# Patient Record
Sex: FEMALE | Race: WHITE | NOT HISPANIC OR LATINO | Employment: OTHER | URBAN - METROPOLITAN AREA
[De-identification: names, ages, dates, MRNs, and addresses within clinical notes are randomized per-mention and may not be internally consistent; named-entity substitution may affect disease eponyms.]

---

## 2017-02-08 ENCOUNTER — ALLSCRIPTS OFFICE VISIT (OUTPATIENT)
Dept: OTHER | Facility: OTHER | Age: 57
End: 2017-02-08

## 2017-02-08 ENCOUNTER — TRANSCRIBE ORDERS (OUTPATIENT)
Dept: ADMINISTRATIVE | Facility: HOSPITAL | Age: 57
End: 2017-02-08

## 2017-02-08 DIAGNOSIS — E34.8 OTHER SPECIFIED ENDOCRINE DISORDERS: Primary | ICD-10-CM

## 2017-02-08 DIAGNOSIS — E34.8 OTHER SPECIFIED ENDOCRINE DISORDERS: ICD-10-CM

## 2017-02-08 DIAGNOSIS — I10 ESSENTIAL (PRIMARY) HYPERTENSION: ICD-10-CM

## 2017-02-22 ENCOUNTER — GENERIC CONVERSION - ENCOUNTER (OUTPATIENT)
Dept: OTHER | Facility: OTHER | Age: 57
End: 2017-02-22

## 2017-02-23 ENCOUNTER — TRANSCRIBE ORDERS (OUTPATIENT)
Dept: LAB | Facility: CLINIC | Age: 57
End: 2017-02-23

## 2017-02-23 ENCOUNTER — APPOINTMENT (OUTPATIENT)
Dept: LAB | Facility: CLINIC | Age: 57
End: 2017-02-23
Payer: COMMERCIAL

## 2017-02-23 DIAGNOSIS — I10 ESSENTIAL (PRIMARY) HYPERTENSION: ICD-10-CM

## 2017-02-23 LAB
BUN SERPL-MCNC: 11 MG/DL (ref 5–25)
CREAT SERPL-MCNC: 0.78 MG/DL (ref 0.6–1.3)
GFR SERPL CREATININE-BSD FRML MDRD: >60 ML/MIN/1.73SQ M

## 2017-02-23 PROCEDURE — 36415 COLL VENOUS BLD VENIPUNCTURE: CPT

## 2017-02-23 PROCEDURE — 82565 ASSAY OF CREATININE: CPT

## 2017-02-23 PROCEDURE — 84520 ASSAY OF UREA NITROGEN: CPT

## 2017-05-09 ENCOUNTER — TRANSCRIBE ORDERS (OUTPATIENT)
Dept: ADMINISTRATIVE | Facility: HOSPITAL | Age: 57
End: 2017-05-09

## 2017-05-10 DIAGNOSIS — E34.8 OTHER SPECIFIED ENDOCRINE DISORDERS: ICD-10-CM

## 2017-05-12 ENCOUNTER — HOSPITAL ENCOUNTER (OUTPATIENT)
Dept: MRI IMAGING | Facility: HOSPITAL | Age: 57
Discharge: HOME | End: 2017-05-12
Attending: PSYCHIATRY & NEUROLOGY
Payer: COMMERCIAL

## 2017-05-12 DIAGNOSIS — E34.8 OTHER SPECIFIED ENDOCRINE DISORDERS: ICD-10-CM

## 2017-05-12 PROCEDURE — 70551 MRI BRAIN STEM W/O DYE: CPT

## 2017-05-15 ENCOUNTER — GENERIC CONVERSION - ENCOUNTER (OUTPATIENT)
Dept: OTHER | Facility: OTHER | Age: 57
End: 2017-05-15

## 2017-06-21 ENCOUNTER — HOSPITAL ENCOUNTER (EMERGENCY)
Facility: HOSPITAL | Age: 57
Discharge: HOME | End: 2017-06-21
Attending: EMERGENCY MEDICINE | Admitting: EMERGENCY MEDICINE
Payer: COMMERCIAL

## 2017-06-21 ENCOUNTER — APPOINTMENT (EMERGENCY)
Dept: CT IMAGING | Facility: HOSPITAL | Age: 57
End: 2017-06-21
Payer: COMMERCIAL

## 2017-06-21 VITALS
OXYGEN SATURATION: 99 % | HEART RATE: 93 BPM | RESPIRATION RATE: 18 BRPM | SYSTOLIC BLOOD PRESSURE: 186 MMHG | DIASTOLIC BLOOD PRESSURE: 91 MMHG | TEMPERATURE: 99.1 F | WEIGHT: 190 LBS

## 2017-06-21 DIAGNOSIS — N39.0 URINARY TRACT INFECTION: Primary | ICD-10-CM

## 2017-06-21 LAB
BACTERIA UR QL AUTO: ABNORMAL /HPF
BILIRUB UR QL STRIP: NEGATIVE
CLARITY UR: CLEAR
COLOR UR: YELLOW
GLUCOSE UR STRIP-MCNC: NEGATIVE MG/DL
HGB UR QL STRIP.AUTO: ABNORMAL
HOLD SPECIMEN: NORMAL
HOLD SPECIMEN: YES
HOLD SPECIMEN: YES
KETONES UR STRIP-MCNC: NEGATIVE MG/DL
LEUKOCYTE ESTERASE UR QL STRIP: ABNORMAL
NITRITE UR QL STRIP: NEGATIVE
NON-SQ EPI CELLS URNS QL MICRO: ABNORMAL /HPF
PH UR STRIP.AUTO: 7 [PH] (ref 4.5–8)
PROT UR STRIP-MCNC: NEGATIVE MG/DL
RBC #/AREA URNS AUTO: ABNORMAL /HPF
SP GR UR STRIP.AUTO: 1.01 (ref 1–1.03)
UROBILINOGEN UR QL STRIP.AUTO: 0.2 E.U./DL
WBC #/AREA URNS AUTO: ABNORMAL /HPF

## 2017-06-21 PROCEDURE — 81001 URINALYSIS AUTO W/SCOPE: CPT | Performed by: EMERGENCY MEDICINE

## 2017-06-21 PROCEDURE — 87086 URINE CULTURE/COLONY COUNT: CPT | Performed by: EMERGENCY MEDICINE

## 2017-06-21 PROCEDURE — 36415 COLL VENOUS BLD VENIPUNCTURE: CPT

## 2017-06-21 PROCEDURE — 99284 EMERGENCY DEPT VISIT MOD MDM: CPT

## 2017-06-21 PROCEDURE — 74176 CT ABD & PELVIS W/O CONTRAST: CPT

## 2017-06-21 RX ORDER — ACETAMINOPHEN 325 MG/1
975 TABLET ORAL ONCE
Status: COMPLETED | OUTPATIENT
Start: 2017-06-21 | End: 2017-06-21

## 2017-06-21 RX ORDER — LOSARTAN POTASSIUM 100 MG/1
100 TABLET ORAL DAILY
COMMUNITY
End: 2017-12-18

## 2017-06-21 RX ORDER — SULFAMETHOXAZOLE AND TRIMETHOPRIM 800; 160 MG/1; MG/1
1 TABLET ORAL 2 TIMES DAILY
Qty: 14 TABLET | Refills: 0 | Status: SHIPPED | OUTPATIENT
Start: 2017-06-21 | End: 2017-06-28

## 2017-06-21 RX ORDER — AMLODIPINE BESYLATE AND ATORVASTATIN CALCIUM 10; 10 MG/1; MG/1
1 TABLET, FILM COATED ORAL DAILY
COMMUNITY
End: 2017-12-18

## 2017-06-21 RX ORDER — SULFAMETHOXAZOLE AND TRIMETHOPRIM 800; 160 MG/1; MG/1
1 TABLET ORAL ONCE
Status: COMPLETED | OUTPATIENT
Start: 2017-06-21 | End: 2017-06-21

## 2017-06-21 RX ORDER — FUROSEMIDE 40 MG/1
40 TABLET ORAL DAILY PRN
COMMUNITY

## 2017-06-21 RX ORDER — PHENAZOPYRIDINE HYDROCHLORIDE 100 MG/1
200 TABLET, FILM COATED ORAL ONCE
Status: COMPLETED | OUTPATIENT
Start: 2017-06-21 | End: 2017-06-21

## 2017-06-21 RX ORDER — MONTELUKAST SODIUM 10 MG/1
10 TABLET ORAL
COMMUNITY
End: 2020-01-09 | Stop reason: SDUPTHER

## 2017-06-21 RX ORDER — PHENAZOPYRIDINE HYDROCHLORIDE 200 MG/1
200 TABLET, FILM COATED ORAL 3 TIMES DAILY
Qty: 9 TABLET | Refills: 0 | Status: SHIPPED | OUTPATIENT
Start: 2017-06-21 | End: 2017-06-24

## 2017-06-21 RX ORDER — PRAVASTATIN SODIUM 10 MG
10 TABLET ORAL DAILY
COMMUNITY
End: 2017-12-18

## 2017-06-21 RX ADMIN — SULFAMETHOXAZOLE AND TRIMETHOPRIM 1 TABLET: 800; 160 TABLET ORAL at 22:29

## 2017-06-21 RX ADMIN — ACETAMINOPHEN 975 MG: 325 TABLET, FILM COATED ORAL at 21:54

## 2017-06-21 RX ADMIN — PHENAZOPYRIDINE HYDROCHLORIDE 200 MG: 100 TABLET ORAL at 22:30

## 2017-06-23 LAB — BACTERIA UR CULT: NORMAL

## 2017-11-05 ENCOUNTER — APPOINTMENT (EMERGENCY)
Dept: RADIOLOGY | Facility: HOSPITAL | Age: 57
End: 2017-11-05
Payer: COMMERCIAL

## 2017-11-05 ENCOUNTER — APPOINTMENT (EMERGENCY)
Dept: ULTRASOUND IMAGING | Facility: HOSPITAL | Age: 57
End: 2017-11-05
Payer: COMMERCIAL

## 2017-11-05 ENCOUNTER — HOSPITAL ENCOUNTER (EMERGENCY)
Facility: HOSPITAL | Age: 57
Discharge: HOME | End: 2017-11-05
Attending: EMERGENCY MEDICINE
Payer: COMMERCIAL

## 2017-11-05 VITALS
WEIGHT: 197.97 LBS | SYSTOLIC BLOOD PRESSURE: 168 MMHG | OXYGEN SATURATION: 97 % | RESPIRATION RATE: 16 BRPM | HEIGHT: 63 IN | HEART RATE: 81 BPM | DIASTOLIC BLOOD PRESSURE: 84 MMHG | TEMPERATURE: 98.1 F | BODY MASS INDEX: 35.08 KG/M2

## 2017-11-05 DIAGNOSIS — S83.92XA LEFT KNEE SPRAIN: Primary | ICD-10-CM

## 2017-11-05 PROCEDURE — 73564 X-RAY EXAM KNEE 4 OR MORE: CPT

## 2017-11-05 PROCEDURE — 99284 EMERGENCY DEPT VISIT MOD MDM: CPT

## 2017-11-05 PROCEDURE — 93971 EXTREMITY STUDY: CPT

## 2017-11-05 RX ORDER — ACETAMINOPHEN 325 MG/1
650 TABLET ORAL ONCE
Status: COMPLETED | OUTPATIENT
Start: 2017-11-05 | End: 2017-11-05

## 2017-11-05 RX ADMIN — ACETAMINOPHEN 650 MG: 325 TABLET ORAL at 21:50

## 2017-11-06 NOTE — DISCHARGE INSTRUCTIONS
Knee Sprain   WHAT YOU NEED TO KNOW:   A knee sprain occurs when one or more ligaments in your knee are suddenly stretched or torn  Ligaments are tissues that hold bones together  Ligaments support the knee and keep the joint and bones in the correct position  DISCHARGE INSTRUCTIONS:   Return to the emergency department if:   · Any part of your leg feels cold, numb, or looks pale     Contact your healthcare provider if:   · You have new or increased swelling, bruising, or pain in your knee  · Your symptoms do not improve within 6 weeks, even with treatment  · You have questions or concerns about your condition or care  Medicines:   · NSAIDs , such as ibuprofen, help decrease swelling, pain, and fever  This medicine is available with or without a doctor's order  NSAIDs can cause stomach bleeding or kidney problems in certain people  If you take blood thinner medicine, always ask your healthcare provider if NSAIDs are safe for you  Always read the medicine label and follow directions  · Acetaminophen  decreases pain and fever  It is available without a doctor's order  Ask how much to take and how often to take it  Follow directions  Read the labels of all other medicines you are using to see if they also contain acetaminophen, or ask your doctor or pharmacist  Acetaminophen can cause liver damage if not taken correctly  Do not use more than 4 grams (4,000 milligrams) total of acetaminophen in one day  · Prescription pain medicine  may be given  Ask how to take this medicine safely  · Take your medicine as directed  Contact your healthcare provider if you think your medicine is not helping or if you have side effects  Tell him or her if you are allergic to any medicine  Keep a list of the medicines, vitamins, and herbs you take  Include the amounts, and when and why you take them  Bring the list or the pill bottles to follow-up visits   Carry your medicine list with you in case of an emergency  Self-care:   · Rest  your knee and do not exercise  You may be told to keep weight off your knee  This means that you should not walk on your injured leg  Rest helps decrease swelling and allows the injury to heal  You can do gentle range of motion (ROM) exercises as directed  This will prevent stiffness  · Apply ice  on your knee for 15 to 20 minutes every hour or as directed  Use an ice pack, or put crushed ice in a plastic bag  Cover it with a towel  Ice helps prevent tissue damage and decreases swelling and pain  · Apply compression to your knee as directed  You may need to wear an elastic bandage  This helps keep your injured knee from moving too much while it heals  You can loosen or tighten the elastic bandage to make it comfortable  It should be tight enough for you to feel support  It should not be so tight that it causes your toes to feel numb or tingly  If you are wearing an elastic bandage, take it off and rewrap it once a day  · Elevate your knee  above the level of your heart as often as you can  This will help decrease swelling and pain  Prop your leg on pillows or blankets to keep it elevated comfortably  Do not put pillows directly behind your knee  · Use support devices as directed:  Support devices such as a splint or brace may be needed  These devices limit movement and protect your joint while it heals  You may be given crutches to use until you can stand on your injured leg without pain  Use devices as directed  Physical therapy:  A physical therapist teaches you exercises to help improve movement and strength, and to decrease pain  Prevent another knee sprain:  Exercise your legs to keep your muscles strong  Strong leg muscles help protect your knee and prevent strain  The following may also prevent a knee sprain:  · Slowly start your exercise or training program   Slowly increase the time, distance, and intensity of your exercise   Sudden increases in training may cause you to injure your knee again  · Wear protective braces and equipment as directed  Braces may prevent your knee from moving the wrong way and causing another sprain  Protective equipment may support your bones and ligaments to prevent injury  · Warm up and stretch before exercise  Warm up by walking or using an exercise bike before starting your regular exercise  Do gentle stretches after warming up  This helps to loosen your muscles and decrease stress on your knee  Cool down and stretch after you exercise  · Wear shoes that fit correctly and support your feet  Replace your running or exercise shoes before the padding or shock absorption is worn out  Ask your healthcare provider which exercise shoes are best for you  Ask if you should wear special shoe inserts  Shoe inserts can help support your heels and arches or keep your foot lined up correctly in your shoes  Exercise on flat surfaces  Follow up with your healthcare provider as directed:  Write down your questions so you remember to ask them during your visits  © 2017 [address] [facility] Information is for End User's use only and may not be sold, redistributed or otherwise used for commercial purposes  All illustrations and images included in CareNotes® are the copyrighted property of [employer] , [employer]  or Robby Titus  The above information is an  only  It is not intended as medical advice for individual conditions or treatments  Talk to your doctor, nurse or pharmacist before following any medical regimen to see if it is safe and effective for you

## 2017-11-06 NOTE — ED PROVIDER NOTES
History  Chief Complaint   Patient presents with    Knee Pain     Pt  states tripped over tree roots and now has pain in left knee, denies hitting head or LOC  History provided by:  Patient  Knee Pain   Location:  Knee  Time since incident:  1 day  Injury: yes    Mechanism of injury: fall    Fall:     Fall occurred:  Tripped    Impact surface:  [place]    Point of impact:  Knees    Entrapped after fall: no    Knee location:  L knee  Pain details:     Quality:  Dull    Radiates to:  Does not radiate    Severity:  Moderate    Onset quality:  Gradual    Duration:  1 day    Timing:  Constant    Progression:  Unchanged  Chronicity:  New  Foreign body present:  No foreign bodies  Prior injury to area:  No  Relieved by:  None tried  Exacerbated by: walking  Ineffective treatments:  None tried  Associated symptoms: no back pain, no decreased ROM, no fatigue, no fever, no muscle weakness, no neck pain, no numbness, no stiffness, no swelling and no tingling        Prior to Admission Medications   Prescriptions Last Dose Informant Patient Reported? Taking? amLODIPine-atorvastatin (CADUET) 10-10 MG per tablet   Yes Yes   Sig: Take 1 tablet by mouth daily   furosemide (LASIX) 40 mg tablet   Yes Yes   Sig: Take 40 mg by mouth 2 (two) times a day   losartan (COZAAR) 100 MG tablet   Yes Yes   Sig: Take 100 mg by mouth daily   metoprolol tartrate (LOPRESSOR) 25 mg tablet   Yes Yes   Sig: Take 25 mg by mouth 2 (two) times a day   montelukast (SINGULAIR) 10 mg tablet   Yes Yes   Sig: Take 10 mg by mouth daily at bedtime   pravastatin (PRAVACHOL) 10 mg tablet   Yes Yes   Sig: Take 10 mg by mouth daily      Facility-Administered Medications: None       Past Medical History:   Diagnosis Date    Bladder prolapse, female, acquired     Epilepsy ([facility] [address] )     Hypertension     Kidney stone        Past Surgical History:   Procedure Laterality Date    HYSTERECTOMY         History reviewed  No pertinent family history    I have reviewed and agree with the history as documented  Social History   Substance Use Topics    Smoking status: Never Smoker    Smokeless tobacco: Never Used    Alcohol use No        Review of Systems   Constitutional: Negative for activity change, chills, fatigue and fever  Eyes: Negative for photophobia and visual disturbance  Respiratory: Negative for chest tightness, shortness of breath and wheezing  Cardiovascular: Negative for chest pain and palpitations  Gastrointestinal: Negative for abdominal pain, constipation, diarrhea, nausea and vomiting  Genitourinary: Negative for decreased urine volume, difficulty urinating, dysuria, genital sores and hematuria  Musculoskeletal: Positive for arthralgias and joint swelling  Negative for back pain, myalgias, neck pain, neck stiffness and stiffness  Skin: Negative for rash and wound  Neurological: Negative for dizziness, syncope, weakness, light-headedness, numbness and headaches  Hematological: Negative for adenopathy  All other systems reviewed and are negative  Physical Exam  ED Triage Vitals [11/05/17 2141]   Temperature Pulse Respirations Blood Pressure SpO2   98 1 °F (36 7 °C) 81 16 (!) 216/103 97 %      Temp Source Heart Rate Source Patient Position - Orthostatic VS BP Location FiO2 (%)   Oral Monitor Lying Right arm --      Pain Score       8           Orthostatic Vital Signs  Vitals:    11/05/17 2141 11/05/17 2330   BP: (!) 216/103 168/84   Pulse: 81    Patient Position - Orthostatic VS: Lying        Physical Exam   Constitutional: She is oriented to person, place, and time  She appears well-developed and well-nourished  No distress  HENT:   Head: Normocephalic and atraumatic  Right Ear: External ear normal    Left Ear: External ear normal    Nose: Nose normal    Mouth/Throat: Oropharynx is clear and moist    Eyes: Conjunctivae and EOM are normal    Neck: Normal range of motion  Neck supple     Cardiovascular: Normal rate, regular rhythm, normal heart sounds and intact distal pulses  Exam reveals no gallop and no friction rub  No murmur heard  Pulmonary/Chest: Effort normal and breath sounds normal  No respiratory distress  She has no wheezes  Abdominal: Soft  She exhibits no distension  There is no tenderness  There is no guarding  Musculoskeletal: Normal range of motion  She exhibits tenderness  She exhibits no edema or deformity  Tenderness to posterior aspect of left knee  Left knee swelling noted  No pitting edema, no erythema, no warmth   Neurological: She is alert and oriented to person, place, and time  No sensory deficit  She exhibits normal muscle tone  Coordination normal    Skin: Skin is warm and dry  Capillary refill takes less than 2 seconds  She is not diaphoretic  Nursing note and vitals reviewed  ED Medications  Medications   acetaminophen (TYLENOL) tablet 650 mg (650 mg Oral Given 11/5/17 2150)       Diagnostic Studies  Results Reviewed     None                 XR knee 4+ views LEFT    (Results Pending)   VAS lower limb venous duplex study, unilateral/limited    (Results Pending)              Procedures  Orthopedic Injury  Date/Time: 11/5/2017 11:34 PM  Performed by: Seema Orellana by: Angélica Perez   Consent: Verbal consent obtained    Risks and benefits: risks, benefits and alternatives were discussed  Injury location: knee  Location details: left knee  Injury type: soft tissue  Pre-procedure neurovascular assessment: neurovascularly intact  Pre-procedure distal perfusion: normal  Pre-procedure neurological function: normal  Pre-procedure range of motion: normal    Anesthesia:  Local anesthesia used: no  Immobilization: ace wrap  Post-procedure neurovascular assessment: post-procedure neurovascularly intact  Post-procedure distal perfusion: normal  Post-procedure neurological function: normal  Post-procedure range of motion: normal  Patient tolerance: Patient tolerated the procedure well with no immediate complications             Phone Contacts  ED Phone Contact    ED Course  ED Course                                MDM  Number of Diagnoses or Management Options  Left knee sprain:   Diagnosis management comments: Differential diagnosis to include not limited to:  Knee sprain, knee fracture, DVT, ligamentous injury    Patient is a 53-year-old female with past medical history of DVT presenting to the emergency department for evaluation of left knee pain  Patient states that yesterday when she was getting up her car she tripped over a tree root and landed on her left knee  She states she was able to get up and ambulate throughout the night  She tried ice which helped her swelling  She states that today when she was walking around she has no history of increased pain in her left knee and weakness  She states that she felt like her kneecap giving out all day  She has not tried any medications to help with pain  She states that many years ago she did have a DVT in her left leg after minor knee injury  She states she is very worried about that right now as her knee is very swollen  On exam patient does have painful full range of motion of her left knee  Sensation fully intact distally  2+ pulses distally  Her knee is 2 cm more swollen than the right knee  With posterior tenderness  No acute fracture seen on x-ray  Plan to get ultrasound rule out DVT, as she had 1 after similar injury in the past   If normal, will discharge her after Ace wrap and have her follow up with orthopedics  Will offer crutches         Amount and/or Complexity of Data Reviewed  Tests in the radiology section of CPT®: ordered and reviewed  Discuss the patient with other providers: yes    Risk of Complications, Morbidity, and/or Mortality  Presenting problems: low  Diagnostic procedures: moderate  Management options: low    Patient Progress  Patient progress: improved    CritCare Time    Disposition  Final diagnoses: Left knee sprain     Time reflects when diagnosis was documented in both MDM as applicable and the Disposition within this note     Time User Action Codes Description Comment    11/5/2017 11:33 PM Spencer Necessary Add Emmy Morales  [id number]] Left knee sprain       ED Disposition     ED Disposition Condition Comment    Discharge  [address] discharge to home/self care  Condition at discharge: Stable        Follow-up Information     Follow up With Specialties Details Why Contact Info Additional Information    [facility] [place]  Schedule an appointment as soon as possible for a visit in 2 weeks If symptoms worsen or persist [address] [facility] [address] Emergency Department Emergency Medicine  If symptoms worsen [address]  AN ED, [address], [place], [place], [address]        Patient's Medications   Discharge Prescriptions    No medications on file     No discharge procedures on file      ED Provider  Electronically Signed by           Natividad Vasquez PA-C  11/05/17 2187

## 2017-11-08 ENCOUNTER — HOSPITAL ENCOUNTER (EMERGENCY)
Facility: HOSPITAL | Age: 57
Discharge: HOME | End: 2017-11-08
Admitting: EMERGENCY MEDICINE
Payer: COMMERCIAL

## 2017-11-08 ENCOUNTER — APPOINTMENT (EMERGENCY)
Dept: CT IMAGING | Facility: HOSPITAL | Age: 57
End: 2017-11-08
Payer: COMMERCIAL

## 2017-11-08 VITALS
BODY MASS INDEX: 34.61 KG/M2 | HEIGHT: 63 IN | OXYGEN SATURATION: 97 % | WEIGHT: 195.33 LBS | HEART RATE: 72 BPM | SYSTOLIC BLOOD PRESSURE: 146 MMHG | DIASTOLIC BLOOD PRESSURE: 68 MMHG | RESPIRATION RATE: 18 BRPM | TEMPERATURE: 99 F

## 2017-11-08 DIAGNOSIS — M25.569 KNEE PAIN, ACUTE: Primary | ICD-10-CM

## 2017-11-08 PROCEDURE — 73700 CT LOWER EXTREMITY W/O DYE: CPT

## 2017-11-08 PROCEDURE — 99284 EMERGENCY DEPT VISIT MOD MDM: CPT

## 2017-11-08 RX ORDER — OXYCODONE HYDROCHLORIDE AND ACETAMINOPHEN 5; 325 MG/1; MG/1
1 TABLET ORAL EVERY 6 HOURS PRN
Qty: 8 TABLET | Refills: 0 | Status: SHIPPED | OUTPATIENT
Start: 2017-11-08 | End: 2017-11-08

## 2017-11-08 RX ORDER — OXYCODONE HYDROCHLORIDE AND ACETAMINOPHEN 5; 325 MG/1; MG/1
1 TABLET ORAL ONCE
Status: DISCONTINUED | OUTPATIENT
Start: 2017-11-08 | End: 2017-11-08 | Stop reason: HOSPADM

## 2017-11-08 RX ORDER — NAPROXEN 250 MG/1
500 TABLET ORAL ONCE
Status: COMPLETED | OUTPATIENT
Start: 2017-11-08 | End: 2017-11-08

## 2017-11-08 RX ORDER — TRAMADOL HYDROCHLORIDE 50 MG/1
50 TABLET ORAL EVERY 6 HOURS PRN
Qty: 6 TABLET | Refills: 0 | Status: SHIPPED | OUTPATIENT
Start: 2017-11-08 | End: 2017-11-14

## 2017-11-08 RX ORDER — NAPROXEN 500 MG/1
500 TABLET ORAL 2 TIMES DAILY WITH MEALS
Qty: 14 TABLET | Refills: 0 | Status: SHIPPED | OUTPATIENT
Start: 2017-11-08 | End: 2020-06-30

## 2017-11-08 RX ADMIN — NAPROXEN 500 MG: 250 TABLET ORAL at 13:18

## 2017-11-08 NOTE — DISCHARGE INSTRUCTIONS

## 2017-11-08 NOTE — ED PROVIDER NOTES
History  Chief Complaint   Patient presents with   Ann-Marie Humphries Fall     pt fell the other day, was here saturday for same, Xray &US done and were both negative; seen @ortho today &was told she can have MRI on wednesday but does not want to wait       History provided by:  Patient  Fall   Mechanism of injury: fall    Injury location: left knee  Incident location:  Outdoors  Time since incident:  3 days  Fall:     Fall occurred:  Tripped (uneven sidewalk)    Impact surface:  [place]    Entrapped after fall: no    Suspicion of alcohol use: no    Suspicion of drug use: no    Prior to arrival data:     Loss of consciousness: no      Amnesic to event: no        Prior to Admission Medications   Prescriptions Last Dose Informant Patient Reported? Taking? amLODIPine-atorvastatin (CADUET) 10-10 MG per tablet   Yes No   Sig: Take 1 tablet by mouth daily   furosemide (LASIX) 40 mg tablet   Yes No   Sig: Take 40 mg by mouth 2 (two) times a day   losartan (COZAAR) 100 MG tablet   Yes No   Sig: Take 100 mg by mouth daily   metoprolol tartrate (LOPRESSOR) 25 mg tablet   Yes No   Sig: Take 25 mg by mouth 2 (two) times a day   montelukast (SINGULAIR) 10 mg tablet   Yes No   Sig: Take 10 mg by mouth daily at bedtime   pravastatin (PRAVACHOL) 10 mg tablet   Yes No   Sig: Take 10 mg by mouth daily      Facility-Administered Medications: None       Past Medical History:   Diagnosis Date    Bladder prolapse, female, acquired     Epilepsy ([facility] [address] )     Hypertension     Kidney stone        Past Surgical History:   Procedure Laterality Date    HYSTERECTOMY         History reviewed  No pertinent family history  I have reviewed and agree with the history as documented  Social History   Substance Use Topics    Smoking status: Never Smoker    Smokeless tobacco: Never Used    Alcohol use No        Review of Systems   Constitutional: Positive for activity change  Musculoskeletal: Positive for arthralgias and gait problem     Skin: Negative for color change, pallor, rash and wound  Psychiatric/Behavioral: Negative for confusion  All other systems reviewed and are negative  Physical Exam  ED Triage Vitals [11/08/17 1214]   Temperature Pulse Respirations Blood Pressure SpO2   99 °F (37 2 °C) 70 18 143/67 96 %      Temp Source Heart Rate Source Patient Position - Orthostatic VS BP Location FiO2 (%)   Oral Monitor Lying Right arm --      Pain Score       8           Orthostatic Vital Signs  Vitals:    11/08/17 1214 11/08/17 1430   BP: 143/67 146/68   Pulse: 70 72   Patient Position - Orthostatic VS: Lying Sitting       Physical Exam   Constitutional: She is oriented to person, place, and time  She appears well-developed and well-nourished  No distress  Musculoskeletal: She exhibits no edema  Examination of the left knee  There is mild swelling upon inspection  The legs are aligned  There is tenderness palpated over the posterior and lateral joint line  There is tenderness with flexion beyond 90° also in this area  Medial lateral collateral ligaments are intact  There is negative Lachman's sign  There is no crepitance upon palpation of the patella  There is good range of motion of the hip and ankle in all planes of motion  There is palpable dorsalis pedis and posterior tibial pulses that are +2 and symmetrical  There is sensation and motor function to the deep and superficial peroneal, sural, posterior tibial nerve distributions  Neurological: She is alert and oriented to person, place, and time  Skin: Skin is warm  Capillary refill takes less than 2 seconds  No rash noted  She is not diaphoretic  No erythema  No pallor  Psychiatric: She has a normal mood and affect  Her behavior is normal  Judgment and thought content normal    Nursing note and vitals reviewed        ED Medications  Medications   oxyCODONE-acetaminophen (PERCOCET) 5-325 mg per tablet 1 tablet (1 tablet Oral Not Given 11/8/17 1320)   naproxen (NAPROSYN) tablet 500 mg (500 mg Oral Given 11/8/17 1318)       Diagnostic Studies  Results Reviewed     None                 CT knee left wo contrast   ED Interpretation by Abhishek Quintana PA-C (11/08 1412)   No fracture      Final Result by Courtney Li MD (11/08 1402)   No fracture  Workstation performed: [id number]                    Procedures  Procedures       Phone Contacts  ED Phone Contact    ED Course  ED Course as of Nov 08 1621 Wed Nov 08, 2017   1418  aware reviewed  No prescriptions                                MDM  Number of Diagnoses or Management Options  Knee pain, acute: new and requires workup     Amount and/or Complexity of Data Reviewed  Tests in the radiology section of CPT®: ordered and reviewed  Tests in the medicine section of CPT®: ordered and reviewed  Independent visualization of images, tracings, or specimens: (X-ray left knee reviewed 11/4/17)    Risk of Complications, Morbidity, and/or Mortality  Presenting problems: low  Diagnostic procedures: moderate  Management options: moderate  General comments: Patient presents emergency room for repeat evaluation of her left knee  She had a plants in twist injury to her left knee, 3 days ago  She was seen and evaluated in the emergency room an x-ray of her knee was obtained which revealed some degenerative joint disease but no fracture  She also had an ultrasound of her calf because of pain was posterior and the ruling out a DVT  She attempted to go to the orthopedic office today  They told her they have no available appointments, so they sent her to the emergency room for evaluation  She presents because she is demanding an MRI  She was evaluated and the x-rays were reviewed from 3 days ago which were normal  I ordered a CT scan in the emergency room demonstrates no fracture  She was placed in a knee immobilizer and given a cane for ambulation    I did write her a prescription for tramadol as well as anti-inflammatories medications, Naprosyn, to take until her follow-up appointment with Orthopedics next week  Patient Progress  Patient progress: stable    CritCare Time    Disposition  Final diagnoses:   Knee pain, acute - Acute left knee sprain     Time reflects when diagnosis was documented in both MDM as applicable and the Disposition within this note     Time User Action Codes Description Comment    11/8/2017  2:24 PM Evershagufta Gleasoner Add [M25 569] Knee pain, acute     11/8/2017  2:24 PM Everlean Rafter Modify [M25 569] Knee pain, acute left    11/8/2017  2:25 PM Everlean Rafter Modify [M25 569] Knee pain, acute Acute left knee sprain      ED Disposition     ED Disposition Condition Comment    Discharge  Michelle Olivo Robbymanojlong discharge to home/self care      Condition at discharge: Good        Follow-up Information     Follow up With Specialties Details Shyam Blackman MD Family Medicine   [address] [place]      Veronique Recinos, DO Orthopedic Surgery  as scheduled next week [address] [place] [phone]          Discharge Medication List as of 11/8/2017  2:18 PM      START taking these medications    Details   naproxen (NAPROSYN) 500 mg tablet Take 1 tablet by mouth 2 (two) times a day with meals, Starting Wed 11/8/2017, Print      oxyCODONE-acetaminophen (PERCOCET) 5-325 mg per tablet Take 1 tablet by mouth every 6 (six) hours as needed for moderate pain for up to 8 days  aware reviewed Max Daily Amount: 4 tablets, Starting Wed 11/8/2017, Until u 11/16/2017, Print         CONTINUE these medications which have NOT CHANGED    Details   amLODIPine-atorvastatin (CADUET) 10-10 MG per tablet Take 1 tablet by mouth daily, Historical Med      furosemide (LASIX) 40 mg tablet Take 40 mg by mouth 2 (two) times a day, Historical Med      losartan (COZAAR) 100 MG tablet Take 100 mg by mouth daily, Historical Med      metoprolol tartrate (LOPRESSOR) 25 mg tablet Take 25 mg by mouth 2 (two) times a day, Historical Med      montelukast (SINGULAIR) 10 mg tablet Take 10 mg by mouth daily at bedtime, Historical Med      pravastatin (PRAVACHOL) 10 mg tablet Take 10 mg by mouth daily, Historical Med           No discharge procedures on file      ED Provider  Electronically Signed by           Otoniel Thompson PA-C  11/08/17 6336

## 2017-11-08 NOTE — ED NOTES
Pt stable, no distress noted, pt amb from ER with cane without difficulty     Kassie Paredes, RN  11/08/17 2657

## 2017-11-08 NOTE — ED NOTES
Pt expressed that she is unhappy with care, pt stating that she went to ortho office to schedule and appt and can't be seen until Wednesday and that is unacceptable, pt states she has hx of stroke and when she is in pain her BP increases and that is not acceptable, pt insists there is something wrong with her knee, pt wants MRI done in ER, explained to patient that an MRI will not be completed in ER, she will have to see Ortho and they will determine the need for an MRI, pt unhappy with explanation, adv MD will be in to see patient shortly and determine further course of treatment, patient mother heard calling nurse expletive name upon leaving the room       Alex Rodriguez RN  11/08/17 7790

## 2017-11-20 ENCOUNTER — ALLSCRIPTS OFFICE VISIT (OUTPATIENT)
Dept: OTHER | Facility: OTHER | Age: 57
End: 2017-11-20

## 2017-11-20 DIAGNOSIS — S83.412A SPRAIN OF MEDIAL COLLATERAL LIGAMENT OF LEFT KNEE: ICD-10-CM

## 2017-11-21 NOTE — PROGRESS NOTES
Assessment    1  Sprain of medial collateral ligament of left knee, initial encounter (844 1) ([id number])    Plan  Sprain of medial collateral ligament of left knee, initial encounter    · *1 - [facility]  * evaluate and treat 2 times a week for 6-8weeks  Patient may also have meniscus tear not getting MRI at this time  Status: Active Requested for: 20Nov2017  Care Summary provided  : Yes    Discussion/Summary    Left knee injury suspect back medial collateral ligaments brain possible medial meniscus tearI offered cortisone injection patient declinedWe will send her to physical therapy T ROM brace applied in the office today  Follow-up in 4 weeks but she is going away I did talk to her about signs and symptoms that may allow her to progress with range of motion and unlocking the brace at 4 weeks if she is not having pain with valgus stress  She will come back to see us in 4-6 weeks at which time we will evaluate if we can get rid of the brace  If she is still having pain we will look at an MRI for the medial meniscus  History of Present Illness  HPI: Patient comes in today with regards to her left knee  She fell over stump by her house and basically twisted her knee  She fell onto her knee as well  She reports the pain ranges from 4 out 10-8 out 10  Ice brace and pain killers have helped  Tylenol has helped as well  Pain is posterior medially  Patient reports past medical history of high blood pressure stroke heart disorder seizures  She also reports seizures after having steroid injections  She also reports depression stomach problems  She also reports a goiter and arthritis  Review of Systems   Constitutional: No fever, no chills, feels well, no tiredness, no recent weight gain or loss  Eyes: No complaints of eyesight problems, no red eyes  ENT: no loss of hearing, no nosebleeds, no sore throat    Cardiovascular: No complaints of chest pain, no palpitations, no leg claudication or lower extremity edema  Respiratory: no compliants of shortness of breath, no wheezing, no cough  Gastrointestinal: no complaints of abdominal pain, no constipation, no nausea or diarrhea, no vomiting, no bloody stools  Genitourinary: no complaints of dysuria, no incontinence  Musculoskeletal: as noted in HPI  Integumentary: no complaints of skin rash or lesion, no itching or dry skin, no skin wounds  Neurological: no complaints of headache, no confusion, no numbness or tingling, no dizziness  Endocrine: No complaints of muscle weakness, no feelings of weakness, no frequent urination, no excessive thirst   Psychiatric: No suicidal thoughts, no anxiety, no feelings of depression  Active Problems  1  Esophageal reflux (530 81) (K21 9)   2  Hypertension (401 9) (I10)   3  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)   4  Seizures (780 39) (R56 9)   5  Tension headache (307 81) (G44 209)    Past Medical History   · History of Depression (311) (F32 9)   · History of Epilepsy (345 90)   · History of Head Injury (959 01)   · History of diverticulitis of colon (V12 79) (Z87 19)   · History of headache (V13 89) (G83 607)   · History of hyperlipidemia (V12 29) (Z86 39)   · History of myocardial infarction (412) (I25 2)   · History of renal calculi (V13 01) (Z87 442)   · History of transient cerebral ischemia (V12 54) (Z86 73)   · History of Irregular heart beat (427 9) (I49 9)   · History of Nephrolithiasis (V13 01)   · History of Nephrolithiasis (592 0) (N20 0)   · History of Post traumatic stress disorder (309 81) (F43 10)    The active problems and past medical history were reviewed and updated today  Surgical History   · History of Hysterectomy   · History of Salpingo-oophorectomy Bilateral    The surgical history was reviewed and updated today         Family History  Mother    · Family history of Hypertension (V17 49)  Father    · Family history of diabetes mellitus (V18 0) (Z83 3)   · Family history of hypertension (V17 49) (Z82 49)   · Family history of Hypertension (V17 49)  Maternal Grandmother    · Family history of hypertension (V17 49) (Z82 49)   · Family history of Hypertension (V17 49)    The family history was reviewed and updated today  Social History     · Alcohol use   · Being A Social Drinker   · Caffeine use (V49 89) (F15 90)   · Never A Smoker   · Never smoker   · Never Used Drugs   · Single  The social history was reviewed and updated today  Current Meds   1  AmLODIPine Besylate 10 MG Oral Tablet; Take 1 tablet daily; Therapy: (Recorded:15Oct2013) to Recorded   2   MG TABS; take one tablet po q8h prn with food; Therapy: (Recorded:12Sep2014) to Recorded   3  Losartan Potassium 100 MG Oral Tablet; TAKE 1 TABLET DAILY; Therapy: (Recorded:12Sep2014) to Recorded   4  Omeprazole 40 MG Oral Capsule Delayed Release; TAKE 1 CAPSULE DAILY; Therapy: (Recorded:12Sep2014) to Recorded   5  Pravachol 40 MG Oral Tablet; TAKE 1 TABLET DAILY AS DIRECTED; Therapy: (Recorded:12Sep2014) to Recorded   6  Singulair 10 MG Oral Tablet; TAKE 1 TABLET DAILY; Therapy: (Recorded:15Oct2013) to Recorded   7  Torsemide 10 MG Oral Tablet; Take 1 tablet daily; Therapy: [id number] to Recorded    The medication list was reviewed and updated today  Allergies  1  Dilantin CAPS   2  Lidocaine HCl SOLN   3  Novocain SOLN  4  IVP Dye   5  IV Dye    Vitals  Signs     Heart Rate: 91  Systolic: 841  Diastolic: 85  Height: 5 ft 4 in  Weight: 196 lb 7 04 oz  BMI Calculated: 33 72  BSA Calculated: 1 95    Physical Exam  No audible wheeze no shortness of breath no appreciable erythema  Examination of patient's left knee there is mild laxity with valgus stress compared to the other extremity  There is pinpoint tenderness over the medial joint line especially with the medial collateral ligament is  Bounce test is negative but Deb's test does cause pain    Constitutional - General appearance: Normal   Musculoskeletal - Digits and nails: Normal -- Inspection/palpation of joints, bones, and muscles: Normal -- Muscle strength/tone: Normal   Cardiovascular - Pulses: Normal   Skin - Skin and subcutaneous tissue: Normal   Neurologic - Reflexes: Normal -- Sensation: Normal   Psychiatric - Orientation to person, place, and time: Normal -- Mood and affect: Normal   Eyes  Conjunctiva and lids: Normal        Results/Data  I personally reviewed the films/images/results in the office today  My interpretation follows  X-ray Review No osseous deformity  Future Appointments    Date/Time Provider Specialty Site   02/08/2018 10:00 AM JESSICA Butterfield   Neurology [facility] 21 12/18/2017 03:40 PM Heath Rogers DO Orthopedic Surgery [facility]  [address]       Signatures   Electronically signed by : Tea Garner DO; Nov 20 2017  2:51PM EST                       (Author)

## 2017-12-18 ENCOUNTER — GENERIC CONVERSION - ENCOUNTER (OUTPATIENT)
Dept: OTHER | Facility: OTHER | Age: 57
End: 2017-12-18

## 2017-12-18 ENCOUNTER — APPOINTMENT (EMERGENCY)
Dept: ULTRASOUND IMAGING | Facility: HOSPITAL | Age: 57
End: 2017-12-18
Payer: COMMERCIAL

## 2017-12-18 ENCOUNTER — HOSPITAL ENCOUNTER (EMERGENCY)
Facility: HOSPITAL | Age: 57
Discharge: HOME | End: 2017-12-18
Attending: EMERGENCY MEDICINE | Admitting: EMERGENCY MEDICINE
Payer: COMMERCIAL

## 2017-12-18 VITALS
RESPIRATION RATE: 18 BRPM | TEMPERATURE: 98.6 F | BODY MASS INDEX: 34.05 KG/M2 | OXYGEN SATURATION: 96 % | HEART RATE: 60 BPM | SYSTOLIC BLOOD PRESSURE: 170 MMHG | DIASTOLIC BLOOD PRESSURE: 78 MMHG | WEIGHT: 192.24 LBS

## 2017-12-18 DIAGNOSIS — M79.89 LEG SWELLING: Primary | ICD-10-CM

## 2017-12-18 LAB
APTT PPP: 32 SECONDS (ref 23–35)
BASOPHILS # BLD AUTO: 0.03 THOUSANDS/ΜL (ref 0–0.1)
BASOPHILS NFR BLD AUTO: 0 % (ref 0–1)
EOSINOPHIL # BLD AUTO: 0.17 THOUSAND/ΜL (ref 0–0.61)
EOSINOPHIL NFR BLD AUTO: 2 % (ref 0–6)
ERYTHROCYTE [DISTWIDTH] IN BLOOD BY AUTOMATED COUNT: 13 % (ref 11.6–15.1)
HCT VFR BLD AUTO: 39.8 % (ref 34.8–46.1)
HGB BLD-MCNC: 13.6 G/DL (ref 11.5–15.4)
INR PPP: 0.86 (ref 0.86–1.16)
LYMPHOCYTES # BLD AUTO: 1.81 THOUSANDS/ΜL (ref 0.6–4.47)
LYMPHOCYTES NFR BLD AUTO: 22 % (ref 14–44)
MCH RBC QN AUTO: 30.6 PG (ref 26.8–34.3)
MCHC RBC AUTO-ENTMCNC: 34.2 G/DL (ref 31.4–37.4)
MCV RBC AUTO: 89 FL (ref 82–98)
MONOCYTES # BLD AUTO: 0.67 THOUSAND/ΜL (ref 0.17–1.22)
MONOCYTES NFR BLD AUTO: 8 % (ref 4–12)
NEUTROPHILS # BLD AUTO: 5.66 THOUSANDS/ΜL (ref 1.85–7.62)
NEUTS SEG NFR BLD AUTO: 68 % (ref 43–75)
PLATELET # BLD AUTO: 270 THOUSANDS/UL (ref 149–390)
PMV BLD AUTO: 10.9 FL (ref 8.9–12.7)
PROTHROMBIN TIME: 12 SECONDS (ref 12.1–14.4)
RBC # BLD AUTO: 4.45 MILLION/UL (ref 3.81–5.12)
WBC # BLD AUTO: 8.34 THOUSAND/UL (ref 4.31–10.16)

## 2017-12-18 PROCEDURE — 85610 PROTHROMBIN TIME: CPT | Performed by: EMERGENCY MEDICINE

## 2017-12-18 PROCEDURE — 85730 THROMBOPLASTIN TIME PARTIAL: CPT | Performed by: EMERGENCY MEDICINE

## 2017-12-18 PROCEDURE — 99284 EMERGENCY DEPT VISIT MOD MDM: CPT

## 2017-12-18 PROCEDURE — 36415 COLL VENOUS BLD VENIPUNCTURE: CPT | Performed by: EMERGENCY MEDICINE

## 2017-12-18 PROCEDURE — 93971 EXTREMITY STUDY: CPT

## 2017-12-18 PROCEDURE — 85025 COMPLETE CBC W/AUTO DIFF WBC: CPT | Performed by: EMERGENCY MEDICINE

## 2017-12-18 RX ORDER — AMLODIPINE BESYLATE 10 MG/1
1 TABLET ORAL DAILY
COMMUNITY

## 2017-12-18 RX ORDER — TRAMADOL HYDROCHLORIDE 50 MG/1
50 TABLET ORAL EVERY 6 HOURS PRN
Qty: 30 TABLET | Refills: 0 | Status: SHIPPED | OUTPATIENT
Start: 2017-12-18 | End: 2017-12-28

## 2017-12-18 RX ORDER — TRAMADOL HYDROCHLORIDE 50 MG/1
50 TABLET ORAL EVERY 6 HOURS PRN
COMMUNITY
Start: 2017-12-01 | End: 2017-12-18

## 2017-12-18 RX ORDER — LOSARTAN POTASSIUM 100 MG/1
100 TABLET ORAL DAILY
COMMUNITY

## 2017-12-18 RX ORDER — ATORVASTATIN CALCIUM 40 MG/1
40 TABLET, FILM COATED ORAL DAILY
COMMUNITY

## 2017-12-18 NOTE — ED PROVIDER NOTES
History  Chief Complaint   Patient presents with    Leg Swelling     tore left mcl after a fall  was at ortho today  possible blood clot edema to left leg  pt has been having swelling and pain  61-year-old female sent down from Orthopedics office to rule out DVT  Patient has a a sprain needed she is being followed for by Orthopedics was seen in our office today was concerned for having a DVT consistent she has continued to have a lot of swelling and pain since initial injury  History provided by:  Patient   used: No    Leg Pain   Location:  Leg  Time since incident:  3 weeks  Injury: yes    Mechanism of injury: fall    Fall:     Fall occurred:  Tripped and walking    Impact surface:  Dirt    Point of impact:  Unable to specify    Entrapped after fall: no    Leg location:  L lower leg  Pain details:     Quality:  Cramping and pressure    Radiates to:  Does not radiate    Severity:  Severe    Onset quality:  Gradual    Duration:  3 weeks    Timing:  Constant    Progression:  Worsening  Chronicity:  New  Dislocation: no    Foreign body present:  No foreign bodies  Tetanus status:  Up to date  Prior injury to area:  No  Ineffective treatments:  None tried  Associated symptoms: decreased ROM and swelling    Associated symptoms: no back pain, no fatigue and no fever    Swelling:     Location:  Leg    Onset quality:  Gradual    Duration:  3 weeks    Timing:  Constant    Progression:  Worsening    Chronicity:  New  Risk factors: no concern for non-accidental trauma and no recent illness        Prior to Admission Medications   Prescriptions Last Dose Informant Patient Reported? Taking?    amLODIPine (NORVASC) 10 mg tablet   Yes Yes   Sig: Take 1 tablet by mouth daily   atorvastatin (LIPITOR) 40 mg tablet   Yes Yes   Sig: Take 40 mg by mouth daily   furosemide (LASIX) 40 mg tablet   Yes Yes   Sig: Take 40 mg by mouth daily     losartan (COZAAR) 100 MG tablet   Yes Yes   Sig: Take 100 mg by mouth daily   metoprolol tartrate (LOPRESSOR) 25 mg tablet   Yes Yes   Sig: Take 25 mg by mouth 2 (two) times a day   montelukast (SINGULAIR) 10 mg tablet   Yes Yes   Sig: Take 10 mg by mouth daily at bedtime   naproxen (NAPROSYN) 500 mg tablet   No Yes   Sig: Take 1 tablet by mouth 2 (two) times a day with meals   traMADol (ULTRAM) 50 mg tablet   Yes Yes   Sig: Take 50 mg by mouth every 6 (six) hours as needed      Facility-Administered Medications: None       Past Medical History:   Diagnosis Date    Bladder prolapse, female, acquired     Epilepsy ([facility] [address] )     Hypertension     Kidney stone     Tear of MCL (medial collateral ligament) of knee        Past Surgical History:   Procedure Laterality Date    HYSTERECTOMY         History reviewed  No pertinent family history  I have reviewed and agree with the history as documented  Social History   Substance Use Topics    Smoking status: Never Smoker    Smokeless tobacco: Never Used    Alcohol use No        Review of Systems   Constitutional: Negative for fatigue and fever  HENT: Negative for congestion and ear pain  Eyes: Negative for discharge and redness  Respiratory: Negative for apnea, cough, shortness of breath and wheezing  Cardiovascular: Negative for chest pain  Gastrointestinal: Negative for abdominal pain and diarrhea  Endocrine: Negative for cold intolerance and polydipsia  Genitourinary: Negative for difficulty urinating and hematuria  Musculoskeletal: Negative for arthralgias and back pain  Skin: Negative for color change and rash  Allergic/Immunologic: Negative for environmental allergies and immunocompromised state  Neurological: Negative for numbness and headaches  Hematological: Negative for adenopathy  Does not bruise/bleed easily  Psychiatric/Behavioral: Negative for agitation and behavioral problems         Physical Exam  ED Triage Vitals   Temperature Pulse Respirations Blood Pressure SpO2   12/18/17 1857 12/18/17 1821 12/18/17 1821 12/18/17 1821 12/18/17 1821   98 6 °F (37 °C) 82 18 (!) 210/102 99 %      Temp Source Heart Rate Source Patient Position - Orthostatic VS BP Location FiO2 (%)   12/18/17 1821 12/18/17 1821 12/18/17 1821 12/18/17 1821 --   Oral Monitor Lying Right arm       Pain Score       12/18/17 1821       5           Orthostatic Vital Signs  Vitals:    12/18/17 1821 12/18/17 1858 12/18/17 1900 12/18/17 2116   BP: (!) 210/102 (!) 197/93 (!) 197/93 170/78   Pulse: 82  74 60   Patient Position - Orthostatic VS: Lying Lying  Lying       Physical Exam   Constitutional: She is oriented to person, place, and time  Vital signs are normal  She appears well-developed and well-nourished  Non-toxic appearance  HENT:   Head: Normocephalic and atraumatic  Right Ear: Tympanic membrane and external ear normal    Left Ear: Tympanic membrane and external ear normal    Nose: Nose normal  No rhinorrhea, sinus tenderness or nasal deformity  Mouth/Throat: Uvula is midline and oropharynx is clear and moist  Normal dentition  Eyes: Conjunctivae, EOM and lids are normal  Pupils are equal, round, and reactive to light  Right eye exhibits no discharge  Left eye exhibits no discharge  Neck: Trachea normal and normal range of motion  Neck supple  No JVD present  Carotid bruit is not present  Cardiovascular: Normal rate, regular rhythm, intact distal pulses and normal pulses  No extrasystoles are present  PMI is not displaced  Pulmonary/Chest: Effort normal and breath sounds normal  No accessory muscle usage  No respiratory distress  She has no wheezes  She has no rhonchi  She has no rales  Abdominal: Soft  Normal appearance and bowel sounds are normal  She exhibits no mass  There is no tenderness  There is no rigidity, no rebound and no guarding  Musculoskeletal:        Right shoulder: She exhibits normal range of motion, no bony tenderness, no swelling and no deformity          Cervical back: Normal  She exhibits normal range of motion, no tenderness, no bony tenderness and no deformity  Left lower leg: She exhibits tenderness and swelling (Mild)  Lymphadenopathy:     She has no cervical adenopathy  She has no axillary adenopathy  Neurological: She is alert and oriented to person, place, and time  She has normal strength and normal reflexes  No cranial nerve deficit or sensory deficit  GCS eye subscore is 4  GCS verbal subscore is 5  GCS motor subscore is 6  Skin: Skin is warm and dry  No rash noted  Psychiatric: She has a normal mood and affect  Her speech is normal and behavior is normal    Nursing note and vitals reviewed  ED Medications  Medications - No data to display    Diagnostic Studies  Results Reviewed     Procedure Component Value Units Date/Time    Protime-INR [[id number]]  (Abnormal) Collected:  12/18/17 1855    Lab Status:  Final result Specimen:  Blood from Arm, Left Updated:  12/18/17 1926     Protime 12 0 (L) seconds      INR 0 86    APTT [[id number]]  (Normal) Collected:  12/18/17 1855    Lab Status:  Final result Specimen:  Blood from Arm, Left Updated:  12/18/17 1926     PTT 32 seconds     Narrative:          Therapeutic Heparin Range = 60-90 seconds    CBC and differential [[id number]]  (Normal) Collected:  12/18/17 1855    Lab Status:  Final result Specimen:  Blood from Arm, Left Updated:  12/18/17 1926     WBC 8 34 Thousand/uL      RBC 4 45 Million/uL      Hemoglobin 13 6 g/dL      Hematocrit 39 8 %      MCV 89 fL      MCH 30 6 pg      MCHC 34 2 g/dL      RDW 13 0 %      MPV 10 9 fL      Platelets 720 Thousands/uL      Neutrophils Relative 68 %      Lymphocytes Relative 22 %      Monocytes Relative 8 %      Eosinophils Relative 2 %      Basophils Relative 0 %      Neutrophils Absolute 5 66 Thousands/µL      Lymphocytes Absolute 1 81 Thousands/µL      Monocytes Absolute 0 67 Thousand/µL      Eosinophils Absolute 0 17 Thousand/µL      Basophils Absolute 0 03 Thousands/µL VAS lower limb venous duplex study, unilateral/limited   Final Result by Bam Tran MD (12/18 2237)                 Procedures  Procedures       Phone Contacts  ED Phone Contact    ED Course  ED Course                                MDM  Number of Diagnoses or Management Options  Leg swelling: new and requires workup     Amount and/or Complexity of Data Reviewed  Clinical lab tests: ordered and reviewed  Tests in the radiology section of CPT®: ordered and reviewed  Tests in the medicine section of CPT®: ordered and reviewed    Risk of Complications, Morbidity, and/or Mortality  General comments: Discussed results with patient she will have to follow up with Orthopedics will temporarily write for some tramadol which she specifically requested because she states the only pain medication she can take  Patient Progress  Patient progress: stable    CritCare Time    Disposition  Final diagnoses:   Leg swelling     Time reflects when diagnosis was documented in both MDM as applicable and the Disposition within this note     Time User Action Codes Description Comment    12/18/2017  8:40 PM Gwyn Gallagheres Add [M79 89] Leg swelling       ED Disposition     ED Disposition Condition Comment    Discharge  [address] discharge to home/self care      Condition at discharge: Good        Follow-up Information     Follow up With Specialties Details Why Contact Info    Rolando López MD [facility] Medicine Schedule an appointment as soon as possible for a visit  [address],  Orthopedic Surgery Schedule an appointment as soon as possible for a visit  [address] [place] [address]  [phone]          Discharge Medication List as of 12/18/2017  8:41 PM      CONTINUE these medications which have CHANGED    Details   traMADol (ULTRAM) 50 mg tablet Take 1 tablet by mouth every 6 (six) hours as needed for severe pain for up to 10 days, Starting Mon 12/18/2017, Until Thu 12/28/2017, Print         CONTINUE these medications which have NOT CHANGED    Details   amLODIPine (NORVASC) 10 mg tablet Take 1 tablet by mouth daily, Historical Med      atorvastatin (LIPITOR) 40 mg tablet Take 40 mg by mouth daily, Historical Med      furosemide (LASIX) 40 mg tablet Take 40 mg by mouth daily  , Historical Med      losartan (COZAAR) 100 MG tablet Take 100 mg by mouth daily, Historical Med      metoprolol tartrate (LOPRESSOR) 25 mg tablet Take 25 mg by mouth 2 (two) times a day, Historical Med      montelukast (SINGULAIR) 10 mg tablet Take 10 mg by mouth daily at bedtime, Historical Med      naproxen (NAPROSYN) 500 mg tablet Take 1 tablet by mouth 2 (two) times a day with meals, Starting Wed 11/8/2017, Print           No discharge procedures on file      ED Provider  Electronically Signed by           Kristal Dockery, DO  12/19/17 0030

## 2017-12-19 NOTE — DISCHARGE INSTRUCTIONS
Leg Pain   WHAT YOU NEED TO KNOW:   Leg pain may be caused by a variety of health conditions  Your tests did not show any broken bones or blood clots  DISCHARGE INSTRUCTIONS:   Return to the emergency department if:   · You have a fever  · Your leg starts to swell  · Your leg pain gets worse  · You have numbness or tingling in your leg or toes  · You cannot put any weight on or move your leg  Contact your healthcare provider if:   · Your pain does not decrease, even after treatment  · You have questions or concerns about your condition or care  Medicines:   · NSAIDs , such as ibuprofen, help decrease swelling, pain, and fever  This medicine is available with or without a doctor's order  NSAIDs can cause stomach bleeding or kidney problems in certain people  If you take blood thinner medicine, always ask your healthcare provider if NSAIDs are safe for you  Always read the medicine label and follow directions  · Take your medicine as directed  Contact your healthcare provider if you think your medicine is not helping or if you have side effects  Tell him of her if you are allergic to any medicine  Keep a list of the medicines, vitamins, and herbs you take  Include the amounts, and when and why you take them  Bring the list or the pill bottles to follow-up visits  Carry your medicine list with you in case of an emergency  Follow up with your healthcare provider as directed: You may need more tests to find the cause of your leg pain  You may need to see an orthopedic specialist or a physical therapist  Write down your questions so you remember to ask them during your visits  Manage your leg pain:   · Rest  your injured leg so that it can heal  You may need an immobilizer, brace, or splint to limit the movement of your leg  You may need to avoid putting any weight on your leg for at least 48 hours  Return to normal activities as directed      · Ice  the injury for 20 minutes every 4 hours for up to 24 hours, or as directed  Use an ice pack, or put crushed ice in a plastic bag  Cover it with a towel to protect your skin  Ice helps prevent tissue damage and decreases swelling and pain  · Elevate  your injured leg above the level of your heart as often as you can  This will help decrease swelling and pain  If possible, prop your leg on pillows or blankets to keep the area elevated comfortably  · Use assistive devices as directed  You may need to use a cane or crutches  Assistive devices help decrease pain and pressure on your leg when you walk  Ask your healthcare provider for more information about assistive devices and how to use them correctly  · Maintain a healthy weight  Extra body weight can cause pressure and pain in your hip, knee, and ankle joints  Ask your healthcare provider how much you should weigh  Ask him to help you create a weight loss plan if you are overweight  © 2017 [address] [employer] Information is for End User's use only and may not be sold, redistributed or otherwise used for commercial purposes  All illustrations and images included in CareNotes® are the copyrighted property of [employer]  or [employer]  The above information is an  only  It is not intended as medical advice for individual conditions or treatments  Talk to your doctor, nurse or pharmacist before following any medical regimen to see if it is safe and effective for you

## 2018-01-02 ENCOUNTER — APPOINTMENT (OUTPATIENT)
Dept: PHYSICAL THERAPY | Facility: REHABILITATION | Age: 58
End: 2018-01-02
Payer: COMMERCIAL

## 2018-01-02 ENCOUNTER — GENERIC CONVERSION - ENCOUNTER (OUTPATIENT)
Dept: OBGYN CLINIC | Facility: CLINIC | Age: 58
End: 2018-01-02

## 2018-01-02 DIAGNOSIS — S83.412A SPRAIN OF MEDIAL COLLATERAL LIGAMENT OF LEFT KNEE: ICD-10-CM

## 2018-01-02 PROCEDURE — G8979 MOBILITY GOAL STATUS: HCPCS

## 2018-01-02 PROCEDURE — 97161 PT EVAL LOW COMPLEX 20 MIN: CPT

## 2018-01-02 PROCEDURE — G8978 MOBILITY CURRENT STATUS: HCPCS

## 2018-01-04 ENCOUNTER — OFFICE VISIT (OUTPATIENT)
Dept: PHYSICAL THERAPY | Facility: REHABILITATION | Age: 58
End: 2018-01-04
Payer: COMMERCIAL

## 2018-01-04 ENCOUNTER — APPOINTMENT (OUTPATIENT)
Dept: PHYSICAL THERAPY | Facility: REHABILITATION | Age: 58
End: 2018-01-04
Payer: COMMERCIAL

## 2018-01-04 PROCEDURE — 97140 MANUAL THERAPY 1/> REGIONS: CPT

## 2018-01-04 PROCEDURE — 97110 THERAPEUTIC EXERCISES: CPT

## 2018-01-09 ENCOUNTER — APPOINTMENT (OUTPATIENT)
Dept: PHYSICAL THERAPY | Facility: REHABILITATION | Age: 58
End: 2018-01-09
Payer: COMMERCIAL

## 2018-01-09 PROCEDURE — 97110 THERAPEUTIC EXERCISES: CPT

## 2018-01-09 PROCEDURE — 97140 MANUAL THERAPY 1/> REGIONS: CPT

## 2018-01-10 NOTE — RESULT NOTES
Verified Results  * MRI BRAIN WO CONTRAST [id number] 07:16PM Lucia Ask Order Number: [id number]   Performing Comments: follow up pineal gland mass   - Patient Instructions: To schedule this appointment, please contact Central Scheduling at [phone]  Test Name Result Flag Reference   MRI BRAIN WO CONTRAST (Report)     MRI BRAIN WITHOUT CONTRAST     INDICATION: Follow-up pineal cyst  History of headache  History of epilepsy  COMPARISON:  3/19/2015 is the most recent of 3 previous MRI exams  TECHNIQUE: Sagittal T1, axial T2, axial FLAIR, axial T1, axial [place] and axial diffusion imaging  Coronal T2 and coronal FLAIR  IMAGE QUALITY: Diagnostic  FINDINGS:     BRAIN PARENCHYMA: Stable subcentimeter pineal cyst  Scattered white matter hyperintensities on T2 and FLAIR imaging which is unchanged  Stable brainstem and cerebellum  Normal corpus callosum and hypothalamus  Symmetric hippocampal formations with regards to size and signal      VENTRICLES: The ventricles are normal in size and contour  SELLA AND PITUITARY GLAND: Normal      ORBITS: Normal      PARANASAL SINUSES: Normal      VASCULATURE: Evaluation of the major intracranial vasculature demonstrates appropriate flow voids  CALVARIUM AND SKULL BASE: Normal      EXTRACRANIAL SOFT TISSUES: Normal        IMPRESSION:     Nonspecific scattered white matter change within the cerebral hemispheres is grossly unchanged from the prior examination  This may represent precocious chronic microangiopathic disease  No acute ischemia, mass or hemorrhage       Stable pineal cyst        Workstation performed: [id number]     Signed by:   Arielle Lee DO   5/15/17

## 2018-01-11 ENCOUNTER — APPOINTMENT (OUTPATIENT)
Dept: PHYSICAL THERAPY | Facility: REHABILITATION | Age: 58
End: 2018-01-11
Payer: COMMERCIAL

## 2018-01-13 VITALS
HEIGHT: 64 IN | HEART RATE: 91 BPM | DIASTOLIC BLOOD PRESSURE: 85 MMHG | BODY MASS INDEX: 33.54 KG/M2 | SYSTOLIC BLOOD PRESSURE: 159 MMHG | WEIGHT: 196.44 LBS

## 2018-01-15 VITALS
SYSTOLIC BLOOD PRESSURE: 128 MMHG | BODY MASS INDEX: 33.54 KG/M2 | HEIGHT: 64 IN | OXYGEN SATURATION: 99 % | HEART RATE: 70 BPM | DIASTOLIC BLOOD PRESSURE: 84 MMHG | WEIGHT: 196.44 LBS

## 2018-01-16 ENCOUNTER — APPOINTMENT (OUTPATIENT)
Dept: PHYSICAL THERAPY | Facility: REHABILITATION | Age: 58
End: 2018-01-16
Payer: COMMERCIAL

## 2018-01-16 PROCEDURE — 97110 THERAPEUTIC EXERCISES: CPT

## 2018-01-16 PROCEDURE — 97530 THERAPEUTIC ACTIVITIES: CPT

## 2018-01-18 ENCOUNTER — TRANSCRIBE ORDERS (OUTPATIENT)
Dept: ADMINISTRATIVE | Facility: HOSPITAL | Age: 58
End: 2018-01-18

## 2018-01-18 ENCOUNTER — ALLSCRIPTS OFFICE VISIT (OUTPATIENT)
Dept: OTHER | Facility: OTHER | Age: 58
End: 2018-01-18

## 2018-01-18 DIAGNOSIS — S06.0X1A CONCUSSION WITH LOSS OF CONSCIOUSNESS OF 30 MINUTES OR LESS, INITIAL ENCOUNTER: Primary | ICD-10-CM
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
## 2018-01-23 ENCOUNTER — APPOINTMENT (OUTPATIENT)
Dept: PHYSICAL THERAPY | Facility: REHABILITATION | Age: 58
End: 2018-01-23
Payer: COMMERCIAL

## 2018-01-23 PROCEDURE — 97110 THERAPEUTIC EXERCISES: CPT

## 2018-01-23 PROCEDURE — 97140 MANUAL THERAPY 1/> REGIONS: CPT

## 2018-01-23 PROCEDURE — G8978 MOBILITY CURRENT STATUS: HCPCS

## 2018-01-23 PROCEDURE — 97530 THERAPEUTIC ACTIVITIES: CPT

## 2018-01-23 PROCEDURE — G8979 MOBILITY GOAL STATUS: HCPCS

## 2018-01-24 ENCOUNTER — OFFICE VISIT (OUTPATIENT)
Dept: OBGYN CLINIC | Facility: CLINIC | Age: 58
End: 2018-01-24
Payer: COMMERCIAL

## 2018-01-24 VITALS
SYSTOLIC BLOOD PRESSURE: 179 MMHG | DIASTOLIC BLOOD PRESSURE: 88 MMHG | BODY MASS INDEX: 33.54 KG/M2 | HEART RATE: 93 BPM | HEIGHT: 64 IN | WEIGHT: 196.44 LBS

## 2018-01-24 VITALS
DIASTOLIC BLOOD PRESSURE: 103 MMHG | BODY MASS INDEX: 34.02 KG/M2 | HEART RATE: 101 BPM | HEIGHT: 63 IN | WEIGHT: 192 LBS | SYSTOLIC BLOOD PRESSURE: 172 MMHG

## 2018-01-24 DIAGNOSIS — Z12.11 SCREENING FOR COLON CANCER: ICD-10-CM

## 2018-01-24 DIAGNOSIS — S83.412A GRADE 1 INJURY OF MEDIAL COLLATERAL LIGAMENT OF LEFT KNEE: ICD-10-CM

## 2018-01-24 DIAGNOSIS — M25.562 ACUTE PAIN OF LEFT KNEE: Primary | ICD-10-CM

## 2018-01-24 PROCEDURE — 99212 OFFICE O/P EST SF 10 MIN: CPT | Performed by: ORTHOPAEDIC SURGERY

## 2018-01-24 RX ORDER — TRAMADOL HYDROCHLORIDE 50 MG/1
50 TABLET ORAL EVERY 6 HOURS PRN
COMMUNITY
End: 2020-01-09 | Stop reason: SINTOL

## 2018-01-24 NOTE — PROGRESS NOTES
Assessment:       1  Acute pain of left knee    2  Grade 1 injury of medial collateral ligament of left knee          Plan:        Grade 1 MCL sprain  with valgus stress very little laxity compared to the opposite side  Recommend continued therapy and weaning the brace as tolerated when pain improves            Subjective:     Patient ID: Fuad Rodgers is a 56 y o  female  Chief Complaint:    HPI  Knee Pain  Patient has left knee MCL sprain now greater than 4 weeks out still having pain with valgus stress  Patient reports some aggravation with therapy but otherwise no other       Social History     Occupational History    Not on file  Social History Main Topics    Smoking status: Never Smoker    Smokeless tobacco: Never Used    Alcohol use No    Drug use: No    Sexual activity: Not on file      Review of Systems   Constitutional: Negative  HENT: Negative for sneezing and sore throat  Eyes: Negative for visual disturbance  Respiratory: Negative for shortness of breath and wheezing  Cardiovascular: Negative for palpitations  Gastrointestinal: Negative for vomiting  Genitourinary: Negative for frequency  Skin: Negative  Neurological: Negative  Psychiatric/Behavioral: Negative  All other systems reviewed and are negative  Objective:     Right Knee Exam   Right knee exam is normal     Range of Motion   The patient has normal right knee ROM  Muscle Strength     The patient has normal right knee strength  Tests   Deb:  Medial - negative Lateral - negative  Drawer:       Anterior - negative      Varus: negative  Valgus: negative  Patellar Apprehension: negative    Other   Erythema: absent  Scars: absent  Sensation: normal  Pulse: present  Swelling: none  Other tests: no effusion present      Left Knee Exam   Left knee exam is normal     Range of Motion   The patient has normal left knee ROM      Muscle Strength     The patient has normal left knee strength  Tests   Deb:  Medial - negative Lateral - negative  Drawer:       Anterior - negative       Varus: negative  Valgus: negative  Patellar Apprehension: negative    Other   Erythema: absent  Scars: absent  Sensation: normal  Pulse: present  Swelling: none  Effusion: no effusion present        Physical Exam   Musculoskeletal:        Right knee: She exhibits no effusion  Left knee: She exhibits no effusion       pain with valgus stress no effusion no ecchymosis minimal tenderness on the medial joint line

## 2018-01-25 ENCOUNTER — APPOINTMENT (OUTPATIENT)
Dept: PHYSICAL THERAPY | Facility: REHABILITATION | Age: 58
End: 2018-01-25
Payer: COMMERCIAL

## 2018-01-30 ENCOUNTER — OFFICE VISIT (OUTPATIENT)
Dept: PHYSICAL THERAPY | Facility: REHABILITATION | Age: 58
End: 2018-01-30
Payer: COMMERCIAL

## 2018-01-30 DIAGNOSIS — S83.412D SPRAIN OF MEDIAL COLLATERAL LIGAMENT OF LEFT KNEE, SUBSEQUENT ENCOUNTER: Primary | ICD-10-CM

## 2018-01-30 PROCEDURE — 97110 THERAPEUTIC EXERCISES: CPT | Performed by: PHYSICAL THERAPIST

## 2018-01-30 PROCEDURE — 97530 THERAPEUTIC ACTIVITIES: CPT | Performed by: PHYSICAL THERAPIST

## 2018-01-30 NOTE — PROGRESS NOTES
Daily Note     Today's date: 2018  Patient name: Gera Sheriff  : 1960  MRN: [id number]  Referring provider: Milka Polanco DO  Dx:   Encounter Diagnosis   Name Primary?  Sprain of medial collateral ligament of left knee, subsequent encounter Yes                  Subjective: Pt reports with her immobilization brace locked at 30* flexion and brace around ankle, "they told me you had it adjusted all wrong, but this is hurting me now "  Pt reports physician performed MCL test which "sent me through the roof "       Objective: See treatment diary below  Precautions: Epileptic, HTN, LBP poor tolerance to SL    Daily Treatment Diary     Manual              Passive flexion nv                                                                    Exercise Diary              SLR flx hep 3x8            Supine Clams 3x8 OTB INC NV            Bridges 3x8            LAQ             TG NP            STS 3x8                                      HS stretch seated 3x30"            Calf stretch 3x30"            Quad stretch nv                                                                                                                                                               Modalities                                                             Assessment: Tolerated treatment well  Patient demonstrated fatigue post treatment, exhibited good technique with therapeutic exercises and would benefit from continued PT  Pt progressed through exercises this session with minimal complaints or need to rest  Pt noted having fatigue but no pain end of session  Adjusted pt's knee brace to be locked in extension vs 30* flexion      Plan: Continue per plan of care

## 2018-02-01 ENCOUNTER — APPOINTMENT (OUTPATIENT)
Dept: PHYSICAL THERAPY | Facility: REHABILITATION | Age: 58
End: 2018-02-01
Payer: COMMERCIAL

## 2018-02-01 ENCOUNTER — HOSPITAL ENCOUNTER (OUTPATIENT)
Dept: NEUROLOGY | Facility: AMBULATORY SURGERY CENTER | Age: 58
Discharge: HOME | End: 2018-02-01
Payer: COMMERCIAL

## 2018-02-01 DIAGNOSIS — S06.0X1A CONCUSSION WITH LOSS OF CONSCIOUSNESS OF 30 MINUTES OR LESS, INITIAL ENCOUNTER: ICD-10-CM

## 2018-02-01 PROCEDURE — 95819 EEG AWAKE AND ASLEEP: CPT | Performed by: PSYCHIATRY & NEUROLOGY

## 2018-02-01 PROCEDURE — 95819 EEG AWAKE AND ASLEEP: CPT

## 2018-02-08 ENCOUNTER — OFFICE VISIT (OUTPATIENT)
Dept: PHYSICAL THERAPY | Facility: REHABILITATION | Age: 58
End: 2018-02-08
Payer: COMMERCIAL

## 2018-02-08 ENCOUNTER — OFFICE VISIT (OUTPATIENT)
Dept: NEUROLOGY | Facility: CLINIC | Age: 58
End: 2018-02-08
Payer: COMMERCIAL

## 2018-02-08 VITALS
HEART RATE: 70 BPM | WEIGHT: 196.4 LBS | SYSTOLIC BLOOD PRESSURE: 119 MMHG | BODY MASS INDEX: 34.79 KG/M2 | DIASTOLIC BLOOD PRESSURE: 73 MMHG | RESPIRATION RATE: 16 BRPM

## 2018-02-08 DIAGNOSIS — S83.412D SPRAIN OF MEDIAL COLLATERAL LIGAMENT OF LEFT KNEE, SUBSEQUENT ENCOUNTER: Primary | ICD-10-CM

## 2018-02-08 DIAGNOSIS — R56.9 SEIZURES (HCC): ICD-10-CM

## 2018-02-08 DIAGNOSIS — S06.0X1D CONCUSSION WITH LOSS OF CONSCIOUSNESS OF 30 MINUTES OR LESS, SUBSEQUENT ENCOUNTER: Primary | ICD-10-CM

## 2018-02-08 PROBLEM — S06.0X1A CONCUSSION WTH LOSS OF CONSCIOUSNESS OF 30 MINUTES OR LESS: Status: ACTIVE | Noted: 2018-01-18

## 2018-02-08 PROCEDURE — 99213 OFFICE O/P EST LOW 20 MIN: CPT | Performed by: PSYCHIATRY & NEUROLOGY

## 2018-02-08 PROCEDURE — 97110 THERAPEUTIC EXERCISES: CPT | Performed by: PHYSICAL THERAPIST

## 2018-02-08 PROCEDURE — 97530 THERAPEUTIC ACTIVITIES: CPT | Performed by: PHYSICAL THERAPIST

## 2018-02-08 RX ORDER — AZITHROMYCIN 250 MG/1
TABLET, FILM COATED ORAL
Refills: 0 | COMMUNITY
Start: 2017-11-24 | End: 2020-04-01 | Stop reason: ALTCHOICE

## 2018-02-08 RX ORDER — MONTELUKAST SODIUM 10 MG/1
1 TABLET ORAL DAILY
COMMUNITY

## 2018-02-08 NOTE — PROGRESS NOTES
Patient ID: Yuvonne Babinski is a 56 y o  female  Assessment/Plan:         Problem List Items Addressed This Visit        Nervous and Auditory    Concussion wth loss of consciousness of 30 minutes or less - Primary       Other    Seizures ([facility] [address] )            Discussion Summary:    Patient with a 20 year history of seizures but no seizures between 2012 and an episode that occurred in November 2017 where after she hit her head on cement, could not remember what transpired for about 30 minutes thereafter  Patient first assumed this was because she had a seizure, but the memory loss could simply have been due to a concussion  After her last visit, she had an EEG which was normal  This makes less likely that her seizure disorder has reactivated, and given that she has history of being sensitive to medication side effects, I thought it best NOT to put her on any seizure medication since we do not have any definitive evidence that her seizures recurred  Patient need not come for follow-up but she knows to call me if she experiences any future episodes of loss of consciousness or loss of awareness  Subjective:    HPI    1 month follow-up for a 56year old female with remote history of seizures for 20 years from 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  Previous routine EEGs were all normal  Dr Jaycob Talamantes formerly followed the patient and when he first saw her in January 2013, he tried her on the Vimpat but she developed nausea and vomiting, so she stopped taking it and has not been on seizure medication since then  She remained seizure free for 5 years, but on 11/5/17 she tripped and fell over a tree stump and she hit her head on concrete and she does not recall what happened for the next 30 minutes   Patient assumed she had had a seizure because she felt headachy when she woke up, but it is also possible that she developed headache from a concussion  I ordered an EEG when I saw her on 1/18/18 to check for any new abnormalities and if she did I would consider placing her on a seizure medication  Note that she has a history of kidney stones so should not be given Topamax or Zonegran  Last seen 1/18/18  INTERVAL HISTORY:     EEG on 2/1/18  Impression: This routine EEG recorded during wakefulness is normal      Patient reports that she has not had any seizures since her last visit and she has not lost consciousness  She is currently not taking any seizure medications and she agrees that she does not necessarily need to start one at this time because her episode on 11/15/17 was more likely a concussion than a seizure  The following portions of the patient's history were reviewed and updated as appropriate: allergies, current medications, past family history, past medical history, past social history, past surgical history and problem list     Objective:    Blood pressure 119/73, pulse 70, resp  rate 16, weight 89 1 kg (196 lb 6 4 oz)  Physical Exam   Constitutional: She appears well-developed  HENT:   Head: Normocephalic  Eyes: EOM are normal  Pupils are equal, round, and reactive to light  Neck: Neck supple  Cardiovascular: Normal rate and regular rhythm  Pulmonary/Chest: Effort normal    Neurological: She is alert  She has normal reflexes  Psychiatric: Her speech is normal        Neurological Exam    Mental Status  The patient is alert and oriented to person, place, time, and situation  Her recent and remote memory are normal  Her speech is normal  Her language is fluent with no aphasia  She has normal attention span and concentration  She has a normal fund of knowledge      Cranial Nerves    CN II: The patient's visual acuity and visual fields are normal   CN III, IV, VI: The patient's pupils are equally round and reactive to light and ocular movements are normal   CN V: The patient has normal facial sensation  CN VII:  The patient has symmetric facial movement  CN VIII:  The patient's hearing is normal   CN IX, X: The patient has symmetric palate movement and normal gag reflex  CN XI: The patient's shoulder shrug strength is normal   CN XII: The patient's tongue is midline without atrophy or fasciculations  Sensory  The patient's sensation is normal in all four extremities  Reflexes  Deep tendon reflexes are 2+ and symmetric in all four extremities with downgoing toes bilaterally  Gait and Coordination    Currently wearing a leg brace         ROS:    Review of Systems   HENT: Positive for sinus pressure  Palpitations,rapid or irregular heart rate,      Genitourinary:        Loss of bladder control, frequency,   Musculoskeletal: Positive for back pain  Joint pain  Muscle pain, pain when walking   Neurological: Positive for headaches  Increased sleepiness, awake at night, snoring, clumsiness,balance problems, difficulty walking, falls,   Hematological: Bruises/bleeds easily  Loss of sexual drive, hair loss   Psychiatric/Behavioral:        Depression, Anxiety        Counseling Documentation:  Time in: 10:30, Time out: 10:50  Total time encounter was 20 minutes and 15 minutes were spent counseling the patient

## 2018-02-08 NOTE — LETTER
February 8, 2018     Marlen Marsh MD  [address] [facility]    Patient: Odalys Chilel   YOB: 1960   Date of Visit: 2/8/2018       Dear Dr Leila Raza:    Thank you for referring Joanne Matias to me for evaluation  Below are my notes for this consultation  If you have questions, please do not hesitate to call me  I look forward to following your patient along with you  Sincerely,        Humberto Maxwell MD        CC: No Recipients  Humberto Maxwell MD  2/8/2018 12:40 PM  Sign at close encounter  Note that the contents of this note have been copied and pasted from a note created during this patient's visit today that APPEARS  to have been created by my Medical Assistant, Joanna Hogan  Ms Baez Standing did indeed start that note and enter the ROS, but all other information in that note was obtained and documented by me and my scribe  THE ONLY REASON THAT THAT NOTE APPEARS TO HAVE BEEN AUTHORED BY Ms  Amrik Arthurdoug IS THAT I FORGOT TO CLICK THE "Make Me the Author" button before signing the Note, and apparently [facility] REQUIRES THAT EXTRA STEP EVEN THOUGH I ADDED ALL THAT INFORMATION UNDER MY OWN LOG IN :( , and also REQUIRES ME TO WRITE A WHOLE 'NOTHER  NOTE IN ORDER TO DOCUMENT THAT I, IN FACT, PERFORMED THE DOCUMENTATION MYSELF  Patient ID: Odalys Chilel is a 56 y o  female      Assessment/Plan:            Problem List Items Addressed This Visit               Nervous and Auditory     Concussion wth loss of consciousness of 30 minutes or less - Primary          Other     Seizures ([facility] [address] )              Discussion Summary:     Patient with a 20 year history of seizures but no seizures between 2012 and an episode that occurred in November 2017 where after she hit her head on cement, could not remember what transpired for about 30 minutes thereafter  Patient first assumed this was because she had a seizure, but the memory loss could simply have been due to a concussion   After her last visit, she had an EEG which was normal  This makes less likely that her seizure disorder has reactivated, and given that she has history of being sensitive to medication side effects, I thought it best NOT to put her on any seizure medication since we do not have any definitive evidence that her seizures recurred      Patient need not come for follow-up but she knows to call me if she experiences any future episodes of loss of consciousness or loss of awareness       Subjective:     HPI     1 month follow-up for a 56year old female with remote history of seizures for 20 years from [facility] to 2012 that did not respond to Dilantin, Vimpat, Depakote, and phenobarbital  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  Previous routine EEGs were all normal  Dr Amadeo Perez formerly followed the patient and when he first saw her in January 2013, he tried her on the Vimpat but she developed nausea and vomiting, so she stopped taking it and has not been on seizure medication since then  She remained seizure free for 5 years, but on 11/5/17 she tripped and fell over a tree stump and she hit her head on concrete and she does not recall what happened for the next 30 minutes  Patient assumed she had had a seizure because she felt headachy when she woke up, but it is also possible that she developed headache from a concussion  I ordered an EEG when I saw her on 1/18/18 to check for any new abnormalities and if she did I would consider placing her on a seizure medication  Note that she has a history of kidney stones so should not be given Topamax or Zonegran       Last seen 1/18/18        INTERVAL HISTORY:      EEG on 2/1/18  Impression: This routine EEG recorded during wakefulness is normal       Patient reports that she has not had any seizures since her last visit and she has not lost consciousness   She is currently not taking any seizure medications and she agrees that she does not necessarily need to start one at this time because her episode on 11/15/17 was more likely a concussion than a seizure      The following portions of the patient's history were reviewed and updated as appropriate: allergies, current medications, past family history, past medical history, past social history, past surgical history and problem list      Objective:     Blood pressure 119/73, pulse 70, resp  rate 16, weight 89 1 kg (196 lb 6 4 oz)     Physical Exam   Constitutional: She appears well-developed  HENT:   Head: Normocephalic  Eyes: EOM are normal  Pupils are equal, round, and reactive to light  Neck: Neck supple  Cardiovascular: Normal rate and regular rhythm  Pulmonary/Chest: Effort normal    Neurological: She is alert  She has normal reflexes  Psychiatric: Her speech is normal          Neurological Exam     Mental Status  The patient is alert and oriented to person, place, time, and situation  Her recent and remote memory are normal  Her speech is normal  Her language is fluent with no aphasia  She has normal attention span and concentration  She has a normal fund of knowledge      Cranial Nerves     CN II: The patient's visual acuity and visual fields are normal   CN III, IV, VI: The patient's pupils are equally round and reactive to light and ocular movements are normal   CN V: The patient has normal facial sensation  CN VII:  The patient has symmetric facial movement  CN VIII:  The patient's hearing is normal   CN IX, X: The patient has symmetric palate movement and normal gag reflex  CN XI: The patient's shoulder shrug strength is normal   CN XII: The patient's tongue is midline without atrophy or fasciculations      Sensory  The patient's sensation is normal in all four extremities      Reflexes  Deep tendon reflexes are 2+ and symmetric in all four extremities with downgoing toes bilaterally      Gait and Coordination     Currently wearing a leg brace            ROS:     Review of Systems   HENT: Positive for sinus pressure  Palpitations,rapid or irregular heart rate,      Genitourinary:        Loss of bladder control, frequency,   Musculoskeletal: Positive for back pain  Joint pain  Muscle pain, pain when walking   Neurological: Positive for headaches  Increased sleepiness, awake at night, snoring, clumsiness,balance problems, difficulty walking, falls,   Hematological: Bruises/bleeds easily          Loss of sexual drive, hair loss   Psychiatric/Behavioral:        Depression, Anxiety          Counseling Documentation:  Time in: 10:30, Time out: 10:50  Total time encounter was 20 minutes and 15 minutes were spent counseling the patient

## 2018-02-08 NOTE — PROGRESS NOTES
Note that the contents of this note have been copied and pasted from a note created during this patient's visit today that APPEARS  to have been created by my Medical Assistant, Wilfrido Dunlap  Ms Ilana Samano did indeed start that note and enter the ROS, but all other information in that note was obtained and documented by me and my scribe  THE ONLY REASON THAT THAT NOTE APPEARS TO HAVE BEEN AUTHORED BY Ms Burton Blackburn IS THAT I FORGOT TO CLICK THE "Make Me the Author" button before signing the Note, and apparently [facility] REQUIRES THAT EXTRA STEP EVEN THOUGH I ADDED ALL THAT INFORMATION UNDER MY OWN LOG IN :( , and also REQUIRES ME TO WRITE A WHOLE 'NOTHER  NOTE IN ORDER TO DOCUMENT THAT I, IN FACT, PERFORMED THE DOCUMENTATION MYSELF  Patient ID: Bertin Christy is a 56 y o  female      Assessment/Plan:            Problem List Items Addressed This Visit               Nervous and Auditory     Concussion wth loss of consciousness of 30 minutes or less - Primary          Other     Seizures ([facility] [address] )              Discussion Summary:     Patient with a 20 year history of seizures but no seizures between 2012 and an episode that occurred in November 2017 where after she hit her head on cement, could not remember what transpired for about 30 minutes thereafter  Patient first assumed this was because she had a seizure, but the memory loss could simply have been due to a concussion   After her last visit, she had an EEG which was normal  This makes less likely that her seizure disorder has reactivated, and given that she has history of being sensitive to medication side effects, I thought it best NOT to put her on any seizure medication since we do not have any definitive evidence that her seizures recurred      Patient need not come for follow-up but she knows to call me if she experiences any future episodes of loss of consciousness or loss of awareness       Subjective:     HPI     1 month follow-up for a 56year old female with remote history of seizures for 20 years from [facility] to 2012 that did not respond to Dilantin, Vimpat, Depakote, and phenobarbital  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  Previous routine EEGs were all normal  Dr Rosaura Caballero formerly followed the patient and when he first saw her in January 2013, he tried her on the Vimpat but she developed nausea and vomiting, so she stopped taking it and has not been on seizure medication since then  She remained seizure free for 5 years, but on 11/5/17 she tripped and fell over a tree stump and she hit her head on concrete and she does not recall what happened for the next 30 minutes  Patient assumed she had had a seizure because she felt headachy when she woke up, but it is also possible that she developed headache from a concussion  I ordered an EEG when I saw her on 1/18/18 to check for any new abnormalities and if she did I would consider placing her on a seizure medication  Note that she has a history of kidney stones so should not be given Topamax or Zonegran       Last seen 1/18/18        INTERVAL HISTORY:      EEG on 2/1/18  Impression: This routine EEG recorded during wakefulness is normal       Patient reports that she has not had any seizures since her last visit and she has not lost consciousness  She is currently not taking any seizure medications and she agrees that she does not necessarily need to start one at this time because her episode on 11/15/17 was more likely a concussion than a seizure      The following portions of the patient's history were reviewed and updated as appropriate: allergies, current medications, past family history, past medical history, past social history, past surgical history and problem list      Objective:     Blood pressure 119/73, pulse 70, resp  rate 16, weight 89 1 kg (196 lb 6 4 oz)     Physical Exam   Constitutional: She appears well-developed  HENT:   Head: Normocephalic     Eyes: EOM are normal  Pupils are equal, round, and reactive to light  Neck: Neck supple  Cardiovascular: Normal rate and regular rhythm  Pulmonary/Chest: Effort normal    Neurological: She is alert  She has normal reflexes  Psychiatric: Her speech is normal          Neurological Exam     Mental Status  The patient is alert and oriented to person, place, time, and situation  Her recent and remote memory are normal  Her speech is normal  Her language is fluent with no aphasia  She has normal attention span and concentration  She has a normal fund of knowledge      Cranial Nerves     CN II: The patient's visual acuity and visual fields are normal   CN III, IV, VI: The patient's pupils are equally round and reactive to light and ocular movements are normal   CN V: The patient has normal facial sensation  CN VII:  The patient has symmetric facial movement  CN VIII:  The patient's hearing is normal   CN IX, X: The patient has symmetric palate movement and normal gag reflex  CN XI: The patient's shoulder shrug strength is normal   CN XII: The patient's tongue is midline without atrophy or fasciculations      Sensory  The patient's sensation is normal in all four extremities      Reflexes  Deep tendon reflexes are 2+ and symmetric in all four extremities with downgoing toes bilaterally      Gait and Coordination     Currently wearing a leg brace            ROS:     Review of Systems   HENT: Positive for sinus pressure  Palpitations,rapid or irregular heart rate,      Genitourinary:        Loss of bladder control, frequency,   Musculoskeletal: Positive for back pain  Joint pain  Muscle pain, pain when walking   Neurological: Positive for headaches  Increased sleepiness, awake at night, snoring, clumsiness,balance problems, difficulty walking, falls,   Hematological: Bruises/bleeds easily          Loss of sexual drive, hair loss   Psychiatric/Behavioral:        Depression, Anxiety          Counseling Documentation:  Time in: 10:30, Time out: 10:50  Total time encounter was 20 minutes and 15 minutes were spent counseling the patient

## 2018-02-08 NOTE — PROGRESS NOTES
Daily Note     Today's date: 2018  Patient name: Whitney De Guzman  : 1960  MRN: [id number]  Referring provider: Baylee Kirk DO  Dx:   Encounter Diagnosis   Name Primary?  Sprain of medial collateral ligament of left knee, subsequent encounter Yes                  Subjective: Pt reports falling on the ice yesterday and hitting her R knee  Notes she was told she had a concussion when she fell and no seizure and is able to keep her 's license  " I had to drive my mother to her Dr's appointment so I couldn't come to therapy "      Objective: See treatment diary below    Precautions: Epileptic, HTN, LBP poor tolerance to SL    Daily Treatment Diary     Manual              Passive flexion nv                                                                    Exercise Diary             SLR flx hep 3x8 3x8           Supine Clams 3x8 OTB INC NV 3x10 GTB           Bridges* 3x8 3x10           LAQ             TG NP            STS 3x8                                      HS stretch seated* 3x30" 3x30"           Calf stretch* 3x30" 3x30"           Quad stretch nv                         Step ups  2x10 6"           Fwd/lat  nv                                                                                                                       Modalities                                                       Assessment:  L Knee flexion *  Tolerated treatment well  Patient demonstrated fatigue post treatment, exhibited good technique with therapeutic exercises and would benefit from continued PT  Plan: Continue per plan of care

## 2018-02-08 NOTE — LETTER
February 8, 2018     Tiff Jack MD  [facility]    Patient: Werner Dickerson   YOB: 1960   Date of Visit: 2/8/2018       Dear Dr Eber Curran:    Thank you for referring Leila Talavera to me for evaluation  Below are my notes for this consultation  If you have questions, please do not hesitate to call me  I look forward to following your patient along with you  Sincerely,        Winsome Duff MD        CC: No Recipients  Winsome Duff MD  2/8/2018 12:42 PM  Signed  Note that the contents of this note have been copied and pasted from a note created during this patient's visit today that APPEARS  to have been created by my Medical Assistant, Saadia Erickson  Ms Portia Lucas did indeed start that note and enter the ROS, but all other information in that note was obtained and documented by me and my scribe  THE ONLY REASON THAT THAT NOTE APPEARS TO HAVE BEEN AUTHORED BY Ms Bower Jarek IS THAT I FORGOT TO CLICK THE "Make Me the Author" button before signing the Note, and apparently EPIC REQUIRES THAT EXTRA STEP EVEN THOUGH I ADDED ALL THAT INFORMATION UNDER MY OWN LOG IN :( , and also REQUIRES ME TO WRITE A WHOLE 'NOTHER  NOTE IN ORDER TO DOCUMENT THAT I, IN FACT, PERFORMED THE DOCUMENTATION MYSELF  Patient ID: Werner Dickerson is a 56 y o  female      Assessment/Plan:            Problem List Items Addressed This Visit               Nervous and Auditory     Concussion wth loss of consciousness of 30 minutes or less - Primary          Other     Seizures ([facility] [address] )              Discussion Summary:     Patient with a 20 year history of seizures but no seizures between 2012 and an episode that occurred in November 2017 where after she hit her head on cement, could not remember what transpired for about 30 minutes thereafter  Patient first assumed this was because she had a seizure, but the memory loss could simply have been due to a concussion   After her last visit, she had an EEG which was normal  This makes less likely that her seizure disorder has reactivated, and given that she has history of being sensitive to medication side effects, I thought it best NOT to put her on any seizure medication since we do not have any definitive evidence that her seizures recurred      Patient need not come for follow-up but she knows to call me if she experiences any future episodes of loss of consciousness or loss of awareness       Subjective:     HPI     1 month follow-up for a 56year old female with remote history of seizures for 20 years from [facility] to 2012 that did not respond to Dilantin, Vimpat, Depakote, and phenobarbital  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  Previous routine EEGs were all normal  Dr Isadora Read formerly followed the patient and when he first saw her in January 2013, he tried her on the Vimpat but she developed nausea and vomiting, so she stopped taking it and has not been on seizure medication since then  She remained seizure free for 5 years, but on 11/5/17 she tripped and fell over a tree stump and she hit her head on concrete and she does not recall what happened for the next 30 minutes  Patient assumed she had had a seizure because she felt headachy when she woke up, but it is also possible that she developed headache from a concussion  I ordered an EEG when I saw her on 1/18/18 to check for any new abnormalities and if she did I would consider placing her on a seizure medication  Note that she has a history of kidney stones so should not be given Topamax or Zonegran       Last seen 1/18/18        INTERVAL HISTORY:      EEG on 2/1/18  Impression: This routine EEG recorded during wakefulness is normal       Patient reports that she has not had any seizures since her last visit and she has not lost consciousness   She is currently not taking any seizure medications and she agrees that she does not necessarily need to start one at this time because her episode on 11/15/17 was more likely a concussion than a seizure      The following portions of the patient's history were reviewed and updated as appropriate: allergies, current medications, past family history, past medical history, past social history, past surgical history and problem list      Objective:     Blood pressure 119/73, pulse 70, resp  rate 16, weight 89 1 kg (196 lb 6 4 oz)     Physical Exam   Constitutional: She appears well-developed  HENT:   Head: Normocephalic  Eyes: EOM are normal  Pupils are equal, round, and reactive to light  Neck: Neck supple  Cardiovascular: Normal rate and regular rhythm  Pulmonary/Chest: Effort normal    Neurological: She is alert  She has normal reflexes  Psychiatric: Her speech is normal          Neurological Exam     Mental Status  The patient is alert and oriented to person, place, time, and situation  Her recent and remote memory are normal  Her speech is normal  Her language is fluent with no aphasia  She has normal attention span and concentration  She has a normal fund of knowledge      Cranial Nerves     CN II: The patient's visual acuity and visual fields are normal   CN III, IV, VI: The patient's pupils are equally round and reactive to light and ocular movements are normal   CN V: The patient has normal facial sensation  CN VII:  The patient has symmetric facial movement  CN VIII:  The patient's hearing is normal   CN IX, X: The patient has symmetric palate movement and normal gag reflex  CN XI: The patient's shoulder shrug strength is normal   CN XII: The patient's tongue is midline without atrophy or fasciculations      Sensory  The patient's sensation is normal in all four extremities      Reflexes  Deep tendon reflexes are 2+ and symmetric in all four extremities with downgoing toes bilaterally      Gait and Coordination     Currently wearing a leg brace            ROS:     Review of Systems   HENT: Positive for sinus pressure           Palpitations,rapid or irregular heart rate,      Genitourinary:        Loss of bladder control, frequency,   Musculoskeletal: Positive for back pain  Joint pain  Muscle pain, pain when walking   Neurological: Positive for headaches  Increased sleepiness, awake at night, snoring, clumsiness,balance problems, difficulty walking, falls,   Hematological: Bruises/bleeds easily          Loss of sexual drive, hair loss   Psychiatric/Behavioral:        Depression, Anxiety          Counseling Documentation:  Time in: 10:30, Time out: 10:50  Total time encounter was 20 minutes and 15 minutes were spent counseling the patient

## 2018-02-13 ENCOUNTER — OFFICE VISIT (OUTPATIENT)
Dept: PHYSICAL THERAPY | Facility: REHABILITATION | Age: 58
End: 2018-02-13
Payer: COMMERCIAL

## 2018-02-13 DIAGNOSIS — S83.412D SPRAIN OF MEDIAL COLLATERAL LIGAMENT OF LEFT KNEE, SUBSEQUENT ENCOUNTER: Primary | ICD-10-CM

## 2018-02-13 PROCEDURE — 97110 THERAPEUTIC EXERCISES: CPT

## 2018-02-13 PROCEDURE — 97112 NEUROMUSCULAR REEDUCATION: CPT

## 2018-02-13 PROCEDURE — 97140 MANUAL THERAPY 1/> REGIONS: CPT

## 2018-02-13 NOTE — PROGRESS NOTES
Daily Note     Today's date: 2018  Patient name: Madi Quan  : 1960  MRN: [id number]  Referring provider: Roselyn Gilmore DO  Dx:   Encounter Diagnosis   Name Primary?  Sprain of medial collateral ligament of left knee, subsequent encounter Yes                  Subjective: Patient denies any knee pain prior to session today and reports minimal soreness after previous session  Patient is to follow up with MD next Wed  Objective: See treatment diary below  Precautions: Epileptic, HTN, LBP poor tolerance to SL     Daily Treatment Diary      Manual                     Passive flexion nv  8 min                                                                                                                         Exercise Diary                   SLR flx hep 3x8 3x8  3x10                 Supine Clams 3x8 OTB INC NV 3x10 GTB  3x12 GTB                 Bridges* 3x8 3x10 3x12                 LAQ                       TG NP                     STS 3x8    3x10                                                                 HS stretch seated* 3x30" 3x30"  3x30''                 Calf stretch* 3x30" 3x30"  3x30''                 Quad stretch nv                                             Step ups   2x10 6"  2x12 6''                 Lateral   nv  2x10 6''                                                                                                                                                                                                                       Modalities                                                                                                      Assessment: Tolerated treatment well  Trialed lateral step ups where patient tolerated well, no increased pain just fatigue noted  Patient demonstrated fatigue post treatment and would benefit from continued PT      Plan: Continue per plan of care

## 2018-02-15 ENCOUNTER — OFFICE VISIT (OUTPATIENT)
Dept: PHYSICAL THERAPY | Facility: REHABILITATION | Age: 58
End: 2018-02-15
Payer: COMMERCIAL

## 2018-02-15 DIAGNOSIS — S83.412D SPRAIN OF MEDIAL COLLATERAL LIGAMENT OF LEFT KNEE, SUBSEQUENT ENCOUNTER: Primary | ICD-10-CM

## 2018-02-15 PROCEDURE — G8978 MOBILITY CURRENT STATUS: HCPCS | Performed by: PHYSICAL THERAPIST

## 2018-02-15 PROCEDURE — 97164 PT RE-EVAL EST PLAN CARE: CPT | Performed by: PHYSICAL THERAPIST

## 2018-02-15 PROCEDURE — G8979 MOBILITY GOAL STATUS: HCPCS | Performed by: PHYSICAL THERAPIST

## 2018-02-15 PROCEDURE — 97110 THERAPEUTIC EXERCISES: CPT | Performed by: PHYSICAL THERAPIST

## 2018-02-15 NOTE — PROGRESS NOTES
PT Re-Evaluation     Today's date: 2/15/2018  Patient name: Peggy Rashid  : 1960  MRN: [id number]  Referring provider: César Cali DO  Dx:   Encounter Diagnosis   Name Primary?  Sprain of medial collateral ligament of left knee, subsequent encounter Yes                  Assessment  Impairments: abnormal gait, activity intolerance, impaired balance, impaired physical strength and pain with function    Assessment details: Peggy Rashid has been inconsistent with attending PT , having 3 total no shows and two cancels but reports being compliant with home exercise program since initial eval  Pt has also been wearing brace immobilizer, consistently arriving to PT with brace down to her ankle despite being adjusted consistently by the PT for proper fit  Despite this, Tacos Paniagua  has made improvements in objective data since initial eval but is still limited compared to prior level of function  Tacos Paniagua continues with above listed impairments and would benefit from additional skilled PT to address these deficits to return to prior level of function  Understanding of Dx/Px/POC: good   Prognosis: fair    Goals  Short-Term Goals:   1  Increase L knee rom to WNL -MET   2  Increase L knee strength to 4/5 4 weeks - partially met    Long-Term Goals:   1  Pt able to ambulate community distances w/o brace 4-6 weeks  2  Pt able to complete ADLS w/o pain 4 weeks  3  Patient independent with HEP at time of discharge         Plan  Patient would benefit from: skilled PT  Planned therapy interventions: manual therapy, neuromuscular re-education, strengthening, stretching, graded activity, graded exercise, home exercise program, therapeutic exercise, therapeutic activities and patient education  Frequency: 2x week  Duration in weeks: 6  Treatment plan discussed with: patient        Subjective Evaluation    History of Present Illness  Mechanism of injury: Since her injury, pt notes 75% improvement with remaining 25% being due to the "weakness in her leg as well as the feeling of imbalance and incoordination " Some times it feels likes it may give out on her, Not nearly as bad as before  In the morning it's the worst     Pain  Current pain ratin  At best pain rating: 3  At worst pain ratin  Location: L knee  Aggravating factors: sitting and standing    Treatments  Current treatment: physical therapy  Patient Goals  Patient goals for therapy: decreased pain, improved balance, increased motion, increased strength, independence with ADLs/IADLs and return to sport/leisure activities          Objective     Active Range of Motion   Left Knee   Flexion: 125 degrees   Extension: 0 degrees     Right Knee   Flexion: 130 degrees   Extension: 0 degrees     Strength/Myotome Testing     Left Knee   Flexion: 4-  Extension: 4-  Quadriceps contraction: good    Right Knee   Flexion: 4+  Extension: 4+  Quadriceps contraction: good    Tests     Left Knee   Positive medial Deb       Additional Tests Details  Tenderness to palpation MCL       Precautions: Epileptic, HTN, LBP poor tolerance to SL     Daily Treatment Diary      Manual  1/30  2/13  2/15                 Passive flexion nv  8 min                                                                                                                         Exercise Diary  15               SLR flx hep 3x8 3x8  3x10  3x10               Supine Clams 3x8 OTB INC NV 3x10 GTB  3x12 GTB                 Bridges* 3x8 3x10 3x12  3x10               LAQ                       TG NP                     STS 3x8    3x10                                                                 HS stretch seated* 3x30" 3x30"  3x30''   3x30''               Calf stretch* 3x30" 3x30"  3x30''   3x30''               Quad stretch nv       3x30''                                       Step ups   2x10 6"  2x12 6''                 Lateral   nv  2x10 6''                                                                                                                                                                                                                       Modalities

## 2018-02-15 NOTE — PROGRESS NOTES
Daily Note     Today's date: 2/15/2018  Patient name: Marino Moran  : 1960  MRN: [id number]  Referring provider: Tahmina Wadsworth DO  Dx:   Encounter Diagnosis   Name Primary?  Sprain of medial collateral ligament of left knee, subsequent encounter Yes                  Subjective: Pt reports her knee feels sore today and felt sore after last session  Arrived with knee brace around ankle  Objective: See treatment diary below  Precautions: Epileptic, HTN, LBP poor tolerance to SL     Daily Treatment Diary      Manual  1/30  2/13  2/15                 Passive flexion nv  8 min                                                                                                                         Exercise Diary  1/30 2/8  2/13  2/15               SLR flx hep 3x8 3x8  3x10  3x10               Supine Clams 3x8 OTB INC NV 3x10 GTB  3x12 GTB                 Bridges* 3x8 3x10 3x12  3x10               LAQ                       TG NP                     STS 3x8    3x10                                                                 HS stretch seated* 3x30" 3x30"  3x30''   3x30''               Calf stretch* 3x30" 3x30"  3x30''   3x30''               Quad stretch nv       3x30''                                       Step ups   2x10 6"  2x12 6''                 Lateral   nv  2x10 6''                                                                                                                                                                                                                       Modalities                                                                                                    Assessment: Tolerated treatment {Tolerated treatment :3294110303}   Patient {assessment:5487090843}      Plan: {PLAN:[id number]}

## 2018-02-20 ENCOUNTER — OFFICE VISIT (OUTPATIENT)
Dept: PHYSICAL THERAPY | Facility: REHABILITATION | Age: 58
End: 2018-02-20
Payer: COMMERCIAL

## 2018-02-20 DIAGNOSIS — S83.412D SPRAIN OF MEDIAL COLLATERAL LIGAMENT OF LEFT KNEE, SUBSEQUENT ENCOUNTER: Primary | ICD-10-CM

## 2018-02-20 PROCEDURE — 97110 THERAPEUTIC EXERCISES: CPT | Performed by: PHYSICAL THERAPIST

## 2018-02-20 PROCEDURE — 97530 THERAPEUTIC ACTIVITIES: CPT | Performed by: PHYSICAL THERAPIST

## 2018-02-20 NOTE — PROGRESS NOTES
Daily Note     Today's date: 2018  Patient name: Mary Ann Payne  : 1960  MRN: [id number]  Referring provider: Jeanne Ann DO  Dx:   Encounter Diagnosis     ICD-10-CM    1  Sprain of medial collateral ligament of left knee, subsequent encounter S83 [id number]                   Subjective: Pt reports her knee feels "pretty good today " pt's brace again is around her ankle  "I see Dr Sanjiv Fleming tomorrow, hopefully get some good news "       Objective: See treatment diary below  Precautions: Epileptic, HTN, LBP poor tolerance to SL     Daily Treatment Diary      Manual  1/30  2/13  2/15  2/20               Passive flexion nv  8 min  np np                                                                                                                      Exercise Diary  1/30 2/8  2/13  2/15  2/20             SLR flx * 3x8 3x8  3x10  3x10  3x8             Supine Clams 3x8 OTB INC NV 3x10 GTB  3x12 GTB    3x12 GTB             Bridges* 3x8 3x10 3x12  3x10  3x12             LAQ                       TG NP                     STS 3x8    3x10    3x10                                                             HS stretch seated* 3x30" 3x30"  3x30''   3x30''               Calf stretch* 3x30" 3x30"  3x30''   3x30''               Quad stretch nv       3x30''  3x30"                                     Step ups   2x10 6"  2x12 6''   2x12 6''             Lateral   nv  2x10 6''    2x12 6''              lateral walking          3 laps                                                                                                                                                                                      *=on hep     Modalities                                                                                                Treated by KG -245pm    Assessment: Tolerated treatment well   Patient demonstrated fatigue post treatment, exhibited good technique with therapeutic exercises and would benefit from continued PT  Pt did not have any discomfort or pain with addition of lateral stepping as well as increased STS repetitions  Will continue to progress w/o brace  Plan: Continue per plan of care

## 2018-02-21 ENCOUNTER — OFFICE VISIT (OUTPATIENT)
Dept: OBGYN CLINIC | Facility: CLINIC | Age: 58
End: 2018-02-21
Payer: COMMERCIAL

## 2018-02-21 VITALS
SYSTOLIC BLOOD PRESSURE: 172 MMHG | DIASTOLIC BLOOD PRESSURE: 112 MMHG | HEIGHT: 63 IN | WEIGHT: 193 LBS | BODY MASS INDEX: 34.2 KG/M2 | HEART RATE: 85 BPM

## 2018-02-21 DIAGNOSIS — S83.412D SPRAIN OF MEDIAL COLLATERAL LIGAMENT OF LEFT KNEE, SUBSEQUENT ENCOUNTER: Primary | ICD-10-CM

## 2018-02-21 PROCEDURE — 99213 OFFICE O/P EST LOW 20 MIN: CPT | Performed by: ORTHOPAEDIC SURGERY

## 2018-02-21 NOTE — PROGRESS NOTES
Patient Name:  Dg Saavedra  MRN:  [id number]    Assessment & Plan    Left knee grade 1 MCL sprain  1  Wean from hinged knee brace as tolerated  2  Tylenol as needed  3  Continue physical therapy  HEP as appropriate  4  Follow-up in six weeks for repeat evaluation  Subjective    44-year-old female returns to the office today for follow-up regarding her left knee  Today she notes overall significant improvement  She notes 75% resolution since initiation of physical therapy  She continues to utilize the brace and has been weaning out of it slowly  She continues to participate in physical therapy  She still notes discomfort on the medial aspect of the knee  She denies any swelling  She notes mild stiffness  No numbness or tingling  Overall she is happy with her progress  General ROS:  Negative for fever, lethargy/malaise, or night sweats  Objective    BP (!) 172/112   Pulse 85   Ht 5' 3" (1 6 m)   Wt 87 5 kg (193 lb)   BMI 34 19 kg/m²     Left knee:  No gross deformity  Skin intact  No erythema ecchymosis or swelling  No effusion  Minimal discomfort to palpation MCL  No medial joint line tenderness  No lateral joint line tenderness  Range of motion includes full extension and flexion to 120°  Stable to varus and valgus stress  No laxity with valgus stress  Minimal discomfort with valgus stress  Sensation intact left lower extremity

## 2018-02-22 ENCOUNTER — OFFICE VISIT (OUTPATIENT)
Dept: PHYSICAL THERAPY | Facility: REHABILITATION | Age: 58
End: 2018-02-22
Payer: COMMERCIAL

## 2018-02-22 DIAGNOSIS — S83.412D SPRAIN OF MEDIAL COLLATERAL LIGAMENT OF LEFT KNEE, SUBSEQUENT ENCOUNTER: Primary | ICD-10-CM

## 2018-02-22 PROCEDURE — 97110 THERAPEUTIC EXERCISES: CPT | Performed by: PHYSICAL THERAPIST

## 2018-02-22 PROCEDURE — 97530 THERAPEUTIC ACTIVITIES: CPT | Performed by: PHYSICAL THERAPIST

## 2018-02-22 NOTE — PROGRESS NOTES
Daily Note     Today's date: 2018  Patient name: Leny Price  : 1960  MRN: [id number]  Referring provider: David Santana DO  Dx: No diagnosis found  Subjective: "it felt really great yesterday, but today its sore " pt arrives w/o her immobilizer brace, reports her MD wants her to get a compressive brace for her knee  Notes both her feet are swollen, "I have no idea what's going on with them "       Objective: See treatment diary below  Precautions: Epileptic, HTN, LBP poor tolerance to SL     Daily Treatment Diary      Manual  1/30  2/13  2/15  2/20               Passive flexion nv  8 min  np np                                                                                                                      Exercise Diary  1/30 2/8  2/13  2/15  2/20  2/22           SLR flx * 3x8 3x8  3x10  3x10  3x8  3x10           Supine Clams 3x8 OTB INC NV 3x10 GTB  3x12 GTB    3x12 GTB  3x12 GTB           Bridges* 3x8 3x10 3x12  3x10  3x12  3x12           LAQ                       TG NP                     STS  3x8    3x10    3x10  3x12 5#                                                           HS stretch seated* 3x30" 3x30"  3x30''   3x30''    3x30"           Calf stretch* 3x30" 3x30"  3x30''   3x30''               Quad stretch nv       3x30''  3x30"  3x30"                                   Step ups fwd   2x10 6"  2x12 6''   2x12 6''  3x12 6''           Step ups lat   nv  2x10 6''    2x12 6''  2x12 6''            lateral walking          3 laps  3 laps PTB nv                                                                                                                                                                                     *=on hep     Modalities                                                                                                    Assessment: Tolerated treatment well   Patient demonstrated fatigue post treatment, exhibited good technique with therapeutic exercises and would benefit from continued PT      Plan: Continue per plan of care

## 2018-02-27 ENCOUNTER — OFFICE VISIT (OUTPATIENT)
Dept: PHYSICAL THERAPY | Facility: REHABILITATION | Age: 58
End: 2018-02-27
Payer: COMMERCIAL

## 2018-02-27 DIAGNOSIS — S83.412D SPRAIN OF MEDIAL COLLATERAL LIGAMENT OF LEFT KNEE, SUBSEQUENT ENCOUNTER: Primary | ICD-10-CM

## 2018-02-27 PROCEDURE — 97530 THERAPEUTIC ACTIVITIES: CPT | Performed by: PHYSICAL THERAPIST

## 2018-02-27 PROCEDURE — 97110 THERAPEUTIC EXERCISES: CPT | Performed by: PHYSICAL THERAPIST

## 2018-02-27 NOTE — PROGRESS NOTES
Daily Note     Today's date: 2018  Patient name: Justus Valadez  : 1960  MRN: [id number]  Referring provider: Zakiya Cervantes DO  Dx:   Encounter Diagnosis     ICD-10-CM    1   Sprain of medial collateral ligament of left knee, subsequent encounter S83 080D                   Subjective: pt reports with a SPC which she's using on her R side, noting "dr Yadiel Moseley told me to use it "       Objective: See treatment diary below  Precautions: Epileptic, HTN, LBP poor tolerance to SL     Daily Treatment Diary      Manual  1/30  2/13  2/15  2/20               Passive flexion nv  8 min  np np                                                                                                                      Exercise Diary  1/30 2/8  2/13  2/15  2/20  2/22  2/27         SLR flx * 3x8 3x8  3x10  3x10  3x8  3x10  3x12         Supine Clams 3x8 OTB INC NV 3x10 GTB  3x12 GTB    3x12 GTB  3x12 GTB  3x12 GTB         Bridges* 3x8 3x10 3x12  3x10  3x12  3x12  3x15         LAQ                       TG NP                     STS  3x8    3x10    3x10  3x12 5#  3x12                                                         HS stretch seated* 3x30" 3x30"  3x30''   3x30''    3x30"  3x30"         Calf stretch* 3x30" 3x30"  3x30''   3x30''      3x30"         Quad stretch nv       3x30''  3x30"  3x30"  3x30"                                 Step ups fwd   2x10 6"  2x12 6''   2x12 6''  3x12 6''  3x12 6"         Step ups lat   nv  2x10 6''    2x12 6''  2x12 6''  2x12 6" inv nv         Lateral step overs nv              lateral walking          3 laps  3 laps PTB nv   3 laps PTB                                  line walking w high stepping over cones             nv                                                                                                                                  *=on hep     Modalities                                                                                                    Assessment: Tolerated treatment well  Patient demonstrated fatigue post treatment, exhibited good technique with therapeutic exercises and would benefit from continued PT  Pt did not report pain during nor after today's session  Pt inconsistently using her SPC during  Plan: Continue per plan of care

## 2018-03-01 ENCOUNTER — OFFICE VISIT (OUTPATIENT)
Dept: PHYSICAL THERAPY | Facility: REHABILITATION | Age: 58
End: 2018-03-01
Payer: COMMERCIAL

## 2018-03-01 DIAGNOSIS — S83.412D SPRAIN OF MEDIAL COLLATERAL LIGAMENT OF LEFT KNEE, SUBSEQUENT ENCOUNTER: Primary | ICD-10-CM

## 2018-03-01 PROCEDURE — 97530 THERAPEUTIC ACTIVITIES: CPT | Performed by: PHYSICAL THERAPIST

## 2018-03-01 PROCEDURE — 97110 THERAPEUTIC EXERCISES: CPT | Performed by: PHYSICAL THERAPIST

## 2018-03-01 NOTE — PROGRESS NOTES
Daily Note     Today's date: 3/1/2018  Patient name: Dg Saavedra  : 1960  MRN: [id number]  Referring provider: Jenn Charles DO  Dx:   Encounter Diagnosis     ICD-10-CM    1   Sprain of medial collateral ligament of left knee, subsequent encounter S83 412D                   Subjective: Pt arrives 20 minutes late too appointment, "sorry I'm late was at a tour at [employer] " "I walked a mile there, and without my cane"      Objective: See treatment diary below  Precautions: Epileptic, HTN, LBP poor tolerance to SL     Daily Treatment Diary      Manual  1/30  2/13  2/15  2/20               Passive flexion nv  8 min  np np                                                                                                                      Exercise Diary  1/30 2/8  2/13  2/15  2/20  2/22  2/27  3/1       SLR flx * 3x8 3x8  3x10  3x10  3x8  3x10  3x12         Supine Clams 3x8 OTB INC NV 3x10 GTB  3x12 GTB    3x12 GTB  3x12 GTB  3x12 GTB  3x12 GTB       Bridges* 3x8 3x10 3x12  3x10  3x12  3x12  3x15  3x12 12#       LAQ                       TG NP                     STS  3x8    3x10    3x10  3x12 5#  3x12  3x12 8#                                                       HS stretch seated* 3x30" 3x30"  3x30''   3x30''    3x30"  3x30"  3x30"       Calf stretch* 3x30" 3x30"  3x30''   3x30''      3x30"         Quad stretch nv       3x30''  3x30"  3x30"  3x30"  3x30"                               Step ups fwd   2x10 6"  2x12 6''   2x12 6''  3x12 6''  3x12 6"  np       Step ups lat   nv  2x10 6''    2x12 6''  2x12 6''  2x12 6" inv nv  np       Lateral step overs nv              lateral walking          3 laps  3 laps PTB nv   3 laps PTB                                  line walking w high stepping over cones             nv                                                                                                                                  *=on hep     Modalities                                                                                                    Assessment: Tolerated treatment well  Patient exhibited good technique with therapeutic exercises  Emphasized importance of being on time in order to perform all exercises, pt verbalized understanding and agreement  Plan: Continue per plan of care

## 2018-03-06 ENCOUNTER — OFFICE VISIT (OUTPATIENT)
Dept: PHYSICAL THERAPY | Facility: REHABILITATION | Age: 58
End: 2018-03-06
Payer: COMMERCIAL

## 2018-03-06 DIAGNOSIS — S83.412D SPRAIN OF MEDIAL COLLATERAL LIGAMENT OF LEFT KNEE, SUBSEQUENT ENCOUNTER: Primary | ICD-10-CM

## 2018-03-06 PROCEDURE — 97110 THERAPEUTIC EXERCISES: CPT | Performed by: PHYSICAL THERAPIST

## 2018-03-06 NOTE — PROGRESS NOTES
Daily Note     Today's date: 3/6/2018  Patient name: Dereck Griffin  : 1960  MRN: [id number]  Referring provider: Sandy Menard DO  Dx:   Encounter Diagnosis     ICD-10-CM    1  Sprain of medial collateral ligament of left knee, subsequent encounter S83 101D                   Subjective: Patient denying any pain in L knee upon arrival to therapy today  She offers no new complaints at this time         Objective: See treatment diary below  Precautions: Epileptic, HTN, LBP poor tolerance to SL     Daily Treatment Diary      Manual  1/30  2/13  2/15  2/20               Passive flexion nv  8 min  np np                                                                                                                      Exercise Diary  1/30 2/8  2/13  2/15  2/20  2/22  2/27  3/1  3/6     SLR flx * 3x8 3x8  3x10  3x10  3x8  3x10  3x12    3x12     Supine Clams 3x8 OTB INC NV 3x10 GTB  3x12 GTB    3x12 GTB  3x12 GTB  3x12 GTB  3x12 GTB  3x12 GTB     Bridges* 3x8 3x10 3x12  3x10  3x12  3x12  3x15  3x12 12#  3x12 12#     LAQ                       TG NP                     STS  3x8    3x10    3x10  3x12 5#  3x12  3x12 8#  3x12 8#                                                     HS stretch seated* 3x30" 3x30"  3x30''   3x30''    3x30"  3x30"  3x30" 3x30"     Calf stretch* 3x30" 3x30"  3x30''   3x30''      3x30"         Quad stretch nv       3x30''  3x30"  3x30"  3x30"  3x30" 3x30"                             Step ups fwd   2x10 6"  2x12 6''   2x12 6''  3x12 6''  3x12 6"  np 3x12 6"     Step ups lat   nv  2x10 6''    2x12 6''  2x12 6''  2x12 6" inv nv  np 3x12 6"     Lateral step overs nv              lateral walking          3 laps  3 laps PTB nv   3 laps PTB   3 laps PTB                              line walking w high stepping over cones             nv    3 laps                                                                                                                              *=on hep     Modalities                                                                                                    Assessment: Tolerated treatment fair  Patient demonstrated fatigue post treatment, exhibited good technique with therapeutic exercises and would benefit from continued PT  Patient needing VC for proper technique with line walking with high stepping over cones today to avoid circumduction, able to self-correct  Patient reporting increased difficulty with STS today  Plan: Continue per plan of care  Progress treatment as tolerated

## 2018-03-08 ENCOUNTER — OFFICE VISIT (OUTPATIENT)
Dept: PHYSICAL THERAPY | Facility: REHABILITATION | Age: 58
End: 2018-03-08
Payer: COMMERCIAL

## 2018-03-08 DIAGNOSIS — S83.412D SPRAIN OF MEDIAL COLLATERAL LIGAMENT OF LEFT KNEE, SUBSEQUENT ENCOUNTER: Primary | ICD-10-CM

## 2018-03-08 PROCEDURE — 97110 THERAPEUTIC EXERCISES: CPT | Performed by: PHYSICAL MEDICINE & REHABILITATION

## 2018-03-08 PROCEDURE — 97112 NEUROMUSCULAR REEDUCATION: CPT | Performed by: PHYSICAL MEDICINE & REHABILITATION

## 2018-03-08 NOTE — PROGRESS NOTES
Daily Note     Today's date: 3/8/2018  Patient name: Gera Sheriff  : 1960  MRN: [id number]  Referring provider: Milka Polanco DO  Dx:   Encounter Diagnosis     ICD-10-CM    1   Sprain of medial collateral ligament of left knee, subsequent encounter S83 467D                   Subjective: Patient offer no new complaints this session, notes overall the left knee is "feeling pretty good "     Objective: See treatment diary below  Precautions: Epileptic, HTN, LBP poor tolerance to SL     Daily Treatment Diary      Manual  1/30  2/13  2/15  2/20               Passive flexion nv  8 min  np np                                                                                                                      Exercise Diary  1/30 2/8  2/13  2/15  2/20  2/22  2/27  3/1  3/6  3/8   SLR flx * 3x8 3x8  3x10  3x10  3x8  3x10  3x12    3x12  1# 2x10   Supine Clams 3x8 OTB INC NV 3x10 GTB  3x12 GTB    3x12 GTB  3x12 GTB  3x12 GTB  3x12 GTB  3x12 GTB  20x5" BTB   Bridges* 3x8 3x10 3x12  3x10  3x12  3x12  3x15  3x12 12#  3x12 12#  3x15 12#   LAQ                       TG NP                     STS  3x8    3x10    3x10  3x12 5#  3x12  3x12 8#  3x12 8#  2x10 10#                                                   HS stretch seated* 3x30" 3x30"  3x30''   3x30''    3x30"  3x30"  3x30" 3x30"  3x30"   Calf stretch* 3x30" 3x30"  3x30''   3x30''      3x30"         Quad stretch nv       3x30''  3x30"  3x30"  3x30"  3x30" 3x30"                             Step ups fwd   2x10 6"  2x12 6''   2x12 6''  3x12 6''  3x12 6"  np 3x12 6"  8" 2x10   Step ups lat   nv  2x10 6''    2x12 6''  2x12 6''  2x12 6" inv nv  np 3x12 6"  8" 2x10   Lateral step overs nv              lateral walking          3 laps  3 laps PTB nv   3 laps PTB   3 laps PTB  3 laps PTB                            line walking w high stepping over cones             nv    3 laps  3 laps                                                                                                                            *=on hep     Modalities                                                                                                    Assessment: Tolerated treatment fair  Increased balance difficulty with high march activity, able to tolerate progressions without complaints of significant increase in symptoms  Patient demonstrated fatigue post treatment, exhibited good technique with therapeutic exercises and would benefit from continued PT  Plan: Continue per plan of care  Progress treatment as tolerated

## 2018-03-13 ENCOUNTER — OFFICE VISIT (OUTPATIENT)
Dept: PHYSICAL THERAPY | Facility: REHABILITATION | Age: 58
End: 2018-03-13
Payer: COMMERCIAL

## 2018-03-13 DIAGNOSIS — S83.412D SPRAIN OF MEDIAL COLLATERAL LIGAMENT OF LEFT KNEE, SUBSEQUENT ENCOUNTER: Primary | ICD-10-CM

## 2018-03-13 PROCEDURE — 97110 THERAPEUTIC EXERCISES: CPT

## 2018-03-13 PROCEDURE — 97112 NEUROMUSCULAR REEDUCATION: CPT

## 2018-03-13 NOTE — PROGRESS NOTES
Daily Note     Today's date: 3/13/2018  Patient name: Yuvonne Babinski  : 1960  MRN: [id number]  Referring provider: Sahil Aiken DO  Dx:   Encounter Diagnosis     ICD-10-CM    1  Sprain of medial collateral ligament of left knee, subsequent encounter S83 809D                   Subjective: Patient stated no new complaints  Objective: See treatment diary below  Precautions: Epileptic, HTN, LBP poor tolerance to SL    Daily Treatment Diary   Manual  1/30 2/13 2/15 2/20         Passive flexion nv 8 min np np                                                                Exercise Diary  3/13 2/8 2/13 2/15 2/20 2/22 2/27 3/1 3/6 3/8   SLR flx * 1# 2x10 3x8 3x10 3x10 3x8 3x10 3x12  3x12 1# 2x10   Supine Clams 20x5" BTB 3x10 GTB 3x12 GTB  3x12 GTB 3x12 GTB 3x12 GTB 3x12 GTB 3x12 GTB 20x5" BTB   Bridges* 3x15 12# 3x10 3x12 3x10 3x12 3x12 3x15 3x12 12# 3x12 12# 3x15 12#   LAQ             TG             STS  2x10 10#  3x10  3x10 3x12 5# 3x12 3x12 8# 3x12 8# 2x10 10#                             HS stretch seated* 3x30" 3x30" 3x30'' 3x30''  3x30" 3x30" 3x30" 3x30" 3x30"   Calf stretch*  3x30" 3x30'' 3x30''   3x30"      Quad stretch    3x30'' 3x30" 3x30" 3x30" 3x30" 3x30"                 Step ups fwd 8" 2x10 2x10 6" 2x12 6''  2x12 6'' 3x12 6'' 3x12 6" np 3x12 6" 8" 2x10   Step ups lat 8" 2x10 nv 2x10 6''  2x12 6'' 2x12 6'' 2x12 6" inv nv np 3x12 6" 8" 2x10   Lateral step overs nv             lateral walking 3 laps PTB    3 laps 3 laps PTB nv  3 laps PTB  3 laps PTB 3 laps PTB                line walking w high stepping over cones 3 laps      nv  3 laps 3 laps                                                                    *=on hep   Modalities                                                             Assessment: Tolerated treatment fair  Patient noted increased soreness post last treatment  Kept all exercises the same as last treatment progressions   Patient was able to perform high stepping over cones with improved balance with minimal LOB  Patient noted minimal soreness post treatment  Patient would benefit from continued PT      Plan: Continue per plan of care

## 2018-03-15 ENCOUNTER — OFFICE VISIT (OUTPATIENT)
Dept: PHYSICAL THERAPY | Facility: REHABILITATION | Age: 58
End: 2018-03-15
Payer: COMMERCIAL

## 2018-03-15 DIAGNOSIS — S06.0X1D CONCUSSION WITH LOSS OF CONSCIOUSNESS OF 30 MINUTES OR LESS, SUBSEQUENT ENCOUNTER: ICD-10-CM

## 2018-03-15 DIAGNOSIS — S83.412D SPRAIN OF MEDIAL COLLATERAL LIGAMENT OF LEFT KNEE, SUBSEQUENT ENCOUNTER: Primary | ICD-10-CM

## 2018-03-15 PROCEDURE — 97164 PT RE-EVAL EST PLAN CARE: CPT | Performed by: PHYSICAL THERAPIST

## 2018-03-15 PROCEDURE — 97110 THERAPEUTIC EXERCISES: CPT

## 2018-03-15 PROCEDURE — 97112 NEUROMUSCULAR REEDUCATION: CPT

## 2018-03-15 PROCEDURE — G8979 MOBILITY GOAL STATUS: HCPCS | Performed by: PHYSICAL THERAPIST

## 2018-03-15 PROCEDURE — G8978 MOBILITY CURRENT STATUS: HCPCS | Performed by: PHYSICAL THERAPIST

## 2018-03-15 NOTE — PROGRESS NOTES
PT Re-Evaluation     Today's date: 3/15/2018  Patient name: Maggie Dorsey  : 1960  MRN: [id number]  Referring provider: Jose Paez DO  Dx:   Encounter Diagnosis     ICD-10-CM    1  Sprain of medial collateral ligament of left knee, subsequent encounter S83 412D                   Assessment  Impairments: activity intolerance, impaired balance, impaired physical strength and pain with function    Assessment details: Maggie Dorsey has been compliant with attending PT and home exercise program since last re- eval   Veronica Leonard  has made improvements in objective data since initial eval but is still limited compared to prior level of function  Veronica Leonard continues with above listed impairments and would benefit from additional skilled PT to address these deficits to return to prior level of function  Understanding of Dx/Px/POC: good   Prognosis: fair    Goals  Short-Term Goals:   1  Increase L knee rom to WNL -MET   2  Increase L knee strength to 4/5 4 weeks - met  3  Increase L knee strength to 4+/5 4 weeks    Long-Term Goals:   1  Pt able to ambulate community distances w/o brace 4-6 weeks - not met, able to ambulate with compression brace  2  Pt able to complete ADLS w/o pain 4 weeks -partially met, if carrying a basket of wash down her steps continues to have pain  3  Patient independent with HEP at time of discharge         Plan  Patient would benefit from: skilled PT  Planned therapy interventions: manual therapy, neuromuscular re-education, strengthening, stretching, graded activity, graded exercise, home exercise program, therapeutic exercise, therapeutic activities and patient education  Frequency: 2x week  Duration in weeks: 4  Treatment plan discussed with: patient  Plan details: Pt will be treated for an additional 4 weeks and transitioned towards independent home exercise program          Subjective Evaluation    History of Present Illness  Mechanism of injury: Since her injury, pt notes 75-80% improvement with remaining 25-20% being due to the " difficulty with carrying anything heavy as well as the feeling of imbalance and incoordination " Continues to report feelings of giving out  Pt reports it's tough getting out of bed in the morning, "once I get moving it feels better "  I still wear the compression brace  Pain  Current pain ratin  At best pain ratin  At worst pain ratin  Location: L knee  Relieving factors: rest  Aggravating factors: standing, walking and stair climbing  Progression: improved    Treatments  Previous treatment: physical therapy  Current treatment: physical therapy  Patient Goals  Patient goals for therapy: decreased pain, improved balance, increased motion, increased strength, independence with ADLs/IADLs and return to sport/leisure activities          Objective     Active Range of Motion   Left Knee   Flexion: 126 degrees   Extension: 0 degrees     Right Knee   Flexion: 130 degrees   Extension: 0 degrees     Strength/Myotome Testing     Left Hip   Planes of Motion   Abduction: 4    Right Hip   Planes of Motion   Abduction: 4    Left Knee   Flexion: 4  Extension: 4  Quadriceps contraction: good    Right Knee   Flexion: 4+  Extension: 4+  Quadriceps contraction: good    Tests     Left Knee   Positive medial Deb  Additional Tests Details  Tenderness to palpation MCL, however pt's R knee was also tenderness noted over MCL

## 2018-03-15 NOTE — PROGRESS NOTES
Daily Note     Today's date: 3/15/2018  Patient name: Davidson Brewer  : 1960  MRN: [id number]  Referring provider: Dante Charles DO  Dx:   Encounter Diagnosis     ICD-10-CM    1  Sprain of medial collateral ligament of left knee, subsequent encounter S83 079D                   Subjective: Patient reports increased knee discomfort prior to session today stating "it's cracking a lot "       Objective: See treatment diary below  Precautions: Epileptic, HTN, LBP poor tolerance to SL    Daily Treatment Diary   Manual  1/30 2/13 2/15 2/20         Passive flexion nv 8 min np np                                                                Exercise Diary  3/13 3/15 2/13 2/15 2/20 2/22 2/27 3/1 3/6 3/8   SLR flx * 1# 2x10  3x10 3x10 3x8 3x10 3x12  3x12 1# 2x10   Supine Clams 20x5" BTB 30x5''  3x12 GTB  3x12 GTB 3x12 GTB 3x12 GTB 3x12 GTB 3x12 GTB 20x5" BTB   Bridges* 3x15 12# 15# 3x15 3x12 3x10 3x12 3x12 3x15 3x12 12# 3x12 12# 3x15 12#   LAQ             TG             STS  2x10 10# 2x10 10# 3x10  3x10 3x12 5# 3x12 3x12 8# 3x12 8# 2x10 10#                             HS stretch seated* 3x30" 3x30'' 3x30'' 3x30''  3x30" 3x30" 3x30" 3x30" 3x30"   Calf stretch*   3x30'' 3x30''   3x30"      Quad stretch    3x30'' 3x30" 3x30" 3x30" 3x30" 3x30"                 Step ups fwd 8" 2x10 8'' 2x10 2x12 6''  2x12 6'' 3x12 6'' 3x12 6" np 3x12 6" 8" 2x10   Step ups lat 8" 2x10 8'' 2x10 2x10 6''  2x12 6'' 2x12 6'' 2x12 6" inv nv np 3x12 6" 8" 2x10   Lateral step overs nv             lateral walking 3 laps PTB 3 laps PTB   3 laps 3 laps PTB nv  3 laps PTB  3 laps PTB 3 laps PTB                line walking w high stepping over cones 3 laps      nv  3 laps 3 laps                                                                    *=on hep   Modalities                                                               Assessment: Tolerated treatment well   Patient demonstrated fatigue post treatment and would benefit from continued PT See re-eval        Plan: Continue per plan of care  Progress treatment as tolerated

## 2018-03-19 ENCOUNTER — OFFICE VISIT (OUTPATIENT)
Dept: OBGYN CLINIC | Facility: CLINIC | Age: 58
End: 2018-03-19
Payer: COMMERCIAL

## 2018-03-19 VITALS
DIASTOLIC BLOOD PRESSURE: 107 MMHG | HEIGHT: 63 IN | BODY MASS INDEX: 34.02 KG/M2 | HEART RATE: 96 BPM | WEIGHT: 192 LBS | SYSTOLIC BLOOD PRESSURE: 176 MMHG

## 2018-03-19 DIAGNOSIS — S83.412D SPRAIN OF MEDIAL COLLATERAL LIGAMENT OF LEFT KNEE, SUBSEQUENT ENCOUNTER: Primary | ICD-10-CM

## 2018-03-19 PROCEDURE — 99212 OFFICE O/P EST SF 10 MIN: CPT | Performed by: ORTHOPAEDIC SURGERY

## 2018-03-19 NOTE — PROGRESS NOTES
Assessment:  1  Sprain of medial collateral ligament of left knee, subsequent encounter       Patient Active Problem List   Diagnosis    Sprain of medial collateral ligament of left knee    Concussion wth loss of consciousness of 30 minutes or less    Esophageal reflux    Hypertension    Pineal gland cyst    Seizures ([facility] [address] )    Tension type headache           Plan      Continue with physical therapy follow up on as-needed basis if she continues to have pain we may look further at looking as a meniscus as possible irritation  But I think worry were accurate with the MCL she is improving she has improved with her treatment protocol  Subjective:     Patient ID:    Chief Complaint:Melvi Sultana 56 y o  female      HPI      Patient comes in for routine follow-up regarding MCL sprain  She reports that is much better she still has some weakness and occasional irritation  But improved from where was  The following portions of the patient's history were reviewed and updated as appropriate: allergies, current medications, past family history, past social history, past surgical history and problem list         Social History     Social History    Marital status: Single     Spouse name: N/A    Number of children: N/A    Years of education: N/A     Occupational History    Not on file       Social History Main Topics    Smoking status: Never Smoker    Smokeless tobacco: Never Used    Alcohol use No    Drug use: No    Sexual activity: Not on file     Other Topics Concern    Not on file     Social History Narrative    No narrative on file     Past Medical History:   Diagnosis Date    Bladder prolapse, female, acquired     Epilepsy ([facility] [address] )     Hypertension     Kidney stone     Tear of MCL (medial collateral ligament) of knee      Past Surgical History:   Procedure Laterality Date    HYSTERECTOMY       Allergies   Allergen Reactions    Iodinated Diagnostic Agents Anaphylaxis    Cortisone  Iodine     Lidocaine     Novocain [Procaine]     Other      "GENERAL ANESTHESIA"    Phenytoin      Current Outpatient Prescriptions on File Prior to Visit   Medication Sig Dispense Refill    amLODIPine (NORVASC) 10 mg tablet Take 1 tablet by mouth daily      atorvastatin (LIPITOR) 40 mg tablet Take 40 mg by mouth daily      azithromycin (ZITHROMAX) 250 mg tablet TAKE 2 TABLETS BY MOUTH TODAY, THEN TAKE 1 TABLET DAILY FOR 4 DAYS  0    furosemide (LASIX) 40 mg tablet Take 40 mg by mouth daily        losartan (COZAAR) 100 MG tablet Take 100 mg by mouth daily      metoprolol tartrate (LOPRESSOR) 25 mg tablet Take 25 mg by mouth 2 (two) times a day      montelukast (SINGULAIR) 10 mg tablet Take 10 mg by mouth daily at bedtime      montelukast (SINGULAIR) 10 mg tablet Take 1 tablet by mouth daily      naproxen (NAPROSYN) 500 mg tablet Take 1 tablet by mouth 2 (two) times a day with meals 14 tablet 0    traMADol (ULTRAM) 50 mg tablet Take 50 mg by mouth every 6 (six) hours as needed for moderate pain       No current facility-administered medications on file prior to visit  Objective:        Ortho Exam      There is some mild laxity but nothing severe very mild compared to the other side  No tenderness over the MCL on the more mid mild tenderness over the joint itself  Portions of the record may have been created with voice recognition software   Occasional wrong word or "sound a like" substitutions may have occurred due to the inherent limitations of voice recognition software   Read the chart carefully and recognize, using context, where substitutions have occurred

## 2018-03-28 NOTE — PROGRESS NOTES
PT Discharge    Today's date: 3/28/2018  Patient name: Trini Green  : 1960  MRN: [id number]  Referring provider: Izaiah Holly DO  Dx:   Encounter Diagnosis     ICD-10-CM    1  Sprain of medial collateral ligament of left knee, subsequent encounter S83 412D PT plan of care cert/re-cert   2  Concussion with loss of consciousness of 30 minutes or less, subsequent encounter S06  0X1D        Start Time: 7596  Stop Time: 1625  Total time in clinic (min): 55 minutes    Assessment/Plan  Trini Green has not returned since last appointment, unable to perform objective measures since then  At this time, Trini Green will be discharged from physical therapy services      Subjective    Objective    Flowsheet Rows    Flowsheet Row Most Recent Value   PT/OT G-Codes   Current Score  47   Projected Score  39   FOTO information reviewed  Yes   Assessment Type  Re-evaluation   G code set  Mobility: Walking & Moving Around   Mobility: Walking and Moving Around Current Status ()  CK   Mobility: Walking and Moving Around Goal Status ()  CL

## 2018-04-26 ENCOUNTER — EVALUATION (OUTPATIENT)
Dept: PHYSICAL THERAPY | Facility: REHABILITATION | Age: 58
End: 2018-04-26
Payer: COMMERCIAL

## 2018-04-26 DIAGNOSIS — S83.412D SPRAIN OF MEDIAL COLLATERAL LIGAMENT OF LEFT KNEE, SUBSEQUENT ENCOUNTER: Primary | ICD-10-CM

## 2018-04-26 PROCEDURE — 97164 PT RE-EVAL EST PLAN CARE: CPT | Performed by: PHYSICAL THERAPIST

## 2018-04-26 PROCEDURE — G8978 MOBILITY CURRENT STATUS: HCPCS | Performed by: PHYSICAL THERAPIST

## 2018-04-26 PROCEDURE — G8979 MOBILITY GOAL STATUS: HCPCS | Performed by: PHYSICAL THERAPIST

## 2018-04-30 ENCOUNTER — APPOINTMENT (OUTPATIENT)
Dept: PHYSICAL THERAPY | Facility: REHABILITATION | Age: 58
End: 2018-04-30
Payer: COMMERCIAL

## 2018-05-03 ENCOUNTER — OFFICE VISIT (OUTPATIENT)
Dept: PHYSICAL THERAPY | Facility: REHABILITATION | Age: 58
End: 2018-05-03
Payer: COMMERCIAL

## 2018-05-03 DIAGNOSIS — S83.412D SPRAIN OF MEDIAL COLLATERAL LIGAMENT OF LEFT KNEE, SUBSEQUENT ENCOUNTER: Primary | ICD-10-CM

## 2018-05-03 PROCEDURE — 97110 THERAPEUTIC EXERCISES: CPT | Performed by: PHYSICAL THERAPIST

## 2018-05-03 NOTE — PROGRESS NOTES
Daily Note     Today's date: 5/3/2018  Patient name: Adria Cervantes  : 1960  MRN: [id number]  Referring provider: Corbin Kay DO  Dx:   Encounter Diagnosis     ICD-10-CM    1  Sprain of medial collateral ligament of left knee, subsequent encounter S83 412D        Start Time: 396  Stop Time: 1830  Total time in clinic (min): 45 minutes    Subjective: Doug Maldonado reports that her knee feels okay  Reports being late due to forgetting her mother had a Dr's appointment, despite calling yesterday to confirm her appointment  Objective: See treatment diary below      Assessment: Tolerated treatment well  Patient demonstrated fatigue post treatment, exhibited good technique with therapeutic exercises and would benefit from continued PT  Advised pt to make sure she attends each appointment and educated on importance of compliance with attending PT  Pt verbalized understanding and agreement  Tolerated today's session w/o pain  Plan: Continue per plan of care     Precautions: Epileptic, HTN, LBP poor tolerance to SL     Daily Treatment Diary      Manual  1/30  2/13  2/15  2/20               Passive flexion nv  8 min  np np                                                                                                                      Exercise Diary  4/26 5/3       3/1  3/6  3   SLR flx * nv 1# 3x15         3x12  1# 2x10   Supine Clams  20x5" BTB       3x12 GTB  3x12 GTB  20x5" BTB   Bridges* nv 3x15 12#       3x12 12#  3x12 12#  3x15 12#   LAQ                TG                STS  nv 3x12       3x12 8#  3x12 8#  2x10 10#                                     HS stretch seated* nv 3x30"       3x30" 3x30"  3x30"   Calf stretch*                Quad stretch 3x30" 3x30"       3x30" 3x30"                      Step ups fwd nv 2x12 6"       np 3x12 6"  8" 2x10   Step ups lat nv 2x12 6"       np 3x12 6"  8" 2x10   Lateral step overs nv  nv            lateral walking nv nv        3 laps PTB  3 laps PTB              line walking w high stepping over cones           3 laps  3 laps                     Balance                        SL    5xea                    Tandem    nv                                            *=on hep     Modalities

## 2018-05-07 ENCOUNTER — OFFICE VISIT (OUTPATIENT)
Dept: PHYSICAL THERAPY | Facility: REHABILITATION | Age: 58
End: 2018-05-07
Payer: COMMERCIAL

## 2018-05-07 DIAGNOSIS — S83.412D SPRAIN OF MEDIAL COLLATERAL LIGAMENT OF LEFT KNEE, SUBSEQUENT ENCOUNTER: Primary | ICD-10-CM

## 2018-05-07 PROCEDURE — 97530 THERAPEUTIC ACTIVITIES: CPT | Performed by: PHYSICAL THERAPIST

## 2018-05-07 PROCEDURE — 97112 NEUROMUSCULAR REEDUCATION: CPT | Performed by: PHYSICAL THERAPIST

## 2018-05-07 PROCEDURE — 97110 THERAPEUTIC EXERCISES: CPT | Performed by: PHYSICAL THERAPIST

## 2018-05-07 NOTE — PROGRESS NOTES
Daily Note     Today's date: 2018  Patient name: Blaire Ortega  : 1960  MRN: [id number]  Referring provider: Kristen Thorne DO  Dx:   Encounter Diagnosis     ICD-10-CM    1  Sprain of medial collateral ligament of left knee, subsequent encounter S83 092D                   Subjective: Karyn Strickland reports that her knee is feeling okay today, reports doing yard work 5 hours "pulling weeds, picking up rocks, digging" saturday as well as walking approximately 1 mile this morning  Notes knee feels a little sore today and is swollen  Arrives 15 minutes late  Objective: See treatment diary below      Assessment: Tolerated treatment well  Patient demonstrated fatigue post treatment, exhibited good technique with therapeutic exercises and would benefit from continued PT  No complaints during today's session  Plan: Continue per plan of care     recautions: Epileptic, HTN, LBP poor tolerance to SL     Daily Treatment Diary      Manual  1/30  2/13  2/15  2/20               Passive flexion nv  8 min  np np                                                                                                                      Exercise Diary  4/26 5/3 5/7      3/1  3/6  3/8   SLR flx * nv 1# 3x15 1 5# 3x12        3x12  1# 2x10   Supine Clams  20x5" BTB 20x5" BTB      3x12 GTB  3x12 GTB  20x5" BTB   Bridges* nv 3x15 12# 3x15 12#      3x12 12#  3x12 12#  3x15 12#   LAQ                TG                STS  nv 3x12 3x12      3x12 8#  3x12 8#  2x10 10#                                     HS stretch seated* nv 3x30" 3x30"      3x30" 3x30"  3x30"   Calf stretch*                Quad stretch 3x30" 3x30"       3x30" 3x30"                      Step ups fwd nv 2x12 6" 3x15 6"      np 3x12 6"  8" 2x10   Step ups lat nv 2x12 6" dc      np 3x12 6"  8" 2x10   Lateral step overs nv  nv 2x12           lateral walking nv nv 3 laps       3 laps PTB  3 laps PTB                     line walking w high stepping over cones           3 laps  3 laps                     Balance                        SL    5xea                    Tandem    nv  20" ea x 3                                           *=on hep

## 2018-05-09 ENCOUNTER — OFFICE VISIT (OUTPATIENT)
Dept: PHYSICAL THERAPY | Facility: REHABILITATION | Age: 58
End: 2018-05-09
Payer: COMMERCIAL

## 2018-05-09 DIAGNOSIS — S83.412D SPRAIN OF MEDIAL COLLATERAL LIGAMENT OF LEFT KNEE, SUBSEQUENT ENCOUNTER: Primary | ICD-10-CM

## 2018-05-09 PROCEDURE — 97530 THERAPEUTIC ACTIVITIES: CPT | Performed by: PHYSICAL THERAPIST

## 2018-05-09 PROCEDURE — 97112 NEUROMUSCULAR REEDUCATION: CPT | Performed by: PHYSICAL THERAPIST

## 2018-05-09 PROCEDURE — 97110 THERAPEUTIC EXERCISES: CPT | Performed by: PHYSICAL THERAPIST

## 2018-05-09 NOTE — PROGRESS NOTES
Daily Note     Today's date: 2018  Patient name: Toribio Andrade  : 1960  MRN: [id number]  Referring provider: Ted Collazo DO  Dx:   Encounter Diagnosis     ICD-10-CM    1  Sprain of medial collateral ligament of left knee, subsequent encounter S83 221D                   Subjective: Bernadette Singh reports that her knee felt a little sore after last appointment, " was cracking a lot"  Objective: See treatment diary below      Assessment: Tolerated treatment well  Patient demonstrated fatigue post treatment, exhibited good technique with therapeutic exercises and would benefit from continued PT  Required cuing during balance activities to not use upper extremities, pt will benefit from continued progression as able  Plan: Continue per plan of care     Precautions: Epileptic, HTN, LBP poor tolerance to SL     Daily Treatment Diary      Manual  1/30  2/13  2/15  2/20               Passive flexion nv  8 min  np np                                                                                                                      Exercise Diary  4/26 5/3 5/7 5/9     3/1  3/6  3/   SLR flx * nv 1# 3x15 1 5# 3x12 1 5# 3x12       3x12  1# 2x10   Supine Clams  20x5" BTB 20x5" BTB 20x5" BTB     3x12 GTB  3x12 GTB  20x5" BTB   Bridges* nv 3x15 12# 3x15 12# 3x12 15#     3x12 12#  3x12 12#  3x15 12#   LAQ                TG                STS  nv 3x12 3x12 3x15     3x12 8#  3x12 8#  2x10 10#                                     HS stretch seated* nv 3x30" 3x30" 3x30"     3x30" 3x30"  3x30"   Calf stretch*                Quad stretch 3x30" 3x30"  3x30"     3x30" 3x30"                      Step ups fwd nv 2x12 6" 3x15 6" 3x15 8"     np 3x12 6"  8" 2x10   Step ups lat nv 2x12 6" dc      np 3x12 6"  8" 2x10   Lateral step overs nv  nv 2x12 3x12 6"          lateral walking nv nv 3 laps 3 laps      3 laps PTB  3 laps PTB                     line walking w high stepping over hurdles    3 laps       3 laps  3 laps              Balance                        SL    5xea    5xea                Tandem    nv  20" ea x 3                                           *=on hep

## 2018-05-10 ENCOUNTER — APPOINTMENT (OUTPATIENT)
Dept: PHYSICAL THERAPY | Facility: REHABILITATION | Age: 58
End: 2018-05-10
Payer: COMMERCIAL

## 2018-05-14 ENCOUNTER — OFFICE VISIT (OUTPATIENT)
Dept: PHYSICAL THERAPY | Facility: REHABILITATION | Age: 58
End: 2018-05-14
Payer: COMMERCIAL

## 2018-05-14 DIAGNOSIS — S83.412D SPRAIN OF MEDIAL COLLATERAL LIGAMENT OF LEFT KNEE, SUBSEQUENT ENCOUNTER: Primary | ICD-10-CM

## 2018-05-14 PROCEDURE — 97112 NEUROMUSCULAR REEDUCATION: CPT | Performed by: PHYSICAL THERAPIST

## 2018-05-14 PROCEDURE — 97110 THERAPEUTIC EXERCISES: CPT | Performed by: PHYSICAL THERAPIST

## 2018-05-14 PROCEDURE — 97530 THERAPEUTIC ACTIVITIES: CPT | Performed by: PHYSICAL THERAPIST

## 2018-05-14 NOTE — PROGRESS NOTES
Daily Note     Today's date: 2018  Patient name: Mckayla Haynes  : 1960  MRN: [id number]  Referring provider: Arpan Barcenas DO  Dx:   Encounter Diagnosis     ICD-10-CM    1  Sprain of medial collateral ligament of left knee, subsequent encounter S83 412D        Start Time:   Stop Time: 1520  Total time in clinic (min): 55 minutes    Subjective: Carloz Fisher reports that her knee feels good today, "not clicking as much"  Objective: See treatment diary below      Assessment: Tolerated treatment well  Patient demonstrated fatigue post treatment, exhibited good technique with therapeutic exercises and would benefit from continued PT  Pt had no complaints of pain this visit  Moderate challenge noted with       Plan: Continue per plan of care     Precautions: Epileptic, HTN, LBP poor tolerance to SL     Daily Treatment Diary      Manual  1/30  2/13  2/15  2/20               Passive flexion nv  8 min  np np                                                                                                                      Exercise Diary  4/26 5/3 5/7 5/9 5/14    3/1  3/6  3/8   bike     5'         SLR flx * nv 1# 3x15 1 5# 3x12 1 5# 3x12 1 5# 3x15      3x12  1# 2x10   Supine Clams  20x5" BTB 20x5" BTB 20x5" BTB 20x5" MTB    3x12 GTB  3x12 GTB  20x5" BTB   Bridges* nv 3x15 12# 3x15 12# 3x12 15# 3x12 15#    3x12 12#  3x12 12#  3x15 12#   LAQ                TG                STS  nv 3x12 3x12 3x15 3x15 7# ea    3x12 8#  3x12 8#  2x10 10#                                     HS stretch seated* nv 3x30" 3x30" 3x30" 3x30"    3x30" 3x30"  3x30"   Calf stretch*                Quad stretch 3x30" 3x30"  3x30" 3x30"    3x30" 3x30"                      Step ups fwd nv 2x12 6" 3x15 6" 3x15 8" 3x15 8"    np 3x12 6"  8" 2x10   Step ups lat nv 2x12 6" dc      np 3x12 6"  8" 2x10   Lateral step overs nv  nv 2x12 3x12 6" 3x15 6"         lateral walking nv nv 3 laps 3 laps 3 laps OTB     3 laps PTB  3 laps PTB              line walking w high stepping over hurdles    3 laps       3 laps  3 laps                     Balance                        SL    5xea    5xea  5xea              Tandem    nv  20" ea x 3                                           *=on hep

## 2018-05-17 ENCOUNTER — OFFICE VISIT (OUTPATIENT)
Dept: PHYSICAL THERAPY | Facility: REHABILITATION | Age: 58
End: 2018-05-17
Payer: COMMERCIAL

## 2018-05-17 DIAGNOSIS — S83.412D SPRAIN OF MEDIAL COLLATERAL LIGAMENT OF LEFT KNEE, SUBSEQUENT ENCOUNTER: Primary | ICD-10-CM

## 2018-05-17 PROCEDURE — 97110 THERAPEUTIC EXERCISES: CPT | Performed by: PHYSICAL THERAPIST

## 2018-05-17 PROCEDURE — 97530 THERAPEUTIC ACTIVITIES: CPT | Performed by: PHYSICAL THERAPIST

## 2018-05-17 PROCEDURE — 97112 NEUROMUSCULAR REEDUCATION: CPT | Performed by: PHYSICAL THERAPIST

## 2018-05-17 NOTE — PROGRESS NOTES
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 as well as walking with her grand son  Objective: See treatment diary below      Assessment: Tolerated treatment well  Patient demonstrated fatigue post treatment, exhibited good technique with therapeutic exercises and would benefit from continued PT  Pt required CGA during balancing on foam course first 2x, able to perform independently thereafter  Tolerated additional squats and balancing activities w/o reports of pain  Plan: Continue per plan of care      Precautions: Epileptic, HTN, LBP poor tolerance to SL     Daily Treatment Diary      Manual  1/30  2/13  2/15  2/20               Passive flexion nv  8 min  np np                                                                                                                      Exercise Diary  4/26 5/3 5/7 5/9 5/14 5/17   3/1  3/6  3/8   bike     5'  5'       SLR flx * nv 1# 3x15 1 5# 3x12 1 5# 3x12 1 5# 3x15 2# 3x10     3x12  1# 2x10   Supine Clams  20x5" BTB 20x5" BTB 20x5" BTB 20x5" MTB 20x5" MTB   3x12 GTB  3x12 GTB  20x5" BTB   Bridges* nv 3x15 12# 3x15 12# 3x12 15# 3x12 15# 3x15 15# MTB    3x12 12#  3x12 12#  3x15 12#   LAQ                TG                STS  nv 3x12 3x12 3x15 3x15 7# ea 3x15 7#    3x12 8#  3x12 8#  2x10 10#                                     HS stretch seated* nv 3x30" 3x30" 3x30" 3x30" 3x30"   3x30" 3x30"  3x30"   Calf stretch*                Quad stretch 3x30" 3x30"  3x30" 3x30" 3x30"   3x30" 3x30"                      Step ups fwd nv 2x12 6" 3x15 6" 3x15 8" 3x15 8" np   np 3x12 6"  8" 2x10   Step ups lat nv 2x12 6" dc      np 3x12 6"  8" 2x10   Lateral step overs nv  nv 2x12 3x12 6" 3x15 6" nv        lateral walking nv nv 3 laps 3 laps 3 laps OTB 3 laps OTB    3 laps PTB  3 laps PTB    monster walks      3 laps OTB          Foam obstacle course w hurdles      Fwd, latera 3 laps ea        line walking w high stepping over hurdles    3 laps       3 laps  3 laps                     Balance                        SL    5xea    5xea  5xea   5xea            Tandem    nv  20" ea x 3                                           *=on hep

## 2018-05-21 ENCOUNTER — OFFICE VISIT (OUTPATIENT)
Dept: PHYSICAL THERAPY | Facility: REHABILITATION | Age: 58
End: 2018-05-21
Payer: COMMERCIAL

## 2018-05-21 DIAGNOSIS — S83.412D SPRAIN OF MEDIAL COLLATERAL LIGAMENT OF LEFT KNEE, SUBSEQUENT ENCOUNTER: Primary | ICD-10-CM

## 2018-05-21 PROCEDURE — 97530 THERAPEUTIC ACTIVITIES: CPT

## 2018-05-21 PROCEDURE — 97112 NEUROMUSCULAR REEDUCATION: CPT

## 2018-05-21 PROCEDURE — 97110 THERAPEUTIC EXERCISES: CPT

## 2018-05-21 NOTE — PROGRESS NOTES
Daily Note     Today's date: 2018  Patient name: Emani Villegas  : 1960  MRN: [id number]  Referring provider: Wang De La Torre DO  Dx:   Encounter Diagnosis     ICD-10-CM    1  Sprain of medial collateral ligament of left knee, subsequent encounter S83 753D                   Subjective: Patient denies any knee pain prior to session today and minimal soreness after previous session  Patient arrived to therapy 10 minutes late  Re-evaluate patient next visit         Objective: See treatment diary below  Precautions: Epileptic, HTN, LBP poor tolerance to SL     Daily Treatment Diary      Manual  1/30  2/13  2/15  2/20               Passive flexion nv  8 min  np np                                                                                                                      Exercise Diary  4/26 5/3 5/7 5/9 5/14 5/17 5/21   3/6  3/8   bike     5'  5' 5'      SLR flx * nv 1# 3x15 1 5# 3x12 1 5# 3x12 1 5# 3x15 2# 3x10 2# 3x12    3x12  1# 2x10   Supine Clams  20x5" BTB 20x5" BTB 20x5" BTB 20x5" MTB 20x5" MTB 30x5'' MTB   3x12 GTB  20x5" BTB   Bridges* nv 3x15 12# 3x15 12# 3x12 15# 3x12 15# 3x15 15# MTB  3x15 15# MTB   3x12 12#  3x15 12#   LAQ                TG                STS  nv 3x12 3x12 3x15 3x15 7# ea 3x15 7#  3x15 7#   3x12 8#  2x10 10#                                     HS stretch seated* nv 3x30" 3x30" 3x30" 3x30" 3x30" 3x30''  3x30"  3x30"   Calf stretch*                Quad stretch 3x30" 3x30"  3x30" 3x30" 3x30"   3x30"                      Step ups fwd nv 2x12 6" 3x15 6" 3x15 8" 3x15 8" np   3x12 6"  8" 2x10   Step ups lat nv 2x12 6" dc      3x12 6"  8" 2x10   Lateral step overs nv  nv 2x12 3x12 6" 3x15 6" nv 3x15 6''       lateral walking nv nv 3 laps 3 laps 3 laps OTB 3 laps OTB 3 laps OTB   3 laps PTB  3 laps PTB    monster walks      3 laps OTB 3 laps OTB         Foam obstacle course w hurdles      Fwd, latera 3 laps ea Fwd, lateral 3 laps ea       line walking w high stepping over hurdles    3 laps       3 laps  3 laps                     Balance                        SL    5xea    5xea  5xea   5xea  5x ea          Tandem    nv  20" ea x 3                                           *=on hep      Assessment: Tolerated treatment well  Patient demonstrated fatigue post treatment, exhibited good technique with therapeutic exercises and would benefit from continued PT Patient demonstrates minimal LOB with completion of ralph/foam obstacle this session, with patient able to self correct  No increase in knee pain noted with any TE performed  Plan: Continue per plan of care  Progress treatment as tolerated

## 2018-05-24 ENCOUNTER — APPOINTMENT (OUTPATIENT)
Dept: PHYSICAL THERAPY | Facility: REHABILITATION | Age: 58
End: 2018-05-24
Payer: COMMERCIAL

## 2018-05-31 ENCOUNTER — EVALUATION (OUTPATIENT)
Dept: PHYSICAL THERAPY | Facility: REHABILITATION | Age: 58
End: 2018-05-31
Payer: COMMERCIAL

## 2018-05-31 DIAGNOSIS — S83.412D SPRAIN OF MEDIAL COLLATERAL LIGAMENT OF LEFT KNEE, SUBSEQUENT ENCOUNTER: Primary | ICD-10-CM

## 2018-05-31 PROCEDURE — G8978 MOBILITY CURRENT STATUS: HCPCS | Performed by: PHYSICAL THERAPIST

## 2018-05-31 PROCEDURE — 97164 PT RE-EVAL EST PLAN CARE: CPT | Performed by: PHYSICAL THERAPIST

## 2018-05-31 PROCEDURE — G8979 MOBILITY GOAL STATUS: HCPCS | Performed by: PHYSICAL THERAPIST

## 2018-05-31 NOTE — PROGRESS NOTES
PT Re-Evaluation     Today's date: 2018  Patient name: Lizette Bain  : 1960  MRN: [id number]  Referring provider: Calin Georges DO  Dx:   Encounter Diagnosis     ICD-10-CM    1  Sprain of medial collateral ligament of left knee, subsequent encounter S83 412D        Start Time: 9530  Stop Time: 1515  Total time in clinic (min): 27 minutes    Assessment  Impairments: activity intolerance, impaired balance and pain with function    Assessment details: Lizette Bain has been compliant with attending PT and home exercise program since initial eval   Efrain Patterson  has made improvements in objective data since initial evaluation  Continues to have inconsistent attendance  Is still limited compared to prior level of function  Efrain Patterson continues with above listed impairments and would benefit from additional skilled PT to address these deficits to return to prior level of function  Understanding of Dx/Px/POC: good   Prognosis: fair    Goals  Short-Term Goals:   1  Increase L knee strength to 4+/5 4 weeks MET  2  Increase L hip abd strength to 4+/5 4 weeks MET    Long-Term Goals:   1  Pt able to ambulate community distances w/o brace 4-6 weeks met  2  Pt able to complete ADLS w/o pain 4 weeks partially met, reports pain with getting out of bathtub  3  Patient independent with HEP at time of discharge  Partially met  4   Patient able to walk 1 mile on her [place] walk trail 6 weeks partially met currently 1/2 mile total      Plan  Patient would benefit from: skilled PT  Planned therapy interventions: manual therapy, neuromuscular re-education, strengthening, stretching, graded activity, graded exercise, home exercise program, therapeutic exercise, therapeutic activities and patient education  Frequency: 2x week  Duration in weeks: 4  Treatment plan discussed with: patient  Plan details: Pt will be treated for 6 weeks and transitioned towards independent home exercise program          Subjective Evaluation    History of Present Illness  Mechanism of injury: Tana Ugalde has been seen for total of 8 visits for OP PT for L MCL strain  Patient rates overall improvement since beginning PT 85-90%  Patient's global rating of change is " A great deal better (6) " Patient reports improvements with walking, stair negotiation  Patient reports most difficulty with getting out of her car as well as going down steps vs ascension, "I havent been able to get back to walking the full bike path yet " "I'm really just having issues with balance " I was grabbing heavy boxes for my mom in the attic", did not report difficulty with this  Subjective since last re-evaluation:  Since her injury, pt notes 75-80% improvement with remaining 25-20% being due to the " difficulty with carrying anything heavy as well as the feeling of imbalance and incoordination " Continues to report feelings of giving out  Pt reports it's tough getting out of bed in the morning, "once I get moving it feels better "  I still wear the compression brace     Pain  Current pain ratin  At best pain ratin  At worst pain rating: 3  Location: L knee  Relieving factors: rest  Aggravating factors: standing, walking and stair climbing  Progression: improved    Social Support    Employment status: working (works as a )  Treatments  Previous treatment: physical therapy  Current treatment: physical therapy  Patient Goals  Patient goals for therapy: decreased pain, improved balance, increased motion, increased strength and return to sport/leisure activities  Patient goal: "get back to my normal mile walk"         Objective     Active Range of Motion   Left Knee   Flexion: 130 degrees WFL  Extension: 0 degrees     Right Knee   Flexion: 130 degrees   Extension: 0 degrees     Strength/Myotome Testing     Left Hip   Planes of Motion   Flexion: 4+  Abduction: 4+    Right Hip   Planes of Motion   Flexion: 4+  Abduction: 4+    Left Knee   Flexion: 4+  Extension: 4+  Quadriceps contraction: good    Right Knee   Flexion: 4+  Extension: 4+  Quadriceps contraction: good    Tests     Left Hip   Positive Ely's  Right Hip   Positive Ely's  Left Knee   Negative medial Deb  Additional Tests Details  120* knee flexion ely's   130* knee flexion Ely's      No tenderness noted this session  05/31/18     SL balance bilaterally >45 seconds, hip and ankle strategy noted         Flowsheet Rows      Most Recent Value   PT/OT G-Codes   Current Score  53   Projected Score  53   Assessment Type  Re-evaluation   G code set  Mobility: Walking & Moving Around   Mobility: Walking and Moving Around Current Status ()  CK   Mobility: Walking and Moving Around Goal Status ()  CK        Precautions: Epileptic, HTN, LBP poor tolerance to SL     Daily Treatment Diary      Manual  1/30  2/13  2/15  2/20               Passive flexion nv  8 min  np np                                                                                                                      Exercise Diary  4/26 5/3 5/7 5/9 5/14 5/17 5/21   3/6  3/8   bike     5'  5' 5'      SLR flx * nv 1# 3x15 1 5# 3x12 1 5# 3x12 1 5# 3x15 2# 3x10 2# 3x12    3x12  1# 2x10   Supine Clams  20x5" BTB 20x5" BTB 20x5" BTB 20x5" MTB 20x5" MTB 30x5'' MTB   3x12 GTB  20x5" BTB   Bridges* nv 3x15 12# 3x15 12# 3x12 15# 3x12 15# 3x15 15# MTB  3x15 15# MTB   3x12 12#  3x15 12#   LAQ                TG                STS  nv 3x12 3x12 3x15 3x15 7# ea 3x15 7#  3x15 7#   3x12 8#  2x10 10#                                     HS stretch seated* nv 3x30" 3x30" 3x30" 3x30" 3x30" 3x30''  3x30"  3x30"   Calf stretch*                Quad stretch 3x30" 3x30"  3x30" 3x30" 3x30"   3x30"                      Step ups fwd nv 2x12 6" 3x15 6" 3x15 8" 3x15 8" np   3x12 6"  8" 2x10   Step ups lat nv 2x12 6" dc      3x12 6"  8" 2x10   Lateral step overs nv  nv 2x12 3x12 6" 3x15 6" nv 3x15 6''       lateral walking nv nv 3 laps 3 laps 3 laps OTB 3 laps OTB 3 laps OTB   3 laps PTB  3 laps PTB    monster walks      3 laps OTB 3 laps OTB         Foam obstacle course w hurdles      Fwd, latera 3 laps ea Fwd, lateral 3 laps ea       line walking w high stepping over hurdles    3 laps       3 laps  3 laps                     Balance                        SL    5xea    5xea  5xea   5xea  5x ea          Tandem    nv  20" ea x 3                                           *=on hep

## 2018-06-12 ENCOUNTER — OFFICE VISIT (OUTPATIENT)
Dept: PHYSICAL THERAPY | Facility: REHABILITATION | Age: 58
End: 2018-06-12
Payer: COMMERCIAL

## 2018-06-12 DIAGNOSIS — S83.412D SPRAIN OF MEDIAL COLLATERAL LIGAMENT OF LEFT KNEE, SUBSEQUENT ENCOUNTER: Primary | ICD-10-CM

## 2018-06-12 PROCEDURE — 97110 THERAPEUTIC EXERCISES: CPT

## 2018-06-12 PROCEDURE — 97112 NEUROMUSCULAR REEDUCATION: CPT

## 2018-06-12 PROCEDURE — 97530 THERAPEUTIC ACTIVITIES: CPT

## 2018-06-12 NOTE — PROGRESS NOTES
Daily Note     Today's date: 2018  Patient name: Mitra Patiño  : 1960  MRN: [id number]  Referring provider: Beka Pierre DO  Dx:   Encounter Diagnosis     ICD-10-CM    1  Sprain of medial collateral ligament of left knee, subsequent encounter S83 382D                   Subjective: Patient denies any knee pain prior to session stating "I walked a mile over the weekend " She denies any complaints after previous session         Objective: See treatment diary below  Precautions: Epileptic, HTN, LBP poor tolerance to SL     Daily Treatment Diary      Manual  1/30  2/13  2/15  2/20               Passive flexion nv  8 min  np np                                                                                                                      Exercise Diary  4/26 5/3 5/7 5/9 5/14 5/17 5/21 6/12      bike     5'  5' 5' 5'     SLR flx * nv 1# 3x15 1 5# 3x12 1 5# 3x12 1 5# 3x15 2# 3x10 2# 3x12  2# 3x15     Supine Clams  20x5" BTB 20x5" BTB 20x5" BTB 20x5" MTB 20x5" MTB 30x5'' MTB 30x5'' MTB sidelying     Bridges* nv 3x15 12# 3x15 12# 3x12 15# 3x12 15# 3x15 15# MTB  3x15 15# MTB 3x15 15# MTB     LAQ              TG                STS  nv 3x12 3x12 3x15 3x15 7# ea 3x15 7#  3x15 7# 3x15 7#                                       HS stretch seated* nv 3x30" 3x30" 3x30" 3x30" 3x30" 3x30''      Calf stretch*              Quad stretch 3x30" 3x30"  3x30" 3x30" 3x30"                      Step ups fwd nv 2x12 6" 3x15 6" 3x15 8" 3x15 8" np       Step ups lat nv 2x12 6" dc          Lateral step overs nv  nv 2x12 3x12 6" 3x15 6" nv 3x15 6'' 3x15 6''      lateral walking nv nv 3 laps 3 laps 3 laps OTB 3 laps OTB 3 laps OTB  OTB 3 laps      monster walks      3 laps OTB 3 laps OTB  OTB 3 laps       Foam obstacle course w hurdles      Fwd, latera 3 laps ea Fwd, lateral 3 laps ea Fwd, lateral 3 laps ea      line walking w high stepping over hurdles    3 laps       3 laps  3 laps    Goblet squats         2x10         Balance                        SL    5xea    5xea  5xea   5xea  5x ea  5x ea until fatigue        Tandem    nv  20" ea x 3                                           *=on hep        Assessment: Tolerated treatment well  Patient demonstrated fatigue post treatment, exhibited good technique with therapeutic exercises and would benefit from continued PT Trialed goblet squats this session where patient tolerated well, no knee pain noted, with initial cues required for proper form, patient able to self correct  Patient demonstrates moderate dynamic instability noted with foam/hurdles obstacles      Plan: Continue per plan of care  Progress treatment as tolerated

## 2018-06-14 ENCOUNTER — OFFICE VISIT (OUTPATIENT)
Dept: PHYSICAL THERAPY | Facility: REHABILITATION | Age: 58
End: 2018-06-14
Payer: COMMERCIAL

## 2018-06-14 DIAGNOSIS — S83.412D SPRAIN OF MEDIAL COLLATERAL LIGAMENT OF LEFT KNEE, SUBSEQUENT ENCOUNTER: Primary | ICD-10-CM

## 2018-06-14 PROCEDURE — 97110 THERAPEUTIC EXERCISES: CPT

## 2018-06-14 PROCEDURE — 97530 THERAPEUTIC ACTIVITIES: CPT

## 2018-06-14 PROCEDURE — 97112 NEUROMUSCULAR REEDUCATION: CPT

## 2018-06-14 NOTE — PROGRESS NOTES
Daily Note     Today's date: 2018  Patient name: Jerardo Conway  : 1960  MRN: [id number]  Referring provider: Virgil Kumar DO  Dx:   Encounter Diagnosis     ICD-10-CM    1  Sprain of medial collateral ligament of left knee, subsequent encounter S87 428F                   Subjective: Patient reports no knee pain prior to session today and reports minimal soreness after previous session stating "it burned a little " Patient arrived to therapy 10 minutes late       Objective: See treatment diary below  Precautions: Epileptic, HTN, LBP poor tolerance to SL     Daily Treatment Diary      Manual  1/30  2/13  2/15  2/20               Passive flexion nv  8 min  np np                                                                                                                      Exercise Diary  4/26 5/3 5/7 5/9 5/14 5/17 5/21 6/12 6/14     bike     5'  5' 5' 5' 5'    SLR flx * nv 1# 3x15 1 5# 3x12 1 5# 3x12 1 5# 3x15 2# 3x10 2# 3x12  2# 3x15 2# 3x15    Supine Clams  20x5" BTB 20x5" BTB 20x5" BTB 20x5" MTB 20x5" MTB 30x5'' MTB 30x5'' MTB sidelying 30x5'' MTB sidelying    Bridges* nv 3x15 12# 3x15 12# 3x12 15# 3x12 15# 3x15 15# MTB  3x15 15# MTB 3x15 15# MTB 3x15 15# MTB    LAQ              TG                STS  nv 3x12 3x12 3x15 3x15 7# ea 3x15 7#  3x15 7# 3x15 7# 3x15 9#                                      HS stretch seated* nv 3x30" 3x30" 3x30" 3x30" 3x30" 3x30''  3x30''    Calf stretch*              Quad stretch 3x30" 3x30"  3x30" 3x30" 3x30"                      Step ups fwd nv 2x12 6" 3x15 6" 3x15 8" 3x15 8" np       Step ups lat nv 2x12 6" dc          Lateral step overs nv  nv 2x12 3x12 6" 3x15 6" nv 3x15 6'' 3x15 6'' 3x15 6''     lateral walking nv nv 3 laps 3 laps 3 laps OTB 3 laps OTB 3 laps OTB  OTB 3 laps GTB 3 laps     monster walks      3 laps OTB 3 laps OTB  OTB 3 laps  GTB 3 laps     Foam obstacle course w hurdles      Fwd, latera 3 laps ea Fwd, lateral 3 laps ea Fwd, lateral 3 laps ea  line walking w high stepping over hurdles    3 laps       3 laps  3 laps    Goblet squats         2x10   5# 3x10      Balance                        SL    5xea    5xea  5xea   5xea  5x ea  5x ea until fatigue  5x ea until fatigue      Tandem    nv  20" ea x 3                                           *=on hep      Assessment: Tolerated treatment well  Patient demonstrated fatigue post treatment, exhibited good technique with therapeutic exercises and would benefit from continued PT Added weight with goblet squats this session where patient tolerated well, LE fatigue noted, with initial cues required for proper squat form  Improvements noted with SL stance, minimal dynamic instability noted  Plan: Continue per plan of care  Progress treatment as tolerated

## 2018-06-19 ENCOUNTER — OFFICE VISIT (OUTPATIENT)
Dept: PHYSICAL THERAPY | Facility: REHABILITATION | Age: 58
End: 2018-06-19
Payer: COMMERCIAL

## 2018-06-19 DIAGNOSIS — S83.412D SPRAIN OF MEDIAL COLLATERAL LIGAMENT OF LEFT KNEE, SUBSEQUENT ENCOUNTER: Primary | ICD-10-CM

## 2018-06-19 PROCEDURE — 97110 THERAPEUTIC EXERCISES: CPT | Performed by: PHYSICAL THERAPIST

## 2018-06-19 PROCEDURE — 97112 NEUROMUSCULAR REEDUCATION: CPT | Performed by: PHYSICAL THERAPIST

## 2018-06-19 PROCEDURE — 97530 THERAPEUTIC ACTIVITIES: CPT | Performed by: PHYSICAL THERAPIST

## 2018-06-19 NOTE — PROGRESS NOTES
Daily Note     Today's date: 2018  Patient name: Jaxson York  : 1960  MRN: [id number]  Referring provider: Madonna Cortes DO  Dx:   Encounter Diagnosis     ICD-10-CM    1  Sprain of medial collateral ligament of left knee, subsequent encounter S83 412D        Start Time: 1455  Stop Time: 1545  Total time in clinic (min): 50 minutes    Subjective: German Greer reports that her bilateral lower extremities were swollen yesterday and her PCP advised her to take "water pills and not go out in this heat " Notes her knee was really swollen despite no increase in pain nor BIRD  Objective: See treatment diary below      Assessment: Tolerated treatment well  Patient demonstrated fatigue post treatment, exhibited good technique with therapeutic exercises and would benefit from continued PT  Plan: Continue per plan of care        Precautions: Epileptic, HTN, LBP poor tolerance to SL     Daily Treatment Diary      Manual  1/30  2/13  2/15  2/20               Passive flexion nv  8 min  np np                                                                                                                      Exercise Diary  4/26 5/3 5/7 5/9 5/14 5/17 5/21 6/12 6/14  6/19   bike     5'  5' 5' 5' 5'    SLR flx * nv 1# 3x15 1 5# 3x12 1 5# 3x12 1 5# 3x15 2# 3x10 2# 3x12  2# 3x15 2# 3x15 2# 3x15   Supine Clams  20x5" BTB 20x5" BTB 20x5" BTB 20x5" MTB 20x5" MTB 30x5'' MTB 30x5'' MTB sidelying 30x5'' MTB sidelying dc   Bridges* nv 3x15 12# 3x15 12# 3x12 15# 3x12 15# 3x15 15# MTB  3x15 15# MTB 3x15 15# MTB 3x15 15# MTB 3x10 MTB w abd @ top   LAQ              TG                STS  nv 3x12 3x12 3x15 3x15 7# ea 3x15 7#  3x15 7# 3x15 7# 3x15 9# 3x12 12#                                     HS stretch seated* nv 3x30" 3x30" 3x30" 3x30" 3x30" 3x30''  3x30'' 3x30"   Calf stretch*              Quad stretch 3x30" 3x30"  3x30" 3x30" 3x30"    3x30"                  Step ups fwd nv 2x12 6" 3x15 6" 3x15 8" 3x15 8" np    3x12 6" 6# ea   Step ups lat nv 2x12 6" dc          Lateral step overs nv  nv 2x12 3x12 6" 3x15 6" nv 3x15 6'' 3x15 6'' 3x15 6'' 3x15 6"    lateral walking nv nv 3 laps 3 laps 3 laps OTB 3 laps OTB 3 laps OTB  OTB 3 laps GTB 3 laps GTB 3 laps    monster walks      3 laps OTB 3 laps OTB  OTB 3 laps  GTB 3 laps  GTB 3 laps   Foam obstacle course w hurdles      Fwd, latera 3 laps ea Fwd, lateral 3 laps ea Fwd, lateral 3 laps ea      line walking w high stepping over hurdles    3 laps       3 laps  3 laps    Goblet squats         2x10   5# 3x10 np    Balance                        SL      5xea  5xea   5xea  5x ea  5x ea until fatigue  5x ea until fatigue     star                  twice 2x on R                            *=on hep

## 2018-06-21 ENCOUNTER — EVALUATION (OUTPATIENT)
Dept: PHYSICAL THERAPY | Facility: REHABILITATION | Age: 58
End: 2018-06-21
Payer: COMMERCIAL

## 2018-06-21 DIAGNOSIS — S83.412D SPRAIN OF MEDIAL COLLATERAL LIGAMENT OF LEFT KNEE, SUBSEQUENT ENCOUNTER: Primary | ICD-10-CM

## 2018-06-21 PROCEDURE — 97110 THERAPEUTIC EXERCISES: CPT | Performed by: PHYSICAL THERAPIST

## 2018-06-21 PROCEDURE — G8979 MOBILITY GOAL STATUS: HCPCS | Performed by: PHYSICAL THERAPIST

## 2018-06-21 PROCEDURE — G8980 MOBILITY D/C STATUS: HCPCS | Performed by: PHYSICAL THERAPIST

## 2018-06-21 PROCEDURE — 97164 PT RE-EVAL EST PLAN CARE: CPT | Performed by: PHYSICAL THERAPIST

## 2018-06-21 NOTE — PROGRESS NOTES
PT Re-Evaluation  and PT Discharge    Today's date: 2018  Patient name: Blaire Ortega  : 1960  MRN: [id number]  Referring provider: Kristen Thorne DO  Dx:   Encounter Diagnosis     ICD-10-CM    1  Sprain of medial collateral ligament of left knee, subsequent encounter S83 [id number]        Start Time: 3938  Stop Time: 1610  Total time in clinic (min): 25 minutes    Assessment  Impairments: activity intolerance    Assessment details: Blaire Ortega has been compliant with attending PT and home exercise program since initial eval   Karyn Strickland  has made improvements in objective data since initial evalulation and has achieved all goals  Patient reports having returned to their prior level or function  Patient provided with updated Home Exercise Program, all questions answered, verbalized understanding and agreement to plan of care  Thus it was mutually decided to discontinue this episode of care and transition to Home Exercise Program    Understanding of Dx/Px/POC: excellent  Goals  Short-Term Goals:   1  Increase L knee strength to 4+/5 4 weeks MET  2  Increase L hip abd strength to 4+/5 4 weeks MET    Long-Term Goals:   1  Pt able to ambulate community distances w/o brace 4-6 weeks met  2  Pt able to complete ADLS w/o pain 4 weeks partially met, reports pain with getting out of bathtub  3  Patient independent with HEP at time of discharge  met  4  Patient able to walk 1 mile on her [place] walk trail 6 weeks met       Plan  Patient would benefit from: skilled PT  Treatment plan discussed with: patient  Plan details: Pt will be treated for 6 weeks and transitioned towards independent home exercise program          Subjective Evaluation    History of Present Illness  Mechanism of injury: Karyn Strickland has been seen for total of 12 visits for OP PT for L MCL strain  Patient rates overall improvement since beginning PT 85-90%   Patient's global rating of change is " A great deal better (6) " Patient reports improvements with "balance, coordination, strength, not my hips bothering me  I haven't had to take a pain pill in a long time "   Patient reports most difficulty with being "in a bent position I dont have as much strength getting out "  Pain  No pain reported  Current pain ratin  At best pain ratin  At worst pain ratin  Location: L knee  Relieving factors: rest  Aggravating factors: standing, walking and stair climbing  Progression: improved    Social Support    Employment status: working (works as a )  Treatments  Previous treatment: physical therapy  Current treatment: physical therapy  Patient Goals  Patient goals for therapy: improved balance, increased motion and return to sport/leisure activities  Patient goal: "get back to my normal mile walk"         Objective     Active Range of Motion   Left Knee   Flexion: 130 degrees WFL  Extension: 0 degrees     Right Knee   Flexion: 130 degrees   Extension: 0 degrees     Strength/Myotome Testing     Left Hip   Planes of Motion   Flexion: 4+  Abduction: 4+    Right Hip   Planes of Motion   Flexion: 4+  Abduction: 4+    Left Knee   Flexion: 5  Extension: 5  Quadriceps contraction: good    Right Knee   Flexion: 5  Extension: 5  Quadriceps contraction: good    Tests     Left Hip   Positive Ely's  Right Hip   Positive Ely's  Left Knee   Negative medial Deb  Additional Tests Details  120* knee flexion ely's   130* knee flexion Ely's     No tenderness noted 18    SL balance bilaterally >45 seconds, hip and ankle strategy noted         Flowsheet Rows      Most Recent Value   PT/OT G-Codes   Current Score  53   Projected Score  53   FOTO information reviewed  Yes   Assessment Type  Discharge   G code set  Mobility: Walking & Moving Around   Mobility: Walking and Moving Around Goal Status ()  CK   Mobility: Walking and Moving Around Discharge Status ()  CK          Precautions: Epileptic, HTN, LBP poor tolerance to SL     Daily Treatment Diary      Manual  1/30  2/13  2/15  2/20               Passive flexion nv  8 min  np np                                                                                                                      Exercise Diary  6/21 5/3 5/7 5/9 5/14 5/17 5/21 6/12 6/14  6/19   bike 5'    5'  5' 5' 5' 5'    SLR flx *  1# 3x15 1 5# 3x12 1 5# 3x12 1 5# 3x15 2# 3x10 2# 3x12  2# 3x15 2# 3x15 2# 3x15   Supine Clams  20x5" BTB 20x5" BTB 20x5" BTB 20x5" MTB 20x5" MTB 30x5'' MTB 30x5'' MTB sidelying 30x5'' MTB sidelying dc   Bridges* Staggered foot bridge 3x15 12# 3x15 12# 3x12 15# 3x12 15# 3x15 15# MTB  3x15 15# MTB 3x15 15# MTB 3x15 15# MTB 3x10 MTB w abd @ top   LAQ              TG                STS   3x12 3x12 3x15 3x15 7# ea 3x15 7#  3x15 7# 3x15 7# 3x15 9# 3x12 12#                                     HS stretch seated*  3x30" 3x30" 3x30" 3x30" 3x30" 3x30''  3x30'' 3x30"   Calf stretch*              Quad stretch  3x30"  3x30" 3x30" 3x30"    3x30"                  Step ups fwd 3x15 8" 2x12 6" 3x15 6" 3x15 8" 3x15 8" np    3x12 6" 6# ea   Step ups lat  2x12 6" dc          Lateral step overs nv 3x15 8" nv 2x12 3x12 6" 3x15 6" nv 3x15 6'' 3x15 6'' 3x15 6'' 3x15 6"    lateral walking  nv 3 laps 3 laps 3 laps OTB 3 laps OTB 3 laps OTB  OTB 3 laps GTB 3 laps GTB 3 laps    monster walks      3 laps OTB 3 laps OTB  OTB 3 laps  GTB 3 laps  GTB 3 laps   Foam obstacle course w hurdles      Fwd, latera 3 laps ea Fwd, lateral 3 laps ea Fwd, lateral 3 laps ea      line walking w high stepping over hurdles    3 laps       3 laps  3 laps    Goblet squats         2x10   5# 3x10 np    Balance                       SL     5xea  5xea   5xea  5x ea  5x ea until fatigue  5x ea until fatigue     star                  twice 2x on R                            *=on hep

## 2018-06-25 ENCOUNTER — APPOINTMENT (OUTPATIENT)
Dept: PHYSICAL THERAPY | Facility: REHABILITATION | Age: 58
End: 2018-06-25
Payer: COMMERCIAL

## 2019-10-16 ENCOUNTER — TELEPHONE (OUTPATIENT)
Dept: NEUROLOGY | Facility: CLINIC | Age: 59
End: 2019-10-16

## 2019-10-16 NOTE — TELEPHONE ENCOUNTER
Patient was calling in to make appointment with Dr Marcy Braxton and transfered to clinical line for c/o headache  The headaches have started about a month ago but has noticed that they are lasting longer and becoming frequent  She is treating them with OTC Tylenol 500 mg tablets  States the effects of the tylenol are becoming shorter and she needs to take it more frequently  Location is front of face by her eyes  Feels like a pressure  Associated with nausea and some dizziness  States she does have sinus problems and is on montelukast  Rates pain of her headache now as 7/10  Has a hx of seizures but has not had one for 4 years  Afraid this will trigger one  She has not taken decadron or olanzapine  Has taken Depakote in the past for seizures  Last seen by you 2/8/18  Does not have a upcoming appointment at this time  Please advise    Call back number [phone] okay to leave detailed message

## 2019-10-30 NOTE — TELEPHONE ENCOUNTER
Call made to patient to check on migraine status  Reached voicemail and left message for call back to office

## 2019-10-30 NOTE — TELEPHONE ENCOUNTER
pt called back  she states that   she ended up with an ear infection and saw pcp and received zithromycin  antibiotics are completed now   since completing antibiotics she still gets headaches but not as frequent  she states that at times when she is sitting and then she stand up she gets a little lightheaded  this does not occur all the time  occurs about 2 times a month  she takes tylenol 500mg as needed  taking 1-2 times a week  no seizures  she is asking what you recommend for the headaches  she is also asking to make a follow up appt  i scheduled pt to see you 1/9, this was the first available  [phone] to leave a detailed message

## 2019-11-06 NOTE — TELEPHONE ENCOUNTER
Called and Left a message on pt's answering machine for a call back  [facility] has botox openings on 11/11  I spoke to ally and she stated that we could use an upcoming botox appt with [facility]

## 2019-11-06 NOTE — TELEPHONE ENCOUNTER
Pt made aware and agreeable to see [facility], she preferred the [place] location  Pt scheduled for 12/27

## 2019-11-06 NOTE — TELEPHONE ENCOUNTER
Since I haven't ever spoken with the patient specifically about her headaches, determining how best to treat them will require asking a lot of questions  Please schedule her to see a PA about her headaches  I can see her in January re: her seizures      Note that the patient has kidney stones so she not be put on topiramate or zonisamide

## 2019-12-18 ENCOUNTER — TELEPHONE (OUTPATIENT)
Dept: NEUROLOGY | Facility: CLINIC | Age: 59
End: 2019-12-18

## 2019-12-18 NOTE — TELEPHONE ENCOUNTER
Patient is scheduled in [place] 12/27 w/ [address]  Cannot be seen in [place] with [facility] and PA [employer]  [place]eds to stay in [place]   Called pt and left her a message with this info and asked for a c/b to reschedule the appt in a [place] office

## 2020-01-09 ENCOUNTER — OFFICE VISIT (OUTPATIENT)
Dept: NEUROLOGY | Facility: CLINIC | Age: 60
End: 2020-01-09
Payer: COMMERCIAL

## 2020-01-09 ENCOUNTER — TRANSCRIBE ORDERS (OUTPATIENT)
Dept: NEUROLOGY | Facility: CLINIC | Age: 60
End: 2020-01-09

## 2020-01-09 VITALS
SYSTOLIC BLOOD PRESSURE: 128 MMHG | BODY MASS INDEX: 34.01 KG/M2 | DIASTOLIC BLOOD PRESSURE: 80 MMHG | WEIGHT: 192 LBS | HEART RATE: 60 BPM

## 2020-01-09 DIAGNOSIS — S06.0X1D CONCUSSION WITH LOSS OF CONSCIOUSNESS OF 30 MINUTES OR LESS, SUBSEQUENT ENCOUNTER: Primary | ICD-10-CM

## 2020-01-09 DIAGNOSIS — I10 HYPERTENSION, UNSPECIFIED TYPE: ICD-10-CM

## 2020-01-09 DIAGNOSIS — G47.9 DIFFICULTY SLEEPING: ICD-10-CM

## 2020-01-09 DIAGNOSIS — S06.0X1D CONCUSSION WITH LOSS OF CONSCIOUSNESS OF 30 MINUTES OR LESS, SUBSEQUENT ENCOUNTER: ICD-10-CM

## 2020-01-09 DIAGNOSIS — G43.709 CHRONIC MIGRAINE WITHOUT AURA WITHOUT STATUS MIGRAINOSUS, NOT INTRACTABLE: ICD-10-CM

## 2020-01-09 DIAGNOSIS — R56.9 SEIZURES (HCC): Primary | ICD-10-CM

## 2020-01-09 DIAGNOSIS — R56.9 UNSPECIFIED CONVULSIONS (HCC): ICD-10-CM

## 2020-01-09 DIAGNOSIS — F41.9 ANXIETY: ICD-10-CM

## 2020-01-09 DIAGNOSIS — E34.8 CYST OF PINEAL GLAND: ICD-10-CM

## 2020-01-09 DIAGNOSIS — G44.221 CHRONIC TENSION-TYPE HEADACHE, INTRACTABLE: ICD-10-CM

## 2020-01-09 PROCEDURE — 99214 OFFICE O/P EST MOD 30 MIN: CPT | Performed by: PSYCHIATRY & NEUROLOGY

## 2020-01-09 RX ORDER — MELOXICAM 15 MG/1
15 TABLET ORAL DAILY
Refills: 0 | COMMUNITY
Start: 2019-11-25 | End: 2020-12-04

## 2020-01-09 NOTE — PROGRESS NOTES
Patient ID: Ying Houston is a 64 y o  female  Assessment/Plan:    Yosi Dieter was seen today for seizures  Diagnoses and all orders for this visit:    Seizures ([address] )    Concussion with loss of consciousness of 30 minutes or less, subsequent encounter    Anxiety  -     sertraline (ZOLOFT) 50 mg tablet; Take 1 tablet (50 mg total) by mouth every morning (Patient not taking: Reported on 1/13/2020)    Cyst of pineal gland  -     MRI brain without contrast; Future    Difficulty sleeping  -     Ambulatory referral to Sleep Medicine; Future    Chronic migraine without aura without status migrainosus, not intractable    Hypertension, unspecified type    Chronic tension-type headache, intractable        Discussion Summary:  I last saw this patient on 2/8/18  She had a remote history of seizures requiring trial of several different medications between 1992 and 2012  Shed been seizure free since then although in 2017 after she tripped on a stump and fell, she thought that she may have had a seizure then, but after some assessment we concluded that she had just had a concussion  Thus seizures have not been an issue since 2012  She had had some headaches after that concussion but those resolved  Then in October 2019 patient called to say that her headaches had abruptly become more frequent  Jennifer Mote been occurring at least 3-4 times a week and are described as a pounding sensation on both sides of her head accompanied by nausea and sometimes vomiting  Noise seems to make the headaches worse, although there are no particular triggers for them  She takes Tylenol 1000 mg and about an hour later the headaches go away  The patient stated a couple of times that she thinks the headaches are related to stress   Apparently she worries constantly about her 22year old daughter and her 32year old son who live a few hours away, and she also worries about the fact that she has a pineal cyst documented on previous imaging, the last MRI however having been done about 2 years ago  Patient also asked whether she might need a follow up with Sleep Medicine since she is quite lethargic throughout the day  She says that she does sleep well at night and wakes up still tired in the morning  When the patient first called about her headaches we arranged for her to see one of our headache specialists, VarshaCasandraethan Andersen  Patient said she preferred to see her at our [facility] location but it turned out that her insurance wouldnt cover consultation in [place] so that appointment had to be rescheduled for 1/13 at our [address] [place] [address] location  With the patient clearly stating that she has a significant level of anxiety and providing examples of that I think there is credence to her attributing the recent reemergence of headaches to stress  I started her on sertraline 50 mg daily primarily for the treatment of anxiety but also with sertraline serving as headache prophylaxis  Because the patient has never had headaches this frequently or severely and because she had been documented to have a pineal cyst in previous imaging, I think she needs an MRI scan of the brain to evaluate the status of that cyst and to check for new structural abnormalities that could be causing these headaches  For now, Tylenol 1000 mg seems to be relatively successful in aborting each headache  I will leave further decisions regarding her headache treatment to our headache specialist who will see the patient in less than a week    Ill see the patient back in 2 months for 30 minutes      Subjective:    HPI    Annual follow-up for a 64year old female with remote history of seizures for 20 years from [facility] to 2012 that did not respond to Dilantin, Vimpat, Depakote, and phenobarbital  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  Previous routine EEGs were all normal  Dr Dimitrios Martin formerly followed the patient and when he first saw her in January 2013, he tried her on the Vimpat but she developed nausea and vomiting, so she stopped taking it and has not been on seizure medication since then  She remained seizure free for 5 years, but on 11/5/17 she tripped and fell over a tree stump and she hit her head on concrete and she does not recall what happened for the next 30 minutes  Patient assumed she had had a seizure because she felt headachy when she woke up, but it is also possible that she developed headache from a concussion  I ordered an EEG when I saw her on 1/18/18 to check for any new abnormalities and if she did I would consider placing her on a seizure medication  Note that she has a history of kidney stones so should not be given Topamax or Zonegran  When I saw the patient on 2/8/18, I reviewed the results of her EEG which had been normal  I thought it best not to start the patient on seizure medication at that time  Last seen 2/8/18  INTERVAL HISTORY:    Patient called on 10/16/19 complaining of headaches that had started a month prior and that were increasing in frequency and duration  She was treating them with Tylenol 500 mg however that was becoming less effective  She described them as a pressure between her eyes  She rated the pain at a 7/10 and said that she was often nauseous and dizzy during the headaches  She was concerned that the headaches would trigger a seizure  She ended up having an ear infection and once treated with antibiotics the headaches became less frequent, occurring two times a week  She also reported episodes of dizziness when she stood up, occurring about two times a month  Patient was scheduled to see [place] for these concerns but had to cancel that appointment over insurance concerns due to the appointment being in [place]  She has rescheduled this appointment to 1/13/20 in [facility]  She was started on Mobic in November      She complains of dizzy spells, lightheadedness, headaches, and nausea ongoing for the last 6 months at todays visit  Changes in orthostatic blood pressures taken today were not significant  The patient also complains of arthritis for which she takes the Mobic  She mentioned today that she thinks her headaches may be stress related  She worries a lot about her children who are age 32 and 22  Her younger daughter lives 2 hours away and she does not see her regularly  She does get to see her grandchildren  She feels concerned a lot, not just around the time of her headaches  She states she had been a bit of a worrier but not as bad as this  She does not always get a good nights sleep because she gets nervous about the pineal cyst  She always feels tired and lethargic  She does not feel rested when she wakes up in the morning  She had a sleep study in the past but does not remember the results of it  She initially though they were sinus related because they were in the front and on the side  She states that the pain is pounding and she has vomited on occasion from them  She denies photophobia during the headaches but noise does make them worse  Strong smells do not make them worse  He daughter has migraines  She also sometimes gets numbness but is unsure if that is related to her arthritis  She takes two 500 mg Tylenol for the headaches and they resolve when she does this  It may take an hour to feel better  She feels tired and drained afterward  She had been put on naproxen but has stomach problems sometimes  The headaches occur 4-5 times a week  They occur throughout the day  She does feel an aura for the headaches and once pulled over while driving  Her leg is still a mess since her fall in 2017  She has received 2 years of physical therapy and surgery was recommended but she is not interested in surgical intervention  Patient is allergic to contrast dye  Cristela Malagon is interested in trying a seizure medication that does not have side effects      Patient is trying to lose some weight through exercise  The following portions of the patient's history were reviewed and updated as appropriate: allergies, current medications, past family history, past medical history, past social history, past surgical history and problem list          Objective:    Blood pressure 128/80, pulse 60, weight 87 1 kg (192 lb)  Physical Exam   Constitutional: She is oriented to person, place, and time  She appears well-developed and well-nourished  HENT:   Head: Normocephalic and atraumatic  Right Ear: External ear normal    Left Ear: External ear normal    Eyes: Pupils are equal, round, and reactive to light  EOM and lids are normal    Neck: Neck supple  Cardiovascular: Normal rate and regular rhythm  Pulmonary/Chest: Effort normal    Musculoskeletal: Normal range of motion  Neurological: She is alert and oriented to person, place, and time  She has normal strength and normal reflexes  Coordination normal    Skin: Skin is warm and dry  Psychiatric: She has a normal mood and affect  Her speech is normal    Vitals reviewed  Neurological Exam  Mental Status  Awake and alert  Oriented to person, place and time  Speech is normal  Language is fluent with no aphasia  Attention and concentration are normal     Cranial Nerves  CN II: Visual acuity is normal  Visual fields full to confrontation  CN III, IV, VI: Extraocular movements intact bilaterally  Extraocular movements intact bilaterally  Normal lids and orbits bilaterally  Pupils equal round and reactive to light bilaterally  CN V: Facial sensation is normal   CN VII: Full and symmetric facial movement  CN VIII: Hearing is normal   CN IX, X: Palate elevates symmetrically  Normal gag reflex  CN XI: Shoulder shrug strength is normal   CN XII: Tongue midline without atrophy or fasciculations  Motor   Strength is 5/5 throughout all four extremities      Sensory  Sensation is intact to light touch, pinprick, vibration and proprioception in all four extremities  Reflexes  Deep tendon reflexes are 2+ and symmetric in all four extremities with downgoing toes bilaterally  Coordination  Finger-to-nose, rapid alternating movements and heel-to-shin normal bilaterally without dysmetria  Gait  Normal casual, toe, heel and tandem gait  Romberg is absent  ROS:    Review of Systems   Constitutional: Negative  Negative for appetite change and fever  HENT: Negative  Negative for hearing loss, tinnitus, trouble swallowing and voice change  Eyes: Negative  Negative for photophobia and pain  Respiratory: Negative  Negative for shortness of breath  Cardiovascular: Negative  Negative for palpitations  Gastrointestinal: Positive for nausea  Negative for vomiting  Endocrine: Negative  Negative for cold intolerance and heat intolerance  Genitourinary: Negative  Negative for dysuria, frequency and urgency  Musculoskeletal: Negative  Negative for myalgias and neck pain  Skin: Negative  Negative for rash  Neurological: Positive for dizziness, light-headedness and headaches  Negative for tremors, seizures, syncope, facial asymmetry, speech difficulty, weakness and numbness  Hematological: Negative  Does not bruise/bleed easily  Psychiatric/Behavioral: Negative  Negative for confusion, hallucinations and sleep disturbance  Counseling Attestation:  Time in: 11:55, Time out: 12:20  Total time encounter was 25 minutes and 20 minutes were spent counseling the patient      Attestation:   By signing my name below, Florencio Dwaine, attest that this documentation has been prepared under the direction and in the presence of Marley Álvarez MD  Electronically Signed: Katlin Yoder  01/09/2020     I, Marley Álvarez, personally performed the services described in this documentation  All medical record entries made by the kikeibethan were at my direction and in my presence   I have reviewed the chart and discharge instructions and agree that the record reflects my personal performance and is accurate and complete  Liz Woody MD  01/09/2020

## 2020-01-13 ENCOUNTER — OFFICE VISIT (OUTPATIENT)
Dept: NEUROLOGY | Facility: CLINIC | Age: 60
End: 2020-01-13
Payer: COMMERCIAL

## 2020-01-13 VITALS
SYSTOLIC BLOOD PRESSURE: 138 MMHG | HEART RATE: 67 BPM | DIASTOLIC BLOOD PRESSURE: 84 MMHG | WEIGHT: 189 LBS | BODY MASS INDEX: 33.48 KG/M2

## 2020-01-13 DIAGNOSIS — S06.0X1D CONCUSSION WITH LOSS OF CONSCIOUSNESS OF 30 MINUTES OR LESS, SUBSEQUENT ENCOUNTER: ICD-10-CM

## 2020-01-13 DIAGNOSIS — E34.8 PINEAL GLAND CYST: ICD-10-CM

## 2020-01-13 DIAGNOSIS — G44.221 CHRONIC TENSION-TYPE HEADACHE, INTRACTABLE: ICD-10-CM

## 2020-01-13 DIAGNOSIS — R56.9 UNSPECIFIED CONVULSIONS (HCC): ICD-10-CM

## 2020-01-13 DIAGNOSIS — G43.709 CHRONIC MIGRAINE WITHOUT AURA WITHOUT STATUS MIGRAINOSUS, NOT INTRACTABLE: Primary | ICD-10-CM

## 2020-01-13 DIAGNOSIS — I10 HYPERTENSION, UNSPECIFIED TYPE: ICD-10-CM

## 2020-01-13 PROCEDURE — 3079F DIAST BP 80-89 MM HG: CPT | Performed by: PHYSICIAN ASSISTANT

## 2020-01-13 PROCEDURE — 3075F SYST BP GE 130 - 139MM HG: CPT | Performed by: PHYSICIAN ASSISTANT

## 2020-01-13 PROCEDURE — 99215 OFFICE O/P EST HI 40 MIN: CPT | Performed by: PHYSICIAN ASSISTANT

## 2020-01-13 RX ORDER — ONDANSETRON 4 MG/1
4 TABLET, ORALLY DISINTEGRATING ORAL EVERY 6 HOURS PRN
Qty: 10 TABLET | Refills: 0 | Status: SHIPPED | OUTPATIENT
Start: 2020-01-13 | End: 2020-11-10

## 2020-01-13 NOTE — PROGRESS NOTES
Patient ID: Blessing Ann is a 64 y o  female  Assessment/Plan:   Diagnoses and all orders for this visit:    Chronic migraine without aura without status migrainosus, not intractable  -     ondansetron (ZOFRAN-ODT) 4 mg disintegrating tablet; Take 1 tablet (4 mg total) by mouth every 6 (six) hours as needed for nausea or vomiting    Concussion with loss of consciousness of 30 minutes or less, subsequent encounter  -     Ambulatory referral to Neurology    Unspecified convulsions ([address] )  -     Ambulatory referral to Neurology    Pineal gland cyst    Chronic tension-type headache, intractable    Hypertension, unspecified type       For headaches: Will trial otc supplementation as she is very sensitive to oral meds in general, and fearful of s/e  If these are ineffective after 4-6 weeks, will consider Amitriptyline, Flexeril  Headaches are daily tension type, worse since head injury, and evolve into migraines 2-3 times per week  She definitely needs a prevention medication but she is hesitant to trial new rx medication at this time as noted above  Will proceed with repeat brain MRI  I will obtain recent blood work from [facility] in [place] (ARDEN was signed today)  PRN migraine onset: tylenol 500 mg extra strength, no more than 2-3 doses per week to prevent MOH  Add tea with caffeine as well  Add zofran prn nausea- s/e reviewed  Triptans are C/I at this time since she has a tendency for HTN  She is on mobic daily for OA, so prefer no other NSAID use  Discussed the plan above in detail with the patient  Discussed keeping a headache journal and keeping track of triggers, ie foods, etc  Also provided a migraine handout to read on her own  The patient should not hesitate to call me prior to her follow up with any questions or concerns  The patient was instructed to urgently call 911 or present to the nearest emergency room with any new or worsening neurological deficits  Subjective:    HPI     Ms Jeanne Puri Artem Garcia is a very pleasant 68-year-old female here for neurological follow-up for headaches  Noted in the chart to have a history of DVT  She also has hypertension on multiple medications for this  She works on [address] as a   She spends most of her time taking care of her elderly mother  She has history of seizures for which she follows with Dr Deanna Brown  Last note in the chart that is visible to me is in 2016  She had a follow-up with Dr Deanna Brown for seizure-like episodes, 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 considering she has not responded to any AEDs in the past   These were tonic clonic convulsions and she tried and failed: Dilantin, Depakote, phenobarbital, vimpat  It was noted that it was difficult to tell if the medications are effective since they caused nausea and vomiting and she couldn't tolerate them  The patient tells me today that 1 episode occurred during bladder procedure/?cystoscopy  Previous workup included an MRI, showing an 8 6 mm pineal cyst, and repeat brain MRI with a stable pineal cyst on 5/12/2017, but otherwise normal  She's had several previous EEGs all of which were normal  She's never undergone video EEG monitoring that she can recall or is documented in our electronic medical record  She has not had any new seizure-like activity for at least 2 years from what she can remember  Migraines: Onset: since head injury as below  On questioning she tells me that the headaches were worse since her head injury which occurred 11/5/2017  When I asked her again she stated that they were worse 6 months ago for no apparent reason  A telephone note in epic states that her headaches were worse starting in September or October 2019  Derek Nair She is somewhat of a poor historian as she is giving me different answers  Head injury(?):  11/5/2017: The patient was walking in the dark/ rain and accidentally tripped over a tree stump, twisting her left knee    She states that she sustained ACL injury and has had knee pain ever since  As she was twisting her knee she fell and she assumes she hit her head on the concrete next to her  She states that she thinks she hit her head because she fell to the ground and lost consciousness  She did not remember if she actually hit her head or where she had her head, she just assumes she did  She does not remember how long she was unconscious  She thinks that this may be associated with a seizure , but overall unsure  She had a headache when she awoke  It is documented that she went to the ED 11/5/2017 and complained of knee pain  No head CT was done and I do not see documentation of the patient complaining of headache at that time  Given tylenol only  Current pain: 6/10  Reaches pain level at worst: 9-10/10  Frequency: 3x/week or more of a severe migraine; low grade headache daily  Duration: entire day, or until she takes tylenol- but does not take tylenol daily  Location: bifrontal, bitemporal  Quality: bandlike, pressure  Associated with: nausea, photophobia, phonophobia, dizziness/ lightheadedness, blurry vision    Triggers: stress, in the past menstrual  Aura/ warning: denies (ammonia smell prior to seizure)    Medications tried:  Prevention- none  Abortive- tylenol XS, mobic for arthritis  Other non-medication therapies or treatments- none    Neck pain and description: denies  Sleep concerns: denies    Family hx of migraines: daughter  Family hx of cerebral aneurysms: unsure    The following portions of the patient's history were reviewed and updated as appropriate:   She  has a past medical history of Bladder prolapse, female, acquired, Epilepsy ([address] ), Hypertension, Kidney stone, and Tear of MCL (medial collateral ligament) of knee    She   Patient Active Problem List    Diagnosis Date Noted    Chronic migraine without aura without status migrainosus, not intractable 01/13/2020    Unspecified convulsions ([address] ) 01/13/2020    Sprain of medial collateral ligament of left knee 01/24/2018    Concussion [facility] loss of consciousness of 30 minutes or less 01/18/2018    Tension type headache 05/24/2016    Pineal gland cyst 09/12/2014    Seizures ([address] ) 09/12/2014    Esophageal reflux 06/20/2013    Hypertension 06/20/2013     She  has a past surgical history that includes Hysterectomy  Her family history includes Diabetes in her father and mother; Hypertension in her father and mother; Kidney disease in her mother; Thyroid disease in her mother  She  reports that she has never smoked  She has never used smokeless tobacco  She reports that she drinks alcohol  She reports that she does not use drugs  Current Outpatient Medications   Medication Sig Dispense Refill    amLODIPine (NORVASC) 10 mg tablet Take 1 tablet by mouth daily      atorvastatin (LIPITOR) 40 mg tablet Take 40 mg by mouth daily      furosemide (LASIX) 40 mg tablet Take 40 mg by mouth daily        losartan (COZAAR) 100 MG tablet Take 100 mg by mouth daily      meloxicam (MOBIC) 15 mg tablet Take 15 mg by mouth daily  0    metoprolol tartrate (LOPRESSOR) 25 mg tablet Take 25 mg by mouth 2 (two) times a day      montelukast (SINGULAIR) 10 mg tablet Take 1 tablet by mouth daily      azithromycin (ZITHROMAX) 250 mg tablet TAKE 2 TABLETS BY MOUTH TODAY, THEN TAKE 1 TABLET DAILY FOR 4 DAYS  0    naproxen (NAPROSYN) 500 mg tablet Take 1 tablet by mouth 2 (two) times a day with meals (Patient not taking: Reported on 1/9/2020) 14 tablet 0    ondansetron (ZOFRAN-ODT) 4 mg disintegrating tablet Take 1 tablet (4 mg total) by mouth every 6 (six) hours as needed for nausea or vomiting 10 tablet 0    sertraline (ZOLOFT) 50 mg tablet Take 1 tablet (50 mg total) by mouth every morning (Patient not taking: Reported on 1/13/2020) 90 tablet 3     No current facility-administered medications for this visit        She is allergic to iodinated diagnostic agents; cortisone; iodine; lidocaine; novocain [procaine]; other; and phenytoin            Objective:    Blood pressure 138/84, pulse 67, weight 85 7 kg (189 lb)  Physical Exam    Neurological Exam  Vital signs reviewed  Well developed, well nourished  Flat affect  No dysarthria  Head: Normocephalic, atraumatic  Neck: Neck flexors 5/5  CN 2-12: intact and symmetric, including EOMs which are normal b/l and PERRL  Fundi b/l are normal to crude ophthalmological examination  MSK: 5/5 t/o- poor effort t/o  ROM normal x all 4 extr  No pronator drift  Sensation: Inact to LT and temp x4 extr  Reflexes: brisk t/o, non focal   Coordination: Nml x4 extr  Gait: Steady normal gait  ROS:    Review of Systems   Constitutional: Negative  Negative for appetite change and fever  HENT: Negative  Negative for hearing loss, tinnitus, trouble swallowing and voice change  Eyes: Negative  Negative for photophobia and pain  Respiratory: Negative  Negative for shortness of breath  Cardiovascular: Negative  Negative for palpitations  Gastrointestinal: Negative  Negative for nausea and vomiting  Endocrine: Negative  Negative for cold intolerance and heat intolerance  Genitourinary: Negative  Negative for dysuria, frequency and urgency  Musculoskeletal: Negative  Negative for myalgias and neck pain  Skin: Negative  Negative for rash  Neurological: Positive for seizures and headaches  Negative for dizziness, tremors, syncope, facial asymmetry, speech difficulty, weakness, light-headedness and numbness  Hematological: Negative  Does not bruise/bleed easily  Psychiatric/Behavioral: Negative  Negative for confusion, hallucinations and sleep disturbance       The following portions of the patient's history were reviewed and updated as appropriate: allergies, current medications/ medication history, past family history, past medical history, past social history, past surgical history and problem list     Review of systems was reviewed and otherwise unremarkable from a neurological perspective

## 2020-01-13 NOTE — PATIENT INSTRUCTIONS
Flexeril or other muscle relaxant  Amitriptyline- headache prevention  Trial mag and B2 first    At migraine onset- continue Tylenol + caffeine (tea)- Tylenol no more than 2-3 times per week    Will get brain MRI    Will call pcp for blood work

## 2020-01-20 ENCOUNTER — TELEPHONE (OUTPATIENT)
Dept: NEUROLOGY | Facility: CLINIC | Age: 60
End: 2020-01-20

## 2020-01-20 NOTE — TELEPHONE ENCOUNTER
Called patient to reschedule upcoming appointment with Ghada Conklin on 4/13/2020  Could not leave message or call back number as patient's mailbox is full  If patient calls back please offer the following Monday 4/20/2020 at 11:45 in [facility] with [facility]

## 2020-01-21 NOTE — TELEPHONE ENCOUNTER
Second attempt to reschedule 4/13/2020 appointment with Ranjeet Johnson   Mailbox full, unable to leave a voicemail

## 2020-01-27 NOTE — TELEPHONE ENCOUNTER
Called patient 3x in attempts to reschedule 4/13/2020 appointment with Ranjeet Johnson  Mailbox full, unable to leave message   Letter mailed to patients home address

## 2020-01-30 ENCOUNTER — HOSPITAL ENCOUNTER (OUTPATIENT)
Dept: MRI IMAGING | Facility: HOSPITAL | Age: 60
Discharge: HOME | End: 2020-01-30
Attending: PSYCHIATRY & NEUROLOGY
Payer: COMMERCIAL

## 2020-01-30 DIAGNOSIS — E34.8 CYST OF PINEAL GLAND: ICD-10-CM

## 2020-01-30 PROCEDURE — 70551 MRI BRAIN STEM W/O DYE: CPT

## 2020-03-27 ENCOUNTER — TELEPHONE (OUTPATIENT)
Dept: NEUROLOGY | Facility: CLINIC | Age: 60
End: 2020-03-27

## 2020-03-27 NOTE — TELEPHONE ENCOUNTER
Called pt to offer sooner appt for 03/31/20 11 am  Unable to leave msg    If pt calls back please confirmed if she will like sooner appt or keep scheduled appt 04/02/20 11 am

## 2020-04-01 ENCOUNTER — TELEMEDICINE (OUTPATIENT)
Dept: NEUROLOGY | Facility: CLINIC | Age: 60
End: 2020-04-01
Payer: COMMERCIAL

## 2020-04-01 ENCOUNTER — TELEPHONE (OUTPATIENT)
Dept: NEUROLOGY | Facility: CLINIC | Age: 60
End: 2020-04-01

## 2020-04-01 DIAGNOSIS — G43.709 CHRONIC MIGRAINE WITHOUT AURA WITHOUT STATUS MIGRAINOSUS, NOT INTRACTABLE: ICD-10-CM

## 2020-04-01 DIAGNOSIS — E34.8 PINEAL GLAND CYST: ICD-10-CM

## 2020-04-01 DIAGNOSIS — R56.9 SEIZURES (HCC): Primary | ICD-10-CM

## 2020-04-01 PROBLEM — N20.0 KIDNEY STONES: Status: ACTIVE | Noted: 2020-04-01

## 2020-04-01 PROBLEM — K58.9 IBS (IRRITABLE BOWEL SYNDROME): Status: ACTIVE | Noted: 2020-04-01

## 2020-04-01 PROCEDURE — 99213 OFFICE O/P EST LOW 20 MIN: CPT | Performed by: NURSE PRACTITIONER

## 2020-05-13 ENCOUNTER — TELEPHONE (OUTPATIENT)
Dept: OBGYN CLINIC | Facility: HOSPITAL | Age: 60
End: 2020-05-13

## 2020-05-15 ENCOUNTER — APPOINTMENT (OUTPATIENT)
Dept: RADIOLOGY | Facility: AMBULARY SURGERY CENTER | Age: 60
End: 2020-05-15
Payer: COMMERCIAL

## 2020-05-15 ENCOUNTER — OFFICE VISIT (OUTPATIENT)
Dept: OBGYN CLINIC | Facility: CLINIC | Age: 60
End: 2020-05-15
Payer: COMMERCIAL

## 2020-05-15 VITALS — WEIGHT: 189 LBS | HEIGHT: 63 IN | BODY MASS INDEX: 33.49 KG/M2

## 2020-05-15 DIAGNOSIS — M54.16 RADICULOPATHY, LUMBAR REGION: ICD-10-CM

## 2020-05-15 DIAGNOSIS — M54.50 LOW BACK PAIN, UNSPECIFIED BACK PAIN LATERALITY, UNSPECIFIED CHRONICITY, UNSPECIFIED WHETHER SCIATICA PRESENT: ICD-10-CM

## 2020-05-15 DIAGNOSIS — M25.562 LEFT KNEE PAIN, UNSPECIFIED CHRONICITY: ICD-10-CM

## 2020-05-15 DIAGNOSIS — M25.572 PAIN, JOINT, ANKLE AND FOOT, LEFT: ICD-10-CM

## 2020-05-15 DIAGNOSIS — S83.512A SPRAIN OF ANTERIOR CRUCIATE LIGAMENT OF LEFT KNEE, INITIAL ENCOUNTER: ICD-10-CM

## 2020-05-15 DIAGNOSIS — S82.832A OTHER CLOSED FRACTURE OF DISTAL END OF LEFT FIBULA, INITIAL ENCOUNTER: ICD-10-CM

## 2020-05-15 DIAGNOSIS — S93.409A SPRAIN OF ANKLE, INITIAL ENCOUNTER: ICD-10-CM

## 2020-05-15 PROCEDURE — 73610 X-RAY EXAM OF ANKLE: CPT

## 2020-05-15 PROCEDURE — 73562 X-RAY EXAM OF KNEE 3: CPT

## 2020-05-15 PROCEDURE — 72100 X-RAY EXAM L-S SPINE 2/3 VWS: CPT

## 2020-05-15 PROCEDURE — 99214 OFFICE O/P EST MOD 30 MIN: CPT | Performed by: ORTHOPAEDIC SURGERY

## 2020-05-15 RX ORDER — HYDRALAZINE HYDROCHLORIDE 25 MG/1
25 TABLET, FILM COATED ORAL 2 TIMES DAILY
COMMUNITY
Start: 2020-05-02

## 2020-05-20 ENCOUNTER — CONSULT (OUTPATIENT)
Dept: PAIN MEDICINE | Facility: CLINIC | Age: 60
End: 2020-05-20
Payer: COMMERCIAL

## 2020-05-20 ENCOUNTER — EVALUATION (OUTPATIENT)
Dept: PHYSICAL THERAPY | Age: 60
End: 2020-05-20
Payer: COMMERCIAL

## 2020-05-20 VITALS
WEIGHT: 189 LBS | BODY MASS INDEX: 33.49 KG/M2 | SYSTOLIC BLOOD PRESSURE: 142 MMHG | DIASTOLIC BLOOD PRESSURE: 90 MMHG | HEIGHT: 63 IN

## 2020-05-20 DIAGNOSIS — M54.16 RADICULOPATHY, LUMBAR REGION: ICD-10-CM

## 2020-05-20 DIAGNOSIS — M25.572 PAIN, JOINT, ANKLE AND FOOT, LEFT: ICD-10-CM

## 2020-05-20 DIAGNOSIS — S83.512A SPRAIN OF ANTERIOR CRUCIATE LIGAMENT OF LEFT KNEE, INITIAL ENCOUNTER: ICD-10-CM

## 2020-05-20 DIAGNOSIS — S93.409A SPRAIN OF ANKLE, INITIAL ENCOUNTER: ICD-10-CM

## 2020-05-20 DIAGNOSIS — M25.562 LEFT KNEE PAIN, UNSPECIFIED CHRONICITY: ICD-10-CM

## 2020-05-20 DIAGNOSIS — M54.50 LOW BACK PAIN, UNSPECIFIED BACK PAIN LATERALITY, UNSPECIFIED CHRONICITY, UNSPECIFIED WHETHER SCIATICA PRESENT: Primary | ICD-10-CM

## 2020-05-20 DIAGNOSIS — M54.50 LOW BACK PAIN, UNSPECIFIED BACK PAIN LATERALITY, UNSPECIFIED CHRONICITY, UNSPECIFIED WHETHER SCIATICA PRESENT: ICD-10-CM

## 2020-05-20 PROCEDURE — 97163 PT EVAL HIGH COMPLEX 45 MIN: CPT | Performed by: PHYSICAL THERAPIST

## 2020-05-20 PROCEDURE — 99204 OFFICE O/P NEW MOD 45 MIN: CPT | Performed by: PHYSICAL MEDICINE & REHABILITATION

## 2020-05-27 ENCOUNTER — OFFICE VISIT (OUTPATIENT)
Dept: PHYSICAL THERAPY | Age: 60
End: 2020-05-27
Payer: COMMERCIAL

## 2020-05-27 DIAGNOSIS — S83.512A SPRAIN OF ANTERIOR CRUCIATE LIGAMENT OF LEFT KNEE, INITIAL ENCOUNTER: ICD-10-CM

## 2020-05-27 DIAGNOSIS — S93.409A SPRAIN OF ANKLE, INITIAL ENCOUNTER: ICD-10-CM

## 2020-05-27 DIAGNOSIS — M54.50 LOW BACK PAIN, UNSPECIFIED BACK PAIN LATERALITY, UNSPECIFIED CHRONICITY, UNSPECIFIED WHETHER SCIATICA PRESENT: Primary | ICD-10-CM

## 2020-05-27 DIAGNOSIS — M25.572 PAIN, JOINT, ANKLE AND FOOT, LEFT: ICD-10-CM

## 2020-05-27 DIAGNOSIS — M54.16 RADICULOPATHY, LUMBAR REGION: ICD-10-CM

## 2020-05-27 DIAGNOSIS — M25.562 LEFT KNEE PAIN, UNSPECIFIED CHRONICITY: ICD-10-CM

## 2020-05-27 PROCEDURE — 97110 THERAPEUTIC EXERCISES: CPT | Performed by: PHYSICAL THERAPIST

## 2020-05-27 PROCEDURE — 97140 MANUAL THERAPY 1/> REGIONS: CPT | Performed by: PHYSICAL THERAPIST

## 2020-05-27 PROCEDURE — 97014 ELECTRIC STIMULATION THERAPY: CPT | Performed by: PHYSICAL THERAPIST

## 2020-05-29 ENCOUNTER — APPOINTMENT (OUTPATIENT)
Dept: PHYSICAL THERAPY | Age: 60
End: 2020-05-29
Payer: COMMERCIAL

## 2020-06-01 ENCOUNTER — OFFICE VISIT (OUTPATIENT)
Dept: PHYSICAL THERAPY | Age: 60
End: 2020-06-01
Payer: COMMERCIAL

## 2020-06-01 DIAGNOSIS — M54.50 LOW BACK PAIN, UNSPECIFIED BACK PAIN LATERALITY, UNSPECIFIED CHRONICITY, UNSPECIFIED WHETHER SCIATICA PRESENT: Primary | ICD-10-CM

## 2020-06-01 DIAGNOSIS — S93.409A SPRAIN OF ANKLE, INITIAL ENCOUNTER: ICD-10-CM

## 2020-06-01 DIAGNOSIS — S83.512A SPRAIN OF ANTERIOR CRUCIATE LIGAMENT OF LEFT KNEE, INITIAL ENCOUNTER: ICD-10-CM

## 2020-06-01 DIAGNOSIS — M25.572 PAIN, JOINT, ANKLE AND FOOT, LEFT: ICD-10-CM

## 2020-06-01 DIAGNOSIS — M25.562 LEFT KNEE PAIN, UNSPECIFIED CHRONICITY: ICD-10-CM

## 2020-06-01 DIAGNOSIS — M54.16 RADICULOPATHY, LUMBAR REGION: ICD-10-CM

## 2020-06-01 PROCEDURE — 97110 THERAPEUTIC EXERCISES: CPT | Performed by: PHYSICAL THERAPIST

## 2020-06-01 PROCEDURE — 97140 MANUAL THERAPY 1/> REGIONS: CPT | Performed by: PHYSICAL THERAPIST

## 2020-06-01 PROCEDURE — 97014 ELECTRIC STIMULATION THERAPY: CPT | Performed by: PHYSICAL THERAPIST

## 2020-06-03 ENCOUNTER — OFFICE VISIT (OUTPATIENT)
Dept: PHYSICAL THERAPY | Age: 60
End: 2020-06-03
Payer: COMMERCIAL

## 2020-06-03 DIAGNOSIS — M25.562 LEFT KNEE PAIN, UNSPECIFIED CHRONICITY: ICD-10-CM

## 2020-06-03 DIAGNOSIS — S83.512A SPRAIN OF ANTERIOR CRUCIATE LIGAMENT OF LEFT KNEE, INITIAL ENCOUNTER: ICD-10-CM

## 2020-06-03 DIAGNOSIS — M54.50 LOW BACK PAIN, UNSPECIFIED BACK PAIN LATERALITY, UNSPECIFIED CHRONICITY, UNSPECIFIED WHETHER SCIATICA PRESENT: Primary | ICD-10-CM

## 2020-06-03 DIAGNOSIS — M25.572 PAIN, JOINT, ANKLE AND FOOT, LEFT: ICD-10-CM

## 2020-06-03 DIAGNOSIS — S93.409A SPRAIN OF ANKLE, INITIAL ENCOUNTER: ICD-10-CM

## 2020-06-03 DIAGNOSIS — M54.16 RADICULOPATHY, LUMBAR REGION: ICD-10-CM

## 2020-06-03 PROCEDURE — 97014 ELECTRIC STIMULATION THERAPY: CPT | Performed by: PHYSICAL THERAPIST

## 2020-06-05 ENCOUNTER — OFFICE VISIT (OUTPATIENT)
Dept: OBGYN CLINIC | Facility: CLINIC | Age: 60
End: 2020-06-05
Payer: COMMERCIAL

## 2020-06-05 ENCOUNTER — APPOINTMENT (OUTPATIENT)
Dept: RADIOLOGY | Facility: AMBULARY SURGERY CENTER | Age: 60
End: 2020-06-05
Payer: COMMERCIAL

## 2020-06-05 VITALS
HEIGHT: 63 IN | WEIGHT: 189 LBS | SYSTOLIC BLOOD PRESSURE: 145 MMHG | HEART RATE: 89 BPM | BODY MASS INDEX: 33.49 KG/M2 | DIASTOLIC BLOOD PRESSURE: 86 MMHG

## 2020-06-05 DIAGNOSIS — S93.409A SPRAIN OF ANKLE, INITIAL ENCOUNTER: Primary | ICD-10-CM

## 2020-06-05 DIAGNOSIS — G43.709 CHRONIC MIGRAINE WITHOUT AURA WITHOUT STATUS MIGRAINOSUS, NOT INTRACTABLE: ICD-10-CM

## 2020-06-05 DIAGNOSIS — E34.8 PINEAL GLAND CYST: ICD-10-CM

## 2020-06-05 DIAGNOSIS — S83.512A SPRAIN OF ANTERIOR CRUCIATE LIGAMENT OF LEFT KNEE, INITIAL ENCOUNTER: ICD-10-CM

## 2020-06-05 DIAGNOSIS — R42 VERTIGO: ICD-10-CM

## 2020-06-05 PROCEDURE — 73610 X-RAY EXAM OF ANKLE: CPT

## 2020-06-05 PROCEDURE — 99214 OFFICE O/P EST MOD 30 MIN: CPT | Performed by: ORTHOPAEDIC SURGERY

## 2020-06-05 PROCEDURE — 73562 X-RAY EXAM OF KNEE 3: CPT

## 2020-06-08 ENCOUNTER — APPOINTMENT (OUTPATIENT)
Dept: PHYSICAL THERAPY | Age: 60
End: 2020-06-08
Payer: COMMERCIAL

## 2020-06-10 ENCOUNTER — OFFICE VISIT (OUTPATIENT)
Dept: PHYSICAL THERAPY | Age: 60
End: 2020-06-10
Payer: COMMERCIAL

## 2020-06-10 DIAGNOSIS — M25.562 LEFT KNEE PAIN, UNSPECIFIED CHRONICITY: ICD-10-CM

## 2020-06-10 DIAGNOSIS — M25.572 PAIN, JOINT, ANKLE AND FOOT, LEFT: ICD-10-CM

## 2020-06-10 DIAGNOSIS — M54.16 RADICULOPATHY, LUMBAR REGION: ICD-10-CM

## 2020-06-10 DIAGNOSIS — S93.409A SPRAIN OF ANKLE, INITIAL ENCOUNTER: ICD-10-CM

## 2020-06-10 DIAGNOSIS — M54.50 LOW BACK PAIN, UNSPECIFIED BACK PAIN LATERALITY, UNSPECIFIED CHRONICITY, UNSPECIFIED WHETHER SCIATICA PRESENT: Primary | ICD-10-CM

## 2020-06-10 DIAGNOSIS — S83.512A SPRAIN OF ANTERIOR CRUCIATE LIGAMENT OF LEFT KNEE, INITIAL ENCOUNTER: ICD-10-CM

## 2020-06-10 PROCEDURE — 97014 ELECTRIC STIMULATION THERAPY: CPT | Performed by: PHYSICAL THERAPIST

## 2020-06-10 PROCEDURE — 97110 THERAPEUTIC EXERCISES: CPT | Performed by: PHYSICAL THERAPIST

## 2020-06-11 ENCOUNTER — OFFICE VISIT (OUTPATIENT)
Dept: PHYSICAL THERAPY | Age: 60
End: 2020-06-11
Payer: COMMERCIAL

## 2020-06-11 DIAGNOSIS — M54.50 LOW BACK PAIN, UNSPECIFIED BACK PAIN LATERALITY, UNSPECIFIED CHRONICITY, UNSPECIFIED WHETHER SCIATICA PRESENT: Primary | ICD-10-CM

## 2020-06-11 PROCEDURE — 97110 THERAPEUTIC EXERCISES: CPT | Performed by: PHYSICAL THERAPIST

## 2020-06-12 ENCOUNTER — HOSPITAL ENCOUNTER (EMERGENCY)
Facility: HOSPITAL | Age: 60
Discharge: HOME | End: 2020-06-13
Attending: EMERGENCY MEDICINE
Payer: COMMERCIAL

## 2020-06-12 DIAGNOSIS — R42 DIZZINESS: Primary | ICD-10-CM

## 2020-06-12 DIAGNOSIS — M54.9 BACK PAIN: ICD-10-CM

## 2020-06-12 PROCEDURE — 99284 EMERGENCY DEPT VISIT MOD MDM: CPT

## 2020-06-13 ENCOUNTER — APPOINTMENT (EMERGENCY)
Dept: CT IMAGING | Facility: HOSPITAL | Age: 60
End: 2020-06-13
Payer: COMMERCIAL

## 2020-06-13 VITALS
DIASTOLIC BLOOD PRESSURE: 67 MMHG | BODY MASS INDEX: 33.43 KG/M2 | SYSTOLIC BLOOD PRESSURE: 145 MMHG | TEMPERATURE: 98.6 F | RESPIRATION RATE: 18 BRPM | OXYGEN SATURATION: 98 % | WEIGHT: 188.71 LBS | HEART RATE: 70 BPM

## 2020-06-13 LAB
ALBUMIN SERPL BCP-MCNC: 4.1 G/DL (ref 3.5–5)
ALP SERPL-CCNC: 70 U/L (ref 46–116)
ALT SERPL W P-5'-P-CCNC: 48 U/L (ref 12–78)
ANION GAP SERPL CALCULATED.3IONS-SCNC: 8 MMOL/L (ref 4–13)
AST SERPL W P-5'-P-CCNC: 29 U/L (ref 5–45)
BASOPHILS # BLD AUTO: 0.05 THOUSANDS/ΜL (ref 0–0.1)
BASOPHILS NFR BLD AUTO: 1 % (ref 0–1)
BILIRUB SERPL-MCNC: 0.48 MG/DL (ref 0.2–1)
BUN SERPL-MCNC: 18 MG/DL (ref 5–25)
CALCIUM SERPL-MCNC: 9.8 MG/DL (ref 8.3–10.1)
CHLORIDE SERPL-SCNC: 104 MMOL/L (ref 100–108)
CO2 SERPL-SCNC: 29 MMOL/L (ref 21–32)
CREAT SERPL-MCNC: 1.09 MG/DL (ref 0.6–1.3)
D DIMER PPP FEU-MCNC: 0.43 UG/ML FEU
EOSINOPHIL # BLD AUTO: 0.23 THOUSAND/ΜL (ref 0–0.61)
EOSINOPHIL NFR BLD AUTO: 3 % (ref 0–6)
ERYTHROCYTE [DISTWIDTH] IN BLOOD BY AUTOMATED COUNT: 13.3 % (ref 11.6–15.1)
GFR SERPL CREATININE-BSD FRML MDRD: 56 ML/MIN/1.73SQ M
GLUCOSE SERPL-MCNC: 122 MG/DL (ref 65–140)
HCT VFR BLD AUTO: 38 % (ref 34.8–46.1)
HGB BLD-MCNC: 12.7 G/DL (ref 11.5–15.4)
IMM GRANULOCYTES # BLD AUTO: 0.03 THOUSAND/UL (ref 0–0.2)
IMM GRANULOCYTES NFR BLD AUTO: 0 % (ref 0–2)
LYMPHOCYTES # BLD AUTO: 1.43 THOUSANDS/ΜL (ref 0.6–4.47)
LYMPHOCYTES NFR BLD AUTO: 21 % (ref 14–44)
MCH RBC QN AUTO: 30.8 PG (ref 26.8–34.3)
MCHC RBC AUTO-ENTMCNC: 33.4 G/DL (ref 31.4–37.4)
MCV RBC AUTO: 92 FL (ref 82–98)
MONOCYTES # BLD AUTO: 0.64 THOUSAND/ΜL (ref 0.17–1.22)
MONOCYTES NFR BLD AUTO: 9 % (ref 4–12)
NEUTROPHILS # BLD AUTO: 4.59 THOUSANDS/ΜL (ref 1.85–7.62)
NEUTS SEG NFR BLD AUTO: 66 % (ref 43–75)
NRBC BLD AUTO-RTO: 0 /100 WBCS
PLATELET # BLD AUTO: 268 THOUSANDS/UL (ref 149–390)
PMV BLD AUTO: 10.5 FL (ref 8.9–12.7)
POTASSIUM SERPL-SCNC: 3.7 MMOL/L (ref 3.5–5.3)
PROT SERPL-MCNC: 7.5 G/DL (ref 6.4–8.2)
RBC # BLD AUTO: 4.13 MILLION/UL (ref 3.81–5.12)
SODIUM SERPL-SCNC: 141 MMOL/L (ref 136–145)
TROPONIN I SERPL-MCNC: <0.02 NG/ML
TSH SERPL DL<=0.05 MIU/L-ACNC: 1.78 UIU/ML (ref 0.36–3.74)
WBC # BLD AUTO: 6.97 THOUSAND/UL (ref 4.31–10.16)

## 2020-06-13 PROCEDURE — 80053 COMPREHEN METABOLIC PANEL: CPT | Performed by: INTERNAL MEDICINE

## 2020-06-13 PROCEDURE — 84484 ASSAY OF TROPONIN QUANT: CPT | Performed by: INTERNAL MEDICINE

## 2020-06-13 PROCEDURE — 93005 ELECTROCARDIOGRAM TRACING: CPT

## 2020-06-13 PROCEDURE — 85025 COMPLETE CBC W/AUTO DIFF WBC: CPT | Performed by: INTERNAL MEDICINE

## 2020-06-13 PROCEDURE — 99283 EMERGENCY DEPT VISIT LOW MDM: CPT | Performed by: EMERGENCY MEDICINE

## 2020-06-13 PROCEDURE — 70450 CT HEAD/BRAIN W/O DYE: CPT

## 2020-06-13 PROCEDURE — 36415 COLL VENOUS BLD VENIPUNCTURE: CPT | Performed by: INTERNAL MEDICINE

## 2020-06-13 PROCEDURE — 84443 ASSAY THYROID STIM HORMONE: CPT | Performed by: INTERNAL MEDICINE

## 2020-06-13 PROCEDURE — 85379 FIBRIN DEGRADATION QUANT: CPT | Performed by: INTERNAL MEDICINE

## 2020-06-15 ENCOUNTER — OFFICE VISIT (OUTPATIENT)
Dept: PHYSICAL THERAPY | Age: 60
End: 2020-06-15
Payer: COMMERCIAL

## 2020-06-15 DIAGNOSIS — M25.572 PAIN, JOINT, ANKLE AND FOOT, LEFT: ICD-10-CM

## 2020-06-15 DIAGNOSIS — S83.512A SPRAIN OF ANTERIOR CRUCIATE LIGAMENT OF LEFT KNEE, INITIAL ENCOUNTER: ICD-10-CM

## 2020-06-15 DIAGNOSIS — M54.16 RADICULOPATHY, LUMBAR REGION: ICD-10-CM

## 2020-06-15 DIAGNOSIS — M25.562 LEFT KNEE PAIN, UNSPECIFIED CHRONICITY: ICD-10-CM

## 2020-06-15 DIAGNOSIS — M54.50 LOW BACK PAIN, UNSPECIFIED BACK PAIN LATERALITY, UNSPECIFIED CHRONICITY, UNSPECIFIED WHETHER SCIATICA PRESENT: Primary | ICD-10-CM

## 2020-06-15 DIAGNOSIS — S93.409A SPRAIN OF ANKLE, INITIAL ENCOUNTER: ICD-10-CM

## 2020-06-15 LAB
ATRIAL RATE: 67 BPM
P AXIS: 62 DEGREES
PR INTERVAL: 162 MS
QRS AXIS: 28 DEGREES
QRSD INTERVAL: 82 MS
QT INTERVAL: 384 MS
QTC INTERVAL: 405 MS
T WAVE AXIS: 40 DEGREES
VENTRICULAR RATE: 67 BPM

## 2020-06-15 PROCEDURE — 97110 THERAPEUTIC EXERCISES: CPT | Performed by: PHYSICAL THERAPIST

## 2020-06-15 PROCEDURE — 97014 ELECTRIC STIMULATION THERAPY: CPT | Performed by: PHYSICAL THERAPIST

## 2020-06-15 PROCEDURE — 93010 ELECTROCARDIOGRAM REPORT: CPT | Performed by: INTERNAL MEDICINE

## 2020-06-16 ENCOUNTER — TELEPHONE (OUTPATIENT)
Dept: PAIN MEDICINE | Facility: CLINIC | Age: 60
End: 2020-06-16

## 2020-06-17 ENCOUNTER — OFFICE VISIT (OUTPATIENT)
Dept: PHYSICAL THERAPY | Age: 60
End: 2020-06-17
Payer: COMMERCIAL

## 2020-06-17 DIAGNOSIS — S93.409A SPRAIN OF ANKLE, INITIAL ENCOUNTER: ICD-10-CM

## 2020-06-17 DIAGNOSIS — M25.572 PAIN, JOINT, ANKLE AND FOOT, LEFT: ICD-10-CM

## 2020-06-17 DIAGNOSIS — M54.16 RADICULOPATHY, LUMBAR REGION: ICD-10-CM

## 2020-06-17 DIAGNOSIS — M25.562 LEFT KNEE PAIN, UNSPECIFIED CHRONICITY: ICD-10-CM

## 2020-06-17 DIAGNOSIS — S83.512A SPRAIN OF ANTERIOR CRUCIATE LIGAMENT OF LEFT KNEE, INITIAL ENCOUNTER: ICD-10-CM

## 2020-06-17 DIAGNOSIS — M54.50 LOW BACK PAIN, UNSPECIFIED BACK PAIN LATERALITY, UNSPECIFIED CHRONICITY, UNSPECIFIED WHETHER SCIATICA PRESENT: Primary | ICD-10-CM

## 2020-06-17 PROCEDURE — 97112 NEUROMUSCULAR REEDUCATION: CPT | Performed by: PHYSICAL THERAPIST

## 2020-06-17 PROCEDURE — 97110 THERAPEUTIC EXERCISES: CPT | Performed by: PHYSICAL THERAPIST

## 2020-06-17 PROCEDURE — 97014 ELECTRIC STIMULATION THERAPY: CPT | Performed by: PHYSICAL THERAPIST

## 2020-06-22 ENCOUNTER — OFFICE VISIT (OUTPATIENT)
Dept: PHYSICAL THERAPY | Age: 60
End: 2020-06-22
Payer: COMMERCIAL

## 2020-06-22 DIAGNOSIS — S93.409A SPRAIN OF ANKLE, INITIAL ENCOUNTER: ICD-10-CM

## 2020-06-22 DIAGNOSIS — M25.572 PAIN, JOINT, ANKLE AND FOOT, LEFT: ICD-10-CM

## 2020-06-22 DIAGNOSIS — M54.16 RADICULOPATHY, LUMBAR REGION: ICD-10-CM

## 2020-06-22 DIAGNOSIS — M25.562 LEFT KNEE PAIN, UNSPECIFIED CHRONICITY: ICD-10-CM

## 2020-06-22 DIAGNOSIS — S83.512A SPRAIN OF ANTERIOR CRUCIATE LIGAMENT OF LEFT KNEE, INITIAL ENCOUNTER: ICD-10-CM

## 2020-06-22 DIAGNOSIS — M54.50 LOW BACK PAIN, UNSPECIFIED BACK PAIN LATERALITY, UNSPECIFIED CHRONICITY, UNSPECIFIED WHETHER SCIATICA PRESENT: Primary | ICD-10-CM

## 2020-06-22 PROCEDURE — 97014 ELECTRIC STIMULATION THERAPY: CPT | Performed by: PHYSICAL THERAPIST

## 2020-06-22 PROCEDURE — 97110 THERAPEUTIC EXERCISES: CPT | Performed by: PHYSICAL THERAPIST

## 2020-06-23 ENCOUNTER — OFFICE VISIT (OUTPATIENT)
Dept: NEUROLOGY | Facility: CLINIC | Age: 60
End: 2020-06-23
Payer: COMMERCIAL

## 2020-06-23 VITALS
TEMPERATURE: 98.9 F | WEIGHT: 187 LBS | HEART RATE: 71 BPM | HEIGHT: 63 IN | DIASTOLIC BLOOD PRESSURE: 73 MMHG | SYSTOLIC BLOOD PRESSURE: 134 MMHG | BODY MASS INDEX: 33.13 KG/M2

## 2020-06-23 DIAGNOSIS — H81.11 BENIGN PAROXYSMAL POSITIONAL VERTIGO OF RIGHT EAR: ICD-10-CM

## 2020-06-23 DIAGNOSIS — R56.9 SEIZURES (HCC): Primary | ICD-10-CM

## 2020-06-23 DIAGNOSIS — E34.8 PINEAL GLAND CYST: ICD-10-CM

## 2020-06-23 DIAGNOSIS — G44.221 CHRONIC TENSION-TYPE HEADACHE, INTRACTABLE: ICD-10-CM

## 2020-06-23 DIAGNOSIS — F32.A DEPRESSION, UNSPECIFIED DEPRESSION TYPE: ICD-10-CM

## 2020-06-23 DIAGNOSIS — G43.709 CHRONIC MIGRAINE WITHOUT AURA WITHOUT STATUS MIGRAINOSUS, NOT INTRACTABLE: ICD-10-CM

## 2020-06-23 DIAGNOSIS — N20.0 KIDNEY STONES: ICD-10-CM

## 2020-06-23 DIAGNOSIS — F41.9 ANXIETY: ICD-10-CM

## 2020-06-23 PROCEDURE — 99215 OFFICE O/P EST HI 40 MIN: CPT | Performed by: PSYCHIATRY & NEUROLOGY

## 2020-06-23 RX ORDER — DIPHENOXYLATE HYDROCHLORIDE AND ATROPINE SULFATE 2.5; .025 MG/1; MG/1
1 TABLET ORAL DAILY
COMMUNITY

## 2020-06-23 RX ORDER — CLONAZEPAM 0.5 MG/1
1 TABLET ORAL 2 TIMES DAILY
COMMUNITY
Start: 2020-06-19

## 2020-06-23 RX ORDER — CYCLOBENZAPRINE HCL 5 MG
5 TABLET ORAL 3 TIMES DAILY
COMMUNITY
Start: 2020-06-16 | End: 2020-08-19 | Stop reason: SDUPTHER

## 2020-06-24 ENCOUNTER — OFFICE VISIT (OUTPATIENT)
Dept: PHYSICAL THERAPY | Age: 60
End: 2020-06-24
Payer: COMMERCIAL

## 2020-06-24 DIAGNOSIS — M25.572 PAIN, JOINT, ANKLE AND FOOT, LEFT: Primary | ICD-10-CM

## 2020-06-24 PROCEDURE — 97110 THERAPEUTIC EXERCISES: CPT

## 2020-06-29 ENCOUNTER — OFFICE VISIT (OUTPATIENT)
Dept: PHYSICAL THERAPY | Age: 60
End: 2020-06-29
Payer: COMMERCIAL

## 2020-06-29 DIAGNOSIS — S83.512A SPRAIN OF ANTERIOR CRUCIATE LIGAMENT OF LEFT KNEE, INITIAL ENCOUNTER: ICD-10-CM

## 2020-06-29 DIAGNOSIS — M25.572 PAIN, JOINT, ANKLE AND FOOT, LEFT: Primary | ICD-10-CM

## 2020-06-29 DIAGNOSIS — M25.562 LEFT KNEE PAIN, UNSPECIFIED CHRONICITY: ICD-10-CM

## 2020-06-29 DIAGNOSIS — M54.16 RADICULOPATHY, LUMBAR REGION: ICD-10-CM

## 2020-06-29 DIAGNOSIS — S93.409A SPRAIN OF ANKLE, INITIAL ENCOUNTER: ICD-10-CM

## 2020-06-29 DIAGNOSIS — M54.50 LOW BACK PAIN, UNSPECIFIED BACK PAIN LATERALITY, UNSPECIFIED CHRONICITY, UNSPECIFIED WHETHER SCIATICA PRESENT: ICD-10-CM

## 2020-06-29 PROCEDURE — 97110 THERAPEUTIC EXERCISES: CPT | Performed by: PHYSICAL THERAPIST

## 2020-06-29 PROCEDURE — 97014 ELECTRIC STIMULATION THERAPY: CPT | Performed by: PHYSICAL THERAPIST

## 2020-06-30 ENCOUNTER — OFFICE VISIT (OUTPATIENT)
Dept: PAIN MEDICINE | Facility: CLINIC | Age: 60
End: 2020-06-30
Payer: COMMERCIAL

## 2020-06-30 VITALS
BODY MASS INDEX: 33.13 KG/M2 | SYSTOLIC BLOOD PRESSURE: 125 MMHG | TEMPERATURE: 98.3 F | DIASTOLIC BLOOD PRESSURE: 83 MMHG | HEART RATE: 85 BPM | WEIGHT: 187 LBS

## 2020-06-30 DIAGNOSIS — M54.16 RADICULOPATHY, LUMBAR REGION: Primary | ICD-10-CM

## 2020-06-30 DIAGNOSIS — M54.42 CHRONIC BILATERAL LOW BACK PAIN WITH BILATERAL SCIATICA: ICD-10-CM

## 2020-06-30 DIAGNOSIS — M54.41 CHRONIC BILATERAL LOW BACK PAIN WITH BILATERAL SCIATICA: ICD-10-CM

## 2020-06-30 DIAGNOSIS — G89.29 CHRONIC BILATERAL LOW BACK PAIN WITH BILATERAL SCIATICA: ICD-10-CM

## 2020-06-30 PROCEDURE — 99214 OFFICE O/P EST MOD 30 MIN: CPT | Performed by: PHYSICAL MEDICINE & REHABILITATION

## 2020-06-30 RX ORDER — AMOXICILLIN 500 MG/1
500 TABLET, FILM COATED ORAL DAILY
COMMUNITY
Start: 2020-06-26 | End: 2020-11-10

## 2020-07-01 ENCOUNTER — OFFICE VISIT (OUTPATIENT)
Dept: PHYSICAL THERAPY | Age: 60
End: 2020-07-01
Payer: COMMERCIAL

## 2020-07-01 DIAGNOSIS — M25.572 PAIN, JOINT, ANKLE AND FOOT, LEFT: Primary | ICD-10-CM

## 2020-07-01 DIAGNOSIS — M25.562 LEFT KNEE PAIN, UNSPECIFIED CHRONICITY: ICD-10-CM

## 2020-07-01 DIAGNOSIS — S83.512A SPRAIN OF ANTERIOR CRUCIATE LIGAMENT OF LEFT KNEE, INITIAL ENCOUNTER: ICD-10-CM

## 2020-07-01 DIAGNOSIS — S93.409A SPRAIN OF ANKLE, INITIAL ENCOUNTER: ICD-10-CM

## 2020-07-01 DIAGNOSIS — M54.16 RADICULOPATHY, LUMBAR REGION: ICD-10-CM

## 2020-07-01 DIAGNOSIS — M54.50 LOW BACK PAIN, UNSPECIFIED BACK PAIN LATERALITY, UNSPECIFIED CHRONICITY, UNSPECIFIED WHETHER SCIATICA PRESENT: ICD-10-CM

## 2020-07-01 PROCEDURE — 97014 ELECTRIC STIMULATION THERAPY: CPT | Performed by: PHYSICAL THERAPIST

## 2020-07-01 PROCEDURE — 97110 THERAPEUTIC EXERCISES: CPT | Performed by: PHYSICAL THERAPIST

## 2020-07-01 NOTE — PROGRESS NOTES
Daily Note     Today's date: 2020  Patient name: Odalys Chilel  : 1960  MRN: [id number]  Referring provider: Geoff Garcia DO  Dx:   Encounter Diagnosis     ICD-10-CM    1  Pain, joint, ankle and foot, left M25 572    2  Low back pain, unspecified back pain laterality, unspecified chronicity, unspecified whether sciatica present M54 5    3  Radiculopathy, lumbar region M54 16    4  Sprain of anterior cruciate ligament of left knee, initial encounter S83 512A    5  Left knee pain, unspecified chronicity M25 562    6  Sprain of ankle, initial encounter S93 409A                   Subjective: Patient reported she has left patellar and knee crepitus with pain as well as left le edema  Plus, she noted lumbar spine region pain persists  Patient noted she is more busy with house hold and community ambulation and feels as if her increase in left le pain and edema is due to increase in activity  Patient noted she wears CAM boot for community only  Patient noted her low back pain is at moderate to severe levels  Patient noted she saw Dr Salome Reyna who recommends a MRI which is scheduled for 2020  Objective: See treatment diary below      Assessment: Patient presents with out left antalgic gait with CAM boot thus pt recommended to continue to promote standing and walking with sneaker as per referring doctor  Patient presents with lumbar spine extension in standing as pain aggravating of right le while standing and sitting flexion is limited by vertigo symptoms  Plan:  Continue with PT POC  Precautions: Patient's PMHx is remarkable for Epilepsy, HTN, Hysterectomy, Seizures, Multiple fall history, vertigo, IBS and GERD        Manuals 6/29 7/01 6/1 6/3 6/10 6/11 6/15 6/17 6/22 6/24   Right ankle retrograde massage with patient seated NT NT 10 min 10 min NT NT NT NT NT NT                                          Neuro Re-Ed With sneaker on left le    Ther Ex 6/29 7/01 6/1 6/3 6/10 6/11 6/15 6/17 6/22 6/24   Nu step 15 min 15 min 12 min 15 min 15 min 15' 10 min 12 min 12 min 10 MIN    Seated hr and tr: B: 3 x 10 3 x 10 2 x 10 2 x 10 2 x 10 np 2 x 10 2 x 10 3 x 10 30X   Seated toe curls:L 3 x 10 NT 5 sec x 20 5 sec x 20 5 sec x 20 np NT NT 3 sec x 20 NT   LAQ:B: 20X 3# 3 sec x 20 5 sec x 20 2# x 20 2# x 20 2# x 20 2# x 30 2 x 10 2# x 20 20X 2 5#    Hip flexion:B: 20X 2 5#  3# 2 x 10 2 x 10 2# x 20 2# x 20 np 2# x 30 2 x 10 2# x 20 20X 2 5#    Seated baps:L L3 x 20 10 x  2 x 10 B DF/PF 2 x 10 B DF/PF 2 x 10 B with DF/PF np NT NT NT NT   Long sit ankle pumps:L NT NT NT NT NT  NT NT NT NT   Long sit quad sets:L NT NT NT NT NT  NT NT NT NT   Long sit heel slides:L NT NT NT NT NT  NT NT NT NT   Long sit hip abduction:L NT NT NT NT NT  NT NT NT NT   Long sit DLS abdominal bracing NT NT NT NT NT  NT NT NT NT   Long sit SAQ:B: NT NT NT NT NT  NT NT NT NT   ALL STANDING THERAPEUTIC EXERCISES WITH BILATERAL UE SUPPORT!     YES! Standing SLR x 3:B: 3# x 20 3# x 20 NT NT 2 x 10 2 x 10 3 x 10 3 x 10 2# x 20 2 5#20X   Standing HR and TR:B: NT 3 x 10 NT NT 2 x 10 HR np 3 x 10 2 x 10 2 x 10 20X   Side stepping in parallel bars 6 x  6 x NT 5 x  5 x  5x 5 x  5 x 5 x 6X 2 5#    Tandem forward and backwards walking in parallel bars 6 x 6 x NT 5 x forward 5 x forward 5 x forward 5 x  5 x  5 x  2X 2 5#    SLS and tandem stance in parallel bars NT 30 sec x 2 on each with bilateral ue support! On foam NT NT NT np 30 sec x 2 on each with bilateral ue support! 30 sec x 2 on each with bilateral ue support! On foam 30 sec x 2 on each with bilateral ue support!  On foam NT   Mini squats to chair in parallel bars 2 x 10 2 x 10 NT NT 2 x 10 to chair 2 x 10 to chair 2 x 10 2 x 10 2 x 10 20X    Step ups in parallel bars:L NT NT NT NT NT np NT NT NT NT   Lateral step ups in parallel bars:L NT NT NT NT NT np NT NT NT NT   Lunges of foam:B:  2 x 10     2 x 10 2 x 10 2 x 10 NT                Ther Activity                                       Gait Training                                       Modalities             CP to left knee and ankle and MHP to lumbar spine and TENS seated 10 min 10 min 10 min 10 min 10 min 10 min 10 min 10 min 10 min 10 min

## 2020-07-06 ENCOUNTER — OFFICE VISIT (OUTPATIENT)
Dept: PHYSICAL THERAPY | Age: 60
End: 2020-07-06
Payer: COMMERCIAL

## 2020-07-06 DIAGNOSIS — M25.562 LEFT KNEE PAIN, UNSPECIFIED CHRONICITY: ICD-10-CM

## 2020-07-06 DIAGNOSIS — M54.16 RADICULOPATHY, LUMBAR REGION: ICD-10-CM

## 2020-07-06 DIAGNOSIS — S83.512A SPRAIN OF ANTERIOR CRUCIATE LIGAMENT OF LEFT KNEE, INITIAL ENCOUNTER: ICD-10-CM

## 2020-07-06 DIAGNOSIS — M25.572 PAIN, JOINT, ANKLE AND FOOT, LEFT: Primary | ICD-10-CM

## 2020-07-06 DIAGNOSIS — M54.50 LOW BACK PAIN, UNSPECIFIED BACK PAIN LATERALITY, UNSPECIFIED CHRONICITY, UNSPECIFIED WHETHER SCIATICA PRESENT: ICD-10-CM

## 2020-07-06 DIAGNOSIS — S93.409A SPRAIN OF ANKLE, INITIAL ENCOUNTER: ICD-10-CM

## 2020-07-06 PROCEDURE — 97110 THERAPEUTIC EXERCISES: CPT | Performed by: PHYSICAL THERAPIST

## 2020-07-06 PROCEDURE — 97140 MANUAL THERAPY 1/> REGIONS: CPT | Performed by: PHYSICAL THERAPIST

## 2020-07-06 NOTE — PROGRESS NOTES
Daily Note     Today's date: 2020  Patient name: Modesto Weems  : 1960  MRN: [id number]  Referring provider: Savilla Schaumann, DO  Dx:   Encounter Diagnosis     ICD-10-CM    1  Pain, joint, ankle and foot, left M25 572    2  Low back pain, unspecified back pain laterality, unspecified chronicity, unspecified whether sciatica present M54 5    3  Radiculopathy, lumbar region M54 16    4  Sprain of anterior cruciate ligament of left knee, initial encounter S83 512A    5  Left knee pain, unspecified chronicity M25 562    6  Sprain of ankle, initial encounter S93 409A                   Subjective: Patient reported she has left patellar and knee pain with left le edema persisting at 2 plus  Patient noted all standing based functional activities are limited by left le pain aggravation and fatigue despite use of CAM boot  Objective: See treatment diary below      Assessment: Patient presents without left ankle pain aggravation during open kinetic chain activities without CAM boot  Thus, pt educated to continue to weaning out of the CAM boot 1-2 hours per day until able to wear all day without pain aggravation  Plan:  Continue with PT POC  Precautions: Patient's PMHx is remarkable for Epilepsy, HTN, Hysterectomy, Seizures, Multiple fall history, vertigo, IBS and GERD        Manuals 6/29 7/01 6/1 6/3 6/10 6/11 6/15 6/17 6/22 6/24   Right ankle retrograde massage with patient seated NT NT 10 min 10 min NT NT NT NT NT NT                                          Neuro Re-Ed                                                                                                    With sneaker on left le    Ther Ex  6/3 6/10 6/11 6/15 6/17 6/22 6/24      With bilateral sneakers          Nu step 15 min 15 min 15 min 15 min 15 min 15' 10 min 12 min 12 min 10 MIN    Seated hr and tr: B: 3 x 10 3 x 10 3 x 10 2 x 10 2 x 10 np 2 x 10 2 x 10 3 x 10 30X   Seated toe curls:L 3 x 10 NT NT 5 sec x 20 5 sec x 20 np NT NT 3 sec x 20 NT   LAQ:B: 20X 3# 3 sec x 20 3#  5 sec x 20 2# x 20 2# x 20 2# x 20 2# x 30 2 x 10 2# x 20 20X 2 5#    Hip flexion:B: 20X 2 5#  3# 2 x 10 3# 2  x 10 2# x 20 2# x 20 np 2# x 30 2 x 10 2# x 20 20X 2 5#    Seated baps:L L3 x 20 10 x  NT 2 x 10 B DF/PF 2 x 10 B with DF/PF np NT NT NT NT   Long sit ankle pumps:L NT NT NT NT NT  NT NT NT NT   Long sit quad sets:L NT NT NT NT NT  NT NT NT NT   Long sit heel slides:L NT NT NT NT NT  NT NT NT NT   Long sit hip abduction:L NT NT NT NT NT  NT NT NT NT   Long sit DLS abdominal bracing NT NT NT NT NT  NT NT NT NT   Long sit SAQ:B: NT NT NT NT NT  NT NT NT NT   ALL STANDING THERAPEUTIC EXERCISES WITH BILATERAL UE SUPPORT!     YES! Standing SLR x 3:B: 3# x 20 3# x 20 3# x 20 NT 2 x 10 2 x 10 3 x 10 3 x 10 2# x 20 2 5#20X   Standing HR and TR:B: NT 3 x 10 3 x 10 NT 2 x 10 HR np 3 x 10 2 x 10 2 x 10 20X   Side stepping in parallel bars 6 x  6 x 6 x with bilateral le 3# 5 x  5 x  5x 5 x  5 x 5 x 6X 2 5#    Tandem forward and backwards walking in parallel bars 6 x 6 x 6 x with bilateral le 3# 5 x forward 5 x forward 5 x forward 5 x  5 x  5 x  2X 2 5#    SLS and tandem stance in parallel bars NT 30 sec x 2 on each with bilateral ue support! On foam 30 sec x 2 on each with bilateral ue support! On foam NT NT np 30 sec x 2 on each with bilateral ue support! 30 sec x 2 on each with bilateral ue support! On foam 30 sec x 2 on each with bilateral ue support!  On foam NT   Mini squats to chair in parallel bars 2 x 10 2 x 10 2 x 10 NT 2 x 10 to chair 2 x 10 to chair 2 x 10 2 x 10 2 x 10 20X    Step ups in parallel bars:L NT NT 6" x 20 NT NT np NT NT NT NT   Lateral step ups in parallel bars:L NT NT NT NT NT np NT NT NT NT   Lunges of foam:B:  2 x 10 2 x 10    2 x 10 2 x 10 2 x 10 NT                Ther Activity                                       Gait Training                                       Modalities             CP to left knee and ankle and MHP to lumbar spine and TENS seated 10 min 10 min 10 min 10 min 10 min 10 min 10 min 10 min 10 min 10 min

## 2020-07-08 ENCOUNTER — APPOINTMENT (OUTPATIENT)
Dept: PHYSICAL THERAPY | Age: 60
End: 2020-07-08
Payer: COMMERCIAL

## 2020-07-09 ENCOUNTER — OFFICE VISIT (OUTPATIENT)
Dept: PHYSICAL THERAPY | Age: 60
End: 2020-07-09
Payer: COMMERCIAL

## 2020-07-09 DIAGNOSIS — M54.50 LOW BACK PAIN, UNSPECIFIED BACK PAIN LATERALITY, UNSPECIFIED CHRONICITY, UNSPECIFIED WHETHER SCIATICA PRESENT: ICD-10-CM

## 2020-07-09 DIAGNOSIS — M25.572 PAIN, JOINT, ANKLE AND FOOT, LEFT: Primary | ICD-10-CM

## 2020-07-09 DIAGNOSIS — M54.16 RADICULOPATHY, LUMBAR REGION: ICD-10-CM

## 2020-07-09 DIAGNOSIS — S93.409A SPRAIN OF ANKLE, INITIAL ENCOUNTER: ICD-10-CM

## 2020-07-09 DIAGNOSIS — S83.512A SPRAIN OF ANTERIOR CRUCIATE LIGAMENT OF LEFT KNEE, INITIAL ENCOUNTER: ICD-10-CM

## 2020-07-09 DIAGNOSIS — M25.562 LEFT KNEE PAIN, UNSPECIFIED CHRONICITY: ICD-10-CM

## 2020-07-09 PROCEDURE — 97110 THERAPEUTIC EXERCISES: CPT | Performed by: PHYSICAL THERAPIST

## 2020-07-09 PROCEDURE — 97140 MANUAL THERAPY 1/> REGIONS: CPT | Performed by: PHYSICAL THERAPIST

## 2020-07-09 NOTE — PROGRESS NOTES
Daily Note     Today's date: 2020  Patient name: Octaviano Trevino  : 1960  MRN: [id number]  Referring provider: Vero Oneal DO  Dx:   Encounter Diagnosis     ICD-10-CM    1  Pain, joint, ankle and foot, left M25 572    2  Low back pain, unspecified back pain laterality, unspecified chronicity, unspecified whether sciatica present M54 5    3  Radiculopathy, lumbar region M54 16    4  Sprain of anterior cruciate ligament of left knee, initial encounter S83 512A    5  Left knee pain, unspecified chronicity M25 562    6  Sprain of ankle, initial encounter S93 409A                   Subjective: Patient reported she fell again due to lob off a curb  Patient reported her left knee and ankle pain persists at minimal to moderate levels  Patient still exhibits left le edema persisting at 2 plus  Patient noted is slowly weaning out of CAM boot  Objective: See treatment diary below      Assessment: Patient presents without left ankle pain aggravation during open kinetic chain activities without CAM boot, thus additions to PT treatment as noted on exercise diary  Patient presents with left ankle and knee weakness persisting which increases her fall risk thus all community ambulation she should continue with use of CAM boot to decrease fall risk, which she continues to exhibit bouts of falls  But, as per Orthopedics, pt is educated to continue to weaning out of the CAM boot 1-2 hours per day until able to wear all day without pain aggravation  Plan:  Continue with PT POC  Precautions: Patient's PMHx is remarkable for Epilepsy, HTN, Hysterectomy, Seizures, Multiple fall history, vertigo, IBS and GERD        Manuals 6/29 7/01 7/9 6/3 6/10 6/11 6/15 6/17 6/22 6/24   Right ankle retrograde massage with patient seated NT NT NT 10 min NT NT NT NT NT NT                                          Neuro Re-Ed With sneaker on left le    Ther Ex 6/29 7/01 7/6 7/9 6/10 6/11 6/15 6/17 6/22 6/24      With bilateral sneakers With bilateral sneakers         Nu step 15 min 15 min 15 min 15 min 15 min 15' 10 min 12 min 12 min 10 MIN    Seated hr and tr: B: 3 x 10 3 x 10 3 x 10 3 x 10 2 x 10 np 2 x 10 2 x 10 3 x 10 30X   Seated toe curls:L 3 x 10 NT NT 5 sec x 20 5 sec x 20 np NT NT 3 sec x 20 NT   LAQ:B: 20X 3# 3 sec x 20 3#  5 sec x 20 3# x 20 2# x 20 2# x 20 2# x 30 2 x 10 2# x 20 20X 2 5#    Hip flexion:B: 20X 2 5#  3# 2 x 10 3# 2  x 10 3# x 20 2# x 20 np 2# x 30 2 x 10 2# x 20 20X 2 5#    Seated baps:L L3 x 20 10 x  NT NT 2 x 10 B with DF/PF np NT NT NT NT   Long sit ankle pumps:L NT NT NT NT NT  NT NT NT NT   Long sit quad sets:L NT NT NT NT NT  NT NT NT NT   Long sit heel slides:L NT NT NT NT NT  NT NT NT NT   Long sit hip abduction:L NT NT NT NT NT  NT NT NT NT   Long sit DLS abdominal bracing NT NT NT NT NT  NT NT NT NT   Long sit SAQ:B: NT NT NT NT NT  NT NT NT NT   ALL STANDING THERAPEUTIC EXERCISES WITH BILATERAL UE SUPPORT!     YES! Standing SLR x 3:B: 3# x 20 3# x 20 3# x 20 3# x 20 2 x 10 2 x 10 3 x 10 3 x 10 2# x 20 2 5#20X   Standing HR and TR:B: NT 3 x 10 3 x 10 3 x 10 2 x 10 HR np 3 x 10 2 x 10 2 x 10 20X   Side stepping in parallel bars 6 x  6 x 6 x with bilateral le 3# 6 x  5 x  5x 5 x  5 x 5 x 6X 2 5#    Tandem forward and backwards walking in parallel bars 6 x 6 x 6 x with bilateral le 3# 6 x forward with bilateral le 3# 5 x forward 5 x forward 5 x  5 x  5 x  2X 2 5#    SLS and tandem stance in parallel bars NT 30 sec x 2 on each with bilateral ue support! On foam 30 sec x 2 on each with bilateral ue support! On foam 30 sec x 2 on each le on blue foam with bilateral ue support NT np 30 sec x 2 on each with bilateral ue support! 30 sec x 2 on each with bilateral ue support! On foam 30 sec x 2 on each with bilateral ue support!  On foam NT   Mini squats to chair in parallel bars 2 x 10 2 x 10 2 x 10 2 x 10 2 x 10 to chair 2 x 10 to chair 2 x 10 2 x 10 2 x 10 20X    Step ups in parallel bars:L NT NT 6" x 20 6" x 20 NT np NT NT NT NT   Lateral step ups in parallel bars:L NT NT NT 6"x 20 NT np NT NT NT NT   Lunges of foam:B:  2 x 10 2 x 10 2 x 10   2 x 10 2 x 10 2 x 10 NT                Ther Activity                                       Gait Training                                       Modalities             CP to left knee and ankle and MHP to lumbar spine and TENS seated 10 min 10 min 10 min 10 min 10 min 10 min 10 min 10 min 10 min 10 min

## 2020-07-13 ENCOUNTER — HOSPITAL ENCOUNTER (OUTPATIENT)
Dept: MRI IMAGING | Facility: HOSPITAL | Age: 60
Discharge: HOME | End: 2020-07-13
Attending: PHYSICAL MEDICINE & REHABILITATION
Payer: COMMERCIAL

## 2020-07-13 DIAGNOSIS — M54.41 CHRONIC BILATERAL LOW BACK PAIN WITH BILATERAL SCIATICA: ICD-10-CM

## 2020-07-13 DIAGNOSIS — M54.16 RADICULOPATHY, LUMBAR REGION: ICD-10-CM

## 2020-07-13 DIAGNOSIS — G89.29 CHRONIC BILATERAL LOW BACK PAIN WITH BILATERAL SCIATICA: ICD-10-CM

## 2020-07-13 DIAGNOSIS — M54.42 CHRONIC BILATERAL LOW BACK PAIN WITH BILATERAL SCIATICA: ICD-10-CM

## 2020-07-13 PROCEDURE — 72148 MRI LUMBAR SPINE W/O DYE: CPT

## 2020-07-15 ENCOUNTER — OFFICE VISIT (OUTPATIENT)
Dept: PHYSICAL THERAPY | Age: 60
End: 2020-07-15
Payer: COMMERCIAL

## 2020-07-15 DIAGNOSIS — M54.50 LOW BACK PAIN, UNSPECIFIED BACK PAIN LATERALITY, UNSPECIFIED CHRONICITY, UNSPECIFIED WHETHER SCIATICA PRESENT: ICD-10-CM

## 2020-07-15 DIAGNOSIS — M54.16 RADICULOPATHY, LUMBAR REGION: ICD-10-CM

## 2020-07-15 DIAGNOSIS — S83.512A SPRAIN OF ANTERIOR CRUCIATE LIGAMENT OF LEFT KNEE, INITIAL ENCOUNTER: ICD-10-CM

## 2020-07-15 DIAGNOSIS — M25.572 PAIN, JOINT, ANKLE AND FOOT, LEFT: Primary | ICD-10-CM

## 2020-07-15 DIAGNOSIS — M25.562 LEFT KNEE PAIN, UNSPECIFIED CHRONICITY: ICD-10-CM

## 2020-07-15 DIAGNOSIS — S93.409A SPRAIN OF ANKLE, INITIAL ENCOUNTER: ICD-10-CM

## 2020-07-15 PROCEDURE — 97014 ELECTRIC STIMULATION THERAPY: CPT | Performed by: PHYSICAL THERAPIST

## 2020-07-15 PROCEDURE — 97110 THERAPEUTIC EXERCISES: CPT | Performed by: PHYSICAL THERAPIST

## 2020-07-15 NOTE — PROGRESS NOTES
Daily Note     Today's date: 7/15/2020  Patient name: Nena Denise  : 1960  MRN: [id number]  Referring provider: Jessica Mahan DO  Dx:   Encounter Diagnosis     ICD-10-CM    1  Pain, joint, ankle and foot, left M25 572    2  Low back pain, unspecified back pain laterality, unspecified chronicity, unspecified whether sciatica present M54 5    3  Radiculopathy, lumbar region M54 16    4  Sprain of anterior cruciate ligament of left knee, initial encounter S83 512A    5  Left knee pain, unspecified chronicity M25 562    6  Sprain of ankle, initial encounter S93 409A                   Subjective: Patient reported she attempted to walk without her CAM boot which she was unable due to left ankle and foot pain  Patient noted she is able to walk and standing with CAM boot with only minimal pain  Objective: See treatment diary below      Assessment: Patient presents with inability to wean out of the CAM boot due to pain  Plus, patient is able to perform therapeutic exercises with sneaker on without pain aggravation, but she is unable to perform standing functional activities without experiencing pain aggravation  Plan:  Continue with PT POC  Precautions: Patient's PMHx is remarkable for Epilepsy, HTN, Hysterectomy, Seizures, Multiple fall history, vertigo, IBS and GERD        Manuals  6/3 6/10 6/11 6/15 6/17 6/22 6/24   Right ankle retrograde massage with patient seated NT NT NT 10 min NT NT NT NT NT NT                                          Neuro Re-Ed                                                                                                    With sneaker on left le    Ther Ex  7/9 7/15 6/11 6/15 6/17 6/22 6/24      With bilateral sneakers With bilateral sneakers With bilateral sneakers        Nu step 15 min 15 min 15 min 15 min 15 min 15' 10 min 12 min 12 min 10 MIN    Seated hr and tr: B: 3 x 10 3 x 10 3 x 10 3 x 10 2 x 10 np 2 x 10 2 x 10 3 x 10 30X Seated toe curls:L 3 x 10 NT NT 5 sec x 20 5 sec x 20 np NT NT 3 sec x 20 NT   LAQ:B: 20X 3# 3 sec x 20 3#  5 sec x 20 3# x 20 2# x 20 2# x 20 2# x 30 2 x 10 2# x 20 20X 2 5#    Hip flexion:B: 20X 2 5#  3# 2 x 10 3# 2  x 10 3# x 20 2# x 20 np 2# x 30 2 x 10 2# x 20 20X 2 5#    Seated baps:L L3 x 20 10 x  NT NT 2 x 10 B with DF/PF np NT NT NT NT   Long sit ankle pumps:L NT NT NT NT NT  NT NT NT NT   Long sit quad sets:L NT NT NT NT NT  NT NT NT NT   Long sit heel slides:L NT NT NT NT NT  NT NT NT NT   Long sit hip abduction:L NT NT NT NT NT  NT NT NT NT   Long sit DLS abdominal bracing NT NT NT NT NT  NT NT NT NT   Long sit SAQ:B: NT NT NT NT NT  NT NT NT NT   ALL STANDING THERAPEUTIC EXERCISES WITH BILATERAL UE SUPPORT!     YES! Standing SLR x 3:B: 3# x 20 3# x 20 3# x 20 3# x 20 3#2 x 10 2 x 10 3 x 10 3 x 10 2# x 20 2 5#20X   Standing HR and TR:B: NT 3 x 10 3 x 10 3 x 10 2 x 10 HR np 3 x 10 2 x 10 2 x 10 20X   Side stepping in parallel bars 6 x  6 x 6 x with bilateral le 3# 6 x  6 x  5x 5 x  5 x 5 x 6X 2 5#    Tandem forward and backwards walking in parallel bars 6 x 6 x 6 x with bilateral le 3# 6 x forward with bilateral le 3# 6 x forward 5 x forward 5 x  5 x  5 x  2X 2 5#    SLS and tandem stance in parallel bars NT 30 sec x 2 on each with bilateral ue support! On foam 30 sec x 2 on each with bilateral ue support! On foam 30 sec x 2 on each le on blue foam with bilateral ue support 30 sec x 2 on each le on blue foam with bilateral ue support np 30 sec x 2 on each with bilateral ue support! 30 sec x 2 on each with bilateral ue support! On foam 30 sec x 2 on each with bilateral ue support!  On foam NT   Mini squats to chair in parallel bars 2 x 10 2 x 10 2 x 10 2 x 10 2 x 10 to chair 2 x 10 to chair 2 x 10 2 x 10 2 x 10 20X    Step ups in parallel bars:L NT NT 6" x 20 6" x 20 6" x 20 np NT NT NT NT   Lateral step ups in parallel bars:L NT NT NT 6"x 20 6" x 20 np NT NT NT NT   Lunges of foam:B:  2 x 10 2 x 10 2 x 10 2 x 10  2 x 10 2 x 10 2 x 10 NT                Ther Activity                                       Gait Training                                       Modalities             CP to left knee and ankle and MHP to lumbar spine and TENS seated 10 min 10 min 10 min 10 min 10 min 10 min 10 min 10 min 10 min 10 min

## 2020-07-16 ENCOUNTER — EVALUATION (OUTPATIENT)
Dept: PHYSICAL THERAPY | Age: 60
End: 2020-07-16
Payer: COMMERCIAL

## 2020-07-16 ENCOUNTER — OFFICE VISIT (OUTPATIENT)
Dept: PHYSICAL THERAPY | Age: 60
End: 2020-07-16
Payer: COMMERCIAL

## 2020-07-16 DIAGNOSIS — M25.572 PAIN, JOINT, ANKLE AND FOOT, LEFT: Primary | ICD-10-CM

## 2020-07-16 DIAGNOSIS — S83.512A SPRAIN OF ANTERIOR CRUCIATE LIGAMENT OF LEFT KNEE, INITIAL ENCOUNTER: ICD-10-CM

## 2020-07-16 DIAGNOSIS — S93.409A SPRAIN OF ANKLE, INITIAL ENCOUNTER: ICD-10-CM

## 2020-07-16 DIAGNOSIS — M25.562 LEFT KNEE PAIN, UNSPECIFIED CHRONICITY: ICD-10-CM

## 2020-07-16 DIAGNOSIS — M54.50 LOW BACK PAIN, UNSPECIFIED BACK PAIN LATERALITY, UNSPECIFIED CHRONICITY, UNSPECIFIED WHETHER SCIATICA PRESENT: ICD-10-CM

## 2020-07-16 DIAGNOSIS — M54.16 RADICULOPATHY, LUMBAR REGION: ICD-10-CM

## 2020-07-16 PROCEDURE — 97110 THERAPEUTIC EXERCISES: CPT

## 2020-07-16 NOTE — PROGRESS NOTES
PT Evaluation  / PT Reassessment    Today's date: 2020  Patient name: Maggie Dorsey  : 1960  MRN: [id number]  Referring provider: Jose Paez DO  Dx:   Encounter Diagnosis     ICD-10-CM    1  Pain, joint, ankle and foot, left M25 572    2  Low back pain, unspecified back pain laterality, unspecified chronicity, unspecified whether sciatica present M54 5    3  Radiculopathy, lumbar region M54 16    4  Sprain of anterior cruciate ligament of left knee, initial encounter S83 512A    5  Left knee pain, unspecified chronicity M25 562    6  Sprain of ankle, initial encounter S93 409A                 Assessment  Assessment details: PT Reassessment: 2020  Patient reported the following progress since onset of PT: improvement in mobility, postural improvements, increase in balance, decrease in left knee and ankle, ambulation improved, increase in left le strength, increase in standing tolerance and functional progress  But, she noted the following deficits that still persist: bending, squatting, difficulty getting off the floor, difficulty getting in and out of the tub, stair deficits, balance deficits  Patient presents with vertigo symptoms limiting out ability to increase therapeutic exercises and activities  Patient noted she is unable to perform long bouts of standing activities without left CAM boot  Patient noted she still gets left ankle edema which increases when not wearing CAM boot  Patient noted she medial left thigh, lower leg and foot pressure  Patient noted she has left knee crepitus with extension  Patient noted she remains "clumnsy" and will LOB especially without use of spc  Patient noted she is 50-60 % improved since onset of PT  PT Reassessment: 2020    Patient reported the following progress since onset of PT: increase in left le strength, decrease in "clumbysness", decrease in pain, standing and walking endurance, improvement in pain tolerance to standing and walking based functional activities, improvement in overall weight baring activites and walking in CAM boot with spc  But, she reported the following deficits that still persist:  Am functional activities, initial left le weight baring that will cause buckling intermittently, difficulty with shower chair transfers, walking in sneakers or shoes, prolonged standing, prolonged walking, stair climbing, and standing based functional activities  Also, pt noted an increase in lumbar spine, left knee and ankle pain aggravation at the end of the day which is based upon weight baring activities  Patient reported she has an ear infection that she just started an oral antibiotic which is improving some of her vertigo symptoms but due to falling concerns and current vertigo symptoms she remains unable to test balance  Patient has begun weaning out of CAM boot while in PT which is going without pain aggravation  PT IE: 5-  Patient noted she fell two weeks ago  Patient noted two years ago she had left knee meniscus and ACL  Patient noted she has fell again today  Patient noted she saw spine and pain today  Patient noted she has " bad vertigo and epilepsy"  Patient noted she "rolled her left ankle" off the curb and  grate and landed on her left side  Patient noted she has balance deficits due to epilepsy and vertigo  Patient has been on disability since 1992 due to seizure disorder  Patient noted she saw her neurologist 3 x per day  Patient noted she has difficulties with sleep at night due to pain  Patient noted she is dizzy and lightheaded when she gets up at of bed  Patient noted her driving is limited due to vertigo  Patient noted she had tylenol and capracin OTC and generic mobic  Patient noted low back pain is in bilateral buttock and into left le  Patient noted she uses heat and elevation for left le pain reduction  Patient noted she has a spc, walker and bedside commode    Patient noted left le sciatica pain in posterior lateral buttock and into the lateral left hip region  Patient noted she has left peripatellar region, left anterior ankle pain  Patient noted she is using a spc or a rolling walker due to left le pain  Patient noted she has intermittent left le paresthesias  Patient noted her left plantar foot has decrease in sensation  Patient noted left le is weak and she noted right le buckled  Patient noted car transfers, chair transfers, bed transfers are all limited by pain aggravation  Patient noted walking, stair climbing, standing activities are limited by lumbar spine and left distal le pain  Patient noted standing aggravates her pain while sitting is better but supine lying with heating pad is the best position for her lumbar spine  Patient noted she just had CAM walker boot placed on on 5- and is on until next visit with Dr Ray Galicia   Patient noted sitting and bending to do her shoe is pain aggravating  Impairments: abnormal gait, abnormal or restricted ROM, impaired balance and pain with function  Understanding of Dx/Px/POC: good   Prognosis: fair  Prognosis details: Patient is a 64y o  year old female seen for outpatient PT reevaluation with pain, mobility and functional deficits due to lumbar spine, left knee and ankle pain s/p fall  Patient presents with the following progress since onset of PT: decrease in lumbar spine, left knee and left ankle pain, increase in left le rom and strength, decrease in left le edema, gait and stair improvements, reduction in TTP, balance improved, able to tolerate left le weight baring for short periods in sneaker and functional progress    But, he presents to PT on reassessment with the following problems, concerns, deficits and impairments: lumbar, left knee and left ankle pain, decreased lumbar spine range of motion, decreased left le rom and strength, + TTP, gait and stair dysfunctions, transfer dysfunctions, multiple fall history, decrease in balance, functional limitations and decreased tolerance to activity  Also, patient's Vertigo symptoms are more limiting to standing based functional activities than orthopedics  Patient would benefit from skilled PT services under the following PT treatment plan to address the above noted deficits: land and pool based therapeutic exercises and activities to facilitate lumbar spine rom and bilateral le rom and strength, gait and stair training, transfer training, balance and proprioception activities, IASTM techniques, Kinesio taping techniques, traction, modalities, manual therapy techniques, abdominal and core strengthening activities, lumbar stabilization activities and a hep  Thank you for the referral   Thus, we will continue PT one more week then place patient on hold for PT by me and refer patient to Vertigo PT  Goals  Short Term goals 4 - 6 weeks  1  Patient will be independent HEP   MET  2   Patient will report a 25 - 50% decrease in pain complaints  MET  3   Increase strength 1/2 grade  MET  4   Increase ROM 5-10 degrees  MET  Long Term goals 8 - 12 weeks  1  Patient will report elimination of pain complaints  Partially MET  2   Patient will return to all recreational activities without restriction  Partially MET  3   ROM WFL  Partially MET  4   Strength 5/5  Partially MET  5   Patient will report walking improved > 25%  Partially MET  6   Patient will report standing functional activities improved > 25%  Partially MET  7   Patient will report stair climbing improved > 25%  Partially MET  8   Patient will report transfers improved > 25%  Partially MET  9   Patient will deny any new falls  Partially MET  10   Patient will report sleep improved > 25%  Partially MET  11   Patient will report balance improved > 25%  Partially MET      Plan  Patient would benefit from: skilled physical therapy  Planned modality interventions: cryotherapy, TENS, thermotherapy: hydrocollator packs and unattended electrical stimulation  Planned therapy interventions: home exercise program, graded motor, graded exercise, graded activity, gait training, functional ROM exercises, flexibility, transfer training, therapeutic training, therapeutic exercise, therapeutic activities, stretching, strengthening, self care, compression, postural training, patient education, body mechanics training, neuromuscular re-education, balance, balance/weight bearing training, Núñez taping, massage and aquatic therapy  Frequency: 2x week  Duration in weeks: 12  Treatment plan discussed with: patient        Subjective Evaluation    History of Present Illness  Mechanism of injury: Patient's PMHx is remarkable for Epilepsy, HTN, Hysterectomy, Seizures, Multiple fall history, vertigo, IBS and GERD  LUMBAR SPINE     INDICATION:   M54 5: Low back pain      COMPARISON:  None     VIEWS:  XR SPINE LUMBAR 2 OR 3 VIEWS INJURY  Images: 2  Standing      FINDINGS:     There are 5 non rib bearing lumbar vertebral bodies       There is no evidence of acute fracture or destructive osseous lesion      Alignment is unremarkable       Moderate degenerative changes are seen at the T9-10 disc with mild degenerative changes at L3-4 disc  There are moderate to marked degenerative changes in the facet joints from L3-4 through L5-S1      The pedicles appear intact      There is aortic calcification      IMPRESSION:     No acute osseous abnormality        Degenerative changes as described  LEFT ANKLE     INDICATION:   M25 572: Pain in left ankle and joints of left foot      COMPARISON:  None     VIEWS:  XR ANKLE 3+ VW LEFT   Images: 3     FINDINGS:     There is no acute fracture or dislocation      No significant degenerative changes      No lytic or blastic osseous lesion    There are plantar and posterior calcaneal spurs      Soft tissues are unremarkable      IMPRESSION:     No acute osseous abnormality      Heel spurs       LEFT KNEE     INDICATION:   M25 562: Pain in left knee      COMPARISON:  2017     VIEWS:  XR KNEE 3 VW LEFT NON INJURY   Images: 3     FINDINGS:     There is no acute fracture or dislocation      There is no joint effusion      There are minimal medial compartment degenerative changes similar to previous      No lytic or blastic osseous lesion      Soft tissues are unremarkable      IMPRESSION:     No acute osseous abnormality      Degenerative changes as described  Pain  At best pain ratin  At worst pain ratin  Location: left ankle, knee and lumbar spine    Patient Goals  Patient goals for therapy: decreased pain, decreased edema, increased motion, increased strength, independence with ADLs/IADLs and return to sport/leisure activities          Objective     Tenderness     Additional Tenderness Details  Patient is + TTP at left lateral malleolar region, ankle mortise, medial left malleolar region, left medial tibial region, left peripatellar region, left medial and lateral joint line at minimal to moderate levels      Active Range of Motion   Left Knee   Flexion: 105 degrees with pain  Extension: -12 degrees     Right Knee   Flexion: 105 degrees   Extension: 0 degrees   Left Ankle/Foot   Dorsiflexion (ke): 8 degrees with pain  Plantar flexion: 52 degrees with pain  Inversion: 24 degrees with pain  Eversion: 16 degrees with pain    Right Ankle/Foot   Dorsiflexion (ke): 12 degrees   Plantar flexion: 50 degrees   Inversion: 32 degrees   Eversion: 16 degrees     Strength/Myotome Testing     Left Knee   Flexion: 4  Extension: 4-    Right Knee   Flexion: 4+  Extension: 4+    Left Ankle/Foot   Dorsiflexion: 4-  Plantar flexion: 4+  Inversion: 4  Eversion: 4-    Right Ankle/Foot   Dorsiflexion: 4  Plantar flexion: 4+  Inversion: 4+  Eversion: 4    Ambulation     Ambulation: Level Surfaces   Ambulation with assistive device: independent    Additional Level Surfaces Ambulation Details  Patient ambulates with spc with left CAM boot and left knee hinged sleeve orthosis with = weight baring on left versus right but with sneaker pace decreases and left stance time decreases with gait with spc but still right step length is past left stance phase  Ambulation: Stairs   Ascend stairs: independent  Pattern: non-reciprocal  Railings: two rails  Descend stairs: independent  Pattern: non-reciprocal  Railings: two rails    Comments   PT Reassessment: 07/16/2020  TUG is at 21 23 seconds  MCTSIB: Eyes Open on Firm Surface with Patient Sway Index at 3 41; Eyes Closed on Firm Surface with Patient Sway Index at 3 61 with 3 LOB; Eyes Open on Foam Surface with Patient Sway Index at 4 08; Eyes Closed on Foam Surface with Patient Sway Index at 5 20 with 7 LOB  General Comments:      Lumbar Comments  Unable to test lumbar spine mobility due to vertigo symptom aggravation  Ankle/Foot Comments   Girth Measurements: Ankle:  Bimalleolar region: Right at 25 5 CM and left at 25 9 Cm;  1 st MTP Joint region: Right at 23 2  CM and left at 21 4 Cm; Precautions: Patient's PMHx is remarkable for Epilepsy, HTN, Hysterectomy, Seizures, Multiple fall history, vertigo, IBS and GERD        Manuals 6/29 5/27 6/1 6/3 6/10 6/11 6/15 6/17 6/22 6/24   Right ankle retrograde massage with patient seated NT 10 min 10 min 10 min NT NT NT NT NT NT                                          Neuro Re-Ed                                                                                                    With sneaker on left le    Ther Ex 6/29 5/27 6/1 6/3 6/10 6/11 6/15 6/17 6/22 6/24   Nu step 15 min 11 min 12 min 15 min 15 min 15' 10 min 12 min 12 min 10 MIN    Seated hr and tr: B: 3 x 10 2 x 10 2 x 10 2 x 10 2 x 10 np 2 x 10 2 x 10 3 x 10 30X   Seated toe curls:L 3 x 10 3 sec x 20 5 sec x 20 5 sec x 20 5 sec x 20 np NT NT 3 sec x 20 NT   LAQ:B: 20X 3 sec x 20 5 sec x 20 2# x 20 2# x 20 2# x 20 2# x 30 2 x 10 2# x 20 20X 2 5# Hip flexion:B: 20X 2 5#  2 x 10 2 x 10 2# x 20 2# x 20 np 2# x 30 2 x 10 2# x 20 20X 2 5#    Seated baps:L L3 x 20 10 x  2 x 10 B DF/PF 2 x 10 B DF/PF 2 x 10 B with DF/PF np NT NT NT NT   Long sit ankle pumps:L NT NT NT NT NT  NT NT NT NT   Long sit quad sets:L NT NT NT NT NT  NT NT NT NT   Long sit heel slides:L NT NT NT NT NT  NT NT NT NT   Long sit hip abduction:L NT NT NT NT NT  NT NT NT NT   Long sit DLS abdominal bracing NT NT NT NT NT  NT NT NT NT   Long sit SAQ:B: NT NT NT NT NT  NT NT NT NT   ALL STANDING THERAPEUTIC EXERCISES WITH BILATERAL UE SUPPORT!     YES! Standing SLR x 3:B: 3# x 20 NT NT NT 2 x 10 2 x 10 3 x 10 3 x 10 2# x 20 2 5#20X   Standing HR and TR:B: NT NT NT NT 2 x 10 HR np 3 x 10 2 x 10 2 x 10 20X   Side stepping in parallel bars 6 x  NT NT 5 x  5 x  5x 5 x  5 x 5 x 6X 2 5#    Tandem forward and backwards walking in parallel bars 6 x NT NT 5 x forward 5 x forward 5 x forward 5 x  5 x  5 x  2X 2 5#    SLS and tandem stance in parallel bars NT NT NT NT NT np 30 sec x 2 on each with bilateral ue support! 30 sec x 2 on each with bilateral ue support! On foam 30 sec x 2 on each with bilateral ue support!  On foam NT   Mini squats to chair in parallel bars 2 x 10 NT NT NT 2 x 10 to chair 2 x 10 to chair 2 x 10 2 x 10 2 x 10 20X    Step ups in parallel bars:L NT NT NT NT NT np NT NT NT NT   Lateral step ups in parallel bars:L NT NT NT NT NT np NT NT NT NT   Lunges of foam:B:       2 x 10 2 x 10 2 x 10 NT                Ther Activity                                       Gait Training                                       Modalities             CP to left knee and ankle and MHP to lumbar spine and TENS seated 10 min 15 min 10 min 10 min 10 min 10 min 10 min 10 min 10 min 10 min

## 2020-07-16 NOTE — PROGRESS NOTES
Daily Note     Today's date: 2020  Patient name: Peggy Rashid  : 1960  MRN: [id number]  Referring provider: César Cali DO  Dx:   Encounter Diagnosis     ICD-10-CM    1  Pain, joint, ankle and foot, left M25 572    2  Low back pain, unspecified back pain laterality, unspecified chronicity, unspecified whether sciatica present M54 5    3  Radiculopathy, lumbar region M54 16    4  Sprain of anterior cruciate ligament of left knee, initial encounter S83 512A    5  Left knee pain, unspecified chronicity M25 562    6  Sprain of ankle, initial encounter S93 409A                   Subjective: "I cant be out of the boot for long it swells up"     Objective: See treatment diary below      Assessment: Good tolerance to exercises, performed all dynamic exercises without camboot  Bouts of vertigo intermittently today  See re-eval for details      Plan:  Continue with PT POC  Precautions: Patient's PMHx is remarkable for Epilepsy, HTN, Hysterectomy, Seizures, Multiple fall history, vertigo, IBS and GERD  Manuals                                                      Ther Ex sneaker on L with all exercises                          Nu step 15 min             Seated hr and tr: B:  NT             Seated toe curls:L NT             LAQ:B: 20X 3#             Hip flexion:B: 20X 3#             Seated baps:L L3 x 20            ALL STANDING THERAPEUTIC EXERCISES WITH BILATERAL UE SUPPORT!              Standing SLR x 3:B: 3# x 20             Standing HR and TR:B: 30x             Side stepping in parallel bars 6 x 3#             Tandem forward and backwards walking in parallel bars 6 x 3#             SLS and tandem stance in parallel bars NT             Mini squats to chair in parallel bars 2 x 10            Step ups in parallel bars:L NT            Lateral step, forward steps  ups in parallel bars:L  6" 3#             Lunges of foam:B: NT            Modalities             CP to left knee and ankle and MHP to lumbar spine and TENS seated NT

## 2020-07-17 ENCOUNTER — APPOINTMENT (OUTPATIENT)
Dept: RADIOLOGY | Facility: AMBULARY SURGERY CENTER | Age: 60
End: 2020-07-17
Attending: ORTHOPAEDIC SURGERY
Payer: COMMERCIAL

## 2020-07-17 ENCOUNTER — OFFICE VISIT (OUTPATIENT)
Dept: OBGYN CLINIC | Facility: CLINIC | Age: 60
End: 2020-07-17
Payer: COMMERCIAL

## 2020-07-17 VITALS — BODY MASS INDEX: 33.13 KG/M2 | WEIGHT: 187 LBS | HEIGHT: 63 IN

## 2020-07-17 DIAGNOSIS — M25.572 PAIN, JOINT, ANKLE AND FOOT, LEFT: Primary | ICD-10-CM

## 2020-07-17 DIAGNOSIS — M25.572 PAIN, JOINT, ANKLE AND FOOT, LEFT: ICD-10-CM

## 2020-07-17 DIAGNOSIS — S93.409A SPRAIN OF ANKLE, INITIAL ENCOUNTER: ICD-10-CM

## 2020-07-17 PROCEDURE — 73630 X-RAY EXAM OF FOOT: CPT

## 2020-07-17 PROCEDURE — 99212 OFFICE O/P EST SF 10 MIN: CPT | Performed by: ORTHOPAEDIC SURGERY

## 2020-07-17 NOTE — PROGRESS NOTES
Assessment:  1  Pain, joint, ankle and foot, left  XR foot 3+ vw left    Elastic Bandages & Supports (AIRCAST SPORT ANKLE BRACE/LEFT) MISC   2  Sprain of ankle, initial encounter       Patient Active Problem List   Diagnosis    Sprain of medial collateral ligament of left knee    Concussion wth loss of consciousness of 30 minutes or less    Esophageal reflux    Hypertension    Pineal gland cyst    Seizures (HCC)    Tension type headache    Chronic migraine without aura without status migrainosus, not intractable    Unspecified convulsions ([address] )    Kidney stones    IBS (irritable bowel syndrome)    Radiculopathy, lumbar region    Sprain of anterior cruciate ligament of left knee    Sprain of ankle, initial encounter    Pain, joint, ankle and foot, left           Plan      Continue with physical therapy weaning out of the Cam boot is a must we need to get her out of this Cam boot she has been too long  We will order an air cast have her wear this instead and wean from that  Subjective:     Patient ID:    Chief Complaint:Melvi Sultana 64 y o  female      HPI  Month and a half an ankle sprain continues to have pain but she still continues to have balance issues she has seen Dr Claudia Peter regards to her radiculopathy and also working with physical therapy    She also has vertigo      The following portions of the patient's history were reviewed and updated as appropriate: allergies, current medications, past family history, past social history, past surgical history and problem list     All organ systems normal    Social History     Socioeconomic History    Marital status: Single     Spouse name: Not on file    Number of children: Not on file    Years of education: Not on file    Highest education level: Not on file   Occupational History    Not on file   Social Needs    Financial resource strain: Not on file    Food insecurity:     Worry: Not on file     Inability: Not on file   [facility] Transportation needs:     Medical: Not on file     Non-medical: Not on file   Tobacco Use    Smoking status: Never Smoker    Smokeless tobacco: Never Used   Substance and Sexual Activity    Alcohol use: Yes     Frequency: Monthly or less     Drinks per session: 1 or 2     Comment: rarely    Drug use: No    Sexual activity: Not on file   Lifestyle    Physical activity:     Days per week: Not on file     Minutes per session: Not on file    Stress: Not on file   Relationships    Social connections:     Talks on phone: Not on file     Gets together: Not on file     Attends Zoroastrianism service: Not on file     Active member of club or organization: Not on file     Attends meetings of clubs or organizations: Not on file     Relationship status: Not on file    Intimate partner violence:     Fear of current or ex partner: Not on file     Emotionally abused: Not on file     Physically abused: Not on file     Forced sexual activity: Not on file   Other Topics Concern    Not on file   Social History Narrative    Not on file     Past Medical History:   Diagnosis Date    Bladder prolapse, female, acquired     Epilepsy ([facility] [address] )     Hypertension     Kidney stone     Tear of MCL (medial collateral ligament) of knee      Past Surgical History:   Procedure Laterality Date    HYSTERECTOMY       Allergies   Allergen Reactions    Iodinated Diagnostic Agents Anaphylaxis    Cortisone     Iodine     Lidocaine     Novocain [Procaine]     Other      "GENERAL ANESTHESIA"    Phenytoin      Current Outpatient Medications on File Prior to Visit   Medication Sig Dispense Refill    amLODIPine (NORVASC) 10 mg tablet Take 1 tablet by mouth daily      amoxicillin (AMOXIL) 500 MG tablet Take 500 mg by mouth daily      atorvastatin (LIPITOR) 40 mg tablet Take 40 mg by mouth daily      clonazePAM (KlonoPIN) 0 5 mg tablet Take 1 tablet by mouth 2 (two) times a day      cyclobenzaprine (FLEXERIL) 5 mg tablet Take 5 mg by mouth 3 (three) times a day      Elastic Bandages & Supports (KNEE BRACE/HINGED S/M) MISC by Does not apply route daily 1 each 0    furosemide (LASIX) 40 mg tablet Take 40 mg by mouth daily as needed       hydrALAZINE (APRESOLINE) 25 mg tablet Take 25 mg by mouth 2 (two) times a day      losartan (COZAAR) 100 MG tablet Take 100 mg by mouth daily      meloxicam (MOBIC) 15 mg tablet Take 15 mg by mouth daily  0    metoprolol tartrate (LOPRESSOR) 25 mg tablet Take 25 mg by mouth 2 (two) times a day      montelukast (SINGULAIR) 10 mg tablet Take 1 tablet by mouth daily      multivitamin (THERAGRAN) TABS Take 1 tablet by mouth daily      ondansetron (ZOFRAN-ODT) 4 mg disintegrating tablet Take 1 tablet (4 mg total) by mouth every 6 (six) hours as needed for nausea or vomiting 10 tablet 0    sertraline (ZOLOFT) 50 mg tablet Take 1 tablet (50 mg total) by mouth every morning 90 tablet 3     No current facility-administered medications on file prior to visit  Objective:        Ortho Exam    Mild to moderate swelling  No pain with range of motion  No pain with palpation at this time  I have personally reviewed pertinent films in PACS  Portions of the record may have been created with voice recognition software   Occasional wrong word or "sound a like" substitutions may have occurred due to the inherent limitations of voice recognition software   Read the chart carefully and recognize, using context, where substitutions have occurred

## 2020-07-20 ENCOUNTER — TELEPHONE (OUTPATIENT)
Dept: PAIN MEDICINE | Facility: CLINIC | Age: 60
End: 2020-07-20

## 2020-07-20 ENCOUNTER — OFFICE VISIT (OUTPATIENT)
Dept: PHYSICAL THERAPY | Age: 60
End: 2020-07-20
Payer: COMMERCIAL

## 2020-07-20 DIAGNOSIS — S83.512A SPRAIN OF ANTERIOR CRUCIATE LIGAMENT OF LEFT KNEE, INITIAL ENCOUNTER: ICD-10-CM

## 2020-07-20 DIAGNOSIS — M54.50 LOW BACK PAIN, UNSPECIFIED BACK PAIN LATERALITY, UNSPECIFIED CHRONICITY, UNSPECIFIED WHETHER SCIATICA PRESENT: ICD-10-CM

## 2020-07-20 DIAGNOSIS — M25.562 LEFT KNEE PAIN, UNSPECIFIED CHRONICITY: ICD-10-CM

## 2020-07-20 DIAGNOSIS — M25.572 PAIN, JOINT, ANKLE AND FOOT, LEFT: Primary | ICD-10-CM

## 2020-07-20 DIAGNOSIS — M54.16 RADICULOPATHY, LUMBAR REGION: ICD-10-CM

## 2020-07-20 DIAGNOSIS — S93.409A SPRAIN OF ANKLE, INITIAL ENCOUNTER: ICD-10-CM

## 2020-07-20 PROCEDURE — 97014 ELECTRIC STIMULATION THERAPY: CPT | Performed by: PHYSICAL THERAPIST

## 2020-07-20 PROCEDURE — 97110 THERAPEUTIC EXERCISES: CPT | Performed by: PHYSICAL THERAPIST

## 2020-07-20 NOTE — PROGRESS NOTES
Daily Note     Today's date: 2020  Patient name: Jaye Winslow  : 1960  MRN: [id number]  Referring provider: Kerry Fuentes DO  Dx:   Encounter Diagnosis     ICD-10-CM    1  Pain, joint, ankle and foot, left M25 572    2  Low back pain, unspecified back pain laterality, unspecified chronicity, unspecified whether sciatica present M54 5    3  Radiculopathy, lumbar region M54 16    4  Sprain of anterior cruciate ligament of left knee, initial encounter S83 512A    5  Left knee pain, unspecified chronicity M25 562    6  Sprain of ankle, initial encounter S93 409A                   Subjective: Patient reported standing and ambulation is limited by moderate left knee and ankle pain  Patient noted overall feeling better out of CAM boot and now in left ankle Air cast   Patient noted standing based functional activities limited by left knee, left ankle and vertigo symptoms but she noted use of Air cast has improved her pain reduction and standing activities  Objective: See treatment diary below      Assessment: Patient educated on proper gait with spc and left ankle in air cast   Patient presents with gait improvements with stance phase and terminal stance phase improved since available mobility in DF and PF but no Inversion and eversion while in air cast   Patient educated on hep in standing to promote functional progress and left le strengthening  Patient agrees with PT POC to be seen one more time then d/c to hep and PT for vertigo  Plan:  Continue with PT POC              Precautions: Patient's PMHx is remarkable for Epilepsy, HTN, Hysterectomy, Seizures, Multiple fall history, vertigo, IBS and GERD         Manuals                                                                                            Ther Ex sneaker on L with all exercises   sneaker on L with all exercises                                           Nu step 15 min  15 min                   Seated hr and tr: B:  NT  NT                   Seated toe curls:L NT   NT                   LAQ:B: 20X 3#   3# x 20                   Hip flexion:B: 20X 3#   NT                   Seated baps:L L3 x 20  NT                   ALL STANDING THERAPEUTIC EXERCISES WITH BILATERAL UE SUPPORT!                       Standing SLR x 3:B: 3# x 20  3# x 20                   Standing HR and TR:B: 30x   3 x 10                   Side stepping in parallel bars 6 x 3#   3# x 6                   Tandem forward and backwards walking in parallel bars 6 x 3#   3# x 6                   SLS and tandem stance in parallel bars NT  30 sec x 2 on each le on each exercis on foam                   Mini squats to chair in parallel bars 2 x 10  3 x 10                   Step ups in parallel bars:L NT  NT                   Lateral step, forward steps  ups in parallel bars:L  6" 3#   NT                   Lunges of foam:B: NT  3 x 10                   Modalities                       CP to left knee and ankle and MHP to lumbar spine and TENS seated NT  10 min

## 2020-07-20 NOTE — TELEPHONE ENCOUNTER
Roberta Kevin, DO  [facility]             Attempted to call patient today at 10:15 a m  And leave message but her voice mail box is full   Please call patient and update her on her MRI results including   L3-L4:  Moderate facet arthrosis, minor circumferential bulge, no critical stenosis        L4-L5:  Moderate bilateral facet arthrosis        L5-S1:  Advanced bilateral facet no critical stenosis        Mild to moderate, noncompressive degenerative changes of lumbar spine  If she continues to report radicular symptoms into the left lower extremity can plan for a lumbar epidural steroid injection   If she is interested I will place the order and hours schedule or will reach out to her?    Thank you

## 2020-07-21 NOTE — TELEPHONE ENCOUNTER
Attempted to reach pt, voicemail box full, unable to leave message  Please reach out to pt again, thank you

## 2020-07-22 ENCOUNTER — EVALUATION (OUTPATIENT)
Dept: PHYSICAL THERAPY | Age: 60
End: 2020-07-22
Payer: COMMERCIAL

## 2020-07-22 ENCOUNTER — OFFICE VISIT (OUTPATIENT)
Dept: PHYSICAL THERAPY | Age: 60
End: 2020-07-22
Payer: COMMERCIAL

## 2020-07-22 DIAGNOSIS — S93.409A SPRAIN OF ANKLE, INITIAL ENCOUNTER: ICD-10-CM

## 2020-07-22 DIAGNOSIS — M54.16 RADICULOPATHY, LUMBAR REGION: ICD-10-CM

## 2020-07-22 DIAGNOSIS — S83.512A SPRAIN OF ANTERIOR CRUCIATE LIGAMENT OF LEFT KNEE, INITIAL ENCOUNTER: ICD-10-CM

## 2020-07-22 DIAGNOSIS — M25.562 LEFT KNEE PAIN, UNSPECIFIED CHRONICITY: ICD-10-CM

## 2020-07-22 DIAGNOSIS — M54.50 LOW BACK PAIN, UNSPECIFIED BACK PAIN LATERALITY, UNSPECIFIED CHRONICITY, UNSPECIFIED WHETHER SCIATICA PRESENT: ICD-10-CM

## 2020-07-22 DIAGNOSIS — M25.572 PAIN, JOINT, ANKLE AND FOOT, LEFT: Primary | ICD-10-CM

## 2020-07-22 DIAGNOSIS — S93.409A SPRAIN OF ANKLE, INITIAL ENCOUNTER: Primary | ICD-10-CM

## 2020-07-22 PROCEDURE — 97164 PT RE-EVAL EST PLAN CARE: CPT | Performed by: PHYSICAL THERAPIST

## 2020-07-22 PROCEDURE — 97110 THERAPEUTIC EXERCISES: CPT

## 2020-07-22 NOTE — PROGRESS NOTES
PT Evaluation  / PT Reassessment / PT Discharge    Today's date: 2020  Patient name: Fuad Rodgers  : 1960  MRN: [id number]  Referring provider: Ronan Bailon DO  Dx:   Encounter Diagnosis     ICD-10-CM    1  Pain, joint, ankle and foot, left M25 572    2  Low back pain, unspecified back pain laterality, unspecified chronicity, unspecified whether sciatica present M54 5    3  Radiculopathy, lumbar region M54 16    4  Sprain of anterior cruciate ligament of left knee, initial encounter S83 512A    5  Left knee pain, unspecified chronicity M25 562    6  Sprain of ankle, initial encounter S93 409A                 Assessment  Assessment details: PT Reassessment: 2020  Patient reported the following progress since onset of PT: walking improved with pt able to walk with left ankle air cast, left knee sleeve and spc, increase in left le strength, increase in left le mobility, decrease in dizziness, increase in standing tolerance activities  But, she noted the following deficits still persist:transfers, stair ascent, depth perception, bending, lifting, and prolonged walking  PT Reassessment: 2020  Patient reported the following progress since onset of PT: improvement in mobility, postural improvements, increase in balance, decrease in left knee and ankle, ambulation improved, increase in left le strength, increase in standing tolerance and functional progress  But, she noted the following deficits that still persist: bending, squatting, difficulty getting off the floor, difficulty getting in and out of the tub, stair deficits, balance deficits  Patient presents with vertigo symptoms limiting out ability to increase therapeutic exercises and activities  Patient noted she is unable to perform long bouts of standing activities without left CAM boot  Patient noted she still gets left ankle edema which increases when not wearing CAM boot    Patient noted she medial left thigh, lower leg and foot pressure  Patient noted she has left knee crepitus with extension  Patient noted she remains "clumnsy" and will LOB especially without use of spc  Patient noted she is 50-60 % improved since onset of PT  PT Reassessment: 6/29/2020  Patient reported the following progress since onset of PT: increase in left le strength, decrease in "clumbysness", decrease in pain, standing and walking endurance, improvement in pain tolerance to standing and walking based functional activities, improvement in overall weight baring activites and walking in CAM boot with spc  But, she reported the following deficits that still persist:  Am functional activities, initial left le weight baring that will cause buckling intermittently, difficulty with shower chair transfers, walking in sneakers or shoes, prolonged standing, prolonged walking, stair climbing, and standing based functional activities  Also, pt noted an increase in lumbar spine, left knee and ankle pain aggravation at the end of the day which is based upon weight baring activities  Patient reported she has an ear infection that she just started an oral antibiotic which is improving some of her vertigo symptoms but due to falling concerns and current vertigo symptoms she remains unable to test balance  Patient has begun weaning out of CAM boot while in PT which is going without pain aggravation  PT IE: 5-  Patient noted she fell two weeks ago  Patient noted two years ago she had left knee meniscus and ACL  Patient noted she has fell again today  Patient noted she saw spine and pain today  Patient noted she has " bad vertigo and epilepsy"  Patient noted she "rolled her left ankle" off the curb and  grate and landed on her left side  Patient noted she has balance deficits due to epilepsy and vertigo  Patient has been on disability since 1992 due to seizure disorder  Patient noted she saw her neurologist 3 x per day    Patient noted she has difficulties with sleep at night due to pain  Patient noted she is dizzy and lightheaded when she gets up at of bed  Patient noted her driving is limited due to vertigo  Patient noted she had tylenol and capracin OTC and generic mobic  Patient noted low back pain is in bilateral buttock and into left le  Patient noted she uses heat and elevation for left le pain reduction  Patient noted she has a spc, walker and bedside commode  Patient noted left le sciatica pain in posterior lateral buttock and into the lateral left hip region  Patient noted she has left peripatellar region, left anterior ankle pain  Patient noted she is using a spc or a rolling walker due to left le pain  Patient noted she has intermittent left le paresthesias  Patient noted her left plantar foot has decrease in sensation  Patient noted left le is weak and she noted right le buckled  Patient noted car transfers, chair transfers, bed transfers are all limited by pain aggravation  Patient noted walking, stair climbing, standing activities are limited by lumbar spine and left distal le pain  Patient noted standing aggravates her pain while sitting is better but supine lying with heating pad is the best position for her lumbar spine  Patient noted she just had CAM walker boot placed on on 5- and is on until next visit with Dr John Cook   Patient noted sitting and bending to do her shoe is pain aggravating  Impairments: abnormal gait, abnormal or restricted ROM, impaired balance and pain with function  Understanding of Dx/Px/POC: good   Prognosis: fair  Prognosis details: Patient is a 64y o  year old female seen for outpatient PT reevaluation with pain, mobility and functional deficits due to lumbar spine, left knee and ankle pain s/p fall    Patient presents with the following progress since onset of PT: decrease in lumbar spine, left knee and left ankle pain, increase in left le rom and strength, decrease in left le edema, gait and stair improvements, reduction in TTP, balance improved, able to tolerate left le weight baring for short periods in sneaker and functional progress  Therefore, patient d/c to hep and will refer patient to Vertigo PT, which is scheduled for 7-  Kely Powell Goals  Short Term goals 4 - 6 weeks  1  Patient will be independent HEP   MET  2   Patient will report a 25 - 50% decrease in pain complaints  MET  3   Increase strength 1/2 grade  MET  4   Increase ROM 5-10 degrees  MET  Long Term goals 8 - 12 weeks  1  Patient will report elimination of pain complaints  Partially MET  2   Patient will return to all recreational activities without restriction  Partially MET  3   ROM WFL  Partially MET  4   Strength 5/5  Partially MET  5   Patient will report walking improved > 25%  MET  6   Patient will report standing functional activities improved > 25%  MET  7   Patient will report stair climbing improved > 25%  Partially MET  8   Patient will report transfers improved > 25%  MET  9   Patient will deny any new falls  MET  10   Patient will report sleep improved > 25%  Partially MET  11   Patient will report balance improved > 25%  MET      Plan  Patient would benefit from: skilled physical therapy  Planned modality interventions: cryotherapy, TENS, thermotherapy: hydrocollator packs and unattended electrical stimulation  Planned therapy interventions: home exercise program, graded motor, graded exercise, graded activity, gait training, functional ROM exercises, flexibility, transfer training, therapeutic training, therapeutic exercise, therapeutic activities, stretching, strengthening, self care, compression, postural training, patient education, body mechanics training, neuromuscular re-education, balance, balance/weight bearing training, Núñez taping, massage and aquatic therapy  Frequency: 2x week  Duration in weeks: 12  Treatment plan discussed with: patient        Subjective Evaluation    History of Present Illness  Mechanism of injury: Patient's PMHx is remarkable for Epilepsy, HTN, Hysterectomy, Seizures, Multiple fall history, vertigo, IBS and GERD  LUMBAR SPINE     INDICATION:   M54 5: Low back pain      COMPARISON:  None     VIEWS:  XR SPINE LUMBAR 2 OR 3 VIEWS INJURY  Images: 2  Standing      FINDINGS:     There are 5 non rib bearing lumbar vertebral bodies       There is no evidence of acute fracture or destructive osseous lesion      Alignment is unremarkable       Moderate degenerative changes are seen at the T9-10 disc with mild degenerative changes at L3-4 disc  There are moderate to marked degenerative changes in the facet joints from L3-4 through L5-S1      The pedicles appear intact      There is aortic calcification      IMPRESSION:     No acute osseous abnormality        Degenerative changes as described  LEFT ANKLE     INDICATION:   M25 572: Pain in left ankle and joints of left foot      COMPARISON:  None     VIEWS:  XR ANKLE 3+ VW LEFT   Images: 3     FINDINGS:     There is no acute fracture or dislocation      No significant degenerative changes      No lytic or blastic osseous lesion  There are plantar and posterior calcaneal spurs      Soft tissues are unremarkable      IMPRESSION:     No acute osseous abnormality      Heel spurs  LEFT KNEE     INDICATION:   M25 562: Pain in left knee      COMPARISON:  2017     VIEWS:  XR KNEE 3 VW LEFT NON INJURY   Images: 3     FINDINGS:     There is no acute fracture or dislocation      There is no joint effusion      There are minimal medial compartment degenerative changes similar to previous      No lytic or blastic osseous lesion      Soft tissues are unremarkable      IMPRESSION:     No acute osseous abnormality      Degenerative changes as described    Pain  At best pain ratin  At worst pain ratin  Location: left ankle, knee and lumbar spine    Patient Goals  Patient goals for therapy: decreased pain, decreased edema, increased motion, increased strength, independence with ADLs/IADLs and return to sport/leisure activities          Objective     Tenderness     Additional Tenderness Details  Patient is + TTP at left lateral malleolar region, ankle mortise, medial left malleolar region, left medial tibial region, left peripatellar region, left medial and lateral joint line at minimal to moderate levels  Active Range of Motion   Left Knee   Flexion: 110 degrees with pain  Extension: -8 degrees     Right Knee   Flexion: 112 degrees   Extension: 0 degrees   Left Ankle/Foot   Dorsiflexion (ke): 10 degrees with pain  Plantar flexion: 52 degrees with pain  Inversion: 28 degrees with pain  Eversion: 16 degrees with pain    Right Ankle/Foot   Dorsiflexion (ke): 12 degrees   Plantar flexion: 50 degrees   Inversion: 32 degrees   Eversion: 16 degrees     Strength/Myotome Testing     Left Hip   Planes of Motion   Flexion: 4  Extension: 4  Abduction: 4+  Adduction: 5    Right Hip   Planes of Motion   Flexion: 4+  Extension: 4+  Abduction: 4+  Adduction: 5    Left Knee   Flexion: 4+  Extension: 4    Right Knee   Flexion: 4+  Extension: 4+    Left Ankle/Foot   Dorsiflexion: 4  Plantar flexion: 4+  Inversion: 4+  Eversion: 4-    Right Ankle/Foot   Dorsiflexion: 4  Plantar flexion: 4+  Inversion: 4+  Eversion: 4    Ambulation     Ambulation: Level Surfaces   Ambulation with assistive device: independent    Additional Level Surfaces Ambulation Details  Patient ambulates with spc with left ankle air cast and left knee hinged sleeve orthosis with = weight baring on left versus right but with sneaker pace decreases  Ambulation: Stairs   Ascend stairs: independent  Pattern: reciprocal  Railings: two rails  Descend stairs: independent  Pattern: non-reciprocal  Railings: two rails    Comments   PT Reassessment: 07/16/2020  TUG is at 21 23 seconds  PT Reassessment: 07/22/2020  TUG is at 14 84 seconds      General Comments:      Lumbar Comments  Unable to test lumbar spine mobility due to vertigo symptom aggravation  Ankle/Foot Comments   Girth Measurements: Ankle:  Bimalleolar region: Right at 25 5 CM and left at 25 9 Cm;  1 st MTP Joint region: Right at 23 2  CM and left at 21 4 Cm; Precautions: Patient's PMHx is remarkable for Epilepsy, HTN, Hysterectomy, Seizures, Multiple fall history, vertigo, IBS and GERD  Manuals 6/29 5/27 6/1 6/3 6/10 6/11 6/15 6/17 6/22 6/24   Right ankle retrograde massage with patient seated NT 10 min 10 min 10 min NT NT NT NT NT NT                                          Neuro Re-Ed                                                                                                    With sneaker on left le    Ther Ex 6/29 5/27 6/1 6/3 6/10 6/11 6/15 6/17 6/22 6/24   Nu step 15 min 11 min 12 min 15 min 15 min 15' 10 min 12 min 12 min 10 MIN    Seated hr and tr: B: 3 x 10 2 x 10 2 x 10 2 x 10 2 x 10 np 2 x 10 2 x 10 3 x 10 30X   Seated toe curls:L 3 x 10 3 sec x 20 5 sec x 20 5 sec x 20 5 sec x 20 np NT NT 3 sec x 20 NT   LAQ:B: 20X 3 sec x 20 5 sec x 20 2# x 20 2# x 20 2# x 20 2# x 30 2 x 10 2# x 20 20X 2 5#    Hip flexion:B: 20X 2 5#  2 x 10 2 x 10 2# x 20 2# x 20 np 2# x 30 2 x 10 2# x 20 20X 2 5#    Seated baps:L L3 x 20 10 x  2 x 10 B DF/PF 2 x 10 B DF/PF 2 x 10 B with DF/PF np NT NT NT NT   Long sit ankle pumps:L NT NT NT NT NT  NT NT NT NT   Long sit quad sets:L NT NT NT NT NT  NT NT NT NT   Long sit heel slides:L NT NT NT NT NT  NT NT NT NT   Long sit hip abduction:L NT NT NT NT NT  NT NT NT NT   Long sit DLS abdominal bracing NT NT NT NT NT  NT NT NT NT   Long sit SAQ:B: NT NT NT NT NT  NT NT NT NT   ALL STANDING THERAPEUTIC EXERCISES WITH BILATERAL UE SUPPORT!     YES!         Standing SLR x 3:B: 3# x 20 NT NT NT 2 x 10 2 x 10 3 x 10 3 x 10 2# x 20 2 5#20X   Standing HR and TR:B: NT NT NT NT 2 x 10 HR np 3 x 10 2 x 10 2 x 10 20X   Side stepping in parallel bars 6 x NT NT 5 x  5 x  5x 5 x  5 x 5 x 6X 2 5#    Tandem forward and backwards walking in parallel bars 6 x NT NT 5 x forward 5 x forward 5 x forward 5 x  5 x  5 x  2X 2 5#    SLS and tandem stance in parallel bars NT NT NT NT NT np 30 sec x 2 on each with bilateral ue support! 30 sec x 2 on each with bilateral ue support! On foam 30 sec x 2 on each with bilateral ue support!  On foam NT   Mini squats to chair in parallel bars 2 x 10 NT NT NT 2 x 10 to chair 2 x 10 to chair 2 x 10 2 x 10 2 x 10 20X    Step ups in parallel bars:L NT NT NT NT NT np NT NT NT NT   Lateral step ups in parallel bars:L NT NT NT NT NT np NT NT NT NT   Lunges of foam:B:       2 x 10 2 x 10 2 x 10 NT                Ther Activity                                       Gait Training                                       Modalities             CP to left knee and ankle and MHP to lumbar spine and TENS seated 10 min 15 min 10 min 10 min 10 min 10 min 10 min 10 min 10 min 10 min

## 2020-07-22 NOTE — PROGRESS NOTES
Daily Note     Today's date: 2020  Patient name: Con Alex  : 1960  MRN: [id number]  Referring provider: Albert Graham DO  Dx:   Encounter Diagnosis     ICD-10-CM    1  Sprain of ankle, initial encounter S93 409A                   Subjective: Patient reported standing and ambulation is limited by moderate left knee and ankle pain  Patient noted overall feeling better out of CAM boot and now in left ankle Air cast   Patient noted standing based functional activities limited by left knee, left ankle and vertigo symptoms but she noted use of Air cast has improved her pain reduction and standing activities  Objective: See treatment diary below      Assessment: Patient educated on proper gait with spc and left ankle in air cast   Patient presents with gait improvements with stance phase and terminal stance phase improved since available mobility in DF and PF but no Inversion and eversion while in air cast   Patient educated on hep in standing to promote functional progress and left le strengthening  Patient agrees with PT POC to be seen one more time then d/c to hep and PT for vertigo  Plan:  Continue with PT POC              Precautions: Patient's PMHx is remarkable for Epilepsy, HTN, Hysterectomy, Seizures, Multiple fall history, vertigo, IBS and GERD         Manuals                                                                                          Ther Ex sneaker on L with all exercises   sneaker on L with all exercises                                           Nu step 15 min  15 min  10 min                  Seated hr and tr: B:  NT   NT  20x                 Seated toe curls:L NT   NT  20x                 LAQ:B: 20X 3#   3# x 20  20x 2 5 #                  Hip flexion:B: 20X 3#   NT  20x                 Seated baps:L L3 x 20  NT                   ALL STANDING THERAPEUTIC EXERCISES WITH BILATERAL UE SUPPORT!                       Standing SLR x 3:B: 3# x 20  3# x 20 20x 3#                  Standing HR and TR:B: 30x   3 x 10  30x                 Side stepping in parallel bars 6 x 3#   3# x 6  6x 3#                  Tandem forward and backwards walking in parallel bars 6 x 3#   3# x 6  6x 3#                  SLS and tandem stance in parallel bars NT  30 sec x 2 on each le on each exercis on foam  NT                 Mini squats to chair in parallel bars 2 x 10  3 x 10  20X                  Step ups in parallel bars:L NT  NT  NT                 Lateral step, forward steps  ups in parallel bars:L  6" 3#   NT  NT                 Lunges of foam:B: NT  3 x 10  NT                 Modalities                       CP to left knee and ankle and MHP to lumbar spine and TENS seated NT  10 min

## 2020-07-23 ENCOUNTER — OFFICE VISIT (OUTPATIENT)
Dept: NEUROLOGY | Facility: CLINIC | Age: 60
End: 2020-07-23
Payer: COMMERCIAL

## 2020-07-23 VITALS
HEART RATE: 72 BPM | WEIGHT: 189.4 LBS | TEMPERATURE: 99.3 F | BODY MASS INDEX: 33.56 KG/M2 | SYSTOLIC BLOOD PRESSURE: 107 MMHG | HEIGHT: 63 IN | DIASTOLIC BLOOD PRESSURE: 63 MMHG

## 2020-07-23 DIAGNOSIS — G43.709 CHRONIC MIGRAINE WITHOUT AURA WITHOUT STATUS MIGRAINOSUS, NOT INTRACTABLE: ICD-10-CM

## 2020-07-23 DIAGNOSIS — R42 VERTIGO: Primary | ICD-10-CM

## 2020-07-23 DIAGNOSIS — R56.9 SEIZURES (HCC): ICD-10-CM

## 2020-07-23 DIAGNOSIS — E34.8 PINEAL GLAND CYST: ICD-10-CM

## 2020-07-23 DIAGNOSIS — G44.221 CHRONIC TENSION-TYPE HEADACHE, INTRACTABLE: ICD-10-CM

## 2020-07-23 DIAGNOSIS — N20.0 KIDNEY STONES: ICD-10-CM

## 2020-07-23 PROCEDURE — 99214 OFFICE O/P EST MOD 30 MIN: CPT | Performed by: PSYCHIATRY & NEUROLOGY

## 2020-07-27 ENCOUNTER — EVALUATION (OUTPATIENT)
Dept: PHYSICAL THERAPY | Age: 60
End: 2020-07-27
Payer: COMMERCIAL

## 2020-07-27 VITALS — SYSTOLIC BLOOD PRESSURE: 134 MMHG | HEART RATE: 82 BPM | DIASTOLIC BLOOD PRESSURE: 86 MMHG

## 2020-07-27 DIAGNOSIS — R26.9 ABNORMALITY OF GAIT: Primary | ICD-10-CM

## 2020-07-27 DIAGNOSIS — R42 VERTIGO: ICD-10-CM

## 2020-07-27 PROCEDURE — 97163 PT EVAL HIGH COMPLEX 45 MIN: CPT
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
## 2020-07-29 ENCOUNTER — OFFICE VISIT (OUTPATIENT)
Dept: PHYSICAL THERAPY | Age: 60
End: 2020-07-29
Payer: COMMERCIAL

## 2020-07-29 DIAGNOSIS — R26.9 ABNORMALITY OF GAIT: Primary | ICD-10-CM

## 2020-07-29 DIAGNOSIS — R42 VERTIGO: ICD-10-CM

## 2020-07-29 PROCEDURE — 97112 NEUROMUSCULAR REEDUCATION: CPT

## 2020-07-29 PROCEDURE — 97750 PHYSICAL PERFORMANCE TEST: CPT
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
## 2020-08-03 ENCOUNTER — APPOINTMENT (OUTPATIENT)
Dept: PHYSICAL THERAPY | Age: 60
End: 2020-08-03
Payer: COMMERCIAL

## 2020-08-06 ENCOUNTER — OFFICE VISIT (OUTPATIENT)
Dept: PHYSICAL THERAPY | Age: 60
End: 2020-08-06
Payer: COMMERCIAL

## 2020-08-06 DIAGNOSIS — R42 VERTIGO: ICD-10-CM

## 2020-08-06 DIAGNOSIS — R26.9 ABNORMALITY OF GAIT: Primary | ICD-10-CM

## 2020-08-06 PROCEDURE — 97110 THERAPEUTIC EXERCISES: CPT

## 2020-08-06 PROCEDURE — 97112 NEUROMUSCULAR REEDUCATION: CPT
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
## 2020-08-10 ENCOUNTER — APPOINTMENT (OUTPATIENT)
Dept: PHYSICAL THERAPY | Age: 60
End: 2020-08-10
Payer: COMMERCIAL

## 2020-08-12 ENCOUNTER — OFFICE VISIT (OUTPATIENT)
Dept: PHYSICAL THERAPY | Age: 60
End: 2020-08-12
Payer: COMMERCIAL

## 2020-08-12 DIAGNOSIS — R26.9 ABNORMALITY OF GAIT: Primary | ICD-10-CM

## 2020-08-12 PROCEDURE — 97112 NEUROMUSCULAR REEDUCATION: CPT

## 2020-08-13 ENCOUNTER — OFFICE VISIT (OUTPATIENT)
Dept: PHYSICAL THERAPY | Age: 60
End: 2020-08-13
Payer: COMMERCIAL

## 2020-08-13 DIAGNOSIS — R26.9 ABNORMALITY OF GAIT: Primary | ICD-10-CM

## 2020-08-13 PROCEDURE — 97110 THERAPEUTIC EXERCISES: CPT
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
## 2020-08-17 ENCOUNTER — OFFICE VISIT (OUTPATIENT)
Dept: PHYSICAL THERAPY | Age: 60
End: 2020-08-17
Payer: COMMERCIAL

## 2020-08-17 DIAGNOSIS — R26.9 ABNORMALITY OF GAIT: Primary | ICD-10-CM

## 2020-08-17 PROCEDURE — 97110 THERAPEUTIC EXERCISES: CPT
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
## 2020-08-19 ENCOUNTER — OFFICE VISIT (OUTPATIENT)
Dept: OBGYN CLINIC | Facility: CLINIC | Age: 60
End: 2020-08-19
Payer: COMMERCIAL

## 2020-08-19 VITALS
WEIGHT: 189 LBS | HEIGHT: 63 IN | HEART RATE: 70 BPM | BODY MASS INDEX: 33.49 KG/M2 | DIASTOLIC BLOOD PRESSURE: 83 MMHG | SYSTOLIC BLOOD PRESSURE: 137 MMHG

## 2020-08-19 DIAGNOSIS — S83.512D SPRAIN OF ANTERIOR CRUCIATE LIGAMENT OF LEFT KNEE, SUBSEQUENT ENCOUNTER: ICD-10-CM

## 2020-08-19 DIAGNOSIS — S83.412D SPRAIN OF MEDIAL COLLATERAL LIGAMENT OF LEFT KNEE, SUBSEQUENT ENCOUNTER: ICD-10-CM

## 2020-08-19 DIAGNOSIS — M22.2X2 PATELLOFEMORAL DISORDER OF LEFT KNEE: Primary | ICD-10-CM

## 2020-08-19 DIAGNOSIS — S93.402D MODERATE ANKLE SPRAIN, LEFT, SUBSEQUENT ENCOUNTER: ICD-10-CM

## 2020-08-19 DIAGNOSIS — M76.822 POSTERIOR TIBIAL TENDINITIS OF LEFT LOWER EXTREMITY: ICD-10-CM

## 2020-08-19 PROCEDURE — 99214 OFFICE O/P EST MOD 30 MIN: CPT | Performed by: ORTHOPAEDIC SURGERY

## 2020-08-19 RX ORDER — CYCLOBENZAPRINE HCL 5 MG
5 TABLET ORAL 3 TIMES DAILY
Qty: 30 TABLET | Refills: 0 | Status: SHIPPED | OUTPATIENT
Start: 2020-08-19 | End: 2020-09-16

## 2020-08-19 NOTE — PROGRESS NOTES
Assessment/Plan:  1  Patellofemoral disorder of left knee  cyclobenzaprine (FLEXERIL) 5 mg tablet    Ambulatory referral to Physical Therapy   2  Sprain of medial collateral ligament of left knee, subsequent encounter  cyclobenzaprine (FLEXERIL) 5 mg tablet    Ambulatory referral to Physical Therapy   3  Sprain of anterior cruciate ligament of left knee, subsequent encounter  cyclobenzaprine (FLEXERIL) 5 mg tablet    Ambulatory referral to Physical Therapy   4  Moderate ankle sprain, left, subsequent encounter  cyclobenzaprine (FLEXERIL) 5 mg tablet    Ambulatory referral to Physical Therapy   5  Posterior tibial tendinitis of left lower extremity  Ambulatory referral to Physical Therapy     Patient Active Problem List   Diagnosis    Sprain of medial collateral ligament of left knee    Concussion wth loss of consciousness of 30 minutes or less    Esophageal reflux    Hypertension    Pineal gland cyst    Seizures (HCC)    Tension type headache    Chronic migraine without aura without status migrainosus, not intractable    Unspecified convulsions (HCC)    Kidney stones    IBS (irritable bowel syndrome)    Radiculopathy, lumbar region    Sprain of anterior cruciate ligament of left knee    Sprain of ankle, initial encounter    Pain, joint, ankle and foot, left    Posterior tibial tendinitis of left lower extremity    Patellofemoral disorder of left knee       Discussion/Summary:    64 y o  female With left knee MCL ACL sprains patellofemoral pain  Left ankle pain from a grade 2 ankle sprain she does have some pain over the posterior tibial tendon as well some weakness with inversion of the ankle  will transition her to Núñez taping for the left knee she will stop using the brace      She has not done much therapy as far as the knee and ankle are concerned as she has been focused more on the vertigo and vestibular therapy with PT   explained to the patient she needs to do at least 6 weeks consecutively PT for the knee and the ankle, if she  Does not continue to improve in regards to the ankle consider further imaging versus referral to foot and ankle regarding the PT tendon lateral ankle ligament  and possible tearing of those ligaments   she will discuss with her primary care doctor if she is able to tolerate cortisone we can try cortisone injection for the left knee and possible Visco injections future as well    The assessment and plan were formulated by Dr Yvonne Morales and I assisted in carrying it out  Subjective:   Patient ID: Odalys Chilel is a 64 y o  female   HPI    Patient presents to the office for follow up of  Left knee and ankle pain  Since the last visit, the patient reports  Mild improvements left knee neck pain  She is out of the Cam walker boot today using a cane to ambulate  She uses  An Aircast brace over the left ankle  Pain is over the lateral medial ankle as well as over the anterior knee around the patella  Pain is worse with going up and down the stairs  Pain is moderate to severe intermittent  Pain does radiate up to the hip    Patient   Denies any new injuries to the  Left ankle since the last visit       The following portions of the patient's history were reviewed and updated as appropriate: allergies, current medications, past family history, past social history, past surgical history and problem list     Social History     Socioeconomic History    Marital status: Single     Spouse name: Not on file    Number of children: Not on file    Years of education: Not on file    Highest education level: Not on file   Occupational History    Not on file   Social Needs    Financial resource strain: Not on file    Food insecurity     Worry: Not on file     Inability: Not on file    Transportation needs     Medical: Not on file     Non-medical: Not on file   Tobacco Use    Smoking status: Never Smoker    Smokeless tobacco: Never Used   Substance and Sexual Activity    Alcohol use: Yes     Frequency: Monthly or less     Drinks per session: 1 or 2     Comment: rarely    Drug use: No    Sexual activity: Not on file   Lifestyle    Physical activity     Days per week: Not on file     Minutes per session: Not on file    Stress: Not on file   Relationships    Social connections     Talks on phone: Not on file     Gets together: Not on file     Attends Anabaptism service: Not on file     Active member of club or organization: Not on file     Attends meetings of clubs or organizations: Not on file     Relationship status: Not on file    Intimate partner violence     Fear of current or ex partner: Not on file     Emotionally abused: Not on file     Physically abused: Not on file     Forced sexual activity: Not on file   Other Topics Concern    Not on file   Social History Narrative    Not on file     Past Medical History:   Diagnosis Date    Bladder prolapse, female, acquired     Epilepsy ([facility] [address] )     Hypertension     Kidney stone     Tear of MCL (medial collateral ligament) of knee      Past Surgical History:   Procedure Laterality Date    HYSTERECTOMY       Allergies   Allergen Reactions    Iodinated Diagnostic Agents Anaphylaxis    Cortisone     Iodine     Lidocaine     Novocain [Procaine]     Other      "GENERAL ANESTHESIA"    Phenytoin      Current Outpatient Medications on File Prior to Visit   Medication Sig Dispense Refill    amLODIPine (NORVASC) 10 mg tablet Take 1 tablet by mouth daily      amoxicillin (AMOXIL) 500 MG tablet Take 500 mg by mouth daily      atorvastatin (LIPITOR) 40 mg tablet Take 40 mg by mouth daily      clonazePAM (KlonoPIN) 0 5 mg tablet Take 1 tablet by mouth 2 (two) times a day      clotrimazole (LOTRIMIN) 1 % cream APPLY TO AFFECTED AREA TWICE A DAY IN THE MORNING AND IN THE EVENING      Elastic Bandages & Supports (AIRCAST SPORT ANKLE BRACE/LEFT) [address] [facility] by Does not apply route daily 1 each 0    Elastic Bandages & Supports (KNEE BRACE/HINGED S/M) MISC by Does not apply route daily 1 each 0    furosemide (LASIX) 40 mg tablet Take 40 mg by mouth daily as needed       hydrALAZINE (APRESOLINE) 25 mg tablet Take 25 mg by mouth 2 (two) times a day      losartan (COZAAR) 100 MG tablet Take 100 mg by mouth daily      Mapap Arthritis Pain 650 MG CR tablet Take 650 mg by mouth every 8 (eight) hours as needed      meloxicam (MOBIC) 15 mg tablet Take 15 mg by mouth daily  0    metoprolol tartrate (LOPRESSOR) 25 mg tablet Take 25 mg by mouth 2 (two) times a day      montelukast (SINGULAIR) 10 mg tablet Take 1 tablet by mouth daily      multivitamin (THERAGRAN) TABS Take 1 tablet by mouth daily      ondansetron (ZOFRAN-ODT) 4 mg disintegrating tablet Take 1 tablet (4 mg total) by mouth every 6 (six) hours as needed for nausea or vomiting 10 tablet 0    sertraline (ZOLOFT) 50 mg tablet Take 1 tablet (50 mg total) by mouth every morning 90 tablet 3    [DISCONTINUED] cyclobenzaprine (FLEXERIL) 5 mg tablet Take 5 mg by mouth 3 (three) times a day       No current facility-administered medications on file prior to visit  Review of Systems      Objective:    Vitals:    08/19/20 0957   BP: 137/83   Pulse: 70       Physical Exam  Constitutional:       Appearance: She is well-developed  HENT:      Head: Normocephalic and atraumatic  Eyes:      General: No scleral icterus  Conjunctiva/sclera: Conjunctivae normal    Neck:      Musculoskeletal: Neck supple  Cardiovascular:      Comments: No discernible arrhthymias  Pulmonary:      Effort: Pulmonary effort is normal  No respiratory distress  Breath sounds: No stridor  Abdominal:      General: There is no distension  Palpations: Abdomen is soft  Musculoskeletal:      Left knee: She exhibits no effusion  Skin:     General: Skin is warm and dry  Findings: No erythema  Neurological:      Mental Status: She is alert and oriented to person, place, and time  Psychiatric:         Behavior: Behavior normal          Left Ankle Exam     Tenderness   The patient is experiencing tenderness in the ATF and CF (and posterior tibial tendon)  Swelling: mild    Range of Motion   The patient has normal left ankle ROM  Muscle Strength   Anterior tibial:  4/5   Posterior tibial:  4/5  Peroneal muscle:  5/5    Tests   Anterior drawer: negative    Other   Erythema: absent  Scars: absent  Sensation: normal  Pulse: present      Left Knee Exam     Tenderness   The patient is experiencing tenderness in the patella and medial retinaculum  Range of Motion   The patient has normal left knee ROM  Tests   Deb:  Medial - negative Lateral - negative  Varus: negative Valgus: negative  Lachman:  Anterior - negative      Patellar apprehension: positive    Other   Erythema: absent  Scars: absent  Sensation: normal  Pulse: present  Swelling: none  Effusion: no effusion present            I have personally reviewed pertinent films in PACS  Procedures  No Procedures performed today    Portions of the record may have been created with voice recognition software  Occasional wrong word or "sound a like" substitutions may have occurred due to the inherent limitations of voice recognition software  Read the chart carefully and recognize, using context, where substitutions have occurred

## 2020-08-19 NOTE — PATIENT INSTRUCTIONS
PATELLOFEMORAL SYNDROME-Doll TAPING TECHNIQUE    Search Leukotape P tape and Cover roll stretch tape on  [employer]  Leukotape P is typically 1 5in x 15 yards, Cover roll stretch tape is typically 2in x 10 yards        How to apply:  1  Place cover roll tape over knee cap  This protects the skin  2  Apply Leukotape over the cover roll tape  Use the Leukotape to pull the knee cap to the middle of the body (medial side of knee) to prevent lateral (outside) tracking of the knee cap  3  Wear the tape for 3 days (72 hrs) straight, then take off one day (24 hrs) off, then repeat  4  Visit youtube  com and search Doll tape for the knee to watch a video on how to apply tape  a  Video titled Doll Taping of the knee created by [employer] recommended  What does Doll taping technique do?  ? Patellofemoral syndrome is when the inside quadriceps muscle, called the VMO muscle, becomes weak due to a number of factors  This causes the Patellofemoral ligament, which is the only ligament holding the patella (knee cap) in place, to become weak as well  When the ligament becomes weak, the knee cap drifts or tracks too far to the lateral side of the knee (outside knee) which causes tension on this ligament  The knee cap hits the lateral area of the femur, resulting in pain or discomfort around the front of the knee  ? Physical Therapy is sometimes used to strengthen the weak muscles, such as the VMO, to correct this problem  When the tape is applied correctly, it helps to realign the knee cap to the center of the knee  This helps correct for the lateral tracking of the knee cap and relieve discomfort  ? You can search online for exercises that can help strengthen the VMO quadriceps muscle or attend physical therapy

## 2020-08-20 ENCOUNTER — OFFICE VISIT (OUTPATIENT)
Dept: PHYSICAL THERAPY | Age: 60
End: 2020-08-20
Payer: COMMERCIAL

## 2020-08-20 DIAGNOSIS — R26.9 ABNORMALITY OF GAIT: Primary | ICD-10-CM

## 2020-08-20 PROCEDURE — 97112 NEUROMUSCULAR REEDUCATION: CPT
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
## 2020-08-24 ENCOUNTER — OFFICE VISIT (OUTPATIENT)
Dept: PHYSICAL THERAPY | Age: 60
End: 2020-08-24
Payer: COMMERCIAL

## 2020-08-24 DIAGNOSIS — R26.9 ABNORMALITY OF GAIT: Primary | ICD-10-CM

## 2020-08-24 PROCEDURE — 97110 THERAPEUTIC EXERCISES: CPT
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
## 2020-08-26 ENCOUNTER — TELEPHONE (OUTPATIENT)
Dept: NEUROLOGY | Facility: CLINIC | Age: 60
End: 2020-08-26

## 2020-08-26 NOTE — TELEPHONE ENCOUNTER
Call transferred from call center  Patient calling to report nose bleed for 20 minutes now  She states when she spoke to you regarding nose bleeds before you had stated it could be medication related  She was restarted on cyclobenzaprine yesterday  Advised patient to contact PCP's office or provider that restarted cyclobenzaprine as patient states the cyclobenzaprine had caused nose bleeds in the past      Gabe Miss - patient just wanted me to inform you  Nothing further needed

## 2020-08-27 ENCOUNTER — OFFICE VISIT (OUTPATIENT)
Dept: PHYSICAL THERAPY | Age: 60
End: 2020-08-27
Payer: COMMERCIAL

## 2020-08-27 DIAGNOSIS — R26.9 ABNORMALITY OF GAIT: Primary | ICD-10-CM

## 2020-08-27 DIAGNOSIS — R42 VERTIGO: ICD-10-CM

## 2020-08-27 PROCEDURE — 97110 THERAPEUTIC EXERCISES: CPT

## 2020-08-27 PROCEDURE — 97750 PHYSICAL PERFORMANCE TEST: CPT

## 2020-08-27 PROCEDURE — 97112 NEUROMUSCULAR REEDUCATION: CPT

## 2020-08-28 NOTE — PROGRESS NOTES
PT Re-Evaluation     Today's date: 2020  Patient name: Gera Sheriff  : 1960  MRN: [id number]  Referring provider: Evi Mariscal MD  Dx:   Encounter Diagnosis     ICD-10-CM    1  Abnormality of gait  R26 9    2  Vertigo  R42                   Assessment  Assessment details: The pt has made steady progress with 50 percent reduction in vertigo, improved tolerance to quick visual changes and no loss of balance with quick turns with gait  Marija Hallman did have a light headed incident on 2020 in which she fell backwards against a wall on 20 reinjuring her left knee and ankle with PT now receiving written orders to readdress these complaints of pain and instruct in self patellar taping  Recommend to continue PT at this time for vestibular rehabilitation, balance and gait training and le pain reduction and strengthening exer  Impairments: abnormal gait, abnormal or restricted ROM, activity intolerance, impaired balance, impaired physical strength, lacks appropriate home exercise program, pain with function, poor posture  and poor body mechanics  Other impairment: frequent reports of dehydration then light headed, + visual disturbance, central processing and peripheral processing deficits   Functional limitations: decreased balance, unstable gait , reinjury with left knee and ankle pain s/p fall against wall on 20 ,   Symptom irritability: moderateUnderstanding of Dx/Px/POC: good   Prognosis: good    Goals  Short Term  1  The pt shall achieve vertigo reduction by 50 percent in two weeks  met  2  The pt shall achieve independent bed mobility in two weeks  Partially met  3  The pt shall prove independent in a home exer program in two weeks  met    Long Term Goals Vestibular  1  The pt shall achieve 80 percent reduction in vertigo in 4 weeks  Not yet met  2  The pt shall achieve quick turns with gait with not loss of balance in 4 weeks  Partially met  3   Review previously learned left le strengthening program and teach self patellar taping in 2 weeks  New goal    Plan  Patient would benefit from: PT eval and skilled physical therapy  Referral necessary: Yes  Planned modality interventions: TENS and cryotherapy  Other planned modality interventions: prn  Planned therapy interventions: manual therapy, muscle pump exercises, neuromuscular re-education, balance, patient education, postural training, strengthening, stretching, sensory integrative techniques, therapeutic exercise, therapeutic activities and home exercise program  Other planned therapy interventions: eye head exer , vestibular rehabilitation, habituation exer, progressive gait training with hiking pole versus st cane  Frequency: 2x week  Duration in weeks: 8  Plan of Care beginning date: 2020  Plan of Care expiration date: 10/27/2020  Treatment plan discussed with: patient        Subjective Evaluation    History of Present Illness  Mechanism of injury: The pt has made steady progress with vertigo reduction, improved tolerance to eye head exer and improved central processing  Less falling had been reported however pt with incident at home feeling light headed on 20 and falling backwards against a wall per her report  The pt may have been dehydrated per her report  Pt reinjuring her left knee and ankle at that time  Pt wearing left knee Romeo stabilizing brace and left ankle brace today  Recommend to continue PT for vertigo and increase le strengthening with instruction in self taping for the left knee with new orders received per Becki DERAS  Upon 20 doctor visit             Recurrent probem    Quality of life: good    Pain  Current pain ratin  At best pain ratin  At worst pain ratin  Location: left knee pain and ankle pain reinjury  Quality: dull ache, sharp, tight and pressure  Relieving factors: change in position, ice and rest  Aggravating factors: walking, stair climbing and standing  Progression: worsening (reinjury)    Treatments  Current treatment: physical therapy  Patient Goals  Patient goals for therapy: decreased pain, improved balance, independence with ADLs/IADLs, return to sport/leisure activities, increased strength, decreased edema and increased motion  Patient goal: reduction of of vertigo by 50 percent in 6-8 weeks met new goal reduction of vertigo by 75 percent in 4 weeks         Objective     Active Range of Motion   Left Hip   Flexion: WFL  Extension: WFL  Abduction: WFL  Adduction: WFL  Left Knee   Flexion: 105 degrees with pain  Extension: WFL  Left Ankle/Foot   Dorsiflexion (ke): 15 degrees with pain  Plantar flexion: 35 degrees with pain  Inversion: 25 degrees with pain  Eversion: 20 degrees with pain    Additional Active Range of Motion Details  Talofibular lig region pain and medial ankle joint discomfort    Strength/Myotome Testing     Left Hip   Planes of Motion   Flexion: 4  Extension: 4-  Abduction: 4  Adduction: 3+    Left Knee   Flexion: 4-  Extension: 4    Left Ankle/Foot   Dorsiflexion: 4  Plantar flexion: 3  Inversion: 3+  Eversion: 3+    Ambulation     Ambulation: Level Surfaces   Ambulation with assistive device: independent    Additional Level Surfaces Ambulation Details  St cane 250 ft  Slight antalgic gait now observed s/p recent fall  Against wall on 8/17/20  Functional gait assessment 20/30 improved from 8/30  Tandem gait close supervision 10 ft, tandem stance 20 sec hold improved from 5 sec hold, shannon romberg pt falls in 5 seconds, static standing eyes open 30 sec, flat and foam each,   Foam surface eyes open 30 sec hold, eyes closed able to hold for 30 seconds no falls, (improved ) foam standing with head turns eyes open no falls,  Eyes closed horizontal head turns pt falls, vertical head turns excessive ant post sway noted but no fall   Improved tolerance to vor testing no vertigo, the pt still reports double vision at end range to right , TUG 12 seconds with st cane, Improved gait with walking stick today however pt requires cueing with sequencing unfamiliar with activator walking stick  Improved postural awareness with upright walking stick                Precautions: see med allergies       Manuals 8/27/20             reassessment of status             kinesiotaping left knee y band and u  Man left le                                      Neuro Re-Ed             Ankle sway referencing flat and foam eo, ec 10 reps each             Tandem gait  1 min x 1            Tandem stance 30 se c x 3            Squats  3 x 10            Ball pass activities                                        Ther Ex             slr x 4 left              Ankle isometrics left             Step ups                                                                                                                      Gait Training             Trial activator poll 2 point gait 100 ft x 2 versus st cane usage cueing for upright posturing            Hand to opposite knee gait  40 ft x 2            Modalities

## 2020-08-31 ENCOUNTER — OFFICE VISIT (OUTPATIENT)
Dept: PHYSICAL THERAPY | Age: 60
End: 2020-08-31
Payer: COMMERCIAL

## 2020-08-31 DIAGNOSIS — R42 VERTIGO: Primary | ICD-10-CM

## 2020-08-31 DIAGNOSIS — R26.9 ABNORMALITY OF GAIT: ICD-10-CM

## 2020-08-31 DIAGNOSIS — R29.898 COMPLAINTS OF LEG WEAKNESS: ICD-10-CM

## 2020-08-31 PROCEDURE — 97110 THERAPEUTIC EXERCISES: CPT

## 2020-08-31 NOTE — PROGRESS NOTES
Daily Note     Today's date: 2020  Patient name: Slade Guerrero  : 1960  MRN: [id number]  Referring provider: Saratha Osgood, MD  Dx:   Encounter Diagnosis     ICD-10-CM    1  Vertigo  R42    2  Abnormality of gait  R26 9    3  Complaints of leg weakness  R29 898                   Subjective: "i've lost strength in my left leg "  Pt encouraged to return to prior exer routine for her left le       Objective: See treatment diary below      Assessment: Tolerated treatment well  Patient would benefit from continued PT      Plan: Continue per plan of care        Precautions: see med allergies       Manuals 20/            reassessment of status             kinesiotaping left knee y band and u  Man left le                                      Neuro Re-Ed             Ankle sway referencing flat and foam eo, ec 10 reps each  10 reps            Tandem gait  1 min x 1 1 min x 1           Tandem stance 30 se c x 3            Squats  3 x 10 3 x 10           Ball pass activities                                        Ther Ex             slr x 4 left   2 x 10            Ankle isometrics left             Step ups              Ankle theraband green   3 x 10 left ankle all planes                                                                                                      Gait Training             Trial activator poll 2 point gait 100 ft x 2 versus st cane usage cueing for upright posturing            Hand to opposite knee gait  40 ft x 2 40 ft x 2           Modalities

## 2020-09-03 ENCOUNTER — OFFICE VISIT (OUTPATIENT)
Dept: PHYSICAL THERAPY | Age: 60
End: 2020-09-03
Payer: COMMERCIAL

## 2020-09-03 DIAGNOSIS — R29.898 COMPLAINTS OF LEG WEAKNESS: Primary | ICD-10-CM

## 2020-09-03 PROCEDURE — 97110 THERAPEUTIC EXERCISES: CPT

## 2020-09-03 NOTE — PROGRESS NOTES
Daily Note     Today's date: 9/3/2020  Patient name: Willa Buitrago  : 1960  MRN: [id number]  Referring provider: Marina Madera MD  Dx:   Encounter Diagnosis     ICD-10-CM    1  Complaints of leg weakness  R29 898                   Subjective: Pt  With weak hip extensors  Objective: See treatment diary below      Assessment: Tolerated treatment well  Patient would benefit from continued PT      Plan: Continue per plan of care        Precautions: see med allergies       Manuals 20/            reassessment of status             kinesiotaping left knee y band and u  Man left le                                      Neuro Re-Ed             Ankle sway referencing flat and foam eo, ec 10 reps each  10 reps            Tandem gait  1 min x 1 1 min x 1           Tandem stance 30 se c x 3            Squats  3 x 10 3 x 10           Ball pass activities                                        Ther Ex             slr x 4 left   2 x 10  3# x 30          Ankle isometrics left   5sec x 5          Step ups              Ankle theraband green   3 x 10 left ankle all planes           LAQ/hip flex   6#/5sec x 30          Prone knee flex hip ext   10x                                                                           Gait Training             Trial activator poll 2 point gait 100 ft x 2 versus st cane usage cueing for upright posturing            Hand to opposite knee gait  40 ft x 2 40 ft x 2           Modalities

## 2020-09-08 ENCOUNTER — OFFICE VISIT (OUTPATIENT)
Dept: PHYSICAL THERAPY | Age: 60
End: 2020-09-08
Payer: COMMERCIAL

## 2020-09-08 DIAGNOSIS — R29.898 COMPLAINTS OF LEG WEAKNESS: Primary | ICD-10-CM

## 2020-09-08 PROCEDURE — 97110 THERAPEUTIC EXERCISES: CPT

## 2020-09-08 NOTE — PROGRESS NOTES
Daily Note     Today's date: 2020  Patient name: Octaviano Trevino  : 1960  MRN: [id number]  Referring provider: Yamile Doshi MD  Dx:   Encounter Diagnosis     ICD-10-CM    1  Complaints of leg weakness  R29 898                   Subjective: Strengthening continues  Pt  30 min late today  Objective: See treatment diary below      Assessment: Tolerated treatment well  Patient would benefit from continued PT      Plan: Continue per plan of care        Precautions: see med allergies       Manuals 20           reassessment of status             kinesiotaping left knee y band and u  Man left le  yes                                    Neuro Re-Ed             Ankle sway referencing flat and foam eo, ec 10 reps each  10 reps            Tandem gait  1 min x 1 1 min x 1           Tandem stance 30 se c x 3            Squats  3 x 10 3 x 10           Ball pass activities                                        Ther Ex             slr x 4 left   2 x 10  3# x 30 4# 30x         Ankle isometrics left   5sec x 5 5sec x 5         Step ups     20         Ankle theraband green   3 x 10 left ankle all planes  30         LAQ/hip flex   6#/5sec x 30 6# x 30         Prone knee flex hip ext   10x 10x         Nu-step    15 min                                                             Gait Training             Trial activator poll 2 point gait 100 ft x 2 versus st cane usage cueing for upright posturing            Hand to opposite knee gait  40 ft x 2 40 ft x 2           Modalities

## 2020-09-10 ENCOUNTER — OFFICE VISIT (OUTPATIENT)
Dept: PHYSICAL THERAPY | Age: 60
End: 2020-09-10
Payer: COMMERCIAL

## 2020-09-10 DIAGNOSIS — R29.898 COMPLAINTS OF LEG WEAKNESS: ICD-10-CM

## 2020-09-10 DIAGNOSIS — R42 VERTIGO: Primary | ICD-10-CM

## 2020-09-10 DIAGNOSIS — R26.9 ABNORMALITY OF GAIT: ICD-10-CM

## 2020-09-10 PROCEDURE — 97110 THERAPEUTIC EXERCISES: CPT

## 2020-09-10 NOTE — PROGRESS NOTES
Daily Note     Today's date: 9/10/2020  Patient name: Dereck Griffin  : 1960  MRN: [id number]  Referring provider: Tatiana Andrade MD  Dx:   Encounter Diagnosis     ICD-10-CM    1  Vertigo  R42    2  Abnormality of gait  R26 9    3  Complaints of leg weakness  R29 898                   Subjective: "The bottom of my heels hurt today"      Objective: See treatment diary below      Assessment: Tolerated treatment well  Patient would benefit from continued PT      Plan: Continue per plan of care        Precautions: see med allergies       Manuals 8/27/20 8/31/ 9/8 9/10          reassessment of status             kinesiotaping left knee y band and u  Man left le  yes                                    Neuro Re-Ed             Ankle sway referencing flat and foam eo, ec 10 reps each  10 reps   10         Tandem gait  1 min x 1 1 min x 1   1min x 1         Tandem stance 30 se c x 3   30 se c x 3         Squats  3 x 10 3 x 10  3 x 10         Ball pass activities                                        Ther Ex             slr x 4 left   2 x 10  3# x 30 4# 30x         Ankle isometrics left   5sec x 5 5sec x 5         Step ups     20         Ankle theraband green   3 x 10 left ankle all planes  30         LAQ/hip flex   6#/5sec x 30 6# x 30         Prone knee flex hip ext   10x 10x         Nu-step    15 min         heelraises 3 positions    3 x 10                                                Gait Training             Trial activator poll 2 point gait 100 ft x 2 versus st cane usage cueing for upright posturing            Hand to opposite knee gait  40 ft x 2 40 ft x 2           Modalities

## 2020-09-15 DIAGNOSIS — S93.402D MODERATE ANKLE SPRAIN, LEFT, SUBSEQUENT ENCOUNTER: ICD-10-CM

## 2020-09-15 DIAGNOSIS — M22.2X2 PATELLOFEMORAL DISORDER OF LEFT KNEE: ICD-10-CM

## 2020-09-15 DIAGNOSIS — S83.412D SPRAIN OF MEDIAL COLLATERAL LIGAMENT OF LEFT KNEE, SUBSEQUENT ENCOUNTER: ICD-10-CM

## 2020-09-15 DIAGNOSIS — S83.512D SPRAIN OF ANTERIOR CRUCIATE LIGAMENT OF LEFT KNEE, SUBSEQUENT ENCOUNTER: ICD-10-CM

## 2020-09-16 ENCOUNTER — APPOINTMENT (OUTPATIENT)
Dept: PHYSICAL THERAPY | Age: 60
End: 2020-09-16
Payer: COMMERCIAL

## 2020-09-16 RX ORDER — CYCLOBENZAPRINE HCL 5 MG
TABLET ORAL
Qty: 30 TABLET | Refills: 0 | Status: SHIPPED | OUTPATIENT
Start: 2020-09-16 | End: 2021-07-15

## 2020-09-18 ENCOUNTER — OFFICE VISIT (OUTPATIENT)
Dept: PHYSICAL THERAPY | Age: 60
End: 2020-09-18
Payer: COMMERCIAL

## 2020-09-18 DIAGNOSIS — R29.898 COMPLAINTS OF LEG WEAKNESS: ICD-10-CM

## 2020-09-18 DIAGNOSIS — R42 VERTIGO: Primary | ICD-10-CM

## 2020-09-18 DIAGNOSIS — R26.9 ABNORMALITY OF GAIT: ICD-10-CM

## 2020-09-18 PROCEDURE — 97110 THERAPEUTIC EXERCISES: CPT

## 2020-09-19 NOTE — PROGRESS NOTES
Daily Note     Today's date: 2020  Patient name: Maggie Dorsey  : 1960  MRN: [id number]  Referring provider: Sachin Marie MD  Dx:   Encounter Diagnosis     ICD-10-CM    1  Vertigo  R42    2  Abnormality of gait  R26 9    3  Complaints of leg weakness  R29 898                   Subjective:  "I am so tired today and I feel off "      Objective: See treatment diary below      Assessment: Tolerated treatment well  Patient would benefit from continued PT      Plan: Continue per plan of care        Precautions: see med allergies       Manuals 8/27/20 8/31/ 9/8 9/10 9/18         reassessment of status             kinesiotaping left knee y band and u  Man left le  yes                                    Neuro Re-Ed             Ankle sway referencing flat and foam eo, ec 10 reps each  10 reps   10         Tandem gait  1 min x 1 1 min x 1   1min x 1  1min        Tandem stance 30 se c x 3   30 se c x 3 30 se c x 3        Squats  3 x 10 3 x 10  3 x 10 3 x 10        Ball pass activities                                        Ther Ex             slr x 4 left   2 x 10  3# x 30 4# 30x 3 x 10         Ankle isometrics left   5sec x 5 5sec x 5         Step ups     20         Ankle theraband green   3 x 10 left ankle all planes  30         LAQ/hip flex   6#/5sec x 30 6# x 30 3 x 10        Prone knee flex hip ext   10x 10x 3 x 10        Nu-step    15 min lifecycle 15 min         heelraises 3 positions    3 x 10 3 x 10                                               Gait Training             Trial activator poll 2 point gait 100 ft x 2 versus st cane usage cueing for upright posturing            Hand to opposite knee gait  40 ft x 2 40 ft x 2   40 ft x 2        Modalities

## 2020-09-22 ENCOUNTER — OFFICE VISIT (OUTPATIENT)
Dept: PHYSICAL THERAPY | Age: 60
End: 2020-09-22
Payer: COMMERCIAL

## 2020-09-22 DIAGNOSIS — R26.9 ABNORMALITY OF GAIT: ICD-10-CM

## 2020-09-22 DIAGNOSIS — R29.898 COMPLAINTS OF LEG WEAKNESS: ICD-10-CM

## 2020-09-22 DIAGNOSIS — R42 VERTIGO: Primary | ICD-10-CM

## 2020-09-22 PROCEDURE — 97110 THERAPEUTIC EXERCISES: CPT

## 2020-09-22 PROCEDURE — 97112 NEUROMUSCULAR REEDUCATION: CPT

## 2020-09-23 ENCOUNTER — APPOINTMENT (OUTPATIENT)
Dept: PHYSICAL THERAPY | Age: 60
End: 2020-09-23
Payer: COMMERCIAL

## 2020-09-23 NOTE — PROGRESS NOTES
Daily Note     Today's date: 2020  Patient name: Dg Saavedra  : 1960  MRN: [id number]  Referring provider: Deangelo Deras MD  Dx:   Encounter Diagnosis     ICD-10-CM    1  Vertigo  R42    2  Abnormality of gait  R26 9    3  Complaints of leg weakness  R29 898                   Subjective: "Today is a good day " pt with improved balance observed with gait and increased mental alertness  Objective: See treatment diary below      Assessment: Tolerated treatment well  Patient would benefit from continued PT  Slight vertigo with left sided head diagonals on reps 1 and2 then eliminated with repetitive head diagonal  Good treatment session noted  Plan: Continue per plan of care        Precautions: see med allergies       Manuals 8/27/20 8/31/ 9/8 9/10 9/18 9/22        reassessment of status             kinesiotaping left knee y band and u  Man left le  yes   Pt now self taping  Left le                                 Neuro Re-Ed             Ankle sway referencing flat and foam eo, ec 10 reps each  10 reps   10  10 reps  Eo, ec       Tandem gait  1 min x 1 1 min x 1   1min x 1  1min 1 min x1       Tandem stance 30 se c x 3   30 se c x 3 30 se c x 3 30 sec x 3       Squats  3 x 10 3 x 10  3 x 10 3 x 10 3 x 10       Ball pass activities              Eye head exer       With PT 10 rep each        Head diagonals       5 rep each       Ther Ex             slr x 4 left   2 x 10  3# x 30 4# 30x 3 x 10  3 x 10 2lb wts       Ankle isometrics left   5sec x 5 5sec x 5         Step ups     20         Ankle theraband green   3 x 10 left ankle all planes  30         LAQ/hip flex   6#/5sec x 30 6# x 30 3 x 10 3lx 10 2lb wts       Prone knee flex hip ext   10x 10x 3 x 10        Nu-step    15 min lifecycle 15 min  15 min r 5       heelraises 3 positions    3 x 10 3 x 10 3 x 10                                              Gait Training             Trial activator poll 2 point gait 100 ft x 2 versus st cane usage cueing for upright posturing            Hand to opposite knee gait  40 ft x 2 40 ft x 2   40 ft x 2        Modalities

## 2020-09-24 ENCOUNTER — OFFICE VISIT (OUTPATIENT)
Dept: PHYSICAL THERAPY | Age: 60
End: 2020-09-24
Payer: COMMERCIAL

## 2020-09-24 DIAGNOSIS — R42 VERTIGO: Primary | ICD-10-CM

## 2020-09-24 PROCEDURE — 97110 THERAPEUTIC EXERCISES: CPT

## 2020-09-24 NOTE — PROGRESS NOTES
Daily Note     Today's date: 2020  Patient name: Jaye Winslow  : 1960  MRN: [id number]  Referring provider: Krystal Chung MD  Dx:   Encounter Diagnosis     ICD-10-CM    1  Vertigo  R42                   Subjective: Pt  Progressing with strength and balance  Objective: See treatment diary below      Assessment: Tolerated treatment well  Patient would benefit from continued PT      Plan: Continue per plan of care        Precautions: see med allergies       Manuals 8/27/20 8/31/ 9/8 9/10 9/18 9/22 9/24       reassessment of status             kinesiotaping left knee y band and u  Man left le  yes   Pt now self taping  Left le                                 Neuro Re-Ed             Ankle sway referencing flat and foam eo, ec 10 reps each  10 reps   10  10 reps  Eo, ec       Tandem gait  1 min x 1 1 min x 1   1min x 1  1min 1 min x1       Tandem stance 30 se c x 3   30 se c x 3 30 se c x 3 30 sec x 3 30sec x 3      Squats  3 x 10 3 x 10  3 x 10 3 x 10 3 x 10 30x      Ball pass activities              Eye head exer       With PT 10 rep each        Head diagonals       5 rep each       Ther Ex             slr x 4 left   2 x 10  3# x 30 4# 30x 3 x 10  3 x 10 2lb wts 5# x 30      Ankle isometrics left   5sec x 5 5sec x 5         Step ups     20   20x      Ankle theraband green   3 x 10 left ankle all planes  30         LAQ/hip flex   6#/5sec x 30 6# x 30 3 x 10 3lx 10 2lb wts 5# x 30      Prone knee flex hip ext   10x 10x 3 x 10        Nu-step    15 min lifecycle 15 min  15 min r 5       heelraises 3 positions    3 x 10 3 x 10 3 x 10 30x      HSS       20sec x 5                                Gait Training             Trial activator poll 2 point gait 100 ft x 2 versus st cane usage cueing for upright posturing            Hand to opposite knee gait  40 ft x 2 40 ft x 2   40 ft x 2        Modalities

## 2020-09-28 ENCOUNTER — OFFICE VISIT (OUTPATIENT)
Dept: PHYSICAL THERAPY | Age: 60
End: 2020-09-28
Payer: COMMERCIAL

## 2020-09-28 DIAGNOSIS — R42 VERTIGO: Primary | ICD-10-CM

## 2020-09-28 PROCEDURE — 97110 THERAPEUTIC EXERCISES: CPT

## 2020-09-28 NOTE — PROGRESS NOTES
Daily Note     Today's date: 2020  Patient name: Marino Moran  : 1960  MRN: [id number]  Referring provider: Maria De Jesus Benton MD  Dx:   Encounter Diagnosis     ICD-10-CM    1  Vertigo  R42                   Subjective:       Objective: See treatment diary below      Assessment: Tolerated treatment well  Patient would benefit from continued PT      Plan: Continue per plan of care        Precautions: see med allergies       Manuals 8/27/20 8/31/ 9/8 9/10 9/18 9/22 9/24 9/28      reassessment of status             kinesiotaping left knee y band and u  Man left le  yes   Pt now self taping  Left le                                 Neuro Re-Ed             Ankle sway referencing flat and foam eo, ec 10 reps each  10 reps   10  10 reps  Eo, ec       Tandem gait  1 min x 1 1 min x 1   1min x 1  1min 1 min x1       Tandem stance 30 se c x 3   30 se c x 3 30 se c x 3 30 sec x 3 30sec x 3      Squats  3 x 10 3 x 10  3 x 10 3 x 10 3 x 10 30x 30x     Ball pass activities              Eye head exer       With PT 10 rep each        Head diagonals       5 rep each       Ther Ex             slr x 4 left   2 x 10  3# x 30 4# 30x 3 x 10  3 x 10 2lb wts 5# x 30 4# 30x     Ankle isometrics left   5sec x 5 5sec x 5         Step ups     20   20x 20x     Ankle theraband green   3 x 10 left ankle all planes  30    30x     LAQ/hip flex   6#/5sec x 30 6# x 30 3 x 10 3lx 10 2lb wts 5# x 30 5# 30x     Prone knee flex hip ext   10x 10x 3 x 10   30x     Nu-step    15 min lifecycle 15 min  15 min r 5  15 min     heelraises 3 positions    3 x 10 3 x 10 3 x 10 30x 30x     HSS       20sec x 5 20sec x 5                               Gait Training             Trial activator poll 2 point gait 100 ft x 2 versus st cane usage cueing for upright posturing            Hand to opposite knee gait  40 ft x 2 40 ft x 2   40 ft x 2        Modalities

## 2020-10-01 ENCOUNTER — OFFICE VISIT (OUTPATIENT)
Dept: PHYSICAL THERAPY | Age: 60
End: 2020-10-01
Payer: COMMERCIAL

## 2020-10-01 DIAGNOSIS — R26.9 ABNORMALITY OF GAIT: Primary | ICD-10-CM

## 2020-10-01 PROCEDURE — 97110 THERAPEUTIC EXERCISES: CPT

## 2020-10-06 RX ORDER — OMEPRAZOLE 20 MG/1
20 CAPSULE, DELAYED RELEASE ORAL DAILY
COMMUNITY
Start: 2020-08-25

## 2020-10-07 ENCOUNTER — OFFICE VISIT (OUTPATIENT)
Dept: PHYSICAL THERAPY | Age: 60
End: 2020-10-07
Payer: COMMERCIAL

## 2020-10-07 ENCOUNTER — OFFICE VISIT (OUTPATIENT)
Dept: OBGYN CLINIC | Facility: CLINIC | Age: 60
End: 2020-10-07
Payer: COMMERCIAL

## 2020-10-07 VITALS
HEIGHT: 63 IN | BODY MASS INDEX: 33.49 KG/M2 | HEART RATE: 71 BPM | WEIGHT: 189 LBS | SYSTOLIC BLOOD PRESSURE: 136 MMHG | DIASTOLIC BLOOD PRESSURE: 78 MMHG

## 2020-10-07 DIAGNOSIS — R26.9 ABNORMALITY OF GAIT: Primary | ICD-10-CM

## 2020-10-07 DIAGNOSIS — M22.2X2 PATELLOFEMORAL DISORDER OF LEFT KNEE: ICD-10-CM

## 2020-10-07 DIAGNOSIS — M23.92 INTERNAL DERANGEMENT OF LEFT KNEE: ICD-10-CM

## 2020-10-07 DIAGNOSIS — S83.512D SPRAIN OF ANTERIOR CRUCIATE LIGAMENT OF LEFT KNEE, SUBSEQUENT ENCOUNTER: ICD-10-CM

## 2020-10-07 DIAGNOSIS — M76.822 POSTERIOR TIBIAL TENDINITIS OF LEFT LOWER EXTREMITY: ICD-10-CM

## 2020-10-07 DIAGNOSIS — S83.412D SPRAIN OF MEDIAL COLLATERAL LIGAMENT OF LEFT KNEE, SUBSEQUENT ENCOUNTER: Primary | ICD-10-CM

## 2020-10-07 DIAGNOSIS — S93.402D MODERATE ANKLE SPRAIN, LEFT, SUBSEQUENT ENCOUNTER: ICD-10-CM

## 2020-10-07 PROCEDURE — 99214 OFFICE O/P EST MOD 30 MIN: CPT | Performed by: ORTHOPAEDIC SURGERY

## 2020-10-07 PROCEDURE — 97110 THERAPEUTIC EXERCISES: CPT

## 2020-10-12 ENCOUNTER — OFFICE VISIT (OUTPATIENT)
Dept: NEUROLOGY | Facility: CLINIC | Age: 60
End: 2020-10-12
Payer: COMMERCIAL

## 2020-10-12 ENCOUNTER — OFFICE VISIT (OUTPATIENT)
Dept: PHYSICAL THERAPY | Age: 60
End: 2020-10-12
Payer: COMMERCIAL

## 2020-10-12 VITALS
DIASTOLIC BLOOD PRESSURE: 78 MMHG | HEART RATE: 66 BPM | TEMPERATURE: 97.5 F | BODY MASS INDEX: 34.64 KG/M2 | WEIGHT: 195.5 LBS | HEIGHT: 63 IN | SYSTOLIC BLOOD PRESSURE: 124 MMHG

## 2020-10-12 DIAGNOSIS — R29.898 COMPLAINTS OF LEG WEAKNESS: ICD-10-CM

## 2020-10-12 DIAGNOSIS — N20.0 KIDNEY STONES: ICD-10-CM

## 2020-10-12 DIAGNOSIS — R42 VERTIGO: ICD-10-CM

## 2020-10-12 DIAGNOSIS — R26.9 ABNORMALITY OF GAIT: Primary | ICD-10-CM

## 2020-10-12 DIAGNOSIS — E34.8 PINEAL GLAND CYST: ICD-10-CM

## 2020-10-12 DIAGNOSIS — G43.709 CHRONIC MIGRAINE WITHOUT AURA WITHOUT STATUS MIGRAINOSUS, NOT INTRACTABLE: ICD-10-CM

## 2020-10-12 DIAGNOSIS — R56.9 SEIZURES (HCC): Primary | ICD-10-CM

## 2020-10-12 PROCEDURE — 99214 OFFICE O/P EST MOD 30 MIN: CPT | Performed by: PSYCHIATRY & NEUROLOGY

## 2020-10-12 PROCEDURE — 97110 THERAPEUTIC EXERCISES: CPT

## 2020-10-12 PROCEDURE — 97112 NEUROMUSCULAR REEDUCATION: CPT

## 2020-10-14 ENCOUNTER — APPOINTMENT (OUTPATIENT)
Dept: PHYSICAL THERAPY | Age: 60
End: 2020-10-14
Payer: COMMERCIAL

## 2020-10-15 ENCOUNTER — OFFICE VISIT (OUTPATIENT)
Dept: PHYSICAL THERAPY | Age: 60
End: 2020-10-15
Payer: COMMERCIAL

## 2020-10-15 DIAGNOSIS — R29.898 COMPLAINTS OF LEG WEAKNESS: ICD-10-CM

## 2020-10-15 DIAGNOSIS — R26.9 ABNORMALITY OF GAIT: Primary | ICD-10-CM

## 2020-10-15 DIAGNOSIS — R42 VERTIGO: ICD-10-CM

## 2020-10-15 PROCEDURE — 97112 NEUROMUSCULAR REEDUCATION: CPT

## 2020-10-15 PROCEDURE — 97110 THERAPEUTIC EXERCISES: CPT

## 2020-10-15 PROCEDURE — 97140 MANUAL THERAPY 1/> REGIONS: CPT

## 2020-10-19 ENCOUNTER — OFFICE VISIT (OUTPATIENT)
Dept: PHYSICAL THERAPY | Age: 60
End: 2020-10-19
Payer: COMMERCIAL

## 2020-10-19 DIAGNOSIS — R42 VERTIGO: ICD-10-CM

## 2020-10-19 DIAGNOSIS — R29.898 COMPLAINTS OF LEG WEAKNESS: ICD-10-CM

## 2020-10-19 DIAGNOSIS — R26.9 ABNORMALITY OF GAIT: Primary | ICD-10-CM

## 2020-10-19 PROCEDURE — 97750 PHYSICAL PERFORMANCE TEST: CPT

## 2020-10-19 PROCEDURE — 97110 THERAPEUTIC EXERCISES: CPT

## 2020-11-02 ENCOUNTER — HOSPITAL ENCOUNTER (OUTPATIENT)
Dept: MRI IMAGING | Facility: HOSPITAL | Age: 60
Discharge: HOME | End: 2020-11-02
Payer: COMMERCIAL

## 2020-11-02 DIAGNOSIS — M23.92 INTERNAL DERANGEMENT OF LEFT KNEE: ICD-10-CM

## 2020-11-02 DIAGNOSIS — S83.412D SPRAIN OF MEDIAL COLLATERAL LIGAMENT OF LEFT KNEE, SUBSEQUENT ENCOUNTER: ICD-10-CM

## 2020-11-02 DIAGNOSIS — M76.822 POSTERIOR TIBIAL TENDINITIS OF LEFT LOWER EXTREMITY: ICD-10-CM

## 2020-11-02 DIAGNOSIS — S83.512D SPRAIN OF ANTERIOR CRUCIATE LIGAMENT OF LEFT KNEE, SUBSEQUENT ENCOUNTER: ICD-10-CM

## 2020-11-02 DIAGNOSIS — S93.402D MODERATE ANKLE SPRAIN, LEFT, SUBSEQUENT ENCOUNTER: ICD-10-CM

## 2020-11-02 PROCEDURE — G1004 CDSM NDSC: HCPCS

## 2020-11-02 PROCEDURE — 73721 MRI JNT OF LWR EXTRE W/O DYE: CPT

## 2020-11-09 ENCOUNTER — OFFICE VISIT (OUTPATIENT)
Dept: OBGYN CLINIC | Facility: CLINIC | Age: 60
End: 2020-11-09
Payer: COMMERCIAL

## 2020-11-09 VITALS — DIASTOLIC BLOOD PRESSURE: 79 MMHG | HEART RATE: 64 BPM | SYSTOLIC BLOOD PRESSURE: 133 MMHG

## 2020-11-09 DIAGNOSIS — M54.16 RADICULOPATHY, LUMBAR REGION: ICD-10-CM

## 2020-11-09 DIAGNOSIS — M65.9: ICD-10-CM

## 2020-11-09 DIAGNOSIS — S83.242D OTHER TEAR OF MEDIAL MENISCUS OF LEFT KNEE AS CURRENT INJURY, SUBSEQUENT ENCOUNTER: Primary | ICD-10-CM

## 2020-11-09 PROCEDURE — 99212 OFFICE O/P EST SF 10 MIN: CPT | Performed by: ORTHOPAEDIC SURGERY

## 2020-11-09 RX ORDER — METHYLPREDNISOLONE 4 MG/1
TABLET ORAL
Qty: 1 TABLET | Refills: 0 | Status: SHIPPED | OUTPATIENT
Start: 2020-11-09 | End: 2020-11-10

## 2020-11-10 ENCOUNTER — TRANSCRIBE ORDERS (OUTPATIENT)
Dept: PAIN MEDICINE | Facility: CLINIC | Age: 60
End: 2020-11-10

## 2020-11-10 ENCOUNTER — OFFICE VISIT (OUTPATIENT)
Dept: PAIN MEDICINE | Facility: CLINIC | Age: 60
End: 2020-11-10
Payer: COMMERCIAL

## 2020-11-10 VITALS
TEMPERATURE: 97.8 F | SYSTOLIC BLOOD PRESSURE: 136 MMHG | DIASTOLIC BLOOD PRESSURE: 94 MMHG | BODY MASS INDEX: 34.54 KG/M2 | WEIGHT: 195 LBS | HEART RATE: 72 BPM

## 2020-11-10 DIAGNOSIS — M47.816 LUMBAR SPONDYLOSIS: ICD-10-CM

## 2020-11-10 DIAGNOSIS — M51.16 LUMBAR DISC DISEASE WITH RADICULOPATHY: Primary | ICD-10-CM

## 2020-11-10 DIAGNOSIS — M54.16 RADICULOPATHY, LUMBAR REGION: ICD-10-CM

## 2020-11-10 DIAGNOSIS — S83.242D OTHER TEAR OF MEDIAL MENISCUS OF LEFT KNEE AS CURRENT INJURY, SUBSEQUENT ENCOUNTER: ICD-10-CM

## 2020-11-10 PROCEDURE — 99214 OFFICE O/P EST MOD 30 MIN: CPT | Performed by: PHYSICAL MEDICINE & REHABILITATION

## 2020-11-11 ENCOUNTER — TELEPHONE (OUTPATIENT)
Dept: NEUROLOGY | Facility: CLINIC | Age: 60
End: 2020-11-11

## 2020-11-19 DIAGNOSIS — M51.16 LUMBAR DISC DISEASE WITH RADICULOPATHY: Primary | ICD-10-CM

## 2020-11-25 ENCOUNTER — TRANSCRIBE ORDERS (OUTPATIENT)
Dept: ADMINISTRATIVE | Facility: HOSPITAL | Age: 60
End: 2020-11-25

## 2020-11-25 DIAGNOSIS — Z12.31 ENCOUNTER FOR SCREENING MAMMOGRAM FOR MALIGNANT NEOPLASM OF BREAST: Primary | ICD-10-CM

## 2020-11-25 DIAGNOSIS — E04.9 GOITER: ICD-10-CM

## 2020-11-25 DIAGNOSIS — E04.1 THYROID NODULE: Primary | ICD-10-CM

## 2020-11-30 ENCOUNTER — HOSPITAL ENCOUNTER (OUTPATIENT)
Dept: RADIOLOGY | Age: 60
Discharge: HOME | End: 2020-11-30
Payer: COMMERCIAL

## 2020-11-30 DIAGNOSIS — E04.1 THYROID NODULE: ICD-10-CM

## 2020-11-30 PROCEDURE — 76536 US EXAM OF HEAD AND NECK: CPT

## 2020-12-04 ENCOUNTER — TELEPHONE (OUTPATIENT)
Dept: OBGYN CLINIC | Facility: HOSPITAL | Age: 60
End: 2020-12-04

## 2020-12-04 DIAGNOSIS — M54.16 RADICULOPATHY, LUMBAR REGION: Primary | ICD-10-CM

## 2020-12-04 RX ORDER — CELECOXIB 200 MG/1
200 CAPSULE ORAL DAILY
Qty: 30 CAPSULE | Refills: 1 | Status: SHIPPED | OUTPATIENT
Start: 2020-12-04

## 2020-12-07 ENCOUNTER — DOCTOR'S OFFICE (OUTPATIENT)
Dept: URBAN - METROPOLITAN AREA CLINIC 137 | Facility: CLINIC | Age: 60
Setting detail: OPHTHALMOLOGY
End: 2020-12-07
Payer: COMMERCIAL

## 2020-12-07 ENCOUNTER — OPTICAL OFFICE (OUTPATIENT)
Dept: URBAN - METROPOLITAN AREA CLINIC 146 | Facility: CLINIC | Age: 60
Setting detail: OPHTHALMOLOGY
End: 2020-12-07
Payer: COMMERCIAL

## 2020-12-07 DIAGNOSIS — H52.223: ICD-10-CM

## 2020-12-07 PROCEDURE — 92004 COMPRE OPH EXAM NEW PT 1/>: CPT | Performed by: OPTOMETRIST

## 2020-12-07 PROCEDURE — V2025 EYEGLASSES DELUX FRAMES: HCPCS | Performed by: OPTOMETRIST

## 2020-12-07 PROCEDURE — V2020 VISION SVCS FRAMES PURCHASES: HCPCS | Performed by: OPTOMETRIST

## 2020-12-07 PROCEDURE — V2204 LENS SPHCY BIFOCAL 4.00D/2.1: HCPCS | Performed by: OPTOMETRIST

## 2020-12-07 PROCEDURE — V2750 ANTI-REFLECTIVE COATING: HCPCS | Performed by: OPTOMETRIST

## 2020-12-07 PROCEDURE — V2781 PROGRESSIVE LENS PER LENS: HCPCS | Performed by: OPTOMETRIST

## 2020-12-07 ASSESSMENT — CONFRONTATIONAL VISUAL FIELD TEST (CVF)
OS_FINDINGS: FULL
OD_FINDINGS: FULL

## 2020-12-08 ASSESSMENT — REFRACTION_AUTOREFRACTION
OD_CYLINDER: -2.00
OD_AXIS: 109
OS_AXIS: 074
OS_SPHERE: -0.75
OS_CYLINDER: -4.00
OD_SPHERE: -2.25

## 2020-12-08 ASSESSMENT — REFRACTION_CURRENTRX
OS_ADD: +2.00
OD_ADD: +2.00
OS_VPRISM_DIRECTION: PROGS
OD_CYLINDER: -2.75
OS_OVR_VA: 20/
OS_AXIS: 070
OD_SPHERE: -1.00
OS_SPHERE: +0.75
OD_AXIS: 107
OD_OVR_VA: 20/
OS_CYLINDER: -3.75
OD_VPRISM_DIRECTION: PROGS

## 2020-12-08 ASSESSMENT — SPHEQUIV_DERIVED
OS_SPHEQUIV: -2.75
OD_SPHEQUIV: -2.5
OS_SPHEQUIV: -2.125
OD_SPHEQUIV: -3.25

## 2020-12-08 ASSESSMENT — REFRACTION_MANIFEST
OD_SPHERE: -1.25
OD_VA1: 20/20
OS_CYLINDER: -3.75
OS_SPHERE: -0.25
OS_ADD: +2.25
OS_AXIS: 68
OS_VA1: 20/20
OD_CYLINDER: -2.50
OD_AXIS: 110
OD_ADD: +2.25

## 2020-12-08 ASSESSMENT — VISUAL ACUITY
OS_BCVA: 20/30-1
OD_BCVA: 20/30-2

## 2020-12-14 ENCOUNTER — HOSPITAL ENCOUNTER (OUTPATIENT)
Dept: RADIOLOGY | Age: 60
Discharge: HOME | End: 2020-12-14
Payer: COMMERCIAL

## 2020-12-14 VITALS — WEIGHT: 195 LBS | HEIGHT: 63 IN | BODY MASS INDEX: 34.55 KG/M2

## 2020-12-14 DIAGNOSIS — Z12.31 ENCOUNTER FOR SCREENING MAMMOGRAM FOR MALIGNANT NEOPLASM OF BREAST: ICD-10-CM

## 2020-12-14 PROCEDURE — 77067 SCR MAMMO BI INCL CAD: CPT

## 2020-12-14 PROCEDURE — 77063 BREAST TOMOSYNTHESIS BI: CPT

## 2020-12-29 PROBLEM — R42 VERTIGO: Status: ACTIVE | Noted: 2020-12-29

## 2021-01-05 DIAGNOSIS — F41.9 ANXIETY: ICD-10-CM

## 2021-01-25 ENCOUNTER — TELEPHONE (OUTPATIENT)
Dept: NEUROLOGY | Facility: CLINIC | Age: 61
End: 2021-01-25

## 2021-01-28 DIAGNOSIS — M54.16 RADICULOPATHY, LUMBAR REGION: ICD-10-CM

## 2021-01-28 RX ORDER — CELECOXIB 200 MG/1
CAPSULE ORAL
Qty: 30 CAPSULE | Refills: 1 | OUTPATIENT
Start: 2021-01-28

## 2021-03-10 ENCOUNTER — OFFICE VISIT (OUTPATIENT)
Dept: OBGYN CLINIC | Facility: CLINIC | Age: 61
End: 2021-03-10
Payer: COMMERCIAL

## 2021-03-10 VITALS
HEIGHT: 63 IN | WEIGHT: 195 LBS | SYSTOLIC BLOOD PRESSURE: 147 MMHG | BODY MASS INDEX: 34.55 KG/M2 | HEART RATE: 86 BPM | DIASTOLIC BLOOD PRESSURE: 84 MMHG

## 2021-03-10 DIAGNOSIS — R26.2 AMBULATORY DYSFUNCTION: ICD-10-CM

## 2021-03-10 DIAGNOSIS — M22.2X2 PATELLOFEMORAL PAIN SYNDROME OF LEFT KNEE: Primary | ICD-10-CM

## 2021-03-10 DIAGNOSIS — M54.42 CHRONIC LEFT-SIDED LOW BACK PAIN WITH LEFT-SIDED SCIATICA: ICD-10-CM

## 2021-03-10 DIAGNOSIS — G89.29 CHRONIC LEFT-SIDED LOW BACK PAIN WITH LEFT-SIDED SCIATICA: ICD-10-CM

## 2021-03-10 PROCEDURE — 99214 OFFICE O/P EST MOD 30 MIN: CPT | Performed by: ORTHOPAEDIC SURGERY

## 2021-03-10 NOTE — PROGRESS NOTES
Assessment/Plan:  1  Patellofemoral pain syndrome of left knee  Ambulatory referral to Physical Therapy   2  Chronic left-sided low back pain with left-sided sciatica  Ambulatory referral to Physical Therapy   3  Ambulatory dysfunction  Shower chair     Patient Active Problem List   Diagnosis    Sprain of medial collateral ligament of left knee    Concussion wth loss of consciousness of 30 minutes or less    Esophageal reflux    Hypertension    Pineal gland cyst    Seizures (HCC)    Tension type headache    Chronic migraine without aura without status migrainosus, not intractable    Unspecified convulsions ([address] )    Kidney stones    IBS (irritable bowel syndrome)    Radiculopathy, lumbar region    Sprain of anterior cruciate ligament of left knee    Sprain of ankle, initial encounter    Pain, joint, ankle and foot, left    Posterior tibial tendinitis of left lower extremity    Patellofemoral disorder of left knee    Vertigo       Discussion/Summary:    64 y o  female With left knee pain secondary to patellofemoral arthritis and lateral tracking primarily  She has some posterior medial joint line tenderness but reports majority of symptoms are anterior  Offered her cortisone injection  Into the left knee today but she wants to hold off on talk to her neurologist 1st given concerns over seizure with prior cortisone injections  She will begin Núñez taping and referral back to Physical therapy was given for her knee as well as for low back  She was instructed to follow-up with Dr Khanh Thompson for her back pain and radiculitis  She had no neurologic deficit in lower extremities today on exam   She will follow after 6 weeks only if she has not had any relief pain and would like injections in the knee      The assessment and plan were formulated by Dr Dionne Wynne and I assisted in carrying it out  Subjective:   Patient ID: Meaghan Hoskins is a 64 y o  female       HPI    Patient presents to the office for follow up of left knee pain and low back pain  Since the last visit, the patient reports  No more pain left foot or ankle that is got much better with therapy  She is no longer wearing an ankle brace  She still has pain in the left knee to anterior around the patella she has some posterior medial pain as well  She denies any locking  She notes some popping in the from the knee  Pain is worse with stairs  Pain is moderate and intermittent  She denies any numbness or tingling in the extremities  No noticeable weakness  She is taking Celebrex with some relief  She has been seeing pain management for her back pain as well she has chronic low back pain  She reports some radicular pain to the left lower extremity    Patient denies any new injuries to the left lower extremity since the last visit       The following portions of the patient's history were reviewed and updated as appropriate: allergies, current medications, past family history, past social history, past surgical history and problem list     Social History     Socioeconomic History    Marital status: Single     Spouse name: Not on file    Number of children: Not on file    Years of education: Not on file    Highest education level: Not on file   Occupational History    Not on file   Social Needs    Financial resource strain: Not on file    Food insecurity     Worry: Not on file     Inability: Not on file    Transportation needs     Medical: Not on file     Non-medical: Not on file   Tobacco Use    Smoking status: Never Smoker    Smokeless tobacco: Never Used   Substance and Sexual Activity    Alcohol use: Yes     Frequency: Monthly or less     Drinks per session: 1 or 2     Comment: rarely    Drug use: No    Sexual activity: Not on file   Lifestyle    Physical activity     Days per week: Not on file     Minutes per session: Not on file    Stress: Not on file   Relationships    Social connections     Talks on phone: Not on file Gets together: Not on file     Attends Zoroastrianism service: Not on file     Active member of club or organization: Not on file     Attends meetings of clubs or organizations: Not on file     Relationship status: Not on file    Intimate partner violence     Fear of current or ex partner: Not on file     Emotionally abused: Not on file     Physically abused: Not on file     Forced sexual activity: Not on file   Other Topics Concern    Not on file   Social History Narrative    Not on file     Past Medical History:   Diagnosis Date    Bladder prolapse, female, acquired     Epilepsy ([address] )     Hypertension     Kidney stone     Tear of MCL (medial collateral ligament) of knee      Past Surgical History:   Procedure Laterality Date    HYSTERECTOMY      age 45   Ethelyn England OOPHORECTOMY Bilateral     age 45     Allergies   Allergen Reactions    Iodinated Diagnostic Agents Anaphylaxis    Cortisone     Iodine     Lidocaine     Novocain [Procaine]     Other      "GENERAL ANESTHESIA"    Phenytoin      Current Outpatient Medications on File Prior to Visit   Medication Sig Dispense Refill    amLODIPine (NORVASC) 10 mg tablet Take 1 tablet by mouth daily      atorvastatin (LIPITOR) 40 mg tablet Take 40 mg by mouth daily      celecoxib (CeleBREX) 200 mg capsule Take 1 capsule (200 mg total) by mouth daily 30 capsule 1    clonazePAM (KlonoPIN) 0 5 mg tablet Take 1 tablet by mouth 2 (two) times a day      clotrimazole (LOTRIMIN) 1 % cream APPLY TO AFFECTED AREA TWICE A DAY IN THE MORNING AND IN THE EVENING      cyclobenzaprine (FLEXERIL) 5 mg tablet TAKE 1 TABLET BY MOUTH THREE TIMES A DAY 30 tablet 0    Elastic Bandages & Supports (AIRCAST SPORT ANKLE BRACE/LEFT) MISC by Does not apply route daily 1 each 0    Elastic Bandages & Supports (KNEE BRACE/HINGED S/M) MISC by Does not apply route daily 1 each 0    furosemide (LASIX) 40 mg tablet Take 40 mg by mouth daily as needed       hydrALAZINE (APRESOLINE) 25 mg tablet Take 25 mg by mouth 2 (two) times a day      losartan (COZAAR) 100 MG tablet Take 100 mg by mouth daily      Mapap Arthritis Pain 650 MG CR tablet Take 650 mg by mouth every 8 (eight) hours as needed      metoprolol tartrate (LOPRESSOR) 25 mg tablet Take 50 mg by mouth 2 (two) times a day       montelukast (SINGULAIR) 10 mg tablet Take 1 tablet by mouth daily      multivitamin (THERAGRAN) TABS Take 1 tablet by mouth daily      omeprazole (PriLOSEC) 20 mg delayed release capsule Take 20 mg by mouth daily      sertraline (ZOLOFT) 50 mg tablet TAKE 1 TABLET BY MOUTH EVERY MORNING 90 tablet 3     No current facility-administered medications on file prior to visit  Review of Systems  Neg except as noted in hpi    Objective:    Vitals:    03/10/21 1414   BP: 147/84   Pulse: 86       Physical Exam  Constitutional:       Appearance: She is well-developed  HENT:      Head: Normocephalic and atraumatic  Eyes:      General: No scleral icterus  Conjunctiva/sclera: Conjunctivae normal    Neck:      Musculoskeletal: Neck supple  Cardiovascular:      Comments: No discernible arrhthymias  Pulmonary:      Effort: Pulmonary effort is normal  No respiratory distress  Breath sounds: No stridor  Abdominal:      General: There is no distension  Palpations: Abdomen is soft  Musculoskeletal:      Left knee: She exhibits no effusion  Skin:     General: Skin is warm and dry  Findings: No erythema  Neurological:      Mental Status: She is alert and oriented to person, place, and time  Psychiatric:         Behavior: Behavior normal          Left Knee Exam     Muscle Strength   The patient has normal left knee strength  Tenderness   The patient is experiencing tenderness in the medial joint line and patella  Range of Motion   The patient has normal left knee ROM      Tests   Varus: negative Valgus: negative  Patellar apprehension: positive    Other   Erythema: absent  Scars: absent  Sensation: normal  Left knee pulse absent: skin warm and well perfused  Swelling: none  Effusion: no effusion present    Comments:  No ecchymosis or deformity  positive patellar grind test      Back Exam     Tenderness   The patient is experiencing tenderness in the lumbar  Tests   Straight leg raise right: negative  Straight leg raise left: negative    Other   Back sensation:  sensation intact to light touch L1 through S1   Gait: normal   Erythema: no back redness  Scars: absent    Comments:  Strength:    Right lower extremity: 5/5 -  Hip flexion, 5/5 -  Knee extension, 5/5 -  Knee flexion, 5/5 -  Ankle dorsiflexion, 5/5 -  Ankle plantar flexion, 5/5 -  EHL    Left lower extremity: 5/5 -  Hip flexion, 5/5 -  Knee extension, 5/5 -  Knee flexion, 5/5 -  Ankle dorsiflexion, 5/5 -  Ankle plantar flexion, 5/5 -  EHL    Patient denies symptoms of saddle anesthesia              I have personally reviewed pertinent films in PACS  Procedures  No Procedures performed today    Portions of the record may have been created with voice recognition software  Occasional wrong word or "sound a like" substitutions may have occurred due to the inherent limitations of voice recognition software  Read the chart carefully and recognize, using context, where substitutions have occurred

## 2021-03-10 NOTE — PATIENT INSTRUCTIONS
PATELLOFEMORAL SYNDROME-Doll TAPING TECHNIQUE    Search Leukotape P tape and Cover roll stretch tape on  [employer]  Leukotape P is typically 1 5in x 15 yards, Cover roll stretch tape is typically 2in x 10 yards        How to apply:  1  Place cover roll tape over knee cap  This protects the skin  2  Apply Leukotape over the cover roll tape  Use the Leukotape to pull the knee cap to the middle of the body (medial side of knee) to prevent lateral (outside) tracking of the knee cap  3  Wear the tape for 3 days (72 hrs) straight, then take off one day (24 hrs) off, then repeat  4  Visit youtube  com and search Doll tape for the knee to watch a video on how to apply tape  a  Video titled Doll Taping of the knee created by [employer] recommended  What does Doll taping technique do?  ? Patellofemoral syndrome is when the inside quadriceps muscle, called the VMO muscle, becomes weak due to a number of factors  This causes the Patellofemoral ligament, which is the only ligament holding the patella (knee cap) in place, to become weak as well  When the ligament becomes weak, the knee cap drifts or tracks too far to the lateral side of the knee (outside knee) which causes tension on this ligament  The knee cap hits the lateral area of the femur, resulting in pain or discomfort around the front of the knee  ? Physical Therapy is sometimes used to strengthen the weak muscles, such as the VMO, to correct this problem  When the tape is applied correctly, it helps to realign the knee cap to the center of the knee  This helps correct for the lateral tracking of the knee cap and relieve discomfort  ? You can search online for exercises that can help strengthen the VMO quadriceps muscle or attend physical therapy

## 2021-03-12 ENCOUNTER — TELEPHONE (OUTPATIENT)
Dept: OBGYN CLINIC | Facility: HOSPITAL | Age: 61
End: 2021-03-12

## 2021-03-12 NOTE — TELEPHONE ENCOUNTER
Patent sees Dr Kane Rucker from [facility] is calling stating the script they received has to be ordered/signed by the Dr since she has [place] state insurance        FAX: [phone]

## 2021-03-15 ENCOUNTER — EVALUATION (OUTPATIENT)
Dept: PHYSICAL THERAPY | Facility: CLINIC | Age: 61
End: 2021-03-15
Payer: COMMERCIAL

## 2021-03-15 DIAGNOSIS — M54.42 CHRONIC LEFT-SIDED LOW BACK PAIN WITH LEFT-SIDED SCIATICA: ICD-10-CM

## 2021-03-15 DIAGNOSIS — M22.2X2 PATELLOFEMORAL PAIN SYNDROME OF LEFT KNEE: Primary | ICD-10-CM

## 2021-03-15 DIAGNOSIS — G89.29 CHRONIC LEFT-SIDED LOW BACK PAIN WITH LEFT-SIDED SCIATICA: ICD-10-CM

## 2021-03-15 PROCEDURE — 97163 PT EVAL HIGH COMPLEX 45 MIN: CPT | Performed by: PHYSICAL THERAPIST

## 2021-03-15 PROCEDURE — 97140 MANUAL THERAPY 1/> REGIONS: CPT | Performed by: PHYSICAL THERAPIST

## 2021-03-15 PROCEDURE — 97112 NEUROMUSCULAR REEDUCATION: CPT | Performed by: PHYSICAL THERAPIST

## 2021-03-15 NOTE — PROGRESS NOTES
PT EVALUATION    Today's date: 03/15/21  Patient name: Virgil Snell  : 1960  MRN: [id number]  Referring provider: Kellie Keane PA-C  Dx:   1  Patellofemoral pain syndrome of left knee    2  Chronic left-sided low back pain with left-sided sciatica        Virgil Snell is a 64 y o  female who presents with chronic left anterior knee pain and left-sided sciatica  She has difficulty with all weight bearing activities, decreased ROM, and decreased strength  There appeared to be a lumbar flexion bias with testing so home exercises included stretching in that direction  This patient would benefit from skilled physical therapy to address their listed impairments and functional limitations to maximize functional outcome  Impairments:    restricted ROM    decreased strength   pain with function   activity intolerance     Prognosis:  Fair  Positive and negative prognostic indicator(s):  prior success with conservative care, pain >3 months and multiple comorbidities    Goals:    STG Patient is independent with HEP   STG Range of motion is improved by 25% in 2 weeks  LTG ADL performance improved to prior level of function in 6 weeks  LTG Weight bearing tolerance improved 50% in 4 weeks    Planned interventions:  home exercise program, patient education, manual therapy, massage, graded activity, flexibility, functional range of motion exercises, strengthening, McConnel taping and modalities prn    Duration in visits:  8  Frequency: 2 visits per week  Duration in weeks:  4    History of Current Injury: patient felt in May 2020 and broke her left ankle  She has torn menisci in the left knee and is wearing a knee brace  She also has a history of epilepsy and vertigo  She was diagnosed with patellofemoral pain syndrome  She has clicking in the knee cap  She also has pain in the sacrum and left buttock and sometimes down to the knee and occasionally to the medial calf           Pain location: anterior knee  Pain descriptors:  Aching, pressure  Pain intensity:  Knee 10/10 at peak    Aggravating factors: weight bearing activities (knee),  Prolonged sit to stand (low back/left buttock)  Easing factors: lying down with pillow under legs    Hobbies/Interests: caring for grandson    Patient goals:  decreased pain, independence with ADLs and increased mobility    Objective     Palpation     Additional Palpation Details  Tender to left gluteals, superior > mid and left SIJ    Active Range of Motion     Lumbar   Flexion:  WFL  Extension: 18 degrees   Left lateral flexion: 24 degrees       Right lateral flexion: 25 degrees   Left Knee   Flexion: 70 degrees   Extensor la degrees     Passive Range of Motion   Left Knee   Flexion: 107 degrees   Extension: 4 degrees     Joint Play     Hypomobile: L4, L5 and S1     Pain: L2, L3, L4, L5 and S1     Strength/Myotome Testing     Left Knee   Flexion: 4  Extension: 4    Tests     Lumbar     Left   Negative crossed SLR, passive SLR and slump test      General Comments:      Lumbar Comments  Increasing hamstring pain noted with repeated extension in standing  No increase in pain with repeated flexion          Precautions: Epilepsy, fall risk      Manuals 3/15            theraroller left gluteals to low back SY            Núñez taping left  SY                                      Neuro Re-Ed             PPT             PPT with march             PPT with kick out                                                                 Ther Ex             bike             Knee ext             Knee flexion             Sit to stands,             Standing hip 3-way             SKC :10x5            Hamstring glide 15            Glut stretch :10x5            clamshells             bridges             pball leg curls             Seated hamstring stretch :10x5            Seated trunk flexion :10x5                         Ther Activity                                       Gait Training Modalities

## 2021-03-18 ENCOUNTER — OFFICE VISIT (OUTPATIENT)
Dept: PHYSICAL THERAPY | Facility: CLINIC | Age: 61
End: 2021-03-18
Payer: COMMERCIAL

## 2021-03-18 DIAGNOSIS — M22.2X2 PATELLOFEMORAL PAIN SYNDROME OF LEFT KNEE: Primary | ICD-10-CM

## 2021-03-18 DIAGNOSIS — M54.42 CHRONIC LEFT-SIDED LOW BACK PAIN WITH LEFT-SIDED SCIATICA: ICD-10-CM

## 2021-03-18 DIAGNOSIS — G89.29 CHRONIC LEFT-SIDED LOW BACK PAIN WITH LEFT-SIDED SCIATICA: ICD-10-CM

## 2021-03-18 PROCEDURE — 97140 MANUAL THERAPY 1/> REGIONS: CPT

## 2021-03-18 PROCEDURE — 97112 NEUROMUSCULAR REEDUCATION: CPT

## 2021-03-18 PROCEDURE — 97110 THERAPEUTIC EXERCISES: CPT

## 2021-03-18 NOTE — PROGRESS NOTES
Daily Note     Today's date: 3/18/2021  Patient name: Sharlie Ganser  : 1960  MRN: [id number]  Referring provider: Roya Rojas  Dx:   Encounter Diagnosis     ICD-10-CM    1  Patellofemoral pain syndrome of left knee  M22 2X2    2  Chronic left-sided low back pain with left-sided sciatica  M54 42     G89 29                   Subjective: Patient reports general soreness after IE though she feels Núñez taping was beneficial   She has been compliant with HEP  Objective: See treatment diary below      Assessment: Initiated TE program today as indicated and patient tolerated treatment well  Tender to L gluteals which responded well to theraroller  No significant increase in symptoms reported with exercises performed  Patient demonstrated fatigue post treatment and would benefit from continued PT  Plan: Progress treatment as tolerated         Precautions: Epilepsy, fall risk      Manuals 3/15 3/18           theraroller left gluteals to low back SUZANNE Thomas taping left  SY CATRACHITA                                     Neuro Re-Ed             PPT  10x           PPT with march             PPT with kick out                                                                 Ther Ex             bike  5'           Knee ext             Knee flexion             Sit to stands,             Standing hip 3-way             SKC :10x5 :10x5           Hamstring glide 15 HEP           Glut stretch :10x5 HEP           clamshells  15x           bridges  10x           pball leg curls  15x           Seated hamstring stretch :10x5 HEP           Seated trunk flexion :10x5 :10x10                        Ther Activity                                       Gait Training                                       Modalities

## 2021-03-22 ENCOUNTER — APPOINTMENT (OUTPATIENT)
Dept: PHYSICAL THERAPY | Facility: CLINIC | Age: 61
End: 2021-03-22
Payer: COMMERCIAL

## 2021-03-25 ENCOUNTER — OFFICE VISIT (OUTPATIENT)
Dept: PHYSICAL THERAPY | Facility: CLINIC | Age: 61
End: 2021-03-25
Payer: COMMERCIAL

## 2021-03-25 DIAGNOSIS — M54.42 CHRONIC LEFT-SIDED LOW BACK PAIN WITH LEFT-SIDED SCIATICA: ICD-10-CM

## 2021-03-25 DIAGNOSIS — M22.2X2 PATELLOFEMORAL PAIN SYNDROME OF LEFT KNEE: Primary | ICD-10-CM

## 2021-03-25 DIAGNOSIS — G89.29 CHRONIC LEFT-SIDED LOW BACK PAIN WITH LEFT-SIDED SCIATICA: ICD-10-CM

## 2021-03-25 PROCEDURE — 97140 MANUAL THERAPY 1/> REGIONS: CPT | Performed by: PHYSICAL THERAPIST

## 2021-03-25 PROCEDURE — 97110 THERAPEUTIC EXERCISES: CPT | Performed by: PHYSICAL THERAPIST

## 2021-03-25 PROCEDURE — 97112 NEUROMUSCULAR REEDUCATION: CPT | Performed by: PHYSICAL THERAPIST

## 2021-03-25 NOTE — PROGRESS NOTES
Daily Note     Today's date: 3/25/2021  Patient name: Rosalinda Patrick  : 1960  MRN: [id number]  Referring provider: Isaiah Bunn  Dx:   Encounter Diagnosis     ICD-10-CM    1  Patellofemoral pain syndrome of left knee  M22 2X2    2  Chronic left-sided low back pain with left-sided sciatica  M54 42     G89 29                   Subjective: patient states she had allergies last week and was not well enough to attend PT  She has intermittent low back pain and continued anterior knee pain  She feels the knee taping is beneficial    Objective: See treatment diary below      Assessment: Tolerated treatment fair  Patient exhibited good technique with therapeutic exercises and would benefit from continued PT   Progressed core stabilization activities, fatigue in the hip noted with kick outs  Plan: Progress treatment as tolerated         Precautions: Epilepsy, fall risk      Manuals 3/15 3/18 3/25          theraroller left gluteals to low back SY CATRACHITA Núñez taping left  SY  SY                                    Neuro Re-Ed             PPT  10x 10x          PPT with march             PPT with kick out                                                                 Ther Ex             bike  5' 5'          Knee ext   #11 20          Knee flexion             Sit to stands,             Standing hip 3-way             SKC :10x5 :10x5 :10x5          Hamstring glide 15 HEP           Glut stretch :10x5 HEP           clamshells  15x 20          bridges  10x 20          pball leg curls  15x 20          Seated hamstring stretch :10x5 HEP           Seated trunk flexion :10x5 :10x10 :10x10                       Ther Activity                                       Gait Training                                       Modalities

## 2021-03-29 ENCOUNTER — OFFICE VISIT (OUTPATIENT)
Dept: PHYSICAL THERAPY | Facility: CLINIC | Age: 61
End: 2021-03-29
Payer: COMMERCIAL

## 2021-03-29 DIAGNOSIS — M22.2X2 PATELLOFEMORAL PAIN SYNDROME OF LEFT KNEE: Primary | ICD-10-CM

## 2021-03-29 DIAGNOSIS — M54.42 CHRONIC LEFT-SIDED LOW BACK PAIN WITH LEFT-SIDED SCIATICA: ICD-10-CM

## 2021-03-29 DIAGNOSIS — G89.29 CHRONIC LEFT-SIDED LOW BACK PAIN WITH LEFT-SIDED SCIATICA: ICD-10-CM

## 2021-03-29 PROCEDURE — 97110 THERAPEUTIC EXERCISES: CPT | Performed by: PHYSICAL THERAPIST

## 2021-03-29 PROCEDURE — 97112 NEUROMUSCULAR REEDUCATION: CPT | Performed by: PHYSICAL THERAPIST

## 2021-03-29 PROCEDURE — 97140 MANUAL THERAPY 1/> REGIONS: CPT | Performed by: PHYSICAL THERAPIST

## 2021-04-01 ENCOUNTER — OFFICE VISIT (OUTPATIENT)
Dept: PHYSICAL THERAPY | Facility: CLINIC | Age: 61
End: 2021-04-01
Payer: COMMERCIAL

## 2021-04-01 DIAGNOSIS — M54.42 CHRONIC LEFT-SIDED LOW BACK PAIN WITH LEFT-SIDED SCIATICA: ICD-10-CM

## 2021-04-01 DIAGNOSIS — M22.2X2 PATELLOFEMORAL PAIN SYNDROME OF LEFT KNEE: Primary | ICD-10-CM

## 2021-04-01 DIAGNOSIS — G89.29 CHRONIC LEFT-SIDED LOW BACK PAIN WITH LEFT-SIDED SCIATICA: ICD-10-CM

## 2021-04-01 PROCEDURE — 97112 NEUROMUSCULAR REEDUCATION: CPT

## 2021-04-01 PROCEDURE — 97140 MANUAL THERAPY 1/> REGIONS: CPT

## 2021-04-01 PROCEDURE — 97110 THERAPEUTIC EXERCISES: CPT

## 2021-04-01 NOTE — PROGRESS NOTES
Daily Note     Today's date: 2021  Patient name: Odalys Chilel  : 1960  MRN: [id number]  Referring provider: Derrick Lynch  Dx:   Encounter Diagnosis     ICD-10-CM    1  Patellofemoral pain syndrome of left knee  M22 2X2    2  Chronic left-sided low back pain with left-sided sciatica  M54 42     G89 29                   Subjective: Pt presents to today's session reporting her knee feels about the same as usual, and mentions she continues to have some difficulty with bending as it aggravates back Sx       Objective: See treatment diary below      Assessment: Tolerated treatment well  Pt continues to benefit from theraroller to gluteal musculature as it helps decrease muscle tension  Pt also continues to benefit from Núñez taping to (L) knee as it helps align patella and improve patellar glide  Patient demonstrated fatigue post treatment and would benefit from continued PT to improve Sx, strength, and function  Plan: Continue per plan of care  Today's session was treated by SANTINO Alvarez, with direct supervision of Sandra Galeana PTA         Precautions: Epilepsy, fall risk      Manuals 3/15 3/18 3/25 3/29 4/1        theraroller left gluteals to low back SY MC SY SY JARETH        Núñez taping left  SY MC SY SY MC                                  Neuro Re-Ed             PPT  10x 10x          PPT with march             PPT with kick out                                                                 Ther Ex             bike  5' 5' 6' 6'        Knee ext   #11 20 #11 30 11# 30        Knee flexion             Sit to stands,             Standing hip 3-way             SKC :10x5 :10x5 :10x5 :10x5 nv        Hamstring glide 15 HEP           Glut stretch :10x5 HEP           clamshells  15x 20 20 20        bridges  10x 20 pball 20 nv        pball leg curls  15x 20 20 20        pball LTR    20 20        Seated hamstring stretch :10x5 HEP           Seated trunk flexion :10x5 :10x10 :10x10 :05x15 [id number]                     Ther Activity                                       Gait Training                                       Modalities

## 2021-04-05 ENCOUNTER — OFFICE VISIT (OUTPATIENT)
Dept: PHYSICAL THERAPY | Facility: CLINIC | Age: 61
End: 2021-04-05
Payer: COMMERCIAL

## 2021-04-05 DIAGNOSIS — M54.42 CHRONIC LEFT-SIDED LOW BACK PAIN WITH LEFT-SIDED SCIATICA: ICD-10-CM

## 2021-04-05 DIAGNOSIS — M22.2X2 PATELLOFEMORAL PAIN SYNDROME OF LEFT KNEE: Primary | ICD-10-CM

## 2021-04-05 DIAGNOSIS — G89.29 CHRONIC LEFT-SIDED LOW BACK PAIN WITH LEFT-SIDED SCIATICA: ICD-10-CM

## 2021-04-05 PROCEDURE — 97112 NEUROMUSCULAR REEDUCATION: CPT

## 2021-04-05 PROCEDURE — 97110 THERAPEUTIC EXERCISES: CPT

## 2021-04-05 PROCEDURE — 97140 MANUAL THERAPY 1/> REGIONS: CPT

## 2021-04-05 NOTE — PROGRESS NOTES
Daily Note     Today's date: 2021  Patient name: Gera Sheriff  : 1960  MRN: [id number]  Referring provider: Celia Freeman  Dx:   Encounter Diagnosis     ICD-10-CM    1  Patellofemoral pain syndrome of left knee  M22 2X2    2  Chronic left-sided low back pain with left-sided sciatica  M54 42     G89 29        Start Time: 1620          Subjective: Pt presents to clinic reporting overall improvement in LBP, however pt reports she continues to experience increased LBP /p prolonged walking  Objective: See treatment diary below      Assessment: Tolerated treatment well, performing today's session without adverse Sx, however pt requires mod VCing to keep track of reps, as pt is prone to getting sidetracked  Pt continues to be challenged by current exercise program, demonstrating hamstring weakness during hamstring curls, however, pt does well w/ increased weight on knee extension machine  Patient demonstrated fatigue post treatment and would benefit from continued PT to improve strength, flexibility, and function  Plan: Progress treatment as tolerated  Today's session was treated by SANTINO Lemus, with direct supervision of Martha Morelos PTA         Precautions: Epilepsy, fall risk      Manuals 3/15 3/18 3/25 3/29 4/1 4/5       theraroller left gluteals to low back SUZANNE Irving taping left  SY CATRACHITA TEMPLETON                                 Neuro Re-Ed             PPT  10x 10x          PPT with march             PPT with kick out                                                                 Ther Ex             bike  5' 5' 6' 6' 8'       Knee ext   #11 20 #11 30 11# 30 11# 15x,  22# 10x       Knee flexion             Sit to stands,             Standing hip 3-way             SKC :10x5 :10x5 :10x5 :10x5 nv        Hamstring glide 15 HEP           Glut stretch :10x5 HEP           clamshells  15x 20 20 20 nv       bridges  10x 20 pball 20 nv pball leg curls  15x 20 20 20 20       pball LTR    20 20 20       Seated hamstring stretch :10x5 HEP           Seated trunk flexion :10x5 :10x10 :10x10 :05x15 :[id number]                    Ther Activity                                       Gait Training                                       Modalities

## 2021-04-08 ENCOUNTER — OFFICE VISIT (OUTPATIENT)
Dept: PHYSICAL THERAPY | Facility: CLINIC | Age: 61
End: 2021-04-08
Payer: COMMERCIAL

## 2021-04-08 DIAGNOSIS — M22.2X2 PATELLOFEMORAL PAIN SYNDROME OF LEFT KNEE: Primary | ICD-10-CM

## 2021-04-08 DIAGNOSIS — M54.42 CHRONIC LEFT-SIDED LOW BACK PAIN WITH LEFT-SIDED SCIATICA: ICD-10-CM

## 2021-04-08 DIAGNOSIS — G89.29 CHRONIC LEFT-SIDED LOW BACK PAIN WITH LEFT-SIDED SCIATICA: ICD-10-CM

## 2021-04-08 PROCEDURE — 97140 MANUAL THERAPY 1/> REGIONS: CPT | Performed by: PHYSICAL MEDICINE & REHABILITATION

## 2021-04-08 PROCEDURE — 97112 NEUROMUSCULAR REEDUCATION: CPT | Performed by: PHYSICAL MEDICINE & REHABILITATION

## 2021-04-08 PROCEDURE — 97110 THERAPEUTIC EXERCISES: CPT | Performed by: PHYSICAL MEDICINE & REHABILITATION

## 2021-04-08 NOTE — PROGRESS NOTES
Daily Note     Today's date: 2021  Patient name: Dg Saavedra  : 1960  MRN: [id number]  Referring provider: Lewis Silver  Dx:   Encounter Diagnosis     ICD-10-CM    1  Patellofemoral pain syndrome of left knee  M22 2X2    2  Chronic left-sided low back pain with left-sided sciatica  M54 42     G89 29                 Subjective: Patient notes soreness following last session, no significant changes or issues since last visit  Patient notes positive response to taping  Objective: See treatment diary below    Assessment: Tolerated treatment well overall  Cueing for form maintenance throughout with fair carryover  Patient demonstrated fatigue post treatment and would benefit from continued PT  Continue to progress as able  Plan: Progress treatment as tolerated          Precautions: Epilepsy, fall risk    Manuals 3/15 3/18 3/25 3/29 4/1 4/5 4/8      theraroller left gluteals to low back [facility]  SY CATRACHITA TEMPLETON                                 Neuro Re-Ed             PPT  10x 10x          PPT with march             PPT with kick out                                                                 Ther Ex             bike  5' 5' 6' 6' 8' 8'      Knee ext   #11 20 #11 30 11# 30 11# 15x,  22# 10x 22# 2x10      Knee flexion             Sit to stands,             Standing hip 3-way             SKC :10x5 :10x5 :10x5 :10x5 nv        Hamstring glide 15 HEP           Glut stretch :10x5 HEP           clamshells  15x 20 20 20 nv 20x ea      bridges  10x 20 pball 20 nv        pball leg curls  15x 20 20 20 20 20x      pball LTR    20 20 20 20x      Seated hamstring stretch :10x5 HEP           Seated trunk flexion :10x5 :10x10 :10x10 :05x15 :35b24KPyvf :95p69GHpzr 20x5" pball                   Ther Activity                                       Gait Training                                       Modalities

## 2021-04-12 ENCOUNTER — OFFICE VISIT (OUTPATIENT)
Dept: PHYSICAL THERAPY | Facility: CLINIC | Age: 61
End: 2021-04-12
Payer: COMMERCIAL

## 2021-04-12 DIAGNOSIS — M54.42 CHRONIC LEFT-SIDED LOW BACK PAIN WITH LEFT-SIDED SCIATICA: ICD-10-CM

## 2021-04-12 DIAGNOSIS — G89.29 CHRONIC LEFT-SIDED LOW BACK PAIN WITH LEFT-SIDED SCIATICA: ICD-10-CM

## 2021-04-12 DIAGNOSIS — M22.2X2 PATELLOFEMORAL PAIN SYNDROME OF LEFT KNEE: Primary | ICD-10-CM

## 2021-04-12 PROCEDURE — 97140 MANUAL THERAPY 1/> REGIONS: CPT

## 2021-04-12 PROCEDURE — 97112 NEUROMUSCULAR REEDUCATION: CPT

## 2021-04-12 PROCEDURE — 97110 THERAPEUTIC EXERCISES: CPT

## 2021-04-12 NOTE — PROGRESS NOTES
Daily Note     Today's date: 2021  Patient name: Justus Valadez  : 1960  MRN: [id number]  Referring provider: Yenifer Patrick  Dx:   Encounter Diagnosis     ICD-10-CM    1  Patellofemoral pain syndrome of left knee  M22 2X2    2  Chronic left-sided low back pain with left-sided sciatica  M54 42     G89 29                   Subjective: Pt presents to today's session reporting increased LBP and pain into piriformis area over the weekend, however it seems to be better today  Pt also mentions slight DOMS /p LV  Objective: See treatment diary below      Assessment: Tolerated treatment well, and is challenged appropriately  Pt responds well to strengthening exercises and manual therapy  Pt demonstrates improved quad strength, as pt performed LAQ on knee extension machine with increased weight without difficulty  Patient demonstrated fatigue post treatment and would benefit from continued PT to improve strength, flexibility, and function  Will consider adding weights to clamshells NV  Plan: Progress treatment as tolerated  Today's session was treated by SANTINO Chaudhary, with direct supervision of Estrada Severino PTA         Precautions: Epilepsy, fall risk    Manuals 3/15 3/18 3/25 3/29 4/1 4/5 4/8 4/12     theraroller left gluteals to low back [facility] SUZANNE TEMPLETON  JARETH Núñez taping left  SY  SUZANNE VELARDE  JARETH                               Neuro Re-Ed             PPT  10x 10x          PPT with march             PPT with kick out                                                                 Ther Ex             bike  5' 5' 6' 6' 8' 8' 8'     Knee ext   #11 20 #11 30 11# 30 11# 15x,  22# 10x 22# 2x10 22# 2x10     Knee flexion             Sit to stands,             Standing hip 3-way             SKC :10x5 :10x5 :10x5 :10x5 nv        Hamstring glide 15 HEP           Glut stretch :10x5 HEP           clamshells  15x 20 20 20 nv 20x ea 20ea     bridges  10x 20 pball 20 nv        pball leg curls  15x 20 20 20 20 20x 20x     pball LTR    20 20 20 20x 20x     Seated hamstring stretch :10x5 HEP           Seated trunk flexion :10x5 :10x10 :10x10 :05x15 :03u78SVune :78n60LXngt 20x5" pball :63x39UYpmc                  Ther Activity                                       Gait Training                                       Modalities

## 2021-04-15 ENCOUNTER — APPOINTMENT (OUTPATIENT)
Dept: PHYSICAL THERAPY | Facility: CLINIC | Age: 61
End: 2021-04-15
Payer: COMMERCIAL

## 2021-04-20 ENCOUNTER — OFFICE VISIT (OUTPATIENT)
Dept: PHYSICAL THERAPY | Facility: CLINIC | Age: 61
End: 2021-04-20
Payer: COMMERCIAL

## 2021-04-20 DIAGNOSIS — M54.42 CHRONIC LEFT-SIDED LOW BACK PAIN WITH LEFT-SIDED SCIATICA: ICD-10-CM

## 2021-04-20 DIAGNOSIS — M22.2X2 PATELLOFEMORAL PAIN SYNDROME OF LEFT KNEE: Primary | ICD-10-CM

## 2021-04-20 DIAGNOSIS — G89.29 CHRONIC LEFT-SIDED LOW BACK PAIN WITH LEFT-SIDED SCIATICA: ICD-10-CM

## 2021-04-20 PROCEDURE — 97110 THERAPEUTIC EXERCISES: CPT

## 2021-04-20 PROCEDURE — 97140 MANUAL THERAPY 1/> REGIONS: CPT

## 2021-04-20 PROCEDURE — 97112 NEUROMUSCULAR REEDUCATION: CPT

## 2021-04-20 NOTE — PROGRESS NOTES
Daily Note     Today's date: 2021  Patient name: Bari Morris  : 1960  MRN: [id number]  Referring provider: Mirella Alvarez  Dx:   Encounter Diagnosis     ICD-10-CM    1  Patellofemoral pain syndrome of left knee  M22 2X2    2  Chronic left-sided low back pain with left-sided sciatica  M54 42     G89 29                   Subjective: Pt presents to today's session reporting she feels "good" overall  Pt reports being able to work outside today with no difficulty, however notes mild (L) knee soreness this visit 2/2 repeated bending  Objective: See treatment diary below      Assessment: Tolerated treatment well  Pt tolerated manual therapy well, as it helps decrease muscle tension, and tolerates strengthening exercises well, noting no adverse Sx  Patient demonstrated fatigue post treatment and would benefit from continued PT  Will resume knee extension machine NV  Plan: Continue per plan of care  Today's session was treated by SANTINO Moore, with direct supervision of Yue Bautista PTA         Precautions: Epilepsy, fall risk    Manuals 3/15 3/18 3/25 3/29 4/1 4/5 4/8 4/12 4/20    theraroller left gluteals to low back SY [facility] SY JARETH TEMPLETON  JARETH Núñez taping left  SY  SY SY [facility] JARETH TEMPLETON                              Neuro Re-Ed             PPT  10x 10x          PPT with march             PPT with kick out                                                                 Ther Ex             bike  5' 5' 6' 6' 8' 8' 8' 8'    Knee ext   #11 20 #11 30 11# 30 11# 15x,  22# 10x 22# 2x10 22# 2x10 nv    Knee flexion             Sit to stands,             Standing hip 3-way             SKC :10x5 :10x5 :10x5 :10x5 nv        Hamstring glide 15 HEP           Glut stretch :10x5 HEP           clamshells  15x 20 20 20 nv 20x ea 20ea 2' ea    bridges  10x 20 pball 20 nv        pball leg curls  15x 20 20 20 20 20x 20x 2'    pball LTR    20 20 20 20x 20x 2'    Seated hamstring stretch :10x5 HEP           Seated trunk flexion :10x5 :10x10 :10x10 :05x15 :64h06QEyyl :13r22NJctt 20x5" pball :15c00TIknx :05 2' pball                 Ther Activity                                       Gait Training                                       Modalities

## 2021-04-22 ENCOUNTER — OFFICE VISIT (OUTPATIENT)
Dept: PHYSICAL THERAPY | Facility: CLINIC | Age: 61
End: 2021-04-22
Payer: COMMERCIAL

## 2021-04-22 DIAGNOSIS — M22.2X2 PATELLOFEMORAL PAIN SYNDROME OF LEFT KNEE: Primary | ICD-10-CM

## 2021-04-22 DIAGNOSIS — G89.29 CHRONIC LEFT-SIDED LOW BACK PAIN WITH LEFT-SIDED SCIATICA: ICD-10-CM

## 2021-04-22 DIAGNOSIS — M54.42 CHRONIC LEFT-SIDED LOW BACK PAIN WITH LEFT-SIDED SCIATICA: ICD-10-CM

## 2021-04-22 PROCEDURE — 97140 MANUAL THERAPY 1/> REGIONS: CPT

## 2021-04-22 PROCEDURE — 97110 THERAPEUTIC EXERCISES: CPT

## 2021-04-22 PROCEDURE — 97112 NEUROMUSCULAR REEDUCATION: CPT

## 2021-04-22 NOTE — PROGRESS NOTES
Daily Note     Today's date: 2021  Patient name: Dg Saavedra  : 1960  MRN: [id number]  Referring provider: Lewis Silver  Dx:   Encounter Diagnosis     ICD-10-CM    1  Patellofemoral pain syndrome of left knee  M22 2X2    2  Chronic left-sided low back pain with left-sided sciatica  M54 42     G89 29                   Subjective: Pt reports overall improvement in Sx since she started PT, however pt notes increased soreness 2/2 household activities  Objective: See treatment diary below      Assessment: Tolerated treatment well  Patient demonstrated fatigue post treatment and would benefit from continued PT  Pt does well with resuming bridges and addition of PPT /c marches, demonstrating good core control and no adverse Sx  Pt does well with increased reps on the knee extension machine, noting slight fatigue after  Plan: Continue per plan of care  Today's session was treated by SANTINO Mcmullen, with direct supervision of Carisa Tom PTA         Precautions: Epilepsy, fall risk    Manuals 3/15 3/18 3/25 3/29 4/1 4/5 4/8 4/12 4/20 4/22   theraroller left gluteals to low back SY MC SY SY JM JM  JARETH Núñez taping left  SY MC SY SY MC JM LH JM JARETH TEMPLETON                             Neuro Re-Ed             PPT  10x 10x          PPT with march          20x ea   PPT with kick out                                                                 Ther Ex             bike  5' 5' 6' 6' 8' 8' 8' 8' 10' Lvl3   Knee ext   #11 20 #11 30 11# 30 11# 15x,  22# 10x 22# 2x10 22# 2x10 nv 22# 2x15   Knee flexion             Sit to stands,             Standing hip 3-way             SKC :10x5 :10x5 :10x5 :10x5 nv        Hamstring glide 15 HEP           Glut stretch :10x5 HEP           clamshells  15x 20 20 20 nv 20x ea 20ea 2' ea 2' ea   bridges  10x 20 pball 20 nv     20x   pball leg curls  15x 20 20 20 20 20x 20x 2' 2'   pball LTR    20 20 20 20x 20x 2' 2'   Seated hamstring stretch :10x5 HEP           Seated trunk flexion :10x5 :10x10 :10x10 :05x15 :88p14NFzth :07o84CTqcs 20x5" pball :03j37FPwdz :05 2' pball :05 2' pball                Ther Activity                                       Gait Training                                       Modalities

## 2021-04-25 ENCOUNTER — IMMUNIZATIONS (OUTPATIENT)
Dept: FAMILY MEDICINE CLINIC | Facility: HOSPITAL | Age: 61
End: 2021-04-25

## 2021-04-25 DIAGNOSIS — Z23 ENCOUNTER FOR IMMUNIZATION: Primary | ICD-10-CM

## 2021-04-25 PROCEDURE — 91300 SARS-COV-2 / COVID-19 MRNA VACCINE (PFIZER-BIONTECH) 30 MCG: CPT

## 2021-04-25 PROCEDURE — 0001A SARS-COV-2 / COVID-19 MRNA VACCINE (PFIZER-BIONTECH) 30 MCG: CPT

## 2021-04-27 ENCOUNTER — OFFICE VISIT (OUTPATIENT)
Dept: PHYSICAL THERAPY | Facility: CLINIC | Age: 61
End: 2021-04-27
Payer: COMMERCIAL

## 2021-04-27 DIAGNOSIS — M54.42 CHRONIC LEFT-SIDED LOW BACK PAIN WITH LEFT-SIDED SCIATICA: ICD-10-CM

## 2021-04-27 DIAGNOSIS — M22.2X2 PATELLOFEMORAL PAIN SYNDROME OF LEFT KNEE: Primary | ICD-10-CM

## 2021-04-27 DIAGNOSIS — G89.29 CHRONIC LEFT-SIDED LOW BACK PAIN WITH LEFT-SIDED SCIATICA: ICD-10-CM

## 2021-04-27 PROCEDURE — 97112 NEUROMUSCULAR REEDUCATION: CPT

## 2021-04-27 PROCEDURE — 97140 MANUAL THERAPY 1/> REGIONS: CPT

## 2021-04-27 PROCEDURE — 97110 THERAPEUTIC EXERCISES: CPT

## 2021-04-27 NOTE — PROGRESS NOTES
Daily Note     Today's date: 2021  Patient name: Modesto Weems  : 1960  MRN: [id number]  Referring provider: Marry Oh  Dx:   Encounter Diagnosis     ICD-10-CM    1  Patellofemoral pain syndrome of left knee  M22 2X2    2  Chronic left-sided low back pain with left-sided sciatica  M54 42     G89 29                   Subjective: Pt reports noticing overall improvement in radicular Sx, and states she wishes to be more active, as she used to be  Pt notes slight discomfort along lateral aspect of (L) LE        Objective: See treatment diary below      Assessment: Tolerated treatment well  Patient demonstrated fatigue post treatment and would benefit from continued PT  Pt demonstrates good core control during strengthening exercise, as pt is able to maintain PPT  Pt benefited from manual therapy, as it helped decrease discomfort  Plan: Continue per plan of care  Today's session was treated by SANTINO Samuels, with direct supervision of Pippa Briones PTA         Precautions: Epilepsy, fall risk    Manuals    theraroller left gluteals to low back [facility] JARETH Núñez taping left  [facility] JARETH                             Neuro Re-Ed             PPT             PPT with march 20ea         20x ea   PPT with kick out                                                                 Ther Ex             bike 8' Lvl 3    6' 8' 8' 8' 8' 10' Lvl3   Knee ext nv    11# 30 11# 15x,  22# 10x 22# 2x10 22# 2x10 nv 22# 2x15   Knee flexion             Sit to stands,             Standing hip 3-way             SKC     nv        Hamstring glide             Glut stretch             clamshells 2' ea    20 nv 20x ea 20ea 2' ea 2' ea   bridges 20x    nv     20x   pball leg curls 2'    20 20 20x 20x 2' 2'   pball LTR 2'    20 20 20x 20x 2' 2'   Seated hamstring stretch             Seated trunk flexion :05 2' pball :45l39PLzcc :21u84DEkee 20x5" pball :05w97UVnyk :05 2' pball :05 2' pball                Ther Activity                                       Gait Training                                       Modalities

## 2021-04-29 ENCOUNTER — EVALUATION (OUTPATIENT)
Dept: PHYSICAL THERAPY | Facility: CLINIC | Age: 61
End: 2021-04-29
Payer: COMMERCIAL

## 2021-04-29 DIAGNOSIS — G89.29 CHRONIC LEFT-SIDED LOW BACK PAIN WITH LEFT-SIDED SCIATICA: ICD-10-CM

## 2021-04-29 DIAGNOSIS — M54.42 CHRONIC LEFT-SIDED LOW BACK PAIN WITH LEFT-SIDED SCIATICA: ICD-10-CM

## 2021-04-29 DIAGNOSIS — M22.2X2 PATELLOFEMORAL PAIN SYNDROME OF LEFT KNEE: Primary | ICD-10-CM

## 2021-04-29 PROCEDURE — 97112 NEUROMUSCULAR REEDUCATION: CPT

## 2021-04-29 PROCEDURE — 97110 THERAPEUTIC EXERCISES: CPT

## 2021-04-29 NOTE — PROGRESS NOTES
Daily Note     Today's date: 2021  Patient name: Yuvonne Babinski  : 1960  MRN: [id number]  Referring provider: Felicity Griffiths  Dx:   Encounter Diagnosis     ICD-10-CM    1  Patellofemoral pain syndrome of left knee  M22 2X2    2  Chronic left-sided low back pain with left-sided sciatica  M54 42     G89 29                   Subjective: See re-assessment      Objective: See treatment diary below      Assessment: See re-assessment      Plan: Continue per plan of care  Today's session was treated by SANTINO [place], with direct supervision of Liya Francois PTA         Precautions: Epilepsy, fall risk    Manuals    theraroller left gluteals to low back [facility] JARETH Núñez taping left  [facility] JARETH TEMPLETON                             Neuro Re-Ed             PPT             PPT with march 20ea         20x ea   PPT with kick out                                                                 Ther Ex             bike 8' Lvl 3 10'    6' 8' 8' 8' 8' 10' Lvl3   Knee ext nv    11# 30 11# 15x,  22# 10x 22# 2x10 22# 2x10 nv 22# 2x15   Knee flexion             Sit to stands,             Standing hip 3-way             SKC  10x5 ea   nv        DBC  10x5           Hamstring glide             Glut stretch             clamshells 2' ea    20 nv 20x ea 20ea 2' ea 2' ea   bridges 20x    nv     20x   pball leg curls 2' 20x   20 20 20x 20x 2' 2'   pball LTR 2'    20 20 20x 20x 2' 2'   Seated hamstring stretch             Seated trunk flexion :05 2' pball    :87o94HXyvp :86q89QZsjy 20x5" pball :82w11SGdjf :05 2' pball :05 2' pball                Ther Activity                                       Gait Training                                       Modalities

## 2021-04-29 NOTE — PROGRESS NOTES
PT Re-EVALUATION     Today's date: 21  Patient name: Aide Cade  : 1960  MRN: [id number]  Referring provider: Chely Loza PA-C  Dx:   1  Patellofemoral pain syndrome of left knee    2  Chronic left-sided low back pain with left-sided sciatica          Melvi's left knee has been feeling significantly better but she continues to struggle with low back, gluteal and hamstring pain  Her knee active and passive range of motion has increased though extensor lag is still present  She feels the Núñez taping is very beneficial and uses it consistently  Treatment will incorporate more flexion biased trunk exercises as extension tends to increase radiating symptoms in the leg  This patient would benefit from skilled physical therapy to address their listed impairments and functional limitations to maximize functional outcome      Impairments:    restricted ROM    decreased strength   pain with function   activity intolerance      Prognosis:  Fair  Positive and negative prognostic indicator(s):  prior success with conservative care, pain >3 months and multiple comorbidities     Goals:    STG Patient is independent with HEP (met)  STG Range of motion is improved by 25% in 2 weeks (met)  LTG ADL performance improved to prior level of function in 6 weeks  LTG Weight bearing tolerance improved 50% in 4 weeks     Planned interventions:  home exercise program, patient education, manual therapy, massage, graded activity, flexibility, functional range of motion exercises, strengthening, McConnel taping and modalities prn     Duration in visits:  8  Frequency: 2 visits per week  Duration in weeks:  4     History of Current Injury: patient felt in May 2020 and broke her left ankle  Patient reports continued clicking in the knee cap but improved tolerance to some weight bearing activities  Her overall activity level continues to be limited by left knee and low back pain    Static standing is particularly difficulty and she has to stop for a period of time with house work and caring for her grand children  Symptoms in the left sacrum and left buttock and sometimes down to the knee and occasionally to the medial calf have not significantly changed           Pain location: anterior knee  Pain descriptors:  Aching, pressure  Pain intensity:  Knee 6/10 (10/10 at peak)     Aggravating factors: weight bearing activities (knee),  Prolonged sit to stand (low back/left buttock)  Easing factors: lying down with pillow under legs     Hobbies/Interests: caring for grandson     Patient goals:  decreased pain, independence with ADLs and increased mobility     Objective      Palpation     Additional Palpation Details  Tender to left gluteals, superior > mid and left SIJ     Active Range of Motion      Lumbar   Flexion:  WFL  Extension: 22 (from 18 degrees)   Left lateral flexion: 24 degrees   Right lateral flexion: 25 degrees     Left Knee   Flexion: 90  (from 70 degrees)   Extensor la (from 6 degrees)      Passive Range of Motion   Left Knee   Flexion: 120  (from 107 degrees)   Extension: 0 (from 4 degrees)      Joint Play     Hypomobile: L4, L5 and S1     Pain: L2, L3, L4, L5 and S1      Strength/Myotome Testing      Left Knee   Flexion: 4+  Extension: 4         Lumbar Comments  Increasing hamstring pain noted with repeated extension in standing  No increase in pain with repeated flexion

## 2021-05-04 ENCOUNTER — OFFICE VISIT (OUTPATIENT)
Dept: PHYSICAL THERAPY | Facility: CLINIC | Age: 61
End: 2021-05-04
Payer: COMMERCIAL

## 2021-05-04 DIAGNOSIS — M22.2X2 PATELLOFEMORAL PAIN SYNDROME OF LEFT KNEE: Primary | ICD-10-CM

## 2021-05-04 DIAGNOSIS — G89.29 CHRONIC LEFT-SIDED LOW BACK PAIN WITH LEFT-SIDED SCIATICA: ICD-10-CM

## 2021-05-04 DIAGNOSIS — M54.42 CHRONIC LEFT-SIDED LOW BACK PAIN WITH LEFT-SIDED SCIATICA: ICD-10-CM

## 2021-05-04 PROCEDURE — 97140 MANUAL THERAPY 1/> REGIONS: CPT

## 2021-05-04 PROCEDURE — 97112 NEUROMUSCULAR REEDUCATION: CPT

## 2021-05-04 PROCEDURE — 97110 THERAPEUTIC EXERCISES: CPT

## 2021-05-04 NOTE — PROGRESS NOTES
Daily Note     Today's date: 2021  Patient name: Fany Arizmendi  : 1960  MRN: [id number]  Referring provider: Giles Ribeiro  Dx:   Encounter Diagnosis     ICD-10-CM    1  Patellofemoral pain syndrome of left knee  M22 2X2    2  Chronic left-sided low back pain with left-sided sciatica  M54 42     G89 29                   Subjective: Patient reports increased pain along L hamstring extending into calf after cleaning yesterday which involved vacuuming and repetitive forward bending  Objective: See treatment diary below      Assessment: Tolerated treatment well  Continued with progression of lumbar flexion biased exercises and core stabilization today as indicated  Patient demonstrated fatigue post treatment and would benefit from continued PT  Decreased LLE pain overall at conclusion of session though mild pain in along hamstring remained though did not extend past knee  Plan: Progress treatment as tolerated          Precautions: Epilepsy, fall risk    Manuals    theraroller left gluteals to low back [facility] JARETH Núñez taping left  [facility]                             Neuro Re-Ed             PPT   2'          PPT with   2'       20x ea   PPT with kick out                                                    SLR   10x          Ther Ex             bike 8' Lvl 3 10'  6' L3  6' 8' 8' 8' 8' 10' Lvl3   Knee ext nv  22# 2'  11# 30 11# 15x,  22# 10x 22# 2x10 22# 2x10 nv 22# 2x15   Knee flexion             Sit to stands,             Standing hip 3-way             SKC  10x5 ea :10x1' ea  nv        DKC  10x5 :10x2'          Hamstring glide   manual 10x          Glut stretch   1' ea          clamshells 2' ea    20 nv 20x ea 20ea 2' ea 2' ea   bridges 20x    nv     20x   pball leg curls 2' 20x 2'  20 20 20x 20x 2' 2'   pball LTR 2'    20 20 20x 20x 2' 2'   Seated hamstring stretch             Seated trunk flexion :05 2' pball  pball :05x2'  :48b21ROmcw :35f38GBhst 20x5" pball :21w08QKuiu :05 2' pball :05 2' pball                Ther Activity                                       Gait Training                                       Modalities

## 2021-05-06 ENCOUNTER — OFFICE VISIT (OUTPATIENT)
Dept: PHYSICAL THERAPY | Facility: CLINIC | Age: 61
End: 2021-05-06
Payer: COMMERCIAL

## 2021-05-06 DIAGNOSIS — G89.29 CHRONIC LEFT-SIDED LOW BACK PAIN WITH LEFT-SIDED SCIATICA: ICD-10-CM

## 2021-05-06 DIAGNOSIS — M22.2X2 PATELLOFEMORAL PAIN SYNDROME OF LEFT KNEE: Primary | ICD-10-CM

## 2021-05-06 DIAGNOSIS — M54.42 CHRONIC LEFT-SIDED LOW BACK PAIN WITH LEFT-SIDED SCIATICA: ICD-10-CM

## 2021-05-06 PROCEDURE — 97112 NEUROMUSCULAR REEDUCATION: CPT

## 2021-05-06 PROCEDURE — 97110 THERAPEUTIC EXERCISES: CPT

## 2021-05-06 PROCEDURE — 97140 MANUAL THERAPY 1/> REGIONS: CPT

## 2021-05-06 NOTE — PROGRESS NOTES
Daily Note     Today's date: 2021  Patient name: Odalys Chilel  : 1960  MRN: [id number]  Referring provider: Derrick Lynch  Dx:   Encounter Diagnosis     ICD-10-CM    1  Patellofemoral pain syndrome of left knee  M22 2X2    2  Chronic left-sided low back pain with left-sided sciatica  M54 42     G89 29                   Subjective: Patient reports she has not had L posterior leg pain since last visit  Objective: See treatment diary below      Assessment: Tolerated treatment well  Patient demonstrated fatigue post treatment and would benefit from continued PT  Continued with focus on core stabilization and flexion biased TE program   Verbal and tactile cueing needed for posterior pelvic tilt, patient has tendency to tilt pelvis anteriorly  Plan: Progress treatment as tolerated         Precautions: Epilepsy, fall risk    Manuals    theraroller left gluteals to low back [facility] JARETH Núñez taping left  [facility] JARETH                             Neuro Re-Ed             PPT   2' 2'         PPT with march 20ea  2' 2'      20x ea   PPT with kick out                                                    SLR   10x 15x         Ther Ex             bike 8' Lvl 3 10'  6' L3 6' L3   8' 8' 8' 10' Lvl3   Knee ext nv  22# 2' 22# 2'   22# 2x10 22# 2x10 nv 22# 2x15   Knee flexion             Sit to stands,             Standing hip 3-way             SKC  10x5 ea :10x1' ea :10x1' ea         DKC  10x5 :10x2' :10x1'         Hamstring glide   manual 10x          Glut stretch   1' ea 1' ea         clamshells 2' ea      20x ea 20ea 2' ea 2' ea   bridges 20x         20x   pball leg curls 2' 20x 2' 2'   20x 20x 2' 2'   pball LTR 2'   2'   20x 20x 2' 2'   Seated hamstring stretch             Seated trunk flexion :05 2' pball  pball :05x2' pball :05x2'   20x5" pball :91h06MAmtw :05 2' pball :05 2' pball                Ther Activity Downward dog from table?   :10x1' :10x1'                      Gait Training                                       Modalities

## 2021-05-11 ENCOUNTER — OFFICE VISIT (OUTPATIENT)
Dept: PHYSICAL THERAPY | Facility: CLINIC | Age: 61
End: 2021-05-11
Payer: COMMERCIAL

## 2021-05-11 DIAGNOSIS — M22.2X2 PATELLOFEMORAL PAIN SYNDROME OF LEFT KNEE: Primary | ICD-10-CM

## 2021-05-11 DIAGNOSIS — M54.42 CHRONIC LEFT-SIDED LOW BACK PAIN WITH LEFT-SIDED SCIATICA: ICD-10-CM

## 2021-05-11 DIAGNOSIS — G89.29 CHRONIC LEFT-SIDED LOW BACK PAIN WITH LEFT-SIDED SCIATICA: ICD-10-CM

## 2021-05-11 PROCEDURE — 97112 NEUROMUSCULAR REEDUCATION: CPT

## 2021-05-11 PROCEDURE — 97140 MANUAL THERAPY 1/> REGIONS: CPT

## 2021-05-11 PROCEDURE — 97110 THERAPEUTIC EXERCISES: CPT

## 2021-05-11 NOTE — PROGRESS NOTES
Daily Note     Today's date: 2021  Patient name: Bertin Christy  : 1960  MRN: [id number]  Referring provider: Eliza Gonzalez  Dx:   Encounter Diagnosis     ICD-10-CM    1  Patellofemoral pain syndrome of left knee  M22 2X2    2  Chronic left-sided low back pain with left-sided sciatica  M54 42     G89 29                   Subjective: Patient reports she feels "pretty good" today overall  She notes decreased pain in L knee and she did not feel the need to wear brace today  No radiating pain in LLE since last week  Objective: See treatment diary below      Assessment: Tolerated treatment well  Patient demonstrated fatigue post treatment and would benefit from continued PT  Continues to require cueing to avoid tilting pelvis anteriorly with PPT  Minimal tenderness to L superior gluts  Plan: Progress treatment as tolerated         Precautions: Epilepsy, fall risk    Manuals    theraroller left gluteals to low back [facility]    JARETH Núñez taping left  [facility] JARETH                             Neuro Re-Ed             PPT   2' 2' 2'        PPT with march 20ea  2' 2' 2'     20x ea   PPT with kick out                                                    SLR   10x 15x 15x        Ther Ex             bike 8' Lvl 3 10'  6' L3 6' L3 6' L3    8' 10' Lvl3   Knee ext nv  22# 2' 22# 2' 22# 2'    nv 22# 2x15   Knee flexion             Sit to stands,             Standing hip 3-way             SKC  10x5 ea :10x1' ea :10x1' ea :10x1' ea        DK  10x5 :10x2' :10x1' :10x1'        Hamstring glide   manual 10x          Glut stretch   1' ea 1' ea 1' ea        clamshells 2' ea        2' ea 2' ea   bridges 20x         20x   pball leg curls 2' 20x 2' 2' 2'    2' 2'   pball LTR 2'   2' 2'    2' 2'   Seated hamstring stretch             Seated trunk flexion :05 2' pball  pball :05x2' pball :05x2' pball :05x2'    :05 2' pball :05 2' pball Ther Activity             Downward dog from table?   :10x1' :10x1' :10x1'                     Gait Training                                       Modalities

## 2021-05-13 ENCOUNTER — OFFICE VISIT (OUTPATIENT)
Dept: PHYSICAL THERAPY | Facility: CLINIC | Age: 61
End: 2021-05-13
Payer: COMMERCIAL

## 2021-05-13 DIAGNOSIS — M22.2X2 PATELLOFEMORAL PAIN SYNDROME OF LEFT KNEE: Primary | ICD-10-CM

## 2021-05-13 DIAGNOSIS — M54.42 CHRONIC LEFT-SIDED LOW BACK PAIN WITH LEFT-SIDED SCIATICA: ICD-10-CM

## 2021-05-13 DIAGNOSIS — G89.29 CHRONIC LEFT-SIDED LOW BACK PAIN WITH LEFT-SIDED SCIATICA: ICD-10-CM

## 2021-05-13 PROCEDURE — 97110 THERAPEUTIC EXERCISES: CPT

## 2021-05-13 PROCEDURE — 97112 NEUROMUSCULAR REEDUCATION: CPT

## 2021-05-13 NOTE — PROGRESS NOTES
Daily Note     Today's date: 2021  Patient name: Estela Mae  : 1960  MRN: [id number]  Referring provider: Joseline Hood  Dx:   Encounter Diagnosis     ICD-10-CM    1  Patellofemoral pain syndrome of left knee  M22 2X2    2  Chronic left-sided low back pain with left-sided sciatica  M54 42     G89 29                   Subjective: Pt reports her knees are sore from doing some work on her hands and knees  Objective: See treatment diary below      Assessment: Patient did well with all TE as listed, fatigued post tx, no increased pain during or post tx  Plan: Continue per plan of care        Precautions: Epilepsy, fall risk    Manuals    theraroller left gluteals to low back [facility] JARETH Núñez taping left  [facility] JARETH                             Neuro Re-Ed             PPT   2' 2' 2' 2'       PPT with march 20ea  2' 2' 2' 2'    20x ea   PPT with kick out                                                    SLR   10x 15x 15x 15x       Ther Ex             bike 8' Lvl 3 10'  6' L3 6' L3 6' L3 6' L3   8' 10' Lvl3   Knee ext nv  22# 2' 22# 2' 22# 2' 22# 2'   nv 22# 2x15   Knee flexion             Sit to stands,             Standing hip 3-way             SKC  10x5 ea :10x1' ea :10x1' ea :10x1' ea :10x1'       [facility]  10x5 :10x2' :10x1' :10x1' :10x1'       Hamstring glide   manual 10x          Glut stretch   1' ea 1' ea 1' ea 1' ea       clamshells 2' ea        2' ea 2' ea   bridges 20x         20x   pball leg curls 2' 20x 2' 2' 2' 2'   2' 2'   pball LTR 2'   2' 2' 2'   2' 2'   Seated hamstring stretch             Seated trunk flexion :05 2' pball  pball :05x2' pball :05x2' pball :05x2' pball :05x2'   :05 2' pball :05 2' pball                Ther Activity             Downward dog from table?   :10x1' :10x1' :10x1' :10x10                    Gait Training                                       Modalities

## 2021-05-16 ENCOUNTER — IMMUNIZATIONS (OUTPATIENT)
Dept: FAMILY MEDICINE CLINIC | Facility: HOSPITAL | Age: 61
End: 2021-05-16

## 2021-05-16 DIAGNOSIS — Z23 ENCOUNTER FOR IMMUNIZATION: Primary | ICD-10-CM

## 2021-05-16 PROCEDURE — 0002A SARS-COV-2 / COVID-19 MRNA VACCINE (PFIZER-BIONTECH) 30 MCG: CPT

## 2021-05-16 PROCEDURE — 91300 SARS-COV-2 / COVID-19 MRNA VACCINE (PFIZER-BIONTECH) 30 MCG: CPT

## 2021-05-18 ENCOUNTER — OFFICE VISIT (OUTPATIENT)
Dept: PHYSICAL THERAPY | Facility: CLINIC | Age: 61
End: 2021-05-18
Payer: COMMERCIAL

## 2021-05-18 DIAGNOSIS — M54.42 CHRONIC LEFT-SIDED LOW BACK PAIN WITH LEFT-SIDED SCIATICA: ICD-10-CM

## 2021-05-18 DIAGNOSIS — G89.29 CHRONIC LEFT-SIDED LOW BACK PAIN WITH LEFT-SIDED SCIATICA: ICD-10-CM

## 2021-05-18 DIAGNOSIS — M22.2X2 PATELLOFEMORAL PAIN SYNDROME OF LEFT KNEE: Primary | ICD-10-CM

## 2021-05-18 PROCEDURE — 97112 NEUROMUSCULAR REEDUCATION: CPT

## 2021-05-18 PROCEDURE — 97140 MANUAL THERAPY 1/> REGIONS: CPT

## 2021-05-18 PROCEDURE — 97110 THERAPEUTIC EXERCISES: CPT

## 2021-05-18 NOTE — PROGRESS NOTES
Daily Note     Today's date: 2021  Patient name: Bertin Christy  : 1960  MRN: [id number]  Referring provider: Eliza Gonzalez  Dx:   Encounter Diagnosis     ICD-10-CM    1  Patellofemoral pain syndrome of left knee  M22 2X2    2  Chronic left-sided low back pain with left-sided sciatica  M54 42     G89 29                   Subjective: Patient noted no new complaints  Objective: See treatment diary below      Assessment:  Patient arrived late to treatment was accommodated  Tolerated treatment fair  Not all TE performed due to time, resume NV  Patient exhibited good technique with therapeutic exercises and would benefit from continued PT      Plan: Continue per plan of care        Precautions: Epilepsy, fall risk    Manuals    theraroller left gluteals to low back [facility]  JARETH Núñez taping left  [facility]   JARETH                             Neuro Re-Ed             PPT   2' 2' 2' 2' 2'      PPT with ea  2' 2' 2' 2'    20x ea   PPT with kick out                                                    SLR   10x 15x 15x 15x       Ther Ex             bike 8' Lvl 3 10'  6' L3 6' L3 6' L3 6' L3 6' L3  8' 10' Lvl3   Knee ext nv  22# 2' 22# 2' 22# 2' 22# 2'   nv 22# 2x15   Knee flexion             Sit to stands,             Standing hip 3-way             SKC  10x5 ea :10x1' ea :10x1' ea :10x1' ea :10x1' 10"x1'      DKC  10x5 :10x2' :10x1' :10x1' :10x1' 10"x1'      Hamstring glide   manual 10x          Glut stretch   1' ea 1' ea 1' ea 1' ea       clamshells 2' ea        2' ea 2' ea   bridges 20x         20x   pball leg curls 2' 20x 2' 2' 2' 2' 2'  2' 2'   pball LTR 2'   2' 2' 2' 2'  2' 2'   Seated hamstring stretch             Seated trunk flexion :05 2' pball  pball :05x2' pball :05x2' pball :05x2' pball :05x2' pball 5"x2'  :05 2' pball :05 2' pball                Ther Activity             Downward dog from table?   :10x1' Gage Cosme 10x1' :10x1' :10x10                    Gait Training                                       Modalities

## 2021-05-20 ENCOUNTER — OFFICE VISIT (OUTPATIENT)
Dept: PHYSICAL THERAPY | Facility: CLINIC | Age: 61
End: 2021-05-20
Payer: COMMERCIAL

## 2021-05-20 DIAGNOSIS — M22.2X2 PATELLOFEMORAL PAIN SYNDROME OF LEFT KNEE: ICD-10-CM

## 2021-05-20 DIAGNOSIS — G89.29 CHRONIC LEFT-SIDED LOW BACK PAIN WITH LEFT-SIDED SCIATICA: Primary | ICD-10-CM

## 2021-05-20 DIAGNOSIS — M54.42 CHRONIC LEFT-SIDED LOW BACK PAIN WITH LEFT-SIDED SCIATICA: Primary | ICD-10-CM

## 2021-05-20 PROCEDURE — 97140 MANUAL THERAPY 1/> REGIONS: CPT

## 2021-05-20 PROCEDURE — 97110 THERAPEUTIC EXERCISES: CPT

## 2021-05-20 PROCEDURE — 97112 NEUROMUSCULAR REEDUCATION: CPT

## 2021-05-20 NOTE — PROGRESS NOTES
Daily Note     Today's date: 2021  Patient name: Estela Mae  : 1960  MRN: [id number]  Referring provider: Joseline Hood  Dx:   Encounter Diagnosis     ICD-10-CM    1  Chronic left-sided low back pain with left-sided sciatica  M54 42     G89 29    2  Patellofemoral pain syndrome of left knee  M22 2X2                   Subjective: Patient reports increased low back pain after lifting heavy bins full of clothing, she notes transient symptoms along L gluts and posterior upper thigh though pain did not last like it used to  Objective: See treatment diary below      Assessment: Tolerated treatment well  Patient demonstrated fatigue post treatment and would benefit from continued PT  Resumed full exercise program today as charted  No significant tenderness reported with manual therapy  Plan: Progress treatment as tolerated         Precautions: Epilepsy, fall risk    Manuals      theraroller left gluteals to low back [facility] AF      Niya taping left  [facility]                                Neuro Re-Ed             PPT   2' 2' 2' 2' 2' 2'     PPT with ea  2' 2' 2' 2'  2'     PPT with kick out                                                    SLR   10x 15x 15x 15x  15x     Ther Ex             bike 8' Lvl 3 10'  6' L3 6' L3 6' L3 6' L3 6' L3 6' L3     Knee ext nv  22# 2' 22# 2' 22# 2' 22# 2'  22# 2'     Knee flexion             Sit to stands,             Standing hip 3-way             SKC  10x5 ea :10x1' ea :10x1' ea :10x1' ea :10x1' 10"x1' :10x1'     DKC  10x5 :10x2' :10x1' :10x1' :10x1' 10"x1' :10x1'     Hamstring glide   manual 10x          Glut stretch   1' ea 1' ea 1' ea 1' ea  1' ea     clamshells 2' ea            bridges 20x            pball leg curls 2' 20x 2' 2' 2' 2' 2' 2'     pball LTR 2'   2' 2' 2' 2' 2'     Seated hamstring stretch             Seated trunk flexion :05 2' pball  pball :05x2' pball Ethan Brain 21R1' pball :05x2' pball :05x2' pball 5"x2' pball :05x2'                  Ther Activity             Downward dog from table?   :10x1' :10x1' :10x1' :10x10  nv                  Gait Training                                       Modalities

## 2021-05-25 ENCOUNTER — TRANSCRIBE ORDERS (OUTPATIENT)
Dept: NEUROLOGY | Facility: CLINIC | Age: 61
End: 2021-05-25

## 2021-05-25 ENCOUNTER — OFFICE VISIT (OUTPATIENT)
Dept: PHYSICAL THERAPY | Facility: CLINIC | Age: 61
End: 2021-05-25
Payer: COMMERCIAL

## 2021-05-25 DIAGNOSIS — R56.9 SEIZURES (HCC): Primary | ICD-10-CM

## 2021-05-25 DIAGNOSIS — M54.42 CHRONIC LEFT-SIDED LOW BACK PAIN WITH LEFT-SIDED SCIATICA: Primary | ICD-10-CM

## 2021-05-25 DIAGNOSIS — R42 VERTIGO: ICD-10-CM

## 2021-05-25 DIAGNOSIS — M22.2X2 PATELLOFEMORAL PAIN SYNDROME OF LEFT KNEE: ICD-10-CM

## 2021-05-25 DIAGNOSIS — G43.709 CHRONIC MIGRAINE WITHOUT AURA, NOT INTRACTABLE, WITHOUT STATUS MIGRAINOSUS: ICD-10-CM

## 2021-05-25 DIAGNOSIS — G89.29 CHRONIC LEFT-SIDED LOW BACK PAIN WITH LEFT-SIDED SCIATICA: Primary | ICD-10-CM

## 2021-05-25 DIAGNOSIS — E34.8 PINEAL GLAND CYST: ICD-10-CM

## 2021-05-25 PROCEDURE — 97112 NEUROMUSCULAR REEDUCATION: CPT

## 2021-05-25 PROCEDURE — 97110 THERAPEUTIC EXERCISES: CPT

## 2021-05-25 PROCEDURE — 97140 MANUAL THERAPY 1/> REGIONS: CPT

## 2021-05-25 NOTE — PROGRESS NOTES
Daily Note     Today's date: 2021  Patient name: Slade Guerrero  : 1960  MRN: [id number]  Referring provider: Tarun Arevalo   Dx:   Encounter Diagnosis     ICD-10-CM    1  Chronic left-sided low back pain with left-sided sciatica  M54 42     G89 29    2  Patellofemoral pain syndrome of left knee  M22 2X2                   Subjective: Patient reports she was vacuuming on her hands and knees and the vacuum  fell onto her head, she is following up with neurologist tomorrow as she was light headed immediately after though denies further symptoms  Objective: See treatment diary below      Assessment: Tolerated treatment well  Patient demonstrated fatigue post treatment and would benefit from continued PT  Refer to RE for additional information  Plan: Progress treatment as tolerated         Precautions: Epilepsy, fall risk    Manuals     theraroller left gluteals to low back [facility]    Niya taping left  [facility]                              Neuro Re-Ed             PPT   2' 2' 2' 2' 2' 2' 2'    PPT with ea  2' 2' 2' 2'  2' 2'    PPT with kick out                                                    SLR   10x 15x 15x 15x  15x     Ther Ex             bike 8' Lvl 3 10'  6' L3 6' L3 6' L3 6' L3 6' L3 6' L3 6' L3    Knee ext nv  22# 2' 22# 2' 22# 2' 22# 2'  22# 2' 22# 2'    Knee flexion             Sit to stands,             Standing hip 3-way             SKC  10x5 ea :10x1' ea :10x1' ea :10x1' ea :10x1' 10"x1' :10x1' :10x1'    DKC  10x5 :10x2' :10x1' :10x1' :10x1' 10"x1' :10x1' :10x1'    Hamstring glide   manual 10x          Glut stretch   1' ea 1' ea 1' ea 1' ea  1' ea 1' ea    clamshells 2' ea            bridges 20x            pball leg curls 2' 20x 2' 2' 2' 2' 2' 2' 2'    pball LTR 2'   2' 2' 2' 2' 2' 2'    Seated hamstring stretch             Seated trunk flexion :05 2' pball  pball :05x2' pball :05x2' pball :05x2' pball :05x2' pball 5"x2' pball :05x2'                  Ther Activity             Downward dog from table?   :10x1' :10x1' :10x1' :10x10  nv :10x1'                 Gait Training                                       Modalities

## 2021-05-25 NOTE — PROGRESS NOTES
PT Re-EVALUATION     Today's date: 21  Patient name: Melvi Sultana  : 1960  TVT: [id number]  Referring Callie Jolley PA-C  Dx:   1  Patellofemoral pain syndrome of left knee    2  Chronic left-sided low back pain with left-sided sciatica          Abhi symptoms continue to improved and she has reduced intake of muscle relaxers and arthritis meds  Trunk and knee ROM are improving though she continues to have pain with lumbar extension and resisted knee movements  She also has decreased tolerance to all weight bearing activities  She feels the Núñez taping is very beneficial and uses it consistently   This patient would benefit from skilled physical therapy to address their listed impairments and functional limitations to maximize functional outcome      Impairments:    restricted ROM    decreased strength   pain with function   activity intolerance      Prognosis:  Fair  Positive and negative prognostic indicator(s):  prior success with conservative care, pain >3 months and multiple comorbidities     Goals:    STG Patient is independent with HEP (met)  STG Range of motion is improved by 25% in 2 weeks (met)  LTG ADL performance improved to prior level of function in 6 weeks  LTG Weight bearing tolerance improved 50% in 4 weeks     Planned interventions:  home exercise program, patient education, manual therapy, massage, graded activity, flexibility, functional range of motion exercises, strengthening, McConnel taping and modalities prn     Duration in visits:  8  Frequency: 2 visits per week  Duration in weeks:  4     History of Current Injury: patient felt in May 2020 and broke her left ankle       Patient repots pain in the buttock area and knee after prolonged sitting that decreases with ~30 minutes of light activity  Her grocery shopping trips are limited and if she is getting a lot she will get an electric cart after isle 3    She is taking less muscle relaxers, arthritis meds and CBD oil  She had increased pain over the weekend after a lot of activity with her grand children and cleaning from a family event      Pain location: anterior knee, left buttock  Pain descriptors:  Aching, pressure  Pain intensity:  Knee 5/10 (from 6 and 10/10 at peak)     Aggravating factors: weight bearing activities (knee),  Prolonged sit to stand (low back/left buttock)  Easing factors: lying down with pillow under legs     Hobbies/Interests: caring for grandson     Patient goals:  decreased pain, independence with ADLs and increased mobility     Objective   Palpation   Additional Palpation Details  Tender to left gluteals, superior > mid and left SIJ     Active Range of Motion   Lumbar   Flexion:  WFL  Extension: 27  (from 22, 18 degrees)   Left lateral flexion: 29  (from 24 degrees)   Right lateral flexion: 25 degrees      Left Knee   Flexion: 101  (from 90 and 70 degrees)   Extensor la (from 6 degrees)      Passive Range of Motion   Left Knee   Flexion: 120  (from 107 degrees)   Extension: 0 (from 4 degrees)      Joint Play   Hypomobile: L4, L5 and S1   Pain: L2, L3, L4, L5 and S1      Strength/Myotome Testing      Left Knee   Flexion: 4+  Extension: 4 p!         Lumbar Comments  Increasing hamstring pain noted with repeated extension in standing  No increase in pain with repeated flexion

## 2021-05-26 ENCOUNTER — OFFICE VISIT (OUTPATIENT)
Dept: NEUROLOGY | Facility: CLINIC | Age: 61
End: 2021-05-26
Payer: COMMERCIAL

## 2021-05-26 VITALS
SYSTOLIC BLOOD PRESSURE: 138 MMHG | DIASTOLIC BLOOD PRESSURE: 78 MMHG | HEIGHT: 63 IN | WEIGHT: 196 LBS | HEART RATE: 68 BPM | BODY MASS INDEX: 34.73 KG/M2

## 2021-05-26 DIAGNOSIS — F41.9 ANXIETY: ICD-10-CM

## 2021-05-26 DIAGNOSIS — E34.8 PINEAL GLAND CYST: ICD-10-CM

## 2021-05-26 DIAGNOSIS — R56.9 SEIZURES (HCC): ICD-10-CM

## 2021-05-26 DIAGNOSIS — R42 VERTIGO: ICD-10-CM

## 2021-05-26 DIAGNOSIS — G43.709 CHRONIC MIGRAINE WITHOUT AURA, NOT INTRACTABLE, WITHOUT STATUS MIGRAINOSUS: ICD-10-CM

## 2021-05-26 PROCEDURE — 99215 OFFICE O/P EST HI 40 MIN: CPT | Performed by: PSYCHIATRY & NEUROLOGY

## 2021-05-26 PROCEDURE — 3008F BODY MASS INDEX DOCD: CPT | Performed by: PSYCHIATRY & NEUROLOGY

## 2021-05-26 PROCEDURE — 1036F TOBACCO NON-USER: CPT | Performed by: PSYCHIATRY & NEUROLOGY

## 2021-05-26 PROCEDURE — G2212 PROLONG OUTPT/OFFICE VIS: HCPCS | Performed by: PSYCHIATRY & NEUROLOGY

## 2021-05-26 NOTE — PROGRESS NOTES
Review of Systems   Constitutional: Negative  Negative for appetite change and fever  HENT: Negative  Negative for hearing loss, tinnitus, trouble swallowing and voice change  Eyes: Negative  Negative for photophobia and pain  Respiratory: Negative  Negative for shortness of breath  Cardiovascular: Negative  Negative for palpitations  Gastrointestinal: Positive for nausea  Negative for vomiting  Endocrine: Negative  Negative for cold intolerance  Genitourinary: Negative  Negative for dysuria, frequency and urgency  Musculoskeletal: Positive for gait problem  Negative for myalgias and neck pain  Skin: Negative  Negative for rash  Neurological: Positive for dizziness, weakness (Legs), light-headedness and headaches  Negative for tremors, seizures, syncope, facial asymmetry, speech difficulty and numbness  Hematological: Negative  Does not bruise/bleed easily  Psychiatric/Behavioral: Negative  Negative for confusion, hallucinations and sleep disturbance

## 2021-05-26 NOTE — PATIENT INSTRUCTIONS
1  Schedule vestibular/balance physical therapy  2  Return in about one year with Monsterrat Mcdaniel, [address]
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
## 2021-05-27 ENCOUNTER — OFFICE VISIT (OUTPATIENT)
Dept: PHYSICAL THERAPY | Facility: CLINIC | Age: 61
End: 2021-05-27
Payer: COMMERCIAL

## 2021-05-27 DIAGNOSIS — G89.29 CHRONIC LEFT-SIDED LOW BACK PAIN WITH LEFT-SIDED SCIATICA: Primary | ICD-10-CM

## 2021-05-27 DIAGNOSIS — M54.42 CHRONIC LEFT-SIDED LOW BACK PAIN WITH LEFT-SIDED SCIATICA: Primary | ICD-10-CM

## 2021-05-27 DIAGNOSIS — M22.2X2 PATELLOFEMORAL PAIN SYNDROME OF LEFT KNEE: ICD-10-CM

## 2021-05-27 PROCEDURE — 97110 THERAPEUTIC EXERCISES: CPT

## 2021-05-27 PROCEDURE — 97140 MANUAL THERAPY 1/> REGIONS: CPT

## 2021-05-27 PROCEDURE — 97112 NEUROMUSCULAR REEDUCATION: CPT

## 2021-05-27 NOTE — PROGRESS NOTES
Daily Note     Today's date: 2021  Patient name: Fany Arizmendi  : 1960  MRN: [id number]  Referring provider: Giles Ribeiro  Dx:   Encounter Diagnosis     ICD-10-CM    1  Chronic left-sided low back pain with left-sided sciatica  M54 42     G89 29    2  Patellofemoral pain syndrome of left knee  M22 2X2                   Subjective: Patient reports she saw neurologist yesterday and believes MD is sending over a script for balance PT  Objective: See treatment diary below      Assessment: Tolerated treatment well  Patient demonstrated fatigue post treatment and would benefit from continued PT  Plan: Progress treatment as tolerated         Precautions: Epilepsy, fall risk    Manuals    theraroller left gluteals to low back [facility] HS AF [facility]   [place] taping left  [facility] HS AF [facility]                             Neuro Re-Ed             PPT   2' 2' 2' 2' 2' 2' 2' 2'   PPT with   2' 2' 2' 2'  2' 2' 2'   PPT with kick out                                                    SLR   10x 15x 15x 15x  15x  15x   Ther Ex             bike 8' Lvl 3 10'  6' L3 6' L3 6' L3 6' L3 6' L3 6' L3 6' L3 6' L3   Knee ext nv  22# 2' 22# 2' 22# 2' 22# 2'  22# 2' 22# 2' 22# 2' + flex   Knee flexion             Sit to stands,             Standing hip 3-way             SKC  10x5 ea :10x1' ea :10x1' ea :10x1' ea :10x1' 10"x1' :10x1' :10x1' :10x1'   DKC  10x5 :10x2' :10x1' :10x1' :10x1' 10"x1' :10x1' :10x1' :10x1'   Hamstring glide   manual 10x          Glut stretch   1' ea 1' ea 1' ea 1' ea  1' ea 1' ea 1' ea   clamshells 2' ea            bridges 20x            pball leg curls 2' 20x 2' 2' 2' 2' 2' 2' 2' 2'   pball LTR 2'   2' 2' 2' 2' 2' 2' 2'   Seated hamstring stretch             Seated trunk flexion :05 2' pball  pball :05x2' pball :05x2' pball :05x2' pball :05x2' pball 5"x2' pball :05x2'  :05x2'                Ther Activity Downward dog from table?   :10x1' :10x1' :10x1' :10x10  nv :10x1' :10x1'                Gait Training                                       Modalities

## 2021-06-01 ENCOUNTER — OFFICE VISIT (OUTPATIENT)
Dept: PHYSICAL THERAPY | Facility: CLINIC | Age: 61
End: 2021-06-01
Payer: COMMERCIAL

## 2021-06-01 DIAGNOSIS — G89.29 CHRONIC LEFT-SIDED LOW BACK PAIN WITH LEFT-SIDED SCIATICA: Primary | ICD-10-CM

## 2021-06-01 DIAGNOSIS — M54.42 CHRONIC LEFT-SIDED LOW BACK PAIN WITH LEFT-SIDED SCIATICA: Primary | ICD-10-CM

## 2021-06-01 DIAGNOSIS — M22.2X2 PATELLOFEMORAL PAIN SYNDROME OF LEFT KNEE: ICD-10-CM

## 2021-06-01 PROCEDURE — 97112 NEUROMUSCULAR REEDUCATION: CPT

## 2021-06-01 PROCEDURE — 97140 MANUAL THERAPY 1/> REGIONS: CPT

## 2021-06-01 PROCEDURE — 97110 THERAPEUTIC EXERCISES: CPT

## 2021-06-01 NOTE — PROGRESS NOTES
Daily Note     Today's date: 2021  Patient name: Peggy Rashid  : 1960  MRN: [id number]  Referring provider: Alex Mcdaniel  Dx:   Encounter Diagnosis     ICD-10-CM    1  Chronic left-sided low back pain with left-sided sciatica  M54 42     G89 29    2  Patellofemoral pain syndrome of left knee  M22 2X2                   Subjective: Patient reports she walked two miles yesterday without pain and she left SPC at home today  Objective: See treatment diary below      Assessment: Tolerated treatment well  Patient demonstrated fatigue post treatment, exhibited good technique with therapeutic exercises and would benefit from continued PT  Cueing needed for TKE during SLR with fair carry over  No increased pain reported throughout session  Plan: Progress treatment as tolerated         Precautions: Epilepsy, fall risk    Manuals    theraroller left gluteals to low back  [address] [facility] AF [facility]   [place] taping left   W [address]in [facility]                             Neuro Re-Ed             PPT 2'   2' 2' 2' 2' 2' 2' 2'   PPT with march 2'   2' 2' 2'  2' 2' 2'   PPT with kick out                                                    SLR 2x10   15x 15x 15x  15x  15x   Ther Ex             bike 6' L3   6' L3 6' L3 6' L3 6' L3 6' L3 6' L3 6' L3   Knee ext 22# 2' + flex   22# 2' 22# 2' 22# 2'  22# 2' 22# 2' 22# 2' + flex   Knee flexion             Sit to stands,             Standing hip 3-way             SKC :10x1'   :10x1' ea :10x1' ea :10x1' 10"x1' :10x1' :10x1' :10x1'   DKC :10x1'   :10x1' :10x1' :10x1' 10"x1' :10x1' :10x1' :10x1'   Hamstring glide             Glut stretch 1' ea   1' ea 1' ea 1' ea  1' ea 1' ea 1' ea   clamshells             bridges             pball leg curls 2'   2' 2' 2' 2' 2' 2' 2'   pball LTR 2'   2' 2' 2' 2' 2' 2' 2'   Seated hamstring stretch             Seated trunk flexion :05x2'   pball :05x2' pball :05x2' pball :05x2' pball 5"x2' pball :05x2'  :05x2'                Ther Activity             Downward dog from table? :10x1'   :10x1' :10x1' :10x10  nv :10x1' :10x1'                Gait Training                                       Modalities

## 2021-06-03 ENCOUNTER — OFFICE VISIT (OUTPATIENT)
Dept: PHYSICAL THERAPY | Facility: CLINIC | Age: 61
End: 2021-06-03
Payer: COMMERCIAL

## 2021-06-03 DIAGNOSIS — M22.2X2 PATELLOFEMORAL PAIN SYNDROME OF LEFT KNEE: ICD-10-CM

## 2021-06-03 DIAGNOSIS — G89.29 CHRONIC LEFT-SIDED LOW BACK PAIN WITH LEFT-SIDED SCIATICA: Primary | ICD-10-CM

## 2021-06-03 DIAGNOSIS — M54.42 CHRONIC LEFT-SIDED LOW BACK PAIN WITH LEFT-SIDED SCIATICA: Primary | ICD-10-CM

## 2021-06-03 PROCEDURE — 97112 NEUROMUSCULAR REEDUCATION: CPT

## 2021-06-03 PROCEDURE — 97110 THERAPEUTIC EXERCISES: CPT

## 2021-06-03 PROCEDURE — 97140 MANUAL THERAPY 1/> REGIONS: CPT

## 2021-06-03 NOTE — PROGRESS NOTES
Daily Note     Today's date: 6/3/2021  Patient name: Leny Price  : 1960  MRN: [id number]  Referring provider: Kar Duran  Dx:   Encounter Diagnosis     ICD-10-CM    1  Chronic left-sided low back pain with left-sided sciatica  M54 42     G89 29    2  Patellofemoral pain syndrome of left knee  M22 2X2                   Subjective: Patient reports increased R posterior thigh and buttock pain yesterday which she attributes to sitting on the couch all day  She has been using cane less overall  Objective: See treatment diary below      Assessment: Tolerated treatment well  Patient demonstrated fatigue post treatment, exhibited good technique with therapeutic exercises and would benefit from continued PT  Resumed manual hamstring glides with mild tension felt in hamstring  No increased pain reported throughout TE program         Plan: Progress treatment as tolerated         Precautions: Epilepsy, fall risk    Manuals 6/1 6/3   5/11 5/13 5/18 5/20 5/25 5/27   theraroller left gluteals to low back [facility] AF [facility]   [place] taping left  [facility] AF [facility]                             Neuro Re-Ed             PPT 2' 2'   2' 2' 2' 2' 2' 2'   PPT with march 2' 2'   2' 2'  2' 2' 2'   PPT with kick out                                                    SLR 2x10 2x10   15x 15x  15x  15x   Ther Ex             bike 6' L3 6' L3   6' L3 6' L3 6' L3 6' L3 6' L3 6' L3   Knee ext 22# 2' + flex 22# 2'   22# 2' 22# 2'  22# 2' 22# 2' 22# 2' + flex   Knee flexion             Sit to stands,             Standing hip 3-way             SKC :10x1' :10x1'   :10x1' ea :10x1' 10"x1' :10x1' :10x1' :10x1'   DKC :10x1' :10x1'   :10x1' :10x1' 10"x1' :10x1' :10x1' :10x1'   Hamstring glide  manual           Glut stretch 1' ea 1' ea   1' ea 1' ea  1' ea 1' ea 1' ea   clamshells             bridges             pball leg curls 2' 2'   2' 2' 2' 2' 2' 2'   pball LTR 2' 2'   2' 2' 2' 2' 2' 2'   Seated hamstring stretch             Seated trunk flexion :05x2' :05x2'   pball :05x2' pball :05x2' pball 5"x2' pball :05x2'  :05x2'                Ther Activity             Downward dog from table? :10x1'    :10x1' :10x10  nv :10x1' :10x1'                Gait Training                                       Modalities

## 2021-06-07 ENCOUNTER — APPOINTMENT (OUTPATIENT)
Dept: PHYSICAL THERAPY | Facility: CLINIC | Age: 61
End: 2021-06-07
Payer: COMMERCIAL

## 2021-06-08 ENCOUNTER — OFFICE VISIT (OUTPATIENT)
Dept: PHYSICAL THERAPY | Facility: CLINIC | Age: 61
End: 2021-06-08
Payer: COMMERCIAL

## 2021-06-08 DIAGNOSIS — M54.42 CHRONIC LEFT-SIDED LOW BACK PAIN WITH LEFT-SIDED SCIATICA: Primary | ICD-10-CM

## 2021-06-08 DIAGNOSIS — G89.29 CHRONIC LEFT-SIDED LOW BACK PAIN WITH LEFT-SIDED SCIATICA: Primary | ICD-10-CM

## 2021-06-08 DIAGNOSIS — M22.2X2 PATELLOFEMORAL PAIN SYNDROME OF LEFT KNEE: ICD-10-CM

## 2021-06-08 PROCEDURE — 97140 MANUAL THERAPY 1/> REGIONS: CPT

## 2021-06-08 PROCEDURE — 97112 NEUROMUSCULAR REEDUCATION: CPT

## 2021-06-08 PROCEDURE — 97110 THERAPEUTIC EXERCISES: CPT

## 2021-06-08 NOTE — PROGRESS NOTES
Daily Note     Today's date: 2021  Patient name: Mami Conley  : 1960  MRN: [id number]  Referring provider: Parris Hernandez  Dx:   Encounter Diagnosis     ICD-10-CM    1  Chronic left-sided low back pain with left-sided sciatica  M54 42     G89 29    2  Patellofemoral pain syndrome of left knee  M22 2X2                   Subjective: Patient reports LLE "gave out" on her yesterday when she was walking, notes she was experiencing pain from R buttocks into hamstring when it happened  She denies injury and notes she felt good after last visit  Objective: See treatment diary below      Assessment: Tolerated treatment well  Patient demonstrated fatigue post treatment and would benefit from continued PT  Progressed core strengthening as indicated which was tolerated well  No pain reported throughout exercise program   Focused on heel strike on L when ambulating out of clinic  Plan: Progress treatment as tolerated         Precautions: Epilepsy, fall risk    Manuals 6/1 6/3 6/8   5/13 5/18 5/20 5/25 5/27   theraroller left gluteals to low back  W [facility]   HS AF [facility]   [place] taping left   W [facility] AF [facility]                             Neuro Re-Ed             PPT 2' 2' 2'   2' 2' 2' 2' 2'   PPT with march 2' 2' 2'   2'  2' 2' 2'   PPT with kick out             Uni heel taps   1'                                    SLR 2x10 2x10 20   15x  15x  15x   Ther Ex             bike 6' L3 6' L3 6' L3   6' L3 6' L3 6' L3 6' L3 6' L3   Knee ext 22# 2' + flex 22# 2' 22# 2'   22# 2'  22# 2' 22# 2' 22# 2' + flex   Knee flexion             Sit to stands,             Standing hip 3-way             SKC :10x1' :10x1' :10x1'   :10x1' 10"x1' :10x1' :10x1' :10x1'   DKC :10x1' :10x1' :10x1'   :10x1' 10"x1' :10x1' :10x1' :10x1'   Hamstring glide  manual 10x          Glut stretch 1' ea 1' ea 1' ea   1' ea  1' ea 1' ea 1' ea   clamshells             bridges   1'          pball leg curls 2' 2' 2' 2' 2' 2' 2' 2'   pball LTR 2' 2'    2' 2' 2' 2' 2'   Seated hamstring stretch             Seated trunk flexion :05x2' :05x2' :05x2'   pball :05x2' pball 5"x2' pball :05x2'  :05x2'                Ther Activity             Downward dog from table? :10x1'     :10x10  nv :10x1' :10x1'                Gait Training                                       Modalities

## 2021-06-10 ENCOUNTER — OFFICE VISIT (OUTPATIENT)
Dept: PHYSICAL THERAPY | Facility: CLINIC | Age: 61
End: 2021-06-10
Payer: COMMERCIAL

## 2021-06-10 DIAGNOSIS — M54.42 CHRONIC LEFT-SIDED LOW BACK PAIN WITH LEFT-SIDED SCIATICA: Primary | ICD-10-CM

## 2021-06-10 DIAGNOSIS — G89.29 CHRONIC LEFT-SIDED LOW BACK PAIN WITH LEFT-SIDED SCIATICA: Primary | ICD-10-CM

## 2021-06-10 DIAGNOSIS — M22.2X2 PATELLOFEMORAL PAIN SYNDROME OF LEFT KNEE: ICD-10-CM

## 2021-06-10 PROCEDURE — 97112 NEUROMUSCULAR REEDUCATION: CPT

## 2021-06-10 PROCEDURE — 97140 MANUAL THERAPY 1/> REGIONS: CPT

## 2021-06-10 PROCEDURE — 97110 THERAPEUTIC EXERCISES: CPT

## 2021-06-10 NOTE — PROGRESS NOTES
Daily Note     Today's date: 6/10/2021  Patient name: Madi Quan  : 1960  MRN: [id number]  Referring provider: Hui Houston  Dx:   Encounter Diagnosis     ICD-10-CM    1  Chronic left-sided low back pain with left-sided sciatica  M54 42     G89 29    2  Patellofemoral pain syndrome of left knee  M22 2X2                   Subjective: Patient reports she is much better since last visit and she has not needed to take muscle relaxer  She has been practicing heel toe walking and has been walking without cane  Objective: See treatment diary below      Assessment: Continued with exercise program due to positive response last session and patient tolerated treatment well  Patient demonstrated fatigue post treatment and would benefit from continued PT  Continues to rely on timer to keep track of reps  No pain reported throughout treatment  Plan: Progress treatment as tolerated         Precautions: Epilepsy, fall risk    Manuals 6/1 6/3 6/8 6/10   5/18 5/20 5/25 5/27   theraroller left gluteals to low back [address] [facility]   [address]   [address]   [place] taping left   [address] [facility]  [address] [facility]  [address]   [address]                             Neuro Re-Ed             PPT 2' 2' 2' 2'   2' 2' 2' 2'   PPT with march 2' 2' 2' 2'    2' 2' 2'   PPT with kick out             Uni heel taps   1' 1'                                   SLR 2x10 2x10 20 20    15x  15x   Ther Ex             bike 6' L3 6' L3 6' L3 7' L3   6' L3 6' L3 6' L3 6' L3   Knee ext 22# 2' + flex 22# 2' 22# 2' 22# 2'    22# 2' 22# 2' 22# 2' + flex   Knee flexion             Sit to stands,             Standing hip 3-way             SKC :10x1' :10x1' :10x1' :10x1'   10"x1' :10x1' :10x1' :10x1'   DKC :10x1' :10x1' :10x1' :10x1'   10"x1' :10x1' :10x1' :10x1'   Hamstring glide  manual 10x 15x         Glut stretch 1' ea 1' ea 1' ea 1' ea    1' ea 1' ea 1' ea   clamshells             bridges   1' :90         pball leg curls 2' 2' 2' 2'   2' 2' 2' 2'   pball LTR 2' 2' 2' 2' 2' 2'   Seated hamstring stretch             Seated trunk flexion :05x2' :05x2' :05x2' :05x2'   pball 5"x2' pball :05x2'  :05x2'                Ther Activity             Downward dog from table? :10x1'       nv :10x1' :10x1'                Gait Training                                       Modalities

## 2021-06-14 ENCOUNTER — APPOINTMENT (OUTPATIENT)
Dept: PHYSICAL THERAPY | Facility: CLINIC | Age: 61
End: 2021-06-14
Payer: COMMERCIAL

## 2021-06-17 ENCOUNTER — APPOINTMENT (OUTPATIENT)
Dept: PHYSICAL THERAPY | Facility: CLINIC | Age: 61
End: 2021-06-17
Payer: COMMERCIAL

## 2021-06-21 ENCOUNTER — APPOINTMENT (OUTPATIENT)
Dept: PHYSICAL THERAPY | Facility: CLINIC | Age: 61
End: 2021-06-21
Payer: COMMERCIAL

## 2021-06-22 ENCOUNTER — OFFICE VISIT (OUTPATIENT)
Dept: PHYSICAL THERAPY | Facility: CLINIC | Age: 61
End: 2021-06-22
Payer: COMMERCIAL

## 2021-06-22 DIAGNOSIS — M54.42 CHRONIC LEFT-SIDED LOW BACK PAIN WITH LEFT-SIDED SCIATICA: Primary | ICD-10-CM

## 2021-06-22 DIAGNOSIS — M22.2X2 PATELLOFEMORAL PAIN SYNDROME OF LEFT KNEE: ICD-10-CM

## 2021-06-22 DIAGNOSIS — G89.29 CHRONIC LEFT-SIDED LOW BACK PAIN WITH LEFT-SIDED SCIATICA: Primary | ICD-10-CM

## 2021-06-22 PROCEDURE — 97110 THERAPEUTIC EXERCISES: CPT

## 2021-06-22 PROCEDURE — 97112 NEUROMUSCULAR REEDUCATION: CPT

## 2021-06-22 PROCEDURE — 97140 MANUAL THERAPY 1/> REGIONS: CPT

## 2021-06-22 NOTE — PROGRESS NOTES
Daily Note     Today's date: 2021  Patient name: Davidson Brewer  : 1960  MRN: [id number]  Referring provider: Kylie Patricia  Dx:   Encounter Diagnosis     ICD-10-CM    1  Chronic left-sided low back pain with left-sided sciatica  M54 42     G89 29    2  Patellofemoral pain syndrome of left knee  M22 2X2                   Subjective: Patient reports she fell forward on her knees when getting out of the car last week and medial R knee has been bothering her since  She notes increased L buttock and leg pain after lying in an uncomfortable bed all week while on vacation  Objective: See treatment diary below      Assessment: Tolerated treatment well  Patient exhibited good technique with therapeutic exercises and would benefit from continued PT  Performed bike without resistance due to R knee symptoms which was tolerated well  No increased pain reported throughout session  Plan: Progress treatment as tolerated     Patient will continue with focus on L knee and low back for 3 more sessions and will then be evaluated for vestibular PT per updated script from MD        Precautions: Epilepsy, fall risk    Manuals 6/1 6/3 6/8 6/10 6/22    5/25 5/27   theraroller left gluteals to low back [address] [facility]  [address] [facility] taping left  [address]  [address]  [address]  [address]     W [facility]  [address]                             Neuro Re-Ed             PPT 2' 2' 2' 2' 2'    2' 2'   PPT with march 2' 2' 2' 2' 1'    2' 2'   PPT with kick out             Uni heel taps   1' 1' 1'                                  SLR 2x10 2x10 20 20 20     15x   Ther Ex             bike 6' L3 6' L3 6' L3 7' L3 6'     6' L3 6' L3   Knee ext 22# 2' + flex 22# 2' 22# 2' 22# 2' 22# 2'    22# 2' 22# 2' + flex   Knee flexion             Sit to stands,             Standing hip 3-way             SKC :10x1' :10x1' :10x1' :10x1' :10x1'    :10x1' :10x1'   DKC :10x1' :10x1' :10x1' :10x1' :10x1'    :10x1' :10x1'   Hamstring glide  manual 10x 15x 15x Glut stretch 1' ea 1' ea 1' ea 1' ea 1' ea    1' ea 1' ea   clamshells             bridges   1' :90 :90        pball leg curls 2' 2' 2' 2' 2'    2' 2'   pball LTR 2' 2'       2' 2'   Seated hamstring stretch             Seated trunk flexion :05x2' :05x2' :05x2' :05x2' :05x2'     :05x2'                Ther Activity             Downward dog from table? :10x1'        :10x1' :10x1'                Gait Training                                       Modalities

## 2021-06-24 ENCOUNTER — OFFICE VISIT (OUTPATIENT)
Dept: PHYSICAL THERAPY | Facility: CLINIC | Age: 61
End: 2021-06-24
Payer: COMMERCIAL

## 2021-06-24 DIAGNOSIS — M22.2X2 PATELLOFEMORAL PAIN SYNDROME OF LEFT KNEE: ICD-10-CM

## 2021-06-24 DIAGNOSIS — M54.42 CHRONIC LEFT-SIDED LOW BACK PAIN WITH LEFT-SIDED SCIATICA: Primary | ICD-10-CM

## 2021-06-24 DIAGNOSIS — G89.29 CHRONIC LEFT-SIDED LOW BACK PAIN WITH LEFT-SIDED SCIATICA: Primary | ICD-10-CM

## 2021-06-24 PROCEDURE — 97112 NEUROMUSCULAR REEDUCATION: CPT

## 2021-06-24 PROCEDURE — 97110 THERAPEUTIC EXERCISES: CPT

## 2021-06-24 PROCEDURE — 97140 MANUAL THERAPY 1/> REGIONS: CPT

## 2021-06-24 NOTE — PROGRESS NOTES
Daily Note     Today's date: 2021  Patient name: Bari Morris  : 1960  MRN: [id number]  Referring provider: Mirella Alvarez  Dx:   Encounter Diagnosis     ICD-10-CM    1  Chronic left-sided low back pain with left-sided sciatica  M54 42     G89 29    2  Patellofemoral pain syndrome of left knee  M22 2X2                   Subjective: Patient reports she felt good after last session though L sciatic pain kept her up last night, she feels it in buttocks and posterior thigh at start of session  R knee is feeling much better since last visit and L knee pain remains controlled  Objective: See treatment diary below      Assessment: Tolerated treatment well  Patient demonstrated fatigue post treatment, exhibited good technique with therapeutic exercises and would benefit from continued PT  Continues to feel significant tension with glut stretching on L  Switched to sciatic flossing which was tolerated well  Verbal and tactile cueing needed to avoid lumbar extension throughout [facility] series with fair carry over  Patient denies L buttock and thigh pain at conclusion of session  Plan: Progress treatment as tolerated         Precautions: Epilepsy, fall risk    Manuals 6/1 6/3 6/8 6/10 6/22 6/24   5/25 5/27   theraroller left gluteals to low back [address]    [address] [facility] taping left  [address]  [address]    [address]  [address]                             Neuro Re-Ed             PPT 2' 2' 2' 2' 2' :05 hold 2'   2' 2'   PPT with march 2' 2' 2' 2' 1' 1'    2' 2'   PPT with kick out             Uni heel taps   1' 1' 1' 1'                                  SLR 2x10 2x10 20 20 20 25    15x   Ther Ex             bike 6' L3 6' L3 6' L3 7' L3 6'  6' L3   6' L3 6' L3   Knee ext 22# 2' + flex 22# 2' 22# 2' 22# 2' 22# 2' 22# 2'   22# 2' 22# 2' + flex   Knee flexion             Sit to stands,             Standing hip 3-way             SKC :10x1' :10x1' :10x1' :10x1' :10x1' :10x1'   :10x1' :10x1'   DKC :10x1' Cherlyn Sicard 10x1' :10x1' :10x1' :10x1' :10x1'   :10x1' :10x1'   Hamstring glide  manual 10x 15x 15x flossing 15x       Glut stretch 1' ea 1' ea 1' ea 1' ea 1' ea 1' ea   1' ea 1' ea   clamshells             bridges   1' :90 :90        pball leg curls 2' 2' 2' 2' 2' 2'   2' 2'   pball LTR 2' 2'       2' 2'   Seated hamstring stretch             Seated trunk flexion :05x2' :05x2' :05x2' :05x2' :05x2' :05x2'     :05x2'                Ther Activity             Downward dog from table? :10x1'        :10x1' :10x1'                Gait Training                                       Modalities

## 2021-06-29 ENCOUNTER — APPOINTMENT (OUTPATIENT)
Dept: PHYSICAL THERAPY | Facility: CLINIC | Age: 61
End: 2021-06-29
Payer: COMMERCIAL

## 2021-07-01 ENCOUNTER — APPOINTMENT (OUTPATIENT)
Dept: PHYSICAL THERAPY | Facility: CLINIC | Age: 61
End: 2021-07-01
Payer: COMMERCIAL

## 2021-07-06 ENCOUNTER — OFFICE VISIT (OUTPATIENT)
Dept: PHYSICAL THERAPY | Facility: CLINIC | Age: 61
End: 2021-07-06
Payer: COMMERCIAL

## 2021-07-06 DIAGNOSIS — G89.29 CHRONIC LEFT-SIDED LOW BACK PAIN WITH LEFT-SIDED SCIATICA: Primary | ICD-10-CM

## 2021-07-06 DIAGNOSIS — M22.2X2 PATELLOFEMORAL PAIN SYNDROME OF LEFT KNEE: ICD-10-CM

## 2021-07-06 DIAGNOSIS — M54.42 CHRONIC LEFT-SIDED LOW BACK PAIN WITH LEFT-SIDED SCIATICA: Primary | ICD-10-CM

## 2021-07-06 PROCEDURE — 97112 NEUROMUSCULAR REEDUCATION: CPT

## 2021-07-06 PROCEDURE — 97110 THERAPEUTIC EXERCISES: CPT

## 2021-07-06 PROCEDURE — 97140 MANUAL THERAPY 1/> REGIONS: CPT

## 2021-07-06 NOTE — PROGRESS NOTES
Daily Note     Today's date: 2021  Patient name: Ana María Noriega  : 1960  MRN: [id number]  Referring provider: Puneet Cook MD  Dx:   Encounter Diagnosis     ICD-10-CM    1  Chronic left-sided low back pain with left-sided sciatica  M54 42     G89 29    2  Patellofemoral pain syndrome of left knee  M22 2X2                   Subjective: Patient reports increased sciatic pain in bed at night recently though her doctor switched her to arthritis medication and it has decreased overall  She notes mild pain in L glut and proximal hamstring at start of session  Objective: See treatment diary below      Assessment: Tolerated treatment well  Patient demonstrated fatigue post treatment, exhibited good technique with therapeutic exercises and would benefit from continued PT  Mild tenderness to proximal hamstring with theraroller, no significant tenderness to gluteals  Intermittent cueing needed for PPT throughout [facility] series with fair carry over  Exercise program was pain free and patient denies LLE radicular pain at conclusion of session          Plan: Patient to be seen for one more visit for L sciatica and knee before being evaluated for vestibular and balance PT per new script from MD       Precautions: Epilepsy, fall risk    Manuals 6/1 6/3 6/8 6/10 6/22 6/24 7/6      theraroller left gluteals to low back [address] [facility] taping left  [address] [facility]                                Neuro Re-Ed             PPT 2' 2' 2' 2' 2' :05 hold 2' :05x1'      PPT with march 2' 2' 2' 2' 1' 1'  1'      PPT with kick out             Uni heel taps   1' 1' 1' 1'  1'                                SLR 2x10 2x10 20 20 20 25 25      Ther Ex             bike 6' L3 6' L3 6' L3 7' L3 6'  6' L3 6' L3      Knee ext 22# 2' + flex 22# 2' 22# 2' 22# 2' 22# 2' 22# 2' 22# 2'      Knee flexion             Sit to stands,             Standing hip 3-way             SK :10x1' :10x1' :10x1' :10x1' Salome Rom 10x1' :10x1' :10x1'      Sahra Ramires 57 :10x1' :10x1' :10x1' :10x1' :10x1' :10x1' :10x1'      Hamstring glide  manual 10x 15x 15x flossing 15x 15x      Glut stretch 1' ea 1' ea 1' ea 1' ea 1' ea 1' ea 1' ea      clamshells             bridges   1' :90 :90        pball leg curls 2' 2' 2' 2' 2' 2' 2'      pball LTR 2' 2'           Seated hamstring stretch             Seated trunk flexion :05x2' :05x2' :05x2' :05x2' :05x2' :05x2'  :05x2'                   Ther Activity             Downward dog from table? :10x1'                         Gait Training                                       Modalities

## 2021-07-08 ENCOUNTER — APPOINTMENT (OUTPATIENT)
Dept: PHYSICAL THERAPY | Facility: CLINIC | Age: 61
End: 2021-07-08
Payer: COMMERCIAL

## 2021-07-13 ENCOUNTER — OFFICE VISIT (OUTPATIENT)
Dept: PHYSICAL THERAPY | Facility: CLINIC | Age: 61
End: 2021-07-13
Payer: COMMERCIAL

## 2021-07-13 DIAGNOSIS — M54.42 CHRONIC LEFT-SIDED LOW BACK PAIN WITH LEFT-SIDED SCIATICA: Primary | ICD-10-CM

## 2021-07-13 DIAGNOSIS — G89.29 CHRONIC LEFT-SIDED LOW BACK PAIN WITH LEFT-SIDED SCIATICA: Primary | ICD-10-CM

## 2021-07-13 DIAGNOSIS — M22.2X2 PATELLOFEMORAL PAIN SYNDROME OF LEFT KNEE: ICD-10-CM

## 2021-07-13 PROCEDURE — 97110 THERAPEUTIC EXERCISES: CPT

## 2021-07-13 PROCEDURE — 97112 NEUROMUSCULAR REEDUCATION: CPT

## 2021-07-13 NOTE — PROGRESS NOTES
Daily Note     Today's date: 2021  Patient name: Roxanne Shields  : 1960  MRN: [id number]  Referring provider: Inocente Juarez  Dx:   Encounter Diagnosis     ICD-10-CM    1  Chronic left-sided low back pain with left-sided sciatica  M54 42     G89 29    2  Patellofemoral pain syndrome of left knee  M22 2X2                   Subjective: Patient reports she missed last session due to family reasons  She does note increased swelling in L knee this week though feels she would benefit more from balance therapy at this point as she is feeling unstable recently  Objective: See treatment diary below      Assessment: Tolerated treatment well  Patient demonstrated fatigue post treatment and would benefit from continued PT  Did not complete all exercises due to personal time constraints  Patient had no increased symptoms throughout session and notes decreased L knee and lateral hip pain at conclusion of session  Plan: DC current episode of care  Patient has IE scheduled for vestibular PT later this week         Precautions: Epilepsy, fall risk    Manuals 6/1 6/3 6/8 6/10 6/22 6/24 7/6 7/13     theraroller left gluteals to low back [address]  [address]      [place] taping left  [address]  [address]  [address]  [address]  [address]                               Neuro Re-Ed             PPT 2' 2' 2' 2' 2' :05 hold 2' :05x1' :05x1'     PPT with march 2' 2' 2' 2' 1' 1'  1' 1'     PPT with kick out             Uni heel taps   1' 1' 1' 1'  1' 1'                               SLR 2x10 2x10 20 20 20 25 25 25     Ther Ex             bike 6' L3 6' L3 6' L3 7' L3 6'  6' L3 6' L3      Knee ext 22# 2' + flex 22# 2' 22# 2' 22# 2' 22# 2' 22# 2' 22# 2'      Knee flexion             Sit to stands,             Standing hip 3-way             SKC :10x1' :10x1' :10x1' :10x1' :10x1' :10x1' :10x1' :10x1'     DKC :10x1' :10x1' :10x1' :10x1' :10x1' :10x1' :10x1' :10x1'     Hamstring glide  manual 10x 15x 15x flossing 15x 15x 15x     Glut stretch 1' ea 1' ea 1' ea 1' ea 1' ea 1' ea 1' ea 1'     clamshells             bridges   1' :90 :90        pball leg curls 2' 2' 2' 2' 2' 2' 2' 2'     pball LTR 2' 2'           Seated hamstring stretch             Seated trunk flexion :05x2' :05x2' :05x2' :05x2' :05x2' :05x2'  :05x2' :05x2'                  Ther Activity             Downward dog from table? :10x1'                         Gait Training                                       Modalities

## 2021-07-15 ENCOUNTER — APPOINTMENT (OUTPATIENT)
Dept: NON INVASIVE DIAGNOSTICS | Facility: HOSPITAL | Age: 61
End: 2021-07-15
Payer: COMMERCIAL

## 2021-07-15 ENCOUNTER — APPOINTMENT (EMERGENCY)
Dept: RADIOLOGY | Facility: HOSPITAL | Age: 61
End: 2021-07-15
Payer: COMMERCIAL

## 2021-07-15 ENCOUNTER — APPOINTMENT (OUTPATIENT)
Dept: NUCLEAR MEDICINE | Facility: HOSPITAL | Age: 61
End: 2021-07-15
Payer: COMMERCIAL

## 2021-07-15 ENCOUNTER — HOSPITAL ENCOUNTER (OUTPATIENT)
Facility: HOSPITAL | Age: 61
Setting detail: OBSERVATION
Discharge: HOME | End: 2021-07-15
Attending: EMERGENCY MEDICINE | Admitting: INTERNAL MEDICINE
Payer: COMMERCIAL

## 2021-07-15 ENCOUNTER — APPOINTMENT (OUTPATIENT)
Dept: PHYSICAL THERAPY | Facility: CLINIC | Age: 61
End: 2021-07-15
Payer: COMMERCIAL

## 2021-07-15 VITALS
BODY MASS INDEX: 34.91 KG/M2 | DIASTOLIC BLOOD PRESSURE: 57 MMHG | SYSTOLIC BLOOD PRESSURE: 108 MMHG | OXYGEN SATURATION: 97 % | RESPIRATION RATE: 18 BRPM | TEMPERATURE: 98.2 F | WEIGHT: 197 LBS | HEART RATE: 61 BPM | HEIGHT: 63 IN

## 2021-07-15 DIAGNOSIS — I10 HYPERTENSION: ICD-10-CM

## 2021-07-15 DIAGNOSIS — R42 VERTIGO: ICD-10-CM

## 2021-07-15 DIAGNOSIS — R53.1 WEAKNESS: ICD-10-CM

## 2021-07-15 DIAGNOSIS — R07.9 CHEST PAIN: Primary | ICD-10-CM

## 2021-07-15 DIAGNOSIS — R56.9 SEIZURES (HCC): ICD-10-CM

## 2021-07-15 DIAGNOSIS — R11.0 NAUSEA: ICD-10-CM

## 2021-07-15 LAB
ALBUMIN SERPL BCP-MCNC: 4 G/DL (ref 3.5–5)
ALP SERPL-CCNC: 92 U/L (ref 46–116)
ALT SERPL W P-5'-P-CCNC: 47 U/L (ref 12–78)
ANION GAP SERPL CALCULATED.3IONS-SCNC: 11 MMOL/L (ref 4–13)
APTT PPP: 32 SECONDS (ref 23–37)
AST SERPL W P-5'-P-CCNC: 31 U/L (ref 5–45)
ATRIAL RATE: 77 BPM
ATRIAL RATE: 81 BPM
BASOPHILS # BLD AUTO: 0.05 THOUSANDS/ΜL (ref 0–0.1)
BASOPHILS NFR BLD AUTO: 1 % (ref 0–1)
BILIRUB SERPL-MCNC: 0.41 MG/DL (ref 0.2–1)
BUN SERPL-MCNC: 17 MG/DL (ref 5–25)
CALCIUM SERPL-MCNC: 9.7 MG/DL (ref 8.3–10.1)
CHLORIDE SERPL-SCNC: 105 MMOL/L (ref 100–108)
CHOLEST SERPL-MCNC: 207 MG/DL (ref 50–200)
CK MB SERPL-MCNC: 0.8 NG/ML (ref 0–5)
CK MB SERPL-MCNC: <1 % (ref 0–2.5)
CK SERPL-CCNC: 151 U/L (ref 26–192)
CO2 SERPL-SCNC: 25 MMOL/L (ref 21–32)
CREAT SERPL-MCNC: 0.67 MG/DL (ref 0.6–1.3)
EOSINOPHIL # BLD AUTO: 0.35 THOUSAND/ΜL (ref 0–0.61)
EOSINOPHIL NFR BLD AUTO: 5 % (ref 0–6)
ERYTHROCYTE [DISTWIDTH] IN BLOOD BY AUTOMATED COUNT: 13 % (ref 11.6–15.1)
EST. AVERAGE GLUCOSE BLD GHB EST-MCNC: 120 MG/DL
GFR SERPL CREATININE-BSD FRML MDRD: 97 ML/MIN/1.73SQ M
GLUCOSE SERPL-MCNC: 152 MG/DL (ref 65–140)
HBA1C MFR BLD: 5.8 %
HCT VFR BLD AUTO: 39.5 % (ref 34.8–46.1)
HDLC SERPL-MCNC: 38 MG/DL
HGB BLD-MCNC: 13.8 G/DL (ref 11.5–15.4)
IMM GRANULOCYTES # BLD AUTO: 0.04 THOUSAND/UL (ref 0–0.2)
IMM GRANULOCYTES NFR BLD AUTO: 1 % (ref 0–2)
INR PPP: 0.88 (ref 0.84–1.19)
LDLC SERPL CALC-MCNC: 134 MG/DL (ref 0–100)
LYMPHOCYTES # BLD AUTO: 1.62 THOUSANDS/ΜL (ref 0.6–4.47)
LYMPHOCYTES NFR BLD AUTO: 23 % (ref 14–44)
MCH RBC QN AUTO: 30.6 PG (ref 26.8–34.3)
MCHC RBC AUTO-ENTMCNC: 34.9 G/DL (ref 31.4–37.4)
MCV RBC AUTO: 88 FL (ref 82–98)
MONOCYTES # BLD AUTO: 0.59 THOUSAND/ΜL (ref 0.17–1.22)
MONOCYTES NFR BLD AUTO: 8 % (ref 4–12)
NEUTROPHILS # BLD AUTO: 4.53 THOUSANDS/ΜL (ref 1.85–7.62)
NEUTS SEG NFR BLD AUTO: 62 % (ref 43–75)
NONHDLC SERPL-MCNC: 169 MG/DL
NRBC BLD AUTO-RTO: 0 /100 WBCS
NT-PROBNP SERPL-MCNC: 106 PG/ML
P AXIS: 52 DEGREES
P AXIS: 62 DEGREES
PLATELET # BLD AUTO: 234 THOUSANDS/UL (ref 149–390)
PMV BLD AUTO: 10.8 FL (ref 8.9–12.7)
POTASSIUM SERPL-SCNC: 3.8 MMOL/L (ref 3.5–5.3)
PR INTERVAL: 152 MS
PR INTERVAL: 156 MS
PROT SERPL-MCNC: 7.5 G/DL (ref 6.4–8.2)
PROTHROMBIN TIME: 12 SECONDS (ref 11.6–14.5)
QRS AXIS: 11 DEGREES
QRS AXIS: 27 DEGREES
QRSD INTERVAL: 74 MS
QRSD INTERVAL: 74 MS
QT INTERVAL: 364 MS
QT INTERVAL: 372 MS
QTC INTERVAL: 420 MS
QTC INTERVAL: 422 MS
RBC # BLD AUTO: 4.51 MILLION/UL (ref 3.81–5.12)
SODIUM SERPL-SCNC: 141 MMOL/L (ref 136–145)
T WAVE AXIS: 16 DEGREES
T WAVE AXIS: 18 DEGREES
TRIGL SERPL-MCNC: 176 MG/DL
TROPONIN I SERPL-MCNC: <0.02 NG/ML
TSH SERPL DL<=0.05 MIU/L-ACNC: 2.72 UIU/ML (ref 0.36–3.74)
VENTRICULAR RATE: 77 BPM
VENTRICULAR RATE: 81 BPM
WBC # BLD AUTO: 7.18 THOUSAND/UL (ref 4.31–10.16)

## 2021-07-15 PROCEDURE — 99236 HOSP IP/OBS SAME DATE HI 85: CPT | Performed by: INTERNAL MEDICINE

## 2021-07-15 PROCEDURE — 82550 ASSAY OF CK (CPK): CPT | Performed by: EMERGENCY MEDICINE

## 2021-07-15 PROCEDURE — 93005 ELECTROCARDIOGRAM TRACING: CPT

## 2021-07-15 PROCEDURE — 85610 PROTHROMBIN TIME: CPT | Performed by: EMERGENCY MEDICINE

## 2021-07-15 PROCEDURE — 82553 CREATINE MB FRACTION: CPT | Performed by: EMERGENCY MEDICINE

## 2021-07-15 PROCEDURE — 93017 CV STRESS TEST TRACING ONLY: CPT

## 2021-07-15 PROCEDURE — A9502 TC99M TETROFOSMIN: HCPCS

## 2021-07-15 PROCEDURE — 83880 ASSAY OF NATRIURETIC PEPTIDE: CPT | Performed by: EMERGENCY MEDICINE

## 2021-07-15 PROCEDURE — 93010 ELECTROCARDIOGRAM REPORT: CPT | Performed by: INTERNAL MEDICINE

## 2021-07-15 PROCEDURE — 99285 EMERGENCY DEPT VISIT HI MDM: CPT

## 2021-07-15 PROCEDURE — 93018 CV STRESS TEST I&R ONLY: CPT | Performed by: INTERNAL MEDICINE

## 2021-07-15 PROCEDURE — 83036 HEMOGLOBIN GLYCOSYLATED A1C: CPT | Performed by: INTERNAL MEDICINE

## 2021-07-15 PROCEDURE — 78452 HT MUSCLE IMAGE SPECT MULT: CPT

## 2021-07-15 PROCEDURE — 78452 HT MUSCLE IMAGE SPECT MULT: CPT | Performed by: INTERNAL MEDICINE

## 2021-07-15 PROCEDURE — G1004 CDSM NDSC: HCPCS

## 2021-07-15 PROCEDURE — 85025 COMPLETE CBC W/AUTO DIFF WBC: CPT | Performed by: EMERGENCY MEDICINE

## 2021-07-15 PROCEDURE — 80061 LIPID PANEL: CPT | Performed by: EMERGENCY MEDICINE

## 2021-07-15 PROCEDURE — 36415 COLL VENOUS BLD VENIPUNCTURE: CPT | Performed by: EMERGENCY MEDICINE

## 2021-07-15 PROCEDURE — 99285 EMERGENCY DEPT VISIT HI MDM: CPT | Performed by: EMERGENCY MEDICINE

## 2021-07-15 PROCEDURE — 80053 COMPREHEN METABOLIC PANEL: CPT | Performed by: EMERGENCY MEDICINE

## 2021-07-15 PROCEDURE — 99215 OFFICE O/P EST HI 40 MIN: CPT | Performed by: PSYCHIATRY & NEUROLOGY

## 2021-07-15 PROCEDURE — 71045 X-RAY EXAM CHEST 1 VIEW: CPT

## 2021-07-15 PROCEDURE — 84443 ASSAY THYROID STIM HORMONE: CPT | Performed by: EMERGENCY MEDICINE

## 2021-07-15 PROCEDURE — 84484 ASSAY OF TROPONIN QUANT: CPT | Performed by: EMERGENCY MEDICINE

## 2021-07-15 PROCEDURE — 93016 CV STRESS TEST SUPVJ ONLY: CPT | Performed by: INTERNAL MEDICINE

## 2021-07-15 PROCEDURE — 96374 THER/PROPH/DIAG INJ IV PUSH: CPT

## 2021-07-15 PROCEDURE — 85730 THROMBOPLASTIN TIME PARTIAL: CPT | Performed by: EMERGENCY MEDICINE

## 2021-07-15 PROCEDURE — 84484 ASSAY OF TROPONIN QUANT: CPT | Performed by: INTERNAL MEDICINE

## 2021-07-15 RX ORDER — ONDANSETRON 2 MG/ML
4 INJECTION INTRAMUSCULAR; INTRAVENOUS ONCE
Status: COMPLETED | OUTPATIENT
Start: 2021-07-15 | End: 2021-07-15

## 2021-07-15 RX ORDER — PANTOPRAZOLE SODIUM 40 MG/1
40 TABLET, DELAYED RELEASE ORAL
Status: DISCONTINUED | OUTPATIENT
Start: 2021-07-15 | End: 2021-07-15 | Stop reason: HOSPADM

## 2021-07-15 RX ORDER — MONTELUKAST SODIUM 10 MG/1
10 TABLET ORAL DAILY
Status: DISCONTINUED | OUTPATIENT
Start: 2021-07-15 | End: 2021-07-15 | Stop reason: HOSPADM

## 2021-07-15 RX ORDER — AMLODIPINE BESYLATE 10 MG/1
10 TABLET ORAL DAILY
Status: DISCONTINUED | OUTPATIENT
Start: 2021-07-15 | End: 2021-07-15 | Stop reason: HOSPADM

## 2021-07-15 RX ORDER — SIMETHICONE 80 MG
80 TABLET,CHEWABLE ORAL 4 TIMES DAILY PRN
Status: DISCONTINUED | OUTPATIENT
Start: 2021-07-15 | End: 2021-07-15 | Stop reason: HOSPADM

## 2021-07-15 RX ORDER — HYDRALAZINE HYDROCHLORIDE 25 MG/1
25 TABLET, FILM COATED ORAL 2 TIMES DAILY
Status: DISCONTINUED | OUTPATIENT
Start: 2021-07-15 | End: 2021-07-15 | Stop reason: HOSPADM

## 2021-07-15 RX ORDER — ACETAMINOPHEN 325 MG/1
975 TABLET ORAL ONCE
Status: COMPLETED | OUTPATIENT
Start: 2021-07-15 | End: 2021-07-15

## 2021-07-15 RX ORDER — ASPIRIN 81 MG/1
162 TABLET, CHEWABLE ORAL ONCE
Status: COMPLETED | OUTPATIENT
Start: 2021-07-15 | End: 2021-07-15

## 2021-07-15 RX ORDER — HYDRALAZINE HYDROCHLORIDE 20 MG/ML
5 INJECTION INTRAMUSCULAR; INTRAVENOUS EVERY 6 HOURS PRN
Status: DISCONTINUED | OUTPATIENT
Start: 2021-07-15 | End: 2021-07-15 | Stop reason: HOSPADM

## 2021-07-15 RX ORDER — ACETAMINOPHEN 325 MG/1
650 TABLET ORAL EVERY 4 HOURS PRN
Status: DISCONTINUED | OUTPATIENT
Start: 2021-07-15 | End: 2021-07-15 | Stop reason: HOSPADM

## 2021-07-15 RX ORDER — LOSARTAN POTASSIUM 50 MG/1
100 TABLET ORAL DAILY
Status: DISCONTINUED | OUTPATIENT
Start: 2021-07-15 | End: 2021-07-15 | Stop reason: HOSPADM

## 2021-07-15 RX ORDER — CELECOXIB 100 MG/1
200 CAPSULE ORAL DAILY
Status: DISCONTINUED | OUTPATIENT
Start: 2021-07-15 | End: 2021-07-15 | Stop reason: HOSPADM

## 2021-07-15 RX ORDER — ATORVASTATIN CALCIUM 40 MG/1
40 TABLET, FILM COATED ORAL DAILY
Status: DISCONTINUED | OUTPATIENT
Start: 2021-07-15 | End: 2021-07-15 | Stop reason: HOSPADM

## 2021-07-15 RX ORDER — LABETALOL 20 MG/4 ML (5 MG/ML) INTRAVENOUS SYRINGE
10 ONCE
Status: DISCONTINUED | OUTPATIENT
Start: 2021-07-15 | End: 2021-07-15

## 2021-07-15 RX ORDER — ASPIRIN 81 MG/1
81 TABLET, CHEWABLE ORAL DAILY
Status: DISCONTINUED | OUTPATIENT
Start: 2021-07-16 | End: 2021-07-15 | Stop reason: HOSPADM

## 2021-07-15 RX ORDER — METOPROLOL TARTRATE 50 MG/1
50 TABLET, FILM COATED ORAL 2 TIMES DAILY
Status: DISCONTINUED | OUTPATIENT
Start: 2021-07-15 | End: 2021-07-15 | Stop reason: HOSPADM

## 2021-07-15 RX ORDER — ONDANSETRON 2 MG/ML
4 INJECTION INTRAMUSCULAR; INTRAVENOUS EVERY 6 HOURS PRN
Status: DISCONTINUED | OUTPATIENT
Start: 2021-07-15 | End: 2021-07-15 | Stop reason: HOSPADM

## 2021-07-15 RX ADMIN — AMLODIPINE BESYLATE 10 MG: 5 TABLET ORAL at 08:50

## 2021-07-15 RX ADMIN — ENOXAPARIN SODIUM 40 MG: 40 INJECTION SUBCUTANEOUS at 08:55

## 2021-07-15 RX ADMIN — ACETAMINOPHEN 975 MG: 325 TABLET, FILM COATED ORAL at 05:53

## 2021-07-15 RX ADMIN — SERTRALINE HYDROCHLORIDE 50 MG: 50 TABLET ORAL at 08:57

## 2021-07-15 RX ADMIN — HYDRALAZINE HYDROCHLORIDE 5 MG: 20 INJECTION, SOLUTION INTRAMUSCULAR; INTRAVENOUS at 06:33

## 2021-07-15 RX ADMIN — MONTELUKAST 10 MG: 10 TABLET, FILM COATED ORAL at 08:57

## 2021-07-15 RX ADMIN — PANTOPRAZOLE SODIUM 40 MG: 40 TABLET, DELAYED RELEASE ORAL at 06:31

## 2021-07-15 RX ADMIN — ATORVASTATIN CALCIUM 40 MG: 40 TABLET, FILM COATED ORAL at 08:50

## 2021-07-15 RX ADMIN — CELECOXIB 200 MG: 100 CAPSULE ORAL at 08:50

## 2021-07-15 RX ADMIN — REGADENOSON 0.4 MG: 0.08 INJECTION, SOLUTION INTRAVENOUS at 12:29

## 2021-07-15 RX ADMIN — ONDANSETRON 4 MG: 2 INJECTION INTRAMUSCULAR; INTRAVENOUS at 04:22

## 2021-07-15 RX ADMIN — ASPIRIN 162 MG: 81 TABLET, CHEWABLE ORAL at 04:22

## 2021-07-15 RX ADMIN — HYDRALAZINE HYDROCHLORIDE 25 MG: 25 TABLET, FILM COATED ORAL at 08:51

## 2021-07-15 RX ADMIN — METOPROLOL TARTRATE 50 MG: 50 TABLET, FILM COATED ORAL at 08:50

## 2021-07-15 RX ADMIN — LOSARTAN POTASSIUM 100 MG: 50 TABLET, FILM COATED ORAL at 08:50

## 2021-07-15 RX ADMIN — ACETAMINOPHEN 650 MG: 325 TABLET, FILM COATED ORAL at 15:53

## 2021-07-15 RX ADMIN — NITROGLYCERIN 0.5 INCH: 20 OINTMENT TOPICAL at 04:22

## 2021-07-15 NOTE — ASSESSMENT & PLAN NOTE
Follows with [facility] currently off seizure medication    Patient reportedly had an episode seizure-like symptoms early in the morning-noted to be diaphoretic, but patient could recall events leading up to it and during the episode  Stated she was diaphoretic but did not void bowel or bladder    Neurology was consulted and cleared patient for discharge-attributing this symptoms to vasovagal syncope present seizure    Cleared for discharge and Recommend outpatient follow-up
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
## 2021-07-15 NOTE — CONSULTS
NEUROLOGY RESIDENCY CONSULT NOTE     Name: Calista Mock   Age & Sex: 63 y o  female   MRN: [id number]  Unit/Bed#: S -01   Encounter: [id number]  Length of Stay: 0    ASSESSMENT & PLAN     Seizures [facility])  Assessment & Plan  63 y o  female with hx of unclear possible seizures presented after awakening early in the morning for severe chest pain  While in the ED, pt thought she experienced a seizure and saw the time of 9:05am  Pt notes no LOC and experiencing cold sweat, bilateral stiffening of the arms and legs without shakes, palpitations, and minor incontinence loss  Event lasted a few seconds and occurred after feeling nauseous and just eating food  Pt states it felt different from her past seizures which normally causes LOC, full incontinence, bloody nose, tongue/cheek bites, seeing blue dots before blacking out, and smelling ammonia  Pt experienced event at 9:05, and BP taken showed 162/70 at 9:00am, and 87/50 at 9:10am  Pt has been seizure free for 5 years without need of seizure meds and has been following with outpatient neurology  Impressions: Vasovagal pre-syncopal event   No loss of consciousness or experienced shakes and bilateral stiffness occurred decreasing likelihood of seizure  BP at 9:00 was 162/75, event occurred at 9:05, BP at 9:10 87/50  Labs  Abnormal: A1c 5 8, Choelsterol 207, HDL 38,   Normal: Troponin <0 02, CKMB <1 0, TSH 2 721, sodium 141, PTT 32, PT 12 0, INR 0 88    Imaging  7/15  - CXR: No acute cardiopulmonary disease  Plan  - Continue following with outpatient neurology   - Consider psychologist due to current stressors that could lead to increased likelihood of stress reaction events  - Appreciate medical management from primary team    Pt should continue following up with current outpatient neurology for her hx of seizures, chronic migraine, monitoring of pineal gland cyst, vertigo, and anxiety       SUBJECTIVE     Reason for Consult / Principal Problem: Seizure  Hx and PE limited by: n/a    HPI: Jerardo Conway is a 63 y o  female with PmHx of MI at age 34 and unclear possible history of seizures who presents after awakening early in the morning for severe chest pain  While in the ED, pt thought she experienced a seizure and saw the time of 9:05am  Pt notes no LOC and experiencing cold sweat, bilateral stiffening of the arms and legs without shakes, palpitations, and minor incontinence loss  Event lasted a few seconds and occurred after feeling nauseous and just eating food  Pt states it felt different from her past seizures which normally causes LOC, full incontinence, bloody nose, tongue/cheek bites, seeing blue dots before blacking out, and smelling ammonia  Pt experienced event at 9:05, and BP taken showed 162/70 at 9:00am, and 87/50 at 9:10am  Pt notes a low pain tolerance with increased recent stressors  Pt follows with outpatient neurology  Pt has been seizure free for years without requirement of medications  Pt has had multiple normal EEGs and states she has gone through outpatient 24-hr ambulatory EEG as well as sleep-deprived stress EEG's  Pt has stable pineal cyst and hx of migraines  Pt experienced bilateral frontal headache in the ED that disappeared with Tylenol  Pt notes sensitivity to light and noise during migraines        Inpatient consult to Neurology  Consult performed by: Zola Aschoff, DO  Consult ordered by: Gilma Bolton DO          Inpatient consult to Neurology     Performed by  Zola Aschoff, DO     Authorized by Gilma Bolton DO              Historical Information   Past Medical History:   Diagnosis Date    Bladder prolapse, female, acquired     Epilepsy ([address] )     Hypertension     Kidney stone     Tear of MCL (medial collateral ligament) of knee      Past Surgical History:   Procedure Laterality Date    HYSTERECTOMY      age 45    OOPHORECTOMY Bilateral     age 45     Social History   Social History     Substance and Sexual Activity   Alcohol Use Yes    Comment: rarely     Social History     Substance and Sexual Activity   Drug Use No     E-Cigarette/Vaping    E-Cigarette Use Never User      E-Cigarette/Vaping Substances    Nicotine No     THC No     CBD Yes Cream 3000mg     Social History     Tobacco Use   Smoking Status Never Smoker   Smokeless Tobacco Never Used     Family History: non-contributory  Meds/Allergies   all current active meds have been reviewed  Allergies   Allergen Reactions    Iodinated Diagnostic Agents Anaphylaxis    Cortisone     Iodine - Food Allergy     Lidocaine     Novocain [Procaine]     Other      "GENERAL ANESTHESIA"    Phenytoin        Review of previous medical records was completed  Review of Systems    OBJECTIVE     Patient ID: Toribio Andrade is a 63 y o  female  Vitals:   Vitals:    07/15/21 1400 07/15/21 1518 07/15/21 1538 07/15/21 1638   BP: 137/73 128/62 119/65    BP Location: Right arm Right arm Right arm    Pulse: 64 (!) 54 55 69   Resp: 18 18 18    Temp:   98 2 °F (36 8 °C)    TempSrc:   Oral    SpO2: 97% 98% 97%    Weight:   89 4 kg (197 lb)    Height:   5' 3" (1 6 m)       Body mass index is 34 9 kg/m²  No intake or output data in the 24 hours ending 07/15/21 1704    Temperature:   Temp (24hrs), Av 9 °F (37 2 °C), Min:98 2 °F (36 8 °C), Max:99 5 °F (37 5 °C)    Temperature: 98 2 °F (36 8 °C)    Invasive Devices: Invasive Devices     None                 Physical Exam  Eyes:      Extraocular Movements: EOM normal       Pupils: Pupils are equal, round, and reactive to light  Neurological:      Coordination: Finger-Nose-Finger Test normal       Deep Tendon Reflexes:      Reflex Scores:       Bicep reflexes are 2+ on the right side and 2+ on the left side  Brachioradialis reflexes are 2+ on the right side and 2+ on the left side  Patellar reflexes are 3+ on the right side and 3+ on the left side         Achilles reflexes are 2+ on the right side and 2+ on the left side  Psychiatric:         Speech: Speech normal           Neurologic Exam     Mental Status   Oriented to person  Oriented to place  Oriented to city and area  Disoriented to time  Oriented to year, month and date  Follows 3 step commands  Attention: normal  Concentration: normal    Speech: speech is normal   Level of consciousness: alert  Knowledge: good  Able to name object  Able to repeat  Normal comprehension  Cranial Nerves     CN II   Visual fields full to confrontation  Visual acuity: normal  Right visual field deficit: none  Left visual field deficit: none     CN III, IV, VI   Pupils are equal, round, and reactive to light  Extraocular motions are normal    CN III: no CN III palsy  CN VI: no CN VI palsy    CN V   Facial sensation intact  Right facial sensation deficit: none  Left facial sensation deficit: none    CN VII   Facial expression full, symmetric  Right facial weakness: none  Left facial weakness: none    CN VIII   CN VIII normal    Hearing: intact    CN IX, X   CN IX normal    CN X normal    Palate: symmetric    CN XI   CN XI normal    Right trapezius strength: normal  Left trapezius strength: normal    CN XII   CN XII normal    Tongue: not atrophic  Fasciculations: absent  Tongue deviation: none    Motor Exam   Muscle bulk: normal  Overall muscle tone: normal  Right arm pronator drift: absent  Left arm pronator drift: absent    Strength   Right deltoid: 4/5  Left deltoid: 4/5  Right biceps: 5/5  Left biceps: 5/5  Right triceps: 5/5  Left triceps: 5/5  Right iliopsoas: 4/5  Left iliopsoas: 4/5  Right quadriceps: 4/5  Left quadriceps: 4/5  Right hamstrin/5  Right anterior tibial: 5/5  Left anterior tibial: 5/5  Right gastroc: 5/5  Left gastroc: 5/5Bilateral arthritis in shoulders, hips, and knees       Sensory Exam   Light touch normal    Right arm light touch: normal  Left arm light touch: normal  Right leg light touch: normal  Left leg light touch: normal  Right arm vibration: normal  Left arm vibration: normal  Right leg vibration: decreased from knee (Right deficits worse than left)  Left leg vibration: decreased from knee  Right leg proprioception: normal  Left leg proprioception: normal  Prior meniscus tear on Left knee  Increased temperature sensitivity on right LE than left  UE temperature sensitivity intact  Gait, Coordination, and Reflexes     Coordination   Finger to nose coordination: normal    Reflexes   Right brachioradialis: 2+  Left brachioradialis: 2+  Right biceps: 2+  Left biceps: 2+  Right patellar: 3+  Left patellar: 3+  Right achilles: 2+  Left achilles: 2+  Right : 2+  Left : 2+  Right plantar: normal  Left plantar: normal         LABORATORY DATA     Labs: I have personally reviewed pertinent reports  Results from last 7 days   Lab Units 07/15/21  0429   WBC Thousand/uL 7 18   HEMOGLOBIN g/dL 13 8   HEMATOCRIT % 39 5   PLATELETS Thousands/uL 234   NEUTROS PCT % 62   MONOS PCT % 8      Results from last 7 days   Lab Units 07/15/21  0429   POTASSIUM mmol/L 3 8   CHLORIDE mmol/L 105   CO2 mmol/L 25   BUN mg/dL 17   CREATININE mg/dL 0 67   CALCIUM mg/dL 9 7   ALK PHOS U/L 92   ALT U/L 47   AST U/L 31              Results from last 7 days   Lab Units 07/15/21  0429   INR  0 88   PTT seconds 32         Results from last 7 days   Lab Units 07/15/21  1351 07/15/21  0741 07/15/21  0429   TROPONIN I ng/mL <0 02 <0 02 <0 02       IMAGING & DIAGNOSTIC TESTING     Radiology Results: I have personally reviewed pertinent reports  XR chest 1 view portable   ED Interpretation by Aaron Pérez MD (07/15 0539)   No acute disease read by me  Final Result by Ginny Rey MD (07/15 2135)      No acute cardiopulmonary disease  Workstation performed: [id number]             Other Diagnostic Testing: I have personally reviewed pertinent reports        ACTIVE MEDICATIONS     Current Facility-Administered Medications Medication Dose Route Frequency    acetaminophen (TYLENOL) tablet 650 mg  650 mg Oral Q4H PRN    amLODIPine (NORVASC) tablet 10 mg  10 mg Oral Daily    [START ON 7/16/2021] aspirin chewable tablet 81 mg  81 mg Oral Daily    atorvastatin (LIPITOR) tablet 40 mg  40 mg Oral Daily    celecoxib (CeleBREX) capsule 200 mg  200 mg Oral Daily    enoxaparin (LOVENOX) subcutaneous injection 40 mg  40 mg Subcutaneous Q24H DAWIT    hydrALAZINE (APRESOLINE) injection 5 mg  5 mg Intravenous Q6H PRN    hydrALAZINE (APRESOLINE) tablet 25 mg  25 mg Oral BID    losartan (COZAAR) tablet 100 mg  100 mg Oral Daily    metoprolol tartrate (LOPRESSOR) tablet 50 mg  50 mg Oral BID    montelukast (SINGULAIR) tablet 10 mg  10 mg Oral Daily    ondansetron (ZOFRAN) injection 4 mg  4 mg Intravenous Q6H PRN    pantoprazole (PROTONIX) EC tablet 40 mg  40 mg Oral Early Morning    sertraline (ZOLOFT) tablet 50 mg  50 mg Oral QAM    simethicone (MYLICON) chewable tablet 80 mg  80 mg Oral 4x Daily PRN       Prior to Admission medications    Medication Sig Start Date End Date Taking?  Authorizing Provider   amLODIPine (NORVASC) 10 mg tablet Take 1 tablet by mouth daily   Yes Historical Provider, MD   atorvastatin (LIPITOR) 40 mg tablet Take 40 mg by mouth daily   Yes Historical Provider, MD   celecoxib (CeleBREX) 200 mg capsule Take 1 capsule (200 mg total) by mouth daily 12/4/20  Yes Patty Meigs,    clonazePAM (KlonoPIN) 0 5 mg tablet Take 1 tablet by mouth 2 (two) times a day 6/19/20  Yes Historical Provider, MD   clotrimazole (LOTRIMIN) 1 % cream APPLY TO AFFECTED AREA TWICE A DAY IN THE MORNING AND IN THE EVENING 7/27/20  Yes Historical Provider, MD   Elastic Bandages & Supports ([address]) [address] [facility] by Does not apply route daily 7/17/20  Yes Jb Garb, DO   Elastic Bandages & Supports (KNEE BRACE/HINGED S/M) MISC by Does not apply route daily 5/15/20  Yes Jb Garb, DO   furosemide (LASIX) 40 mg tablet Take 40 mg by mouth daily as needed    Yes Historical Provider, MD   hydrALAZINE (APRESOLINE) 25 mg tablet Take 25 mg by mouth 2 (two) times a day 5/2/20  Yes Historical Provider, MD   losartan (COZAAR) 100 MG tablet Take 100 mg by mouth daily   Yes Historical Provider, MD   Mapap Arthritis Pain 650 MG CR tablet Take 650 mg by mouth every 8 (eight) hours as needed 7/27/20  Yes Historical Provider, MD   metoprolol tartrate (LOPRESSOR) 25 mg tablet Take 50 mg by mouth 2 (two) times a day    Yes Historical Provider, MD   montelukast (SINGULAIR) 10 mg tablet Take 1 tablet by mouth daily   Yes Historical Provider, MD   multivitamin (THERAGRAN) TABS Take 1 tablet by mouth daily   Yes Historical Provider, MD   omeprazole (PriLOSEC) 20 mg delayed release capsule Take 20 mg by mouth daily 8/25/20  Yes Historical Provider, MD   sertraline (ZOLOFT) 50 mg tablet Take 1 tablet (50 mg total) by mouth every morning 5/26/21  Yes Michelle Salter MD   cyclobenzaprine (FLEXERIL) 5 mg tablet TAKE 1 TABLET BY MOUTH THREE TIMES A DAY  Patient not taking: Reported on 5/26/2021 9/16/20 7/15/21  Denice Morales PA-C         CODE STATUS & ADVANCED DIRECTIVES     Code Status: Level 1 - Full Code  Advance Directive and Living Will:      Power of :    POLST:        VTE Pharmacologic Prophylaxis: Enoxaparin (Lovenox)  VTE Mechanical Prophylaxis: reason for no mechanical VTE prophylaxis low risk    ==  DO [facility] Neurology Residency, PGY-1

## 2021-07-15 NOTE — DISCHARGE INSTR - AVS FIRST PAGE
Dear Curt Shelby,     It was our pleasure to care for you here at [facility], "[employer]  It is our hope that we were always able to exceed the expected standards for your care during your stay  You were hospitalized due to chest pain  You were cared for on the ED floor by Humberto Ybarra DO under the service of Jose Guadalupe Whitney MD with the [facility] who covers for your primary care physician (PCP), Raul Valenzuela MD, while you were hospitalized  If you have any questions or concerns related to this hospitalization, you may contact us at [phone]  For follow up as well as any medication refills, we recommend that you follow up with your primary care physician  A registered nurse will reach out to you by phone within a few days after your discharge to answer any additional questions that you may have after going home  However, at this time we provide for you here, the most important instructions / recommendations at discharge:     · Notable Medication Adjustments -   · None  · Testing Required after Discharge -   · None  · Important follow up information -   · Follow-up with primary care physician within 1-2 weeks after discharge  · Follow-up with outpatient neurologist  · Please review this entire after visit summary as additional general instructions including medication list, appointments, activity, diet, any pertinent wound care, and other additional recommendations from your care team that may be provided for you        Sincerely,     Humberto Ybarra DO

## 2021-07-15 NOTE — ED NOTES
Patient rang call bell to report "I think I just had a seizure" PT c/o nausea  RN reassessing vital signs at this time  PT HR in the 40s now  Will make attending aware       Dedra Ceballos RN  07/15/21 2335
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
## 2021-07-15 NOTE — ASSESSMENT & PLAN NOTE
63 y o  female with hx of unclear possible seizures presented after awakening early in the morning for severe chest pain  While in the ED, pt thought she experienced a seizure and saw the time of 9:05am  Pt notes no LOC and experiencing cold sweat, bilateral stiffening of the arms and legs without shakes, palpitations, and minor incontinence loss  Event lasted a few seconds and occurred after feeling nauseous and just eating food  Pt states it felt different from her past seizures which normally causes LOC, full incontinence, bloody nose, tongue/cheek bites, seeing blue dots before blacking out, and smelling ammonia  Pt experienced event at 9:05, and BP taken showed 162/70 at 9:00am, and 87/50 at 9:10am  Pt has been seizure free for 5 years without need of seizure meds and has been following with outpatient neurology  Impressions: Vasovagal pre-syncopal event   No loss of consciousness or experienced shakes and bilateral stiffness occurred decreasing likelihood of seizure  BP at 9:00 was 162/75, event occurred at 9:05, BP at 9:10 87/50  Labs  Abnormal: A1c 5 8, Choelsterol 207, HDL 38,   Normal: Troponin <0 02, CKMB <1 0, TSH 2 721, sodium 141, PTT 32, PT 12 0, INR 0 88    Imaging  7/15  - CXR: No acute cardiopulmonary disease      Plan  - Continue following with outpatient neurology   - Consider psychologist due to current stressors that could lead to increased likelihood of stress reaction events  - Appreciate medical management from primary team

## 2021-07-15 NOTE — ASSESSMENT & PLAN NOTE
Patient reported that blood pressure has been controlled prior to this episode    · Resume amlodipine, hydralazine 25 b i d , losartan 100 mg daily, metoprolol 50 mg b i d

## 2021-07-15 NOTE — ED NOTES
Sent tiger text to admitting Kanwal Sanders) in regards to heart rate and bp dropping       April Candace Rainey RN  07/15/21 1509

## 2021-07-15 NOTE — ED NOTES
Waiting fro pharmacy to tube the papers to apply nitro paste to (none in med cart)     Dedra Whelan RN  07/15/21 1434
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
## 2021-07-16 LAB
CHEST PAIN STATEMENT: NORMAL
MAX DIASTOLIC BP: 72 MMHG
MAX HEART RATE: 106 BPM
MAX PREDICTED HEART RATE: 161 BPM
MAX. SYSTOLIC BP: 150 MMHG
PROTOCOL NAME: NORMAL
REASON FOR TERMINATION: NORMAL
TARGET HR FORMULA: NORMAL
TEST INDICATION: NORMAL
TIME IN EXERCISE PHASE: NORMAL

## 2021-07-19 ENCOUNTER — TELEPHONE (OUTPATIENT)
Dept: NEUROLOGY | Facility: CLINIC | Age: 61
End: 2021-07-19

## 2021-07-19 NOTE — TELEPHONE ENCOUNTER
Patient calling to say that she was recently admitted into the hospital  Familia Oh that she needs a sooner appointment  Says that her blood pressure is up and is causing her to get stressed out and causing seizures  Was cleared by IP neurology but wants to be seen this week if possible  Patient is currently scheduled to see you 10/13/21  Please advise if you would like to see this patient sooner  [phone]: Ok to leave a detailed message

## 2021-07-19 NOTE — TELEPHONE ENCOUNTER
According to the inpatient notes she did not have seizure, but had an event of presyncope related to transient low blood pressure  Her hypertension is not a risk factor for seizure  She should follow up with her PCP and other providers that may address management of her blood pressure and anxiety  If there are questions or concerns after those visits then we can consider earlier follow up

## 2021-07-20 ENCOUNTER — EVALUATION (OUTPATIENT)
Dept: PHYSICAL THERAPY | Facility: CLINIC | Age: 61
End: 2021-07-20
Payer: COMMERCIAL

## 2021-07-20 DIAGNOSIS — R26.89 IMBALANCE: Primary | ICD-10-CM

## 2021-07-20 PROCEDURE — 97163 PT EVAL HIGH COMPLEX 45 MIN: CPT | Performed by: PHYSICAL THERAPIST

## 2021-07-20 NOTE — TELEPHONE ENCOUNTER
Spoke to patient  She has follow up with PCP today 7/20/2021  BP has been running high (410'N systolically)  Has been keeping journal and will discuss with PCP

## 2021-07-20 NOTE — PROGRESS NOTES
PT EVALUATION    Today's date: 21  Patient name: Yuniel Melchor  : 1960  MRN: [id number]  Referring provider: Beto Morrow PA-C  Dx:   1  Brody Taveras is a 63 y o  female who presents with signs and symptoms consistent with positional vertigo and imbalance  This patient would benefit from skilled physical therapy to address their listed impairments and functional limitations to maximize functional outcome  Impairments:    pain with function   activity intolerance   weight bearing intolerance   abnormal gait   imbalance     Prognosis:  Fair  Positive and negative prognostic indicator(s):  prior success with conservative care and pain >3 months    Goals:    STG Patient is independent with HEP   STG Tolerance to weight bearing activities is improved by 25% in 2 weeks  LTG Balance & endurance & gait & locomotion are improved by 50% in 4 weeks  LTG ADL performance improved to prior level of function in 6 weeks    Planned interventions:  home exercise program, patient education, graded activity, balance and weight bearing training, gait training and canalith repositioning techniques    Duration in visits:  8  Frequency: 2 visits per week  Duration in weeks:  4    History of Current Injury: patient has had general unsteadiness and imbalance for over 5 years  She was recently hospitialized with high blood pressure which remains unstable  She has had 3 bloody noses since being the hospital   She has blurred vision at time and dizziness with the room spinning when getting out of bed this morning  There was associated nausea and 'a lot of vomiting' also this morning  She gets dizzy when rolling in bed at times  Prior to this morning she had not been getting dizzy when getting out of bed  Headache haves been present since last Thursday  There is general imbalance when walking an she uses a SPC      Pain location: na  Pain descriptors:  Dizziness, nausea, spinning  Pain intensity:  10/10    Aggravating factors: supine to sit, rolling in bed, bending forward  Easing factors: avoiding above      Patient goals:  decreased pain, independence with ADLs and improved balance    Objective    BP:  156/94 prior to objective measures  Romber+ seconds  Romberg EC: 20+ seconds, moderate sway  Romberg head turns/tilts: 20+ seconds  Tandem left foot back, 10 and 20+ seconds  Tandem right foot back 20+ seconds  SLS left: 4-6 seconds  SLS right:  3-7 seconds    MCTSIB: (mean)  EO firm:  2 88 (0 51)  EC firm:  3 4  (0 03)  EO foam:  3 41 (1 03)  EC foam:  7 37 (2 86)  Composite:   4 26 (1 33)       Precautions: Epilepsy, fall risk      Manuals                                                                 Neuro Re-Ed                                                                                                        Ther Ex                                                                                                                     Ther Activity                                       Gait Training                                       Modalities

## 2021-07-22 ENCOUNTER — OFFICE VISIT (OUTPATIENT)
Dept: PHYSICAL THERAPY | Facility: CLINIC | Age: 61
End: 2021-07-22
Payer: COMMERCIAL

## 2021-07-22 DIAGNOSIS — R26.89 IMBALANCE: Primary | ICD-10-CM

## 2021-07-22 PROCEDURE — 97112 NEUROMUSCULAR REEDUCATION: CPT | Performed by: PHYSICAL THERAPIST

## 2021-07-22 NOTE — PROGRESS NOTES
Daily Note     Today's date: 2021  Patient name: Holland Faria  : 1960  MRN: [id number]  Referring provider: Stefania Ayala  Dx:   Encounter Diagnosis     ICD-10-CM    1  Imbalance  R26 89               Subjective: patient reports her BP has been better after taking her meds this morning  Her vertigo is also much better  Objective: See treatment diary below  BP prior to treatment 126/82      Assessment: Tolerated treatment fair  Patient exhibited good technique with therapeutic exercises and would benefit from continued PT  Occasional rest periods during treatment due to mild vertigo symptoms and leg pain  Plan: Progress treatment as tolerated  Precautions: Epilepsy, fall risk      Manuals                                                                 Neuro Re-Ed             Romberg head turns 10            Romberg head tilts 10            Tandem stance 30ea            Tandem with saccade horiz, two targets 10ea            Walking on foams 4 circles one oval 2'ea            marching             Toe taps 3 foams stacked 20x cones?            Visual motion sensitivity, green MB horiz/vert/CW/CCW 10ea            biodex LOS Easy 5, 32% best            SLS :60ea                                                   Ther Ex                                                                                                                     Ther Activity                                       Gait Training                                       Modalities

## 2021-07-27 ENCOUNTER — OFFICE VISIT (OUTPATIENT)
Dept: PHYSICAL THERAPY | Facility: CLINIC | Age: 61
End: 2021-07-27
Payer: COMMERCIAL

## 2021-07-27 DIAGNOSIS — M54.42 CHRONIC LEFT-SIDED LOW BACK PAIN WITH LEFT-SIDED SCIATICA: ICD-10-CM

## 2021-07-27 DIAGNOSIS — R26.89 IMBALANCE: Primary | ICD-10-CM

## 2021-07-27 DIAGNOSIS — G89.29 CHRONIC LEFT-SIDED LOW BACK PAIN WITH LEFT-SIDED SCIATICA: ICD-10-CM

## 2021-07-27 DIAGNOSIS — M22.2X2 PATELLOFEMORAL PAIN SYNDROME OF LEFT KNEE: ICD-10-CM

## 2021-07-27 PROCEDURE — 97112 NEUROMUSCULAR REEDUCATION: CPT

## 2021-07-27 NOTE — PROGRESS NOTES
Daily Note     Today's date: 2021  Patient name: Peterson Saenz  : 1960  MRN: [id number]  Referring provider: Chantale Garcia PA-C  Dx:   Encounter Diagnosis     ICD-10-CM    1  Imbalance  R26 89    2  Chronic left-sided low back pain with left-sided sciatica  M54 42     G89 29    3  Patellofemoral pain syndrome of left knee  M22 2X2                   Subjective: Patient reports she checked her BP prior to session and it has been more stable  No issues after last session  Objective: See treatment diary below    BP at start of session:     Assessment: Tolerated treatment well  Patient demonstrated fatigue post treatment and would benefit from continued PT  Slightly off balance following VOR circles and horizontal saccades in tandem  Plan: Progress treatment as tolerated         Precautions: Epilepsy, fall risk      Manuals                                                                Neuro Re-Ed             Romberg head turns 10 15            Romberg head tilts 10 15           Tandem stance 30ea 1' + head turns/nods :30 ea           Tandem with saccade horiz, two targets 10ea :30x2           Walking on foams 4 circles one oval 2'ea            marching             Toe taps 3 foams stacked 20x 20 ea cones           Visual motion sensitivity, green MB horiz/vert/CW/CCW 10ea 10 ea           biodex LOS Easy 5, 32% best            SLS :60ea 1' ea           Saccades on foam  Beam AP/ML :30 ea           Walking with cone taps  On beam 6 cones 6 rounds                        Ther Ex                                                                                                                     Ther Activity                                       Gait Training                                       Modalities

## 2021-07-29 ENCOUNTER — OFFICE VISIT (OUTPATIENT)
Dept: PHYSICAL THERAPY | Facility: CLINIC | Age: 61
End: 2021-07-29
Payer: COMMERCIAL

## 2021-07-29 DIAGNOSIS — M22.2X2 PATELLOFEMORAL PAIN SYNDROME OF LEFT KNEE: ICD-10-CM

## 2021-07-29 DIAGNOSIS — M54.42 CHRONIC LEFT-SIDED LOW BACK PAIN WITH LEFT-SIDED SCIATICA: ICD-10-CM

## 2021-07-29 DIAGNOSIS — R26.89 IMBALANCE: Primary | ICD-10-CM

## 2021-07-29 DIAGNOSIS — G89.29 CHRONIC LEFT-SIDED LOW BACK PAIN WITH LEFT-SIDED SCIATICA: ICD-10-CM

## 2021-07-29 PROCEDURE — 97112 NEUROMUSCULAR REEDUCATION: CPT

## 2021-07-29 NOTE — PROGRESS NOTES
Daily Note     Today's date: 2021  Patient name: Ragini Waters  : 1960  MRN: [id number]  Referring provider: Tika Mcconnell PA-C   Dx:   Encounter Diagnosis     ICD-10-CM    1  Imbalance  R26 89    2  Chronic left-sided low back pain with left-sided sciatica  M54 42     G89 29    3  Patellofemoral pain syndrome of left knee  M22 2X2                   Subjective: Patient reports she feels good today and denies LOB since last visit  Objective: See treatment diary below      Assessment: Continued with progression of balance activities today as indicated and patient tolerated treatment well  Patient demonstrated fatigue post treatment and would benefit from continued PT  Most challenged by forward walking on balance beam and frequently reaches for handrail while performing  Plan: Progress treatment as tolerated         Precautions: Epilepsy, fall risk      Manuals                                                               Neuro Re-Ed             Romberg head turns 10 15  On yellow foam 15          Romberg head tilts 10 15 On yellow foam 15          EC head turns/tilts   15 ea on yellow foam          Tandem stance 30ea 1' + head turns/nods :30 ea On beam :30x2 ea          Tandem with saccade horiz, two targets 10ea :30x2 On beam :30x2          Walking on foams 4 circles one oval 2'ea            marching             Forward walking on beam   10 laps          Toe taps 3 foams stacked 20x 20 ea cones on beam 20 ea          Visual motion sensitivity, green MB horiz/vert/CW/CCW 10ea 10 ea           biodex LOS Easy 5, 32% best  Easy 5x, best 52%          SLS :60ea 1' ea 1' ea          Saccades on foam  Beam AP/ML :30 ea           Walking with cone taps  On beam 6 cones 6 rounds           Sidestepping on beam with cone taps   5 cones, 10 rounds          Ther Ex Ther Activity                                       Gait Training                                       Modalities

## 2021-08-03 ENCOUNTER — OFFICE VISIT (OUTPATIENT)
Dept: PHYSICAL THERAPY | Facility: CLINIC | Age: 61
End: 2021-08-03
Payer: COMMERCIAL

## 2021-08-03 DIAGNOSIS — M22.2X2 PATELLOFEMORAL PAIN SYNDROME OF LEFT KNEE: ICD-10-CM

## 2021-08-03 DIAGNOSIS — R26.89 IMBALANCE: Primary | ICD-10-CM

## 2021-08-03 DIAGNOSIS — M54.42 CHRONIC LEFT-SIDED LOW BACK PAIN WITH LEFT-SIDED SCIATICA: ICD-10-CM

## 2021-08-03 DIAGNOSIS — G89.29 CHRONIC LEFT-SIDED LOW BACK PAIN WITH LEFT-SIDED SCIATICA: ICD-10-CM

## 2021-08-03 PROCEDURE — 97112 NEUROMUSCULAR REEDUCATION: CPT

## 2021-08-03 NOTE — PROGRESS NOTES
Daily Note     Today's date: 8/3/2021  Patient name: Lucetta Burkitt  : 1960  MRN: [id number]  Referring provider: Fran Casey PA-C  Dx:   Encounter Diagnosis     ICD-10-CM    1  Imbalance  R26 89    2  Chronic left-sided low back pain with left-sided sciatica  M54 42     G89 29    3  Patellofemoral pain syndrome of left knee  M22 2X2                   Subjective: Patient reports no significant LOB since last visit  Objective: See treatment diary below      Assessment: Tolerated treatment well  Patient demonstrated fatigue post treatment, exhibited good technique with therapeutic exercises and would benefit from continued PT  Held SLS for :30 seconds on R and :25 seconds on L which is an improvement  Improved tolerance to saccade exercises  LOB 1x with VOR on biodex foam though patient was able to recover independently  Plan: Progress treatment as tolerated         Precautions: Epilepsy, fall risk      Manuals 7/22 7/27 7/29 8/3                                                             Neuro Re-Ed             Romberg head turns 10 15  On yellow foam 15 biodex foam 15         Romberg head tilts 10 15 On yellow foam 15 biodex foam 15         FT EC on foam    biodex foam :30x3         EC head turns/tilts   15 ea on yellow foam yellow foam 15 ea         Tandem stance 30ea 1' + head turns/nods :30 ea On beam :30x2 ea          Tandem with saccade horiz, two targets 10ea :30x2 On beam :30x2 On beam :30x2         Walking on foams 4 circles one oval 2'ea            marching             Forward walking on beam   10 laps 10 laps         Toe taps 3 foams stacked 20x 20 ea cones on beam 20 ea on beam 20 ea         Visual motion sensitivity, green MB horiz/vert/CW/CCW 10ea 10 ea  10 ea on yellow foam         biodex LOS Easy 5, 32% best  Easy 5x, best 52%          SLS :60ea 1' ea 1' ea 1' ea         Saccades on foam  Beam AP/ML :30 ea  FT biodex foam :30 ea AP/ML         VOR     biodex foam AP/ML :30x2 ea         Sidestepping on beam with cone taps   5 cones, 10 rounds          Ther Ex                                                                                                                     Ther Activity                                       Gait Training                                       Modalities

## 2021-08-05 ENCOUNTER — OFFICE VISIT (OUTPATIENT)
Dept: PHYSICAL THERAPY | Facility: CLINIC | Age: 61
End: 2021-08-05
Payer: COMMERCIAL

## 2021-08-05 DIAGNOSIS — G89.29 CHRONIC LEFT-SIDED LOW BACK PAIN WITH LEFT-SIDED SCIATICA: ICD-10-CM

## 2021-08-05 DIAGNOSIS — M22.2X2 PATELLOFEMORAL PAIN SYNDROME OF LEFT KNEE: ICD-10-CM

## 2021-08-05 DIAGNOSIS — R26.89 IMBALANCE: Primary | ICD-10-CM

## 2021-08-05 DIAGNOSIS — M54.42 CHRONIC LEFT-SIDED LOW BACK PAIN WITH LEFT-SIDED SCIATICA: ICD-10-CM

## 2021-08-05 PROCEDURE — 97112 NEUROMUSCULAR REEDUCATION: CPT

## 2021-08-05 PROCEDURE — 97530 THERAPEUTIC ACTIVITIES: CPT

## 2021-08-05 NOTE — PROGRESS NOTES
Daily Note     Today's date: 2021  Patient name: Stephanie Rm  : 1960  MRN: [id number]  Referring provider: Ashleigh Rayo PA-C  Dx:   Encounter Diagnosis     ICD-10-CM    1  Imbalance  R26 89    2  Chronic left-sided low back pain with left-sided sciatica  M54 42     G89 29    3  Patellofemoral pain syndrome of left knee  M22 2X2                   Subjective: Patient reports no vertigo symptoms or LOB since last visit  Objective: See treatment diary below      Assessment: Tolerated treatment well  Patient exhibited good technique with therapeutic exercises and would benefit from continued PT  Progressed to biodex foam for most activities  Initiated head turns/tilts while walking which was challenging though patient was able recover from LOB independently  Improved performance on biodex overall  Plan: Progress treatment as tolerated         Precautions: Epilepsy, fall risk      Manuals 7/22 7/27 7/29 8/3 8/5                                                            Neuro Re-Ed             Romberg head turns 10 15  On yellow foam 15 biodex foam 15 biodex foam 15        Romberg head tilts 10 15 On yellow foam 15 biodex foam 15 biodex foam 15        FT EC on foam    biodex foam :30x3 biodex foam :30x3        EC head turns/tilts   15 ea on yellow foam yellow foam 15 ea biodex foam 15 ea        Tandem stance 30ea 1' + head turns/nods :30 ea On beam :30x2 ea          Tandem with saccade horiz, two targets 10ea :30x2 On beam :30x2 On beam :30x2 On beam :30x2        Walking on foams 4 circles one oval 2'ea            marching             Forward walking on beam   10 laps 10 laps 10 laps        Toe taps 3 foams stacked 20x 20 ea cones on beam 20 ea on beam 20 ea On beam 20 ea        Visual motion sensitivity, green MB horiz/vert/CW/CCW 10ea 10 ea  10 ea on yellow foam 10 ea on biodex foam        biodex LOS Easy 5, 32% best  Easy 5x, best 52%  Easy 5x, best 64%        SLS :60ea 1' ea 1' ea 1' ea 1' ea        Saccades on foam  Beam AP/ML :30 ea  FT biodex foam :30 ea AP/ML FT biodex foam :30 ea AP/ML        VOR     biodex foam AP/ML :30x2 ea biodex foam AP/ML :30x2 ea        Sidestepping on beam with cone taps   5 cones, 10 rounds          Ther Ex             Walking with head turns/tilts     2 laps ea        Walking with MB circles     2 laps                                                                                       Ther Activity                                       Gait Training                                       Modalities

## 2021-08-09 NOTE — PROGRESS NOTES
PT RE-EVALUATION    Today's date: 21  Patient name: Ragini Waters  : 1960  MRN: [id number]  Referring provider: Tika Mcconnell PA-C  Dx:   1  Imbalance    2  Chronic left-sided low back pain with left-sided sciatica    3  Patellofemoral pain syndrome of left knee      The patient has been seen in PT for a total of 8 visits since [facility] with good PT attendance noted  She has made improvements since [facility] and is making progress towards her goals  Improvements noted with all balance testing and testing on Biodex machine  The patient would benefit from continued PT to address deficits and improve function  Tx to include ROM, stretching, strengthening, modalities, HEP, pt education, postural ed, lifting/body mechanics, neuro re-ed, balance/proprioception Te, MT and equipment  Will progress as able in upcoming visits with balance, proprioception, neuro re-ed and HEP  Impairments:    pain with function   activity intolerance   weight bearing intolerance   abnormal gait   imbalance     Prognosis:  Fair  Positive and negative prognostic indicator(s):  prior success with conservative care and pain >3 months    Goals:  Progressing towards all her goals  STG Patient is independent with HEP   STG Tolerance to weight bearing activities is improved by 25% in 2 weeks  LTG Balance & endurance & gait & locomotion are improved by 50% in 4 weeks  LTG ADL performance improved to prior level of function in 6 weeks    Planned interventions:  home exercise program, patient education, graded activity, balance and weight bearing training, gait training and canalith repositioning techniques    Duration in visits:  8  Frequency: 2 visits per week  Duration in weeks:  4    History of Current Injury: patient has had general unsteadiness and imbalance for over 5 years  She was recently hospitialized with high blood pressure which remains unstable    She has had 3 bloody noses since being the hospital   She has blurred vision at time and dizziness with the room spinning when getting out of bed this morning  There was associated nausea and 'a lot of vomiting' also this morning  She gets dizzy when rolling in bed at times  Prior to this morning she had not been getting dizzy when getting out of bed  Headache haves been present since last Thursday  There is general imbalance when walking an she uses a SPC  UPDATE 21: Patient reports that therapy has been helping  Improvements noted with dizziness and balance  Pain location: na  Pain descriptors:  Dizziness, nausea, spinning  Pain intensity:  10/10    Aggravating factors: supine to sit, rolling in bed, bending forward  Easing factors: avoiding above      Patient goals:  decreased pain, independence with ADLs and improved balance    Objective  (From IE)   BP:  156/94 prior to objective measures  Romber+ seconds  Romberg EC: 20+ seconds, moderate sway  Romberg head turns/tilts: 20+ seconds  Tandem left foot back, 10 and 20+ seconds  Tandem right foot back 20+ seconds  SLS left: 4-6 seconds  SLS right:  3-7 seconds    MCTSIB: (mean)  EO firm:  2 88 (0 51)  EC firm:  3 4  (0 03)  EO foam:  3 41 (1 03)  EC foam:  7 37 (2 86)  Composite:   4 26 (1 33)    UPDATED 21:  Romber'  Romberg EC: 30+ seconds, moderate sway  Romberg head turns/tilts: 30+ seconds  Tandem left foot back: 45 seconds  Tandem right foot back: 36 seconds  SLS left: 34 seconds  SLS right:  28 seconds    MCTSIB: (mean)  EO firm:  1 62  EC firm:  3 61  EO foam:  2 64  EC foam:  5 91  Composite:   3 44

## 2021-08-10 ENCOUNTER — OFFICE VISIT (OUTPATIENT)
Dept: PHYSICAL THERAPY | Facility: CLINIC | Age: 61
End: 2021-08-10
Payer: COMMERCIAL

## 2021-08-10 DIAGNOSIS — M54.42 CHRONIC LEFT-SIDED LOW BACK PAIN WITH LEFT-SIDED SCIATICA: ICD-10-CM

## 2021-08-10 DIAGNOSIS — R26.89 IMBALANCE: Primary | ICD-10-CM

## 2021-08-10 DIAGNOSIS — M22.2X2 PATELLOFEMORAL PAIN SYNDROME OF LEFT KNEE: ICD-10-CM

## 2021-08-10 DIAGNOSIS — G89.29 CHRONIC LEFT-SIDED LOW BACK PAIN WITH LEFT-SIDED SCIATICA: ICD-10-CM

## 2021-08-10 PROCEDURE — 97112 NEUROMUSCULAR REEDUCATION: CPT

## 2021-08-10 NOTE — PROGRESS NOTES
Daily Note     Today's date: 8/10/2021  Patient name: Stephanie Rm  : 1960  MRN: [id number]  Referring provider: Ashleigh Rayo PA-C  Dx:   Encounter Diagnosis     ICD-10-CM    1  Imbalance  R26 89    2  Chronic left-sided low back pain with left-sided sciatica  M54 42     G89 29    3  Patellofemoral pain syndrome of left knee  M22 2X2                   Subjective: Patient denies falls since last visit though still finds herself "tripping"  She had MD appointment today and her BP was good  Objective: See treatment diary below      Assessment: Tolerated treatment well  Patient demonstrated fatigue post treatment, exhibited good technique with therapeutic exercises and would benefit from continued PT  No difficulty with walking + head turns/tils or ball  Continues to be most challenged by static and dynamic balance with eyes close  Plan: Progress treatment as tolerated         Precautions: Epilepsy, fall risk      Manuals 7/22 7/27 7/29 8/3 8/5 8/9                                                           Neuro Re-Ed             Romberg head turns 10 15  On yellow foam 15 biodex foam 15 biodex foam 15        Romberg head tilts 10 15 On yellow foam 15 biodex foam 15 biodex foam 15        FT EC on foam    biodex foam :30x3 biodex foam :30x3 biodex foam :30x3       EC head turns/tilts   15 ea on yellow foam yellow foam 15 ea biodex foam 15 ea biodex foam :30x2 ea       Tandem stance 30ea 1' + head turns/nods :30 ea On beam :30x2 ea          Tandem with saccade horiz, two targets 10ea :30x2 On beam :30x2 On beam :30x2 On beam :30x2 On beam :30x2       Walking on foams 4 circles one oval 2'ea            marching             Forward walking on beam   10 laps 10 laps 10 laps 10 laps       Toe taps 3 foams stacked 20x 20 ea cones on beam 20 ea on beam 20 ea On beam 20 ea On beam 20 ea       Visual motion sensitivity, green MB horiz/vert/CW/CCW 10ea 10 ea  10 ea on yellow foam 10 ea on biodex foam 10 ea on biodex foam       biodex LOS Easy 5, 32% best  Easy 5x, best 52%  Easy 5x, best 64% medium 5x, best 81%       SLS :60ea 1' ea 1' ea 1' ea 1' ea 1' ea       Saccades on foam  Beam AP/ML :30 ea  FT biodex foam :30 ea AP/ML FT biodex foam :30 ea AP/ML FT biodex foam :30 ea AP/ML + diaganols        VOR     biodex foam AP/ML :30x2 ea biodex foam AP/ML :30x2 ea biodex foam AP/ML :30x2 ea       Sidestepping on beam with cone taps   5 cones, 10 rounds          Ther Ex             Walking with head turns/tilts     2 laps ea 2 laps  ea       Walking with MB circles     2 laps  2 laps                                                                                      Ther Activity                                       Gait Training                                       Modalities

## 2021-08-12 ENCOUNTER — EVALUATION (OUTPATIENT)
Dept: PHYSICAL THERAPY | Facility: CLINIC | Age: 61
End: 2021-08-12
Payer: COMMERCIAL

## 2021-08-12 DIAGNOSIS — G89.29 CHRONIC LEFT-SIDED LOW BACK PAIN WITH LEFT-SIDED SCIATICA: ICD-10-CM

## 2021-08-12 DIAGNOSIS — M54.42 CHRONIC LEFT-SIDED LOW BACK PAIN WITH LEFT-SIDED SCIATICA: ICD-10-CM

## 2021-08-12 DIAGNOSIS — R26.89 IMBALANCE: Primary | ICD-10-CM

## 2021-08-12 DIAGNOSIS — M22.2X2 PATELLOFEMORAL PAIN SYNDROME OF LEFT KNEE: ICD-10-CM

## 2021-08-12 PROCEDURE — 97112 NEUROMUSCULAR REEDUCATION: CPT

## 2021-08-12 NOTE — PROGRESS NOTES
Daily Note     Today's date: 2021  Patient name: Valentine France  : 1960  MRN: [id number]  Referring provider: Woody Snowden PA-C  Dx:   Encounter Diagnosis     ICD-10-CM    1  Imbalance  R26 89    2  Chronic left-sided low back pain with left-sided sciatica  M54 42     G89 29    3  Patellofemoral pain syndrome of left knee  M22 2X2                   Subjective: See RE  Objective: See treatment diary below      Assessment: Tolerated treatment well  Patient demonstrated fatigue post treatment, exhibited good technique with therapeutic exercises and would benefit from continued PT  Did not complete all exercises due to testing  Refer to RE for additional information  Plan: Progress treatment as tolerated            Precautions: Epilepsy, fall risk      Manuals 7/22 7/27 7/29 8/3 8/5 8/9 8/12             RE                                             Neuro Re-Ed             Romberg head turns 10 15  On yellow foam 15 biodex foam 15 biodex foam 15        Romberg head tilts 10 15 On yellow foam 15 biodex foam 15 biodex foam 15        FT EC on foam    biodex foam :30x3 biodex foam :30x3 biodex foam :30x3       EC head turns/tilts   15 ea on yellow foam yellow foam 15 ea biodex foam 15 ea biodex foam :30x2 ea       Tandem stance 30ea 1' + head turns/nods :30 ea On beam :30x2 ea          Tandem with saccade horiz, two targets 10ea :30x2 On beam :30x2 On beam :30x2 On beam :30x2 On beam :30x2 On beam :30x2      Walking on foams 4 circles one oval 2'ea            marching             Forward walking on beam   10 laps 10 laps 10 laps 10 laps 10 laps      Toe taps 3 foams stacked 20x 20 ea cones on beam 20 ea on beam 20 ea On beam 20 ea On beam 20 ea On beam 20 ea      Visual motion sensitivity, green MB horiz/vert/CW/CCW 10ea 10 ea  10 ea on yellow foam 10 ea on biodex foam 10 ea on biodex foam       biodex LOS Easy 5, 32% best  Easy 5x, best 52%  Easy 5x, best 64% medium 5x, best 81% SLS :60ea 1' ea 1' ea 1' ea 1' ea 1' ea       Saccades on foam  Beam AP/ML :30 ea  FT biodex foam :30 ea AP/ML FT biodex foam :30 ea AP/ML FT biodex foam :30 ea AP/ML + diaganols        VOR     biodex foam AP/ML :30x2 ea biodex foam AP/ML :30x2 ea biodex foam AP/ML :30x2 ea       Sidestepping on beam with cone taps   5 cones, 10 rounds          Ther Ex             Walking with head turns/tilts     2 laps ea 2 laps  ea       Walking with MB circles     2 laps  2 laps                                                                                      Ther Activity                                       Gait Training                                       Modalities

## 2021-08-17 ENCOUNTER — OFFICE VISIT (OUTPATIENT)
Dept: PHYSICAL THERAPY | Facility: CLINIC | Age: 61
End: 2021-08-17
Payer: COMMERCIAL

## 2021-08-17 DIAGNOSIS — M22.2X2 PATELLOFEMORAL PAIN SYNDROME OF LEFT KNEE: ICD-10-CM

## 2021-08-17 DIAGNOSIS — G89.29 CHRONIC LEFT-SIDED LOW BACK PAIN WITH LEFT-SIDED SCIATICA: ICD-10-CM

## 2021-08-17 DIAGNOSIS — M54.42 CHRONIC LEFT-SIDED LOW BACK PAIN WITH LEFT-SIDED SCIATICA: ICD-10-CM

## 2021-08-17 DIAGNOSIS — R26.89 IMBALANCE: Primary | ICD-10-CM

## 2021-08-17 PROCEDURE — 97112 NEUROMUSCULAR REEDUCATION: CPT

## 2021-08-17 NOTE — PROGRESS NOTES
Daily Note     Today's date: 2021  Patient name: Blaire Ortega  : 1960  MRN: [id number]  Referring provider: Becky Guzmán PA-C  Dx:   Encounter Diagnosis     ICD-10-CM    1  Imbalance  R26 89    2  Chronic left-sided low back pain with left-sided sciatica  M54 42     G89 29    3  Patellofemoral pain syndrome of left knee  M22 2X2                   Subjective: Patient reports no episodes of imbalance since last visit that she can recal         Objective: See treatment diary below      Assessment: Tolerated treatment well  Patient demonstrated fatigue post treatment and would benefit from continued PT  Most challenged by dynamic balance activities with eyes closed on foam       Plan: Progress treatment as tolerated         Precautions: Epilepsy, fall risk      Manuals 7/22 7/27 7/29 8/3 8/5 8/9 8/12 8/17            RE                                             Neuro Re-Ed             Romberg head turns 10 15  On yellow foam 15 biodex foam 15 biodex foam 15        Romberg head tilts 10 15 On yellow foam 15 biodex foam 15 biodex foam 15        FT EC on foam    biodex foam :30x3 biodex foam :30x3 biodex foam :30x3  biodex foam :30x3     EC head turns/tilts   15 ea on yellow foam yellow foam 15 ea biodex foam 15 ea biodex foam :30x2 ea  biodex foam :30x2 ea     Tandem stance 30ea 1' + head turns/nods :30 ea On beam :30x2 ea          Tandem with saccade horiz, two targets 10ea :30x2 On beam :30x2 On beam :30x2 On beam :30x2 On beam :30x2 On beam :30x2      Walking on foams 4 circles one oval 2'ea            marching             Forward walking on beam   10 laps 10 laps 10 laps 10 laps 10 laps      Toe taps 3 foams stacked 20x 20 ea cones on beam 20 ea on beam 20 ea On beam 20 ea On beam 20 ea On beam 20 ea On beam 20 ea     Visual motion sensitivity, green MB horiz/vert/CW/CCW 10ea 10 ea  10 ea on yellow foam 10 ea on biodex foam 10 ea on biodex foam  10 ea on biodex foam     biodex LOS Easy 5, 32% best  Easy 5x, best 52%  Easy 5x, best 64% medium 5x, best 81%  medium 5x, best 71%     SLS :60ea 1' ea 1' ea 1' ea 1' ea 1' ea       Saccades on foam  Beam AP/ML :30 ea  FT biodex foam :30 ea AP/ML FT biodex foam :30 ea AP/ML FT biodex foam :30 ea AP/ML + diaganols   FT biodex foam :30 ea 3 targets     VOR     biodex foam AP/ML :30x2 ea biodex foam AP/ML :30x2 ea biodex foam AP/ML :30x2 ea  biodex foam AP/ML :30x2     Sidestepping on beam with cone taps   5 cones, 10 rounds          Ther Ex             Walking with head turns/tilts     2 laps ea 2 laps  ea  2 laps ea     Walking with MB circles     2 laps  2 laps                                                                                      Ther Activity                                       Gait Training                                       Modalities

## 2021-08-19 ENCOUNTER — HOSPITAL ENCOUNTER (EMERGENCY)
Facility: HOSPITAL | Age: 61
Discharge: HOME | End: 2021-08-19
Attending: EMERGENCY MEDICINE | Admitting: EMERGENCY MEDICINE
Payer: COMMERCIAL

## 2021-08-19 ENCOUNTER — APPOINTMENT (OUTPATIENT)
Dept: PHYSICAL THERAPY | Facility: CLINIC | Age: 61
End: 2021-08-19
Payer: COMMERCIAL

## 2021-08-19 ENCOUNTER — APPOINTMENT (EMERGENCY)
Dept: CT IMAGING | Facility: HOSPITAL | Age: 61
End: 2021-08-19
Payer: COMMERCIAL

## 2021-08-19 VITALS
OXYGEN SATURATION: 98 % | RESPIRATION RATE: 18 BRPM | HEART RATE: 70 BPM | DIASTOLIC BLOOD PRESSURE: 55 MMHG | TEMPERATURE: 98.6 F | SYSTOLIC BLOOD PRESSURE: 110 MMHG

## 2021-08-19 DIAGNOSIS — N39.0 UTI (URINARY TRACT INFECTION): Primary | ICD-10-CM

## 2021-08-19 LAB
ALBUMIN SERPL BCP-MCNC: 4.1 G/DL (ref 3.5–5)
ALP SERPL-CCNC: 105 U/L (ref 46–116)
ALT SERPL W P-5'-P-CCNC: 42 U/L (ref 12–78)
ANION GAP SERPL CALCULATED.3IONS-SCNC: 10 MMOL/L (ref 4–13)
AST SERPL W P-5'-P-CCNC: 25 U/L (ref 5–45)
BACTERIA UR QL AUTO: ABNORMAL /HPF
BASOPHILS # BLD AUTO: 0.04 THOUSANDS/ΜL (ref 0–0.1)
BASOPHILS NFR BLD AUTO: 0 % (ref 0–1)
BILIRUB SERPL-MCNC: 0.84 MG/DL (ref 0.2–1)
BILIRUB UR QL STRIP: NEGATIVE
BUN SERPL-MCNC: 12 MG/DL (ref 5–25)
CALCIUM SERPL-MCNC: 9.6 MG/DL (ref 8.3–10.1)
CHLORIDE SERPL-SCNC: 106 MMOL/L (ref 100–108)
CLARITY UR: CLEAR
CO2 SERPL-SCNC: 26 MMOL/L (ref 21–32)
COLOR UR: YELLOW
CREAT SERPL-MCNC: 0.81 MG/DL (ref 0.6–1.3)
EOSINOPHIL # BLD AUTO: 0.15 THOUSAND/ΜL (ref 0–0.61)
EOSINOPHIL NFR BLD AUTO: 1 % (ref 0–6)
ERYTHROCYTE [DISTWIDTH] IN BLOOD BY AUTOMATED COUNT: 13.2 % (ref 11.6–15.1)
GFR SERPL CREATININE-BSD FRML MDRD: 80 ML/MIN/1.73SQ M
GLUCOSE SERPL-MCNC: 130 MG/DL (ref 65–140)
GLUCOSE UR STRIP-MCNC: NEGATIVE MG/DL
HCT VFR BLD AUTO: 38.7 % (ref 34.8–46.1)
HGB BLD-MCNC: 13.2 G/DL (ref 11.5–15.4)
HGB UR QL STRIP.AUTO: ABNORMAL
IMM GRANULOCYTES # BLD AUTO: 0.06 THOUSAND/UL (ref 0–0.2)
IMM GRANULOCYTES NFR BLD AUTO: 1 % (ref 0–2)
KETONES UR STRIP-MCNC: NEGATIVE MG/DL
LEUKOCYTE ESTERASE UR QL STRIP: ABNORMAL
LYMPHOCYTES # BLD AUTO: 1.11 THOUSANDS/ΜL (ref 0.6–4.47)
LYMPHOCYTES NFR BLD AUTO: 9 % (ref 14–44)
MCH RBC QN AUTO: 30.1 PG (ref 26.8–34.3)
MCHC RBC AUTO-ENTMCNC: 34.1 G/DL (ref 31.4–37.4)
MCV RBC AUTO: 88 FL (ref 82–98)
MONOCYTES # BLD AUTO: 1 THOUSAND/ΜL (ref 0.17–1.22)
MONOCYTES NFR BLD AUTO: 8 % (ref 4–12)
NEUTROPHILS # BLD AUTO: 9.85 THOUSANDS/ΜL (ref 1.85–7.62)
NEUTS SEG NFR BLD AUTO: 81 % (ref 43–75)
NITRITE UR QL STRIP: POSITIVE
NON-SQ EPI CELLS URNS QL MICRO: ABNORMAL /HPF
NRBC BLD AUTO-RTO: 0 /100 WBCS
PH UR STRIP.AUTO: 7 [PH] (ref 4.5–8)
PLATELET # BLD AUTO: 245 THOUSANDS/UL (ref 149–390)
PMV BLD AUTO: 11 FL (ref 8.9–12.7)
POTASSIUM SERPL-SCNC: 3.4 MMOL/L (ref 3.5–5.3)
PROT SERPL-MCNC: 7.4 G/DL (ref 6.4–8.2)
PROT UR STRIP-MCNC: ABNORMAL MG/DL
RBC # BLD AUTO: 4.38 MILLION/UL (ref 3.81–5.12)
RBC #/AREA URNS AUTO: ABNORMAL /HPF
SODIUM SERPL-SCNC: 142 MMOL/L (ref 136–145)
SP GR UR STRIP.AUTO: 1.02 (ref 1–1.03)
UROBILINOGEN UR QL STRIP.AUTO: 0.2 E.U./DL
WBC # BLD AUTO: 12.21 THOUSAND/UL (ref 4.31–10.16)
WBC #/AREA URNS AUTO: ABNORMAL /HPF

## 2021-08-19 PROCEDURE — 36415 COLL VENOUS BLD VENIPUNCTURE: CPT | Performed by: PHYSICIAN ASSISTANT

## 2021-08-19 PROCEDURE — 74176 CT ABD & PELVIS W/O CONTRAST: CPT

## 2021-08-19 PROCEDURE — 96361 HYDRATE IV INFUSION ADD-ON: CPT

## 2021-08-19 PROCEDURE — 85025 COMPLETE CBC W/AUTO DIFF WBC: CPT | Performed by: PHYSICIAN ASSISTANT

## 2021-08-19 PROCEDURE — 99284 EMERGENCY DEPT VISIT MOD MDM: CPT

## 2021-08-19 PROCEDURE — 87077 CULTURE AEROBIC IDENTIFY: CPT

## 2021-08-19 PROCEDURE — 87086 URINE CULTURE/COLONY COUNT: CPT

## 2021-08-19 PROCEDURE — 80053 COMPREHEN METABOLIC PANEL: CPT | Performed by: PHYSICIAN ASSISTANT

## 2021-08-19 PROCEDURE — G1004 CDSM NDSC: HCPCS

## 2021-08-19 PROCEDURE — 81001 URINALYSIS AUTO W/SCOPE: CPT

## 2021-08-19 PROCEDURE — 99284 EMERGENCY DEPT VISIT MOD MDM: CPT | Performed by: PHYSICIAN ASSISTANT

## 2021-08-19 PROCEDURE — 96375 TX/PRO/DX INJ NEW DRUG ADDON: CPT

## 2021-08-19 PROCEDURE — 96365 THER/PROPH/DIAG IV INF INIT: CPT

## 2021-08-19 PROCEDURE — 87186 SC STD MICRODIL/AGAR DIL: CPT

## 2021-08-19 RX ORDER — ONDANSETRON 2 MG/ML
4 INJECTION INTRAMUSCULAR; INTRAVENOUS ONCE
Status: COMPLETED | OUTPATIENT
Start: 2021-08-19 | End: 2021-08-19

## 2021-08-19 RX ORDER — CEFPODOXIME PROXETIL 200 MG/1
200 TABLET, FILM COATED ORAL 2 TIMES DAILY
Qty: 20 TABLET | Refills: 0 | Status: SHIPPED | OUTPATIENT
Start: 2021-08-19 | End: 2021-08-29

## 2021-08-19 RX ORDER — KETOROLAC TROMETHAMINE 30 MG/ML
15 INJECTION, SOLUTION INTRAMUSCULAR; INTRAVENOUS ONCE
Status: COMPLETED | OUTPATIENT
Start: 2021-08-19 | End: 2021-08-19

## 2021-08-19 RX ADMIN — ONDANSETRON 4 MG: 2 INJECTION INTRAMUSCULAR; INTRAVENOUS at 13:00

## 2021-08-19 RX ADMIN — KETOROLAC TROMETHAMINE 15 MG: 30 INJECTION, SOLUTION INTRAMUSCULAR at 13:00

## 2021-08-19 RX ADMIN — SODIUM CHLORIDE 1000 ML: 0.9 INJECTION, SOLUTION INTRAVENOUS at 12:59

## 2021-08-19 RX ADMIN — CEFTRIAXONE SODIUM 1000 MG: 10 INJECTION, POWDER, FOR SOLUTION INTRAVENOUS at 14:56

## 2021-08-19 NOTE — ED PROVIDER NOTES
History  Chief Complaint   Patient presents with    Abdominal Pain     Low abdominal pressure, pt  notes blood in urine  Pt  reports passing 3 kidney stones this am  Pt  c/o nausea  Pt  reports "I feel like Im going to pass out  The patient is a 68-year-old female with history of epilepsy and hypertension who presents to the emergency department for evaluation of lower abdominal pain  The patient states that she began having lower abdominal pain and pressure this morning  She subsequently reports with passing 3 kidney stones as well as noting a significant amount of blood in her urine  She states that she did get some slight relief after passing the stones, however the pain returned  She states that it is constant but waxing and waning in nature  She states it is currently a 7/10  She reports associated nausea and vomiting  She states that she has extensive history of stones, and she has had as many as 20 stones at 1 time  The patient states that she did take some Tylenol this morning, which gave her some slight relief, however she continues to have ongoing pain  The patient denies fever, chills, chest pain, shortness of breath, diarrhea, dysuria, headache or dizziness  History provided by:  Patient   used: No    Abdominal Pain  Associated symptoms: nausea and vomiting    Associated symptoms: no chest pain, no chills, no cough, no diarrhea, no dysuria, no fever, no hematuria, no shortness of breath and no sore throat        Prior to Admission Medications   Prescriptions Last Dose Informant Patient Reported? Taking?    Elastic Bandages & Supports (AIRCAST SPORT ANKLE BRACE/LEFT) MISC   No No   Sig: by Does not apply route daily   Elastic Bandages & Supports (KNEE BRACE/HINGED S/M) MISC   No No   Sig: by Does not apply route daily   Mapap Arthritis Pain 650 MG CR tablet   Yes No   Sig: Take 650 mg by mouth every 8 (eight) hours as needed   amLODIPine (NORVASC) 10 mg tablet  Self Yes No   Sig: Take 1 tablet by mouth daily   atorvastatin (LIPITOR) 40 mg tablet  Self Yes No   Sig: Take 40 mg by mouth daily   celecoxib (CeleBREX) 200 mg capsule   No No   Sig: Take 1 capsule (200 mg total) by mouth daily   clonazePAM (KlonoPIN) 0 5 mg tablet   Yes No   Sig: Take 1 tablet by mouth 2 (two) times a day   clotrimazole (LOTRIMIN) 1 % cream   Yes No   Sig: APPLY TO AFFECTED AREA TWICE A DAY IN THE MORNING AND IN THE EVENING   furosemide (LASIX) 40 mg tablet  Self Yes No   Sig: Take 40 mg by mouth daily as needed    hydrALAZINE (APRESOLINE) 25 mg tablet   Yes No   Sig: Take 25 mg by mouth 2 (two) times a day   losartan (COZAAR) 100 MG tablet  Self Yes No   Sig: Take 100 mg by mouth daily   metoprolol tartrate (LOPRESSOR) 25 mg tablet  Self Yes No   Sig: Take 50 mg by mouth 2 (two) times a day    montelukast (SINGULAIR) 10 mg tablet  Self Yes No   Sig: Take 1 tablet by mouth daily   multivitamin (THERAGRAN) TABS   Yes No   Sig: Take 1 tablet by mouth daily   omeprazole (PriLOSEC) 20 mg delayed release capsule   Yes No   Sig: Take 20 mg by mouth daily   sertraline (ZOLOFT) 50 mg tablet   No No   Sig: Take 1 tablet (50 mg total) by mouth every morning      Facility-Administered Medications: None       Past Medical History:   Diagnosis Date    Bladder prolapse, female, acquired     Epilepsy ([facility] [address] )     Hypertension     Kidney stone     Tear of MCL (medial collateral ligament) of knee        Past Surgical History:   Procedure Laterality Date    HYSTERECTOMY      age 45   Christin Champion OOPHORECTOMY Bilateral     age 45       Family History   Problem Relation Age of Onset    Diabetes Mother     Hypertension Mother     Thyroid disease Mother     Kidney disease Mother     Diabetes Father     Hypertension Father     No Known Problems Daughter     Heart attack Maternal Grandmother     No Known Problems Maternal Grandfather     Diabetes Paternal Grandmother     Kidney cancer Paternal Grandmother     No Known Problems Paternal Grandfather     No Known Problems Son     Breast cancer Paternal Aunt         bilateral    No Known Problems Paternal Aunt     No Known Problems Paternal Aunt     No Known Problems Paternal Aunt     No Known Problems Paternal Aunt     Kidney cancer Paternal Aunt     Diabetes Paternal Aunt      I have reviewed and agree with the history as documented  E-Cigarette/Vaping    E-Cigarette Use Never User      E-Cigarette/Vaping Substances    Nicotine No     THC No     CBD Yes Cream 3000mg     Social History     Tobacco Use    Smoking status: Never Smoker    Smokeless tobacco: Never Used   Vaping Use    Vaping Use: Never used   Substance Use Topics    Alcohol use: Yes     Comment: rarely    Drug use: No       Review of Systems   Constitutional: Negative for chills and fever  HENT: Negative for ear pain and sore throat  Eyes: Negative for redness and visual disturbance  Respiratory: Negative for cough and shortness of breath  Cardiovascular: Negative for chest pain  Gastrointestinal: Positive for abdominal pain, nausea and vomiting  Negative for diarrhea  Genitourinary: Positive for flank pain  Negative for dysuria and hematuria  Musculoskeletal: Negative for back pain, neck pain and neck stiffness  Skin: Negative for color change and rash  Neurological: Negative for dizziness, light-headedness and headaches  All other systems reviewed and are negative  Physical Exam  Physical Exam  Vitals and nursing note reviewed  Constitutional:       General: She is not in acute distress  Appearance: She is well-developed  She is not ill-appearing or toxic-appearing  HENT:      Head: Normocephalic and atraumatic  Mouth/Throat:      Pharynx: Uvula midline  Eyes:      General: Lids are normal       Conjunctiva/sclera: Conjunctivae normal    Cardiovascular:      Rate and Rhythm: Normal rate and regular rhythm  Heart sounds: Normal heart sounds  Pulmonary:      Effort: Pulmonary effort is normal       Breath sounds: Normal breath sounds  Abdominal:      General: There is no distension  Palpations: Abdomen is soft  Tenderness: There is abdominal tenderness in the right lower quadrant, suprapubic area and left lower quadrant  Musculoskeletal:      Cervical back: Normal range of motion and neck supple  Skin:     General: Skin is warm and dry  Neurological:      Mental Status: She is alert and oriented to person, place, and time  Vital Signs  ED Triage Vitals   Temperature Pulse Respirations Blood Pressure SpO2   08/19/21 1226 08/19/21 1226 08/19/21 1226 08/19/21 1226 08/19/21 1226   98 4 °F (36 9 °C) 68 16 136/72 96 %      Temp Source Heart Rate Source Patient Position - Orthostatic VS BP Location FiO2 (%)   08/19/21 1226 08/19/21 1226 08/19/21 1226 08/19/21 1226 --   Oral Monitor Sitting Left arm       Pain Score       08/19/21 1300       7           Vitals:    08/19/21 1226 08/19/21 1608   BP: 136/72 110/55   Pulse: 68 70   Patient Position - Orthostatic VS: Sitting Lying         Visual Acuity      ED Medications  Medications   sodium chloride 0 9 % bolus 1,000 mL (0 mL Intravenous Stopped 8/19/21 1607)   ondansetron (ZOFRAN) injection 4 mg (4 mg Intravenous Given 8/19/21 1300)   ketorolac (TORADOL) injection 15 mg (15 mg Intravenous Given 8/19/21 1300)   ceftriaxone (ROCEPHIN) 1 g/50 mL in dextrose IVPB (0 mg Intravenous Stopped 8/19/21 1607)       Diagnostic Studies  Results Reviewed     Procedure Component Value Units Date/Time    Urine Microscopic [[id number]]  (Abnormal) Collected: 08/19/21 1410    Lab Status: Final result Specimen: Urine, Clean Catch Updated: 08/19/21 1502     RBC, UA Innumerable /hpf      WBC, UA Innumerable /hpf      Epithelial Cells Occasional /hpf      Bacteria, UA Innumerable /hpf     Urine culture [[id number]] Collected: 08/19/21 1410    Lab Status:  In process Specimen: Urine, Clean Catch Updated: 08/19/21 1502    Comprehensive metabolic panel [[id number]]  (Abnormal) Collected: 08/19/21 1257    Lab Status: Final result Specimen: Blood from Arm, Right Updated: 08/19/21 1422     Sodium 142 mmol/L      Potassium 3 4 mmol/L      Chloride 106 mmol/L      CO2 26 mmol/L      ANION GAP 10 mmol/L      BUN 12 mg/dL      Creatinine 0 81 mg/dL      Glucose 130 mg/dL      Calcium 9 6 mg/dL      AST 25 U/L      ALT 42 U/L      Alkaline Phosphatase 105 U/L      Total Protein 7 4 g/dL      Albumin 4 1 g/dL      Total Bilirubin 0 84 mg/dL      eGFR 80 ml/min/1 73sq m     Narrative:      National Kidney Disease Foundation guidelines for Chronic Kidney Disease (CKD):     Stage 1 with normal or high GFR (GFR > 90 mL/min/1 73 square meters)    Stage 2 Mild CKD (GFR = 60-89 mL/min/1 73 square meters)    Stage 3A Moderate CKD (GFR = 45-59 mL/min/1 73 square meters)    Stage 3B Moderate CKD (GFR = 30-44 mL/min/1 73 square meters)    Stage 4 Severe CKD (GFR = 15-29 mL/min/1 73 square meters)    Stage 5 End Stage CKD (GFR <15 mL/min/1 73 square meters)  Note: GFR calculation is accurate only with a steady state creatinine    Urine Macroscopic, POC [[id number]]  (Abnormal) Collected: 08/19/21 1410    Lab Status: Final result Specimen: Urine Updated: 08/19/21 1412     Color, UA Yellow     Clarity, UA Clear     pH, UA 7 0     Leukocytes, UA Large     Nitrite, UA Positive     Protein,  (2+) mg/dl      Glucose, UA Negative mg/dl      Ketones, UA Negative mg/dl      Urobilinogen, UA 0 2 E U /dl      Bilirubin, UA Negative     Blood, UA Large     Specific Gravity, UA 1 025    Narrative:      CLINITEK RESULT    CBC and differential [[id number]]  (Abnormal) Collected: 08/19/21 1257    Lab Status: Final result Specimen: Blood from Arm, Right Updated: 08/19/21 1400     WBC 12 21 Thousand/uL      RBC 4 38 Million/uL      Hemoglobin 13 2 g/dL      Hematocrit 38 7 %      MCV 88 fL      MCH 30 1 pg      MCHC 34 1 g/dL      RDW 13 2 % MPV 11 0 fL      Platelets 023 Thousands/uL      nRBC 0 /100 WBCs      Neutrophils Relative 81 %      Immat GRANS % 1 %      Lymphocytes Relative 9 %      Monocytes Relative 8 %      Eosinophils Relative 1 %      Basophils Relative 0 %      Neutrophils Absolute 9 85 Thousands/µL      Immature Grans Absolute 0 06 Thousand/uL      Lymphocytes Absolute 1 11 Thousands/µL      Monocytes Absolute 1 00 Thousand/µL      Eosinophils Absolute 0 15 Thousand/µL      Basophils Absolute 0 04 Thousands/µL                  CT renal stone study abdomen pelvis without contrast   Final Result by Barney Torres MD (08/19 1344)      No hydronephrosis; nonobstructive bilateral renal calculi measuring 4 mm and smaller             Workstation performed: [id number]                    Procedures  Procedures         ED Course  ED Course as of Aug 19 1648   Thu Aug 19, 2021   1420 Nitrite, UA(!): Positive   1420 Leukocytes, UA(!): Large   1420 Rocephin ordered  1420 Pending CMP  All results discussed with the patient  1436 Creatinine: 0 81                             SBIRT 22yo+      Most Recent Value   SBIRT (24 yo +)   In order to provide better care to our patients, we are screening all of our patients for alcohol and drug use  Would it be okay to ask you these screening questions? Yes Filed at: 08/19/2021 1608   Initial Alcohol Screen: US AUDIT-C    1  How often do you have a drink containing alcohol?  0 Filed at: 08/19/2021 1608   2  How many drinks containing alcohol do you have on a typical day you are drinking? 0 Filed at: 08/19/2021 1608   3b  FEMALE Any Age, or MALE 65+: How often do you have 4 or more drinks on one occassion? 0 Filed at: 08/19/2021 1608   Audit-C Score  0 Filed at: 08/19/2021 1608   JOSELYN: How many times in the past year have you    Used an illegal drug or used a prescription medication for non-medical reasons?   Never Filed at: 08/19/2021 1608                    MDM  Number of Diagnoses or Management Options  UTI (urinary tract infection): new and requires workup  Diagnosis management comments: Patient presents for evaluation of abdominal pain  Patient reports passing 3 kidney stones this morning  Differential includes but is not limited to ureterolithiasis versus pyelonephritis versus cystitis versus bowel obstruction versus appendicitis versus diverticulitis  Labs, imaging and meds ordered  Labs reviewed  Notable for slightly elevated white blood cell count  Patient does not meet SIRS criteria for vital signs  UA is concerning for infection as it is nitrate positive with innumerable bacteria  CT scan did not show any acute findings  There are no current ureteral stones noted  Patient was given a dose of Rocephin in the emergency department for the UTI  Will plan to cover for pyelo at this time based on patient's symptomatology and exam findings  Patient will be prescribed a course of cefpodoxime for home  Patient got significant relief of symptoms with Toradol  I advised she continue Tylenol and/or Motrin as needed for pain  She was advised to follow up with her primary care doctor/Urology if any ongoing symptoms in 2-3 days  She was advised to return to the ED with any new or significantly worsening symptoms, particularly fever or vomiting  Patient acknowledged understanding  Patient is stable for discharge         Amount and/or Complexity of Data Reviewed  Clinical lab tests: ordered and reviewed  Tests in the radiology section of CPT®: ordered and reviewed  Decide to obtain previous medical records or to obtain history from someone other than the patient: yes  Review and summarize past medical records: yes  Discuss the patient with other providers: yes (Dr Trav Alex)    Risk of Complications, Morbidity, and/or Mortality  Presenting problems: moderate  Diagnostic procedures: moderate  Management options: moderate    Patient Progress  Patient progress: stable      Disposition  Final diagnoses:   UTI (urinary tract infection)     Time reflects when diagnosis was documented in both MDM as applicable and the Disposition within this note     Time User Action Codes Description Comment    8/19/2021  4:13 PM Jenny Lemos Add [N39 0] UTI (urinary tract infection)       ED Disposition     ED Disposition Condition Date/Time Comment    Discharge Stable Thu Aug 19, 2021  4:12 PM Toribio Andrade discharge to home/self care              Follow-up Information     Follow up With Specialties Details Why Contact Info Additional Information    Margo Mckeon MD Family Medicine Schedule an appointment as soon as possible for a visit in 3 days  [address] [place] [address] [phone]       [address] Emergency Department Emergency Medicine  If symptoms worsen [address] [facility] [address] [facility] Emergency Department, [address], [facility], [address] [facility], [address]          Discharge Medication List as of 8/19/2021  4:14 PM      START taking these medications    Details   cefpodoxime (VANTIN) 200 mg tablet Take 1 tablet (200 mg total) by mouth 2 (two) times a day for 10 days, Starting u 8/19/2021, Until Sun 8/29/2021, Normal         CONTINUE these medications which have NOT CHANGED    Details   amLODIPine (NORVASC) 10 mg tablet Take 1 tablet by mouth daily, Historical Med      atorvastatin (LIPITOR) 40 mg tablet Take 40 mg by mouth daily, Historical Med      celecoxib (CeleBREX) 200 mg capsule Take 1 capsule (200 mg total) by mouth daily, Starting Fri 12/4/2020, Normal      clonazePAM (KlonoPIN) 0 5 mg tablet Take 1 tablet by mouth 2 (two) times a day, Starting Fri 6/19/2020, Historical Med      clotrimazole (LOTRIMIN) 1 % cream APPLY TO AFFECTED AREA TWICE A DAY IN THE MORNING AND IN THE EVENING, Historical Med      !! Elastic Bandages & Supports (AIRCAST SPORT ANKLE BRACE/LEFT) MISC by Does not apply route daily, Starting Fri 7/17/2020, Normal      !! Elastic Bandages & Supports (KNEE BRACE/HINGED S/M) MISC by Does not apply route daily, Starting Fri 5/15/2020, Normal      furosemide (LASIX) 40 mg tablet Take 40 mg by mouth daily as needed , Historical Med      hydrALAZINE (APRESOLINE) 25 mg tablet Take 25 mg by mouth 2 (two) times a day, Starting Sat 5/2/2020, Historical Med      losartan (COZAAR) 100 MG tablet Take 100 mg by mouth daily, Historical Med      Mapap Arthritis Pain 650 MG CR tablet Take 650 mg by mouth every 8 (eight) hours as needed, Starting Mon 7/27/2020, Historical Med      metoprolol tartrate (LOPRESSOR) 25 mg tablet Take 50 mg by mouth 2 (two) times a day , Historical Med      montelukast (SINGULAIR) 10 mg tablet Take 1 tablet by mouth daily, Historical Med      multivitamin (THERAGRAN) TABS Take 1 tablet by mouth daily, Historical Med      omeprazole (PriLOSEC) 20 mg delayed release capsule Take 20 mg by mouth daily, Starting Tue 8/25/2020, Historical Med      sertraline (ZOLOFT) 50 mg tablet Take 1 tablet (50 mg total) by mouth every morning, Starting Wed 5/26/2021, Normal       !! - Potential duplicate medications found  Please discuss with provider  No discharge procedures on file      PDMP Review       Value Time User    PDMP Reviewed  Yes 7/15/2021  3:20 Aure Boogie MD          ED Provider  Electronically Signed by           Mac Cuellar PA-C  08/19/21 5410

## 2021-08-21 LAB — BACTERIA UR CULT: ABNORMAL

## 2021-08-24 ENCOUNTER — OFFICE VISIT (OUTPATIENT)
Dept: PHYSICAL THERAPY | Facility: CLINIC | Age: 61
End: 2021-08-24
Payer: COMMERCIAL

## 2021-08-24 DIAGNOSIS — M22.2X2 PATELLOFEMORAL PAIN SYNDROME OF LEFT KNEE: ICD-10-CM

## 2021-08-24 DIAGNOSIS — R26.89 IMBALANCE: Primary | ICD-10-CM

## 2021-08-24 DIAGNOSIS — M54.42 CHRONIC LEFT-SIDED LOW BACK PAIN WITH LEFT-SIDED SCIATICA: ICD-10-CM

## 2021-08-24 DIAGNOSIS — G89.29 CHRONIC LEFT-SIDED LOW BACK PAIN WITH LEFT-SIDED SCIATICA: ICD-10-CM

## 2021-08-24 PROCEDURE — 97112 NEUROMUSCULAR REEDUCATION: CPT

## 2021-08-24 NOTE — PROGRESS NOTES
Daily Note     Today's date: 2021  Patient name: Stephanie Rm  : 1960  MRN: [id number]  Referring provider: Ashleigh Rayo PA-C  Dx:   Encounter Diagnosis     ICD-10-CM    1  Imbalance  R26 89    2  Chronic left-sided low back pain with left-sided sciatica  M54 42     G89 29    3  Patellofemoral pain syndrome of left knee  M22 2X2                   Subjective: Patient reports she went to the ER with a UTI last week though was not admitted  She is ambulating with SPC today due to not feeling 100% though she feel she is up for PT session  Objective: See treatment diary below      Assessment: Tolerated treatment well  Patient demonstrated fatigue post treatment, exhibited good technique with therapeutic exercises and would benefit from continued PT  Most challenged by balance activities with eyes closed on foam   No overt LOB throughout and patient did not use SPC during session  Plan: Progress treatment as tolerated         Precautions: Epilepsy, fall risk      Manuals 7/22 7/27 7/29 8/3 8/5 8/9 8/12 8/17 8/24           RE                                             Neuro Re-Ed             Romberg head turns 10 15  On yellow foam 15 biodex foam 15 biodex foam 15        Romberg head tilts 10 15 On yellow foam 15 biodex foam 15 biodex foam 15        FT EC on foam    biodex foam :30x3 biodex foam :30x3 biodex foam :30x3  biodex foam :30x3 biodex foam :30x3    EC head turns/tilts   15 ea on yellow foam yellow foam 15 ea biodex foam 15 ea biodex foam :30x2 ea  biodex foam :30x2 ea biodex foam :30x2 ea    Tandem stance 30ea 1' + head turns/nods :30 ea On beam :30x2 ea          Tandem with saccade horiz, two targets 10ea :30x2 On beam :30x2 On beam :30x2 On beam :30x2 On beam :30x2 On beam :30x2  On beam :30x2    Walking on foams 4 circles one oval 2'ea            marching             Forward walking on beam   10 laps 10 laps 10 laps 10 laps 10 laps  10 laps    Toe taps 3 foams stacked 20x 20 ea cones on beam 20 ea on beam 20 ea On beam 20 ea On beam 20 ea On beam 20 ea On beam 20 ea On beam 20 ea    Visual motion sensitivity, green MB horiz/vert/CW/CCW 10ea 10 ea  10 ea on yellow foam 10 ea on biodex foam 10 ea on biodex foam  10 ea on biodex foam     biodex LOS Easy 5, 32% best  Easy 5x, best 52%  Easy 5x, best 64% medium 5x, best 81%  medium 5x, best 71% medium 5x, best 69%    SLS :60ea 1' ea 1' ea 1' ea 1' ea 1' ea       Saccades on foam  Beam AP/ML :30 ea  FT biodex foam :30 ea AP/ML FT biodex foam :30 ea AP/ML FT biodex foam :30 ea AP/ML + diaganols   FT biodex foam :30 ea 3 targets     VOR     biodex foam AP/ML :30x2 ea biodex foam AP/ML :30x2 ea biodex foam AP/ML :30x2 ea  biodex foam AP/ML :30x2 biodex foam AP/ML :30x2    Sidestepping on beam with cone taps   5 cones, 10 rounds          Ther Ex             Walking with head turns/tilts     2 laps ea 2 laps  ea  2 laps ea 2 laps ea    Walking with MB circles     2 laps  2 laps                                                                                      Ther Activity                                       Gait Training                                       Modalities

## 2021-08-26 ENCOUNTER — OFFICE VISIT (OUTPATIENT)
Dept: PHYSICAL THERAPY | Facility: CLINIC | Age: 61
End: 2021-08-26
Payer: COMMERCIAL

## 2021-08-26 DIAGNOSIS — G89.29 CHRONIC LEFT-SIDED LOW BACK PAIN WITH LEFT-SIDED SCIATICA: ICD-10-CM

## 2021-08-26 DIAGNOSIS — M54.42 CHRONIC LEFT-SIDED LOW BACK PAIN WITH LEFT-SIDED SCIATICA: ICD-10-CM

## 2021-08-26 DIAGNOSIS — R26.89 IMBALANCE: Primary | ICD-10-CM

## 2021-08-26 DIAGNOSIS — M22.2X2 PATELLOFEMORAL PAIN SYNDROME OF LEFT KNEE: ICD-10-CM

## 2021-08-26 PROCEDURE — 97112 NEUROMUSCULAR REEDUCATION: CPT

## 2021-08-26 PROCEDURE — 97530 THERAPEUTIC ACTIVITIES: CPT

## 2021-08-26 NOTE — PROGRESS NOTES
Daily Note     Today's date: 2021  Patient name: Angela Alicea  : 1960  MRN: [id number]  Referring provider: Ronaldo Avila PA-C  Dx:   Encounter Diagnosis     ICD-10-CM    1  Imbalance  R26 89    2  Chronic left-sided low back pain with left-sided sciatica  M54 42     G89 29    3  Patellofemoral pain syndrome of left knee  M22 2X2                   Subjective: Patient reports she is tired from exercising in the pool all day  Objective: See treatment diary below      Assessment: Tolerated treatment well  Patient exhibited good technique with therapeutic exercises and would benefit from continued PT  Initiated star balance which was moderately challenging  LOB 1x with tandem walking on floor though patient was able to recover independently  Plan: Progress treatment as tolerated         Precautions: Epilepsy, fall risk      Manuals 7/22 7/27 7/29 8/3 8/5 8/9 8/12 8/17 8/24 8/26          RE                                             Neuro Re-Ed             Romberg head turns 10 15  On yellow foam 15 biodex foam 15 biodex foam 15        Romberg head tilts 10 15 On yellow foam 15 biodex foam 15 biodex foam 15        FT EC on foam    biodex foam :30x3 biodex foam :30x3 biodex foam :30x3  biodex foam :30x3 biodex foam :30x3    EC head turns/tilts   15 ea on yellow foam yellow foam 15 ea biodex foam 15 ea biodex foam :30x2 ea  biodex foam :30x2 ea biodex foam :30x2 ea biodex foam :30x2 ea   Tandem stance 30ea 1' + head turns/nods :30 ea On beam :30x2 ea          Tandem with saccade horiz, two targets 10ea :30x2 On beam :30x2 On beam :30x2 On beam :30x2 On beam :30x2 On beam :30x2  On beam :30x2    Walking on foams 4 circles one oval 2'ea            marching             Forward walking on beam   10 laps 10 laps 10 laps 10 laps 10 laps  10 laps 10 laps + lateral   Toe taps 3 foams stacked 20x 20 ea cones on beam 20 ea on beam 20 ea On beam 20 ea On beam 20 ea On beam 20 ea On beam 20 ea On beam 20 ea On beam 20 ea   Visual motion sensitivity, green MB horiz/vert/CW/CCW 10ea 10 ea  10 ea on yellow foam 10 ea on biodex foam 10 ea on biodex foam  10 ea on biodex foam  10 ea on biodex foam   biodex LOS Easy 5, 32% best  Easy 5x, best 52%  Easy 5x, best 64% medium 5x, best 81%  medium 5x, best 71% medium 5x, best 69% medium 5x, best 67%   SLS :60ea 1' ea 1' ea 1' ea 1' ea 1' ea       Saccades on foam  Beam AP/ML :30 ea  FT biodex foam :30 ea AP/ML FT biodex foam :30 ea AP/ML FT biodex foam :30 ea AP/ML + diaganols   FT biodex foam :30 ea 3 targets     VOR     biodex foam AP/ML :30x2 ea biodex foam AP/ML :30x2 ea biodex foam AP/ML :30x2 ea  biodex foam AP/ML :30x2 biodex foam AP/ML :30x2 biodex foam AP/ML :30x2   Sidestepping on beam with cone taps   5 cones, 10 rounds          Ther Ex             Walking with head turns/tilts     2 laps ea 2 laps  ea  2 laps ea 2 laps ea 48' x2   Walking with MB circles     2 laps  2 laps        Tandem walking          50'  x2                                                                    Ther Activity             Star balance          5 targets, 10 rounds ea                                                       Gait Training                                       Modalities

## 2021-08-31 ENCOUNTER — OFFICE VISIT (OUTPATIENT)
Dept: PHYSICAL THERAPY | Facility: CLINIC | Age: 61
End: 2021-08-31
Payer: COMMERCIAL

## 2021-08-31 DIAGNOSIS — G89.29 CHRONIC LEFT-SIDED LOW BACK PAIN WITH LEFT-SIDED SCIATICA: Primary | ICD-10-CM

## 2021-08-31 DIAGNOSIS — R26.89 IMBALANCE: ICD-10-CM

## 2021-08-31 DIAGNOSIS — M22.2X2 PATELLOFEMORAL PAIN SYNDROME OF LEFT KNEE: ICD-10-CM

## 2021-08-31 DIAGNOSIS — M54.42 CHRONIC LEFT-SIDED LOW BACK PAIN WITH LEFT-SIDED SCIATICA: Primary | ICD-10-CM

## 2021-08-31 PROCEDURE — 97112 NEUROMUSCULAR REEDUCATION: CPT

## 2021-08-31 NOTE — PROGRESS NOTES
Daily Note     Today's date: 2021  Patient name: Hao Armando  : 1960  MRN: [id number]  Referring provider: Puja Merchant  Dx:   Encounter Diagnosis     ICD-10-CM    1  Chronic left-sided low back pain with left-sided sciatica  M54 42     G89 29    2  Patellofemoral pain syndrome of left knee  M22 2X2    3  Imbalance  R26 89                   Subjective: Patient reports she had difficulty standing on slippery surface in the shower over the weekend  Objective: See treatment diary below      Assessment: Tolerated treatment well  Patient demonstrated fatigue post treatment, exhibited good technique with therapeutic exercises and would benefit from continued PT  FOTO score improved to 84 from 24 (predicted 57)  Most challenged by tandem walking though able to recover from LOB independently  Plan: Progress treatment as tolerated          Precautions: Epilepsy, fall risk      Manuals 8/31   8/3 8/5 8/9 8/12 8/17 8/24 8/26          RE                                             Neuro Re-Ed             Romberg head turns    biodex foam 15 biodex foam 15        Romberg head tilts    biodex foam 15 biodex foam 15        FT EC on foam biodex foam :30x3   biodex foam :30x3 biodex foam :30x3 biodex foam :30x3  biodex foam :30x3 biodex foam :30x3    EC head turns/tilts biodex foam :30x2 ea   yellow foam 15 ea biodex foam 15 ea biodex foam :30x2 ea  biodex foam :30x2 ea biodex foam :30x2 ea biodex foam :30x2 ea   Tandem stance             Tandem with saccade horiz, two targets biodex foam :30x2   On beam :30x2 On beam :30x2 On beam :30x2 On beam :30x2  On beam :30x2    Walking on foams             marching             Forward walking on beam 10 laps + lateral   10 laps 10 laps 10 laps 10 laps  10 laps 10 laps + lateral   Toe taps On beam 20 ea   on beam 20 ea On beam 20 ea On beam 20 ea On beam 20 ea On beam 20 ea On beam 20 ea On beam 20 ea   Visual motion sensitivity, green MB horiz/vert/CW/CCW    10 ea on yellow foam 10 ea on biodex foam 10 ea on biodex foam  10 ea on biodex foam  10 ea on biodex foam   biodex LOS medium 5x, best 62%    Easy 5x, best 64% medium 5x, best 81%  medium 5x, best 71% medium 5x, best 69% medium 5x, best 67%   SLS    1' ea 1' ea 1' ea       Saccades on foam    FT biodex foam :30 ea AP/ML FT biodex foam :30 ea AP/ML FT biodex foam :30 ea AP/ML + diaganols   FT biodex foam :30 ea 3 targets     VOR  biodex foam AP/ML :30x2   biodex foam AP/ML :30x2 ea biodex foam AP/ML :30x2 ea biodex foam AP/ML :30x2 ea  biodex foam AP/ML :30x2 biodex foam AP/ML :30x2 biodex foam AP/ML :30x2   Sidestepping on beam with cone taps             Ther Ex             Walking with head turns/tilts     2 laps ea 2 laps  ea  2 laps ea 2 laps ea 48' x2   Walking with MB circles     2 laps  2 laps        Tandem walking 48' x2         50'  x2                                                                    Ther Activity             Star balance 5 targets, 10 rounds ea         5 targets, 10 rounds ea                                                       Gait Training                                       Modalities

## 2021-09-02 ENCOUNTER — OFFICE VISIT (OUTPATIENT)
Dept: PHYSICAL THERAPY | Facility: CLINIC | Age: 61
End: 2021-09-02
Payer: COMMERCIAL

## 2021-09-02 DIAGNOSIS — M22.2X2 PATELLOFEMORAL PAIN SYNDROME OF LEFT KNEE: Primary | ICD-10-CM

## 2021-09-02 DIAGNOSIS — M54.42 CHRONIC LEFT-SIDED LOW BACK PAIN WITH LEFT-SIDED SCIATICA: ICD-10-CM

## 2021-09-02 DIAGNOSIS — G89.29 CHRONIC LEFT-SIDED LOW BACK PAIN WITH LEFT-SIDED SCIATICA: ICD-10-CM

## 2021-09-02 DIAGNOSIS — R26.89 IMBALANCE: ICD-10-CM

## 2021-09-02 PROCEDURE — 97112 NEUROMUSCULAR REEDUCATION: CPT

## 2021-09-02 NOTE — PROGRESS NOTES
Daily Note     Today's date: 2021  Patient name: Rayshawn Soto  : 1960  MRN: [id number]  Referring provider: Flako Barraza  Dx:   Encounter Diagnosis     ICD-10-CM    1  Patellofemoral pain syndrome of left knee  M22 2X2    2  Chronic left-sided low back pain with left-sided sciatica  M54 42     G89 29    3  Imbalance  R26 89                   Subjective: Patient reports improved balance today and she did not need to bring [facility] out with her when leaving the house  Objective: See treatment diary below      Assessment: Tolerated treatment well  Patient demonstrated fatigue post treatment and would benefit from continued PT  Most challenged by tandem stance + head turns though no overt LOB throughout session  Improved score on biodex LOB compared to last visit  Plan: Progress treatment as tolerated         Precautions: Epilepsy, fall risk      Manuals           RE                                             Neuro Re-Ed             Romberg head turns             Romberg head tilts             FT EC on foam biodex foam :30x3 biodex foam :30x3    biodex foam :30x3  biodex foam :30x3 biodex foam :30x3    EC head turns/tilts biodex foam :30x2 ea biodex foam :30x2 ea    biodex foam :30x2 ea  biodex foam :30x2 ea biodex foam :30x2 ea biodex foam :30x2 ea   Tandem stance  1' ea + head turns           Tandem with saccade horiz, two targets biodex foam :30x2     On beam :30x2 On beam :30x2  On beam :30x2    Walking on foams  4 foams+ hurdles forward and lateral 3' ea           marching             Forward walking on beam 10 laps + lateral 10 laps + lateral    10 laps 10 laps  10 laps 10 laps + lateral   Toe taps On beam 20 ea On beam 20 ea    On beam 20 ea On beam 20 ea On beam 20 ea On beam 20 ea On beam 20 ea   Visual motion sensitivity, green MB horiz/vert/CW/CCW      10 ea on biodex foam  10 ea on biodex foam  10 ea on biodex foam   biodex LOS medium 5x, best 62% medium 5x, best 71%    medium 5x, best 81%  medium 5x, best 71% medium 5x, best 69% medium 5x, best 67%   SLS      1' ea       Saccades on foam      FT biodex foam :30 ea AP/ML + diaganols   FT biodex foam :30 ea 3 targets     VOR  biodex foam AP/ML :30x2 biodex foam AP/ML :30x2    biodex foam AP/ML :30x2 ea  biodex foam AP/ML :30x2 biodex foam AP/ML :30x2 biodex foam AP/ML :30x2   Sidestepping on beam with cone taps             Ther Ex             Walking with head turns/tilts      2 laps  ea  2 laps ea 2 laps ea 48' x2   Walking with MB circles      2 laps        Tandem walking 48' x2 48' x2        48'  x2                                                                    Ther Activity             Star balance 5 targets, 10 rounds ea 5 targets, 10 rounds ea standing on green oval        5 targets, 10 rounds ea                                                       Gait Training                                       Modalities

## 2021-09-07 ENCOUNTER — OFFICE VISIT (OUTPATIENT)
Dept: PHYSICAL THERAPY | Facility: CLINIC | Age: 61
End: 2021-09-07
Payer: COMMERCIAL

## 2021-09-07 DIAGNOSIS — R26.89 IMBALANCE: ICD-10-CM

## 2021-09-07 DIAGNOSIS — M22.2X2 PATELLOFEMORAL PAIN SYNDROME OF LEFT KNEE: Primary | ICD-10-CM

## 2021-09-07 DIAGNOSIS — G89.29 CHRONIC LEFT-SIDED LOW BACK PAIN WITH LEFT-SIDED SCIATICA: ICD-10-CM

## 2021-09-07 DIAGNOSIS — M54.42 CHRONIC LEFT-SIDED LOW BACK PAIN WITH LEFT-SIDED SCIATICA: ICD-10-CM

## 2021-09-07 PROCEDURE — 97112 NEUROMUSCULAR REEDUCATION: CPT

## 2021-09-07 NOTE — PROGRESS NOTES
Daily Note     Today's date: 2021  Patient name: Donna Lesch  : 1960  MRN: [id number]  Referring provider: Mario Spicer  Dx:   Encounter Diagnosis     ICD-10-CM    1  Patellofemoral pain syndrome of left knee  M22 2X2    2  Chronic left-sided low back pain with left-sided sciatica  M54 42     G89 29    3  Imbalance  R26 89                   Subjective: Patient reports she feels slightly off balance today but forgot her cane  Objective: See treatment diary below      Assessment: Tolerated treatment well  Patient demonstrated fatigue post treatment and would benefit from continued PT  Improved score on biodex LOS  No overt LOB throughout session though Lynnette needed 1x with EC on foam         Plan: Progress treatment as tolerated         Precautions: Epilepsy, fall risk      Manuals           RE                                             Neuro Re-Ed             Romberg head turns             Romberg head tilts             FT EC on foam biodex foam :30x3 biodex foam :30x3 biodex foam :30x3   biodex foam :30x3  biodex foam :30x3 biodex foam :30x3    EC head turns/tilts biodex foam :30x2 ea biodex foam :30x2 ea biodex foam :30x2 ea   biodex foam :30x2 ea  biodex foam :30x2 ea biodex foam :30x2 ea biodex foam :30x2 ea   Tandem stance  1' ea + head turns 1' ea + head turns          Tandem with saccade horiz, two targets biodex foam :30x2     On beam :30x2 On beam :30x2  On beam :30x2    Walking on foams  4 foams+ hurdles forward and lateral 3' ea 4 foams+ hurdles forward and lateral 3' ea          marching             Forward walking on beam 10 laps + lateral 10 laps + lateral + lateral 2' ea   10 laps 10 laps  10 laps 10 laps + lateral   Toe taps On beam 20 ea On beam 20 ea On beam 20 ea   On beam 20 ea On beam 20 ea On beam 20 ea On beam 20 ea On beam 20 ea   Visual motion sensitivity, green MB horiz/vert/CW/CCW      10 ea on biodex foam  10 ea on biodex foam  10 ea on biodex foam   biodex LOS medium 5x, best 62% medium 5x, best 71% medium 5x, best 74%   medium 5x, best 81%  medium 5x, best 71% medium 5x, best 69% medium 5x, best 67%   SLS      1' ea       Saccades on foam      FT biodex foam :30 ea AP/ML + diaganols   FT biodex foam :30 ea 3 targets     VOR  biodex foam AP/ML :30x2 biodex foam AP/ML :30x2 nv   biodex foam AP/ML :30x2 ea  biodex foam AP/ML :30x2 biodex foam AP/ML :30x2 biodex foam AP/ML :30x2   Sidestepping on beam with cone taps             Ther Ex             Walking with head turns/tilts      2 laps  ea  2 laps ea 2 laps ea 48' x2   Walking with MB circles      2 laps        Tandem walking 48' x2 48' x2 nv       50'  x2                                                                    Ther Activity             Star balance 5 targets, 10 rounds ea 5 targets, 10 rounds ea standing on green oval        5 targets, 10 rounds ea                                                       Gait Training                                       Modalities

## 2021-09-09 ENCOUNTER — OFFICE VISIT (OUTPATIENT)
Dept: PHYSICAL THERAPY | Facility: CLINIC | Age: 61
End: 2021-09-09
Payer: COMMERCIAL

## 2021-09-09 DIAGNOSIS — G89.29 CHRONIC LEFT-SIDED LOW BACK PAIN WITH LEFT-SIDED SCIATICA: Primary | ICD-10-CM

## 2021-09-09 DIAGNOSIS — M22.2X2 PATELLOFEMORAL PAIN SYNDROME OF LEFT KNEE: ICD-10-CM

## 2021-09-09 DIAGNOSIS — M54.42 CHRONIC LEFT-SIDED LOW BACK PAIN WITH LEFT-SIDED SCIATICA: Primary | ICD-10-CM

## 2021-09-09 DIAGNOSIS — R26.89 IMBALANCE: ICD-10-CM

## 2021-09-09 PROCEDURE — 97112 NEUROMUSCULAR REEDUCATION: CPT

## 2021-09-09 NOTE — PROGRESS NOTES
Daily Note     Today's date: 2021  Patient name: Nilton Barcenas  : 1960  MRN: [id number]  Referring provider: Antoine Boone  Dx:   Encounter Diagnosis     ICD-10-CM    1  Chronic left-sided low back pain with left-sided sciatica  M54 42     G89 29    2  Imbalance  R26 89    3  Patellofemoral pain syndrome of left knee  M22 2X2                   Subjective: Patient reports she was able to walk 1 mile without any LOB on uneven sidewalks  Objective: See treatment diary below      Assessment: Tolerated treatment well  Patient demonstrated fatigue post treatment, exhibited good technique with therapeutic exercises and would benefit from continued PT  Most challenged by static>dynamic activities with EC on foam requiring Lynnette to recover from LOB 2x total   Improved performance with forward and lateral walking on foam beam         Plan: Progress treatment as tolerated         Precautions: Epilepsy, fall risk      Manuals                                                        Neuro Re-Ed             Romberg head turns             Romberg head tilts             FT EC on foam biodex foam :30x3 biodex foam :30x3 biodex foam :30x3 biodex foam :30x3     biodex foam :30x3    EC head turns/tilts biodex foam :30x2 ea biodex foam :30x2 ea biodex foam :30x2 ea biodex foam :30x2 ea     biodex foam :30x2 ea biodex foam :30x2 ea   Tandem stance  1' ea + head turns 1' ea + head turns 1' ea         Tandem with saccade horiz, two targets biodex foam :30x2        On beam :30x2    Walking on foams  4 foams+ hurdles forward and lateral 3' ea 4 foams+ hurdles forward and lateral 3' ea nv         marching             Forward walking on beam 10 laps + lateral 10 laps + lateral + lateral 2' ea + lateral 6 laps ea     10 laps 10 laps + lateral   Toe taps On beam 20 ea On beam 20 ea On beam 20 ea On beam 20 ea     On beam 20 ea On beam 20 ea   Visual motion sensitivity, green MB horiz/vert/CW/CCW          10 ea on biodex foam   biodex LOS medium 5x, best 62% medium 5x, best 71% medium 5x, best 74% medium 5x, best 58%     medium 5x, best 69% medium 5x, best 67%   SLS             Saccades on foam             VOR  biodex foam AP/ML :30x2 biodex foam AP/ML :30x2 nv biodex foam AP/ML :30x2     biodex foam AP/ML :30x2 biodex foam AP/ML :30x2   Sidestepping on beam with cone taps             Ther Ex             Walking with head turns/tilts         2 laps ea 48' x2   Walking with MB circles             Tandem walking 50' x2 48' x2 nv 50'x2      50'  x2                                                                    Ther Activity             Star balance 5 targets, 10 rounds ea 5 targets, 10 rounds ea standing on green oval        5 targets, 10 rounds ea                                                       Gait Training                                       Modalities

## 2021-09-14 ENCOUNTER — EVALUATION (OUTPATIENT)
Dept: PHYSICAL THERAPY | Facility: CLINIC | Age: 61
End: 2021-09-14
Payer: COMMERCIAL

## 2021-09-14 DIAGNOSIS — M22.2X2 PATELLOFEMORAL PAIN SYNDROME OF LEFT KNEE: ICD-10-CM

## 2021-09-14 DIAGNOSIS — R26.89 IMBALANCE: Primary | ICD-10-CM

## 2021-09-14 DIAGNOSIS — G89.29 CHRONIC LEFT-SIDED LOW BACK PAIN WITH LEFT-SIDED SCIATICA: ICD-10-CM

## 2021-09-14 DIAGNOSIS — M54.42 CHRONIC LEFT-SIDED LOW BACK PAIN WITH LEFT-SIDED SCIATICA: ICD-10-CM

## 2021-09-14 PROCEDURE — 97112 NEUROMUSCULAR REEDUCATION: CPT

## 2021-09-14 NOTE — PROGRESS NOTES
PT RE-EVALUATION     Today's date: 09/15/21  Patient name: Yuniel Melchor  : 1960  MRN: [id number]  Referring provider: Beto Morrow PA-C  Dx:   1  Imbalance    2  Chronic left-sided low back pain with left-sided sciatica    3  Patellofemoral pain syndrome of left knee       Abhi balance can vary significantly day to day  Modified CTSIB testing today revealed scores that were well below average but she was able to  tandem for 30+ seconds  She continues to have increased sway with static balance activities and does better with more dynamic activities  The patient would benefit from continued PT to address deficits and improve function  Will progress as able in upcoming visits with balance, proprioception, neuro re-ed and HEP      Impairments:    activity intolerance   weight bearing intolerance   abnormal gait   imbalance      Prognosis:  Fair  Positive and negative prognostic indicator(s):  prior success with conservative care and pain >3 months     Goals:  Progressing towards all her goals  STG Patient is independent with HEP (met)  STG Tolerance to weight bearing activities is improved by 25% in 2 weeks  LTG Balance & endurance & gait & locomotion are improved by 50% in 4 weeks  LTG ADL performance improved to prior level of function in 6 weeks     Planned interventions:  home exercise program, patient education, graded activity, balance and weight bearing training, gait training and canalith repositioning techniques     Duration in visits:  8  Frequency: 2 visits per week  Duration in weeks:  4     History of Current Injury: patient has had general unsteadiness and imbalance for over 5 years  She was recently hospitialized with high blood pressure which remains unstable          UPDATE 9/15/21: Patient continues to feel that therapy has been helping and she is not having as much dizziness   She feels more steady on her feet but continues not imbalance that is variable day to day        Pain location: na  Pain descriptors:  spinning  Pain intensity:  7/10     Aggravating factors: supine to sit, rolling in bed, bending forward  Easing factors: avoiding above      Patient goals:  decreased pain, independence with ADLs and improved balance     Objective  (From IE)   BP:  156/94 prior to objective measures  Romber+ seconds  Romberg EC: 20+ seconds, moderate sway  Romberg head turns/tilts: 20+ seconds  Tandem left foot back, 10 and 20+ seconds  Tandem right foot back 20+ seconds  SLS left: 4-6 seconds  SLS right:  3-7 seconds     MCTSIB: (mean)  EO firm:  2 88 (0 51)  EC firm:  3 4  (0 03)  EO foam:  3 41 (1 03)  EC foam:  7 37 (2 86)  Composite:   4 26 (1 33)     UPDATED 9/15/21:  Romber'  Romberg EC: 30+ seconds, moderate sway  Romberg head turns/tilts: 30+ seconds  Tandem left foot back: 30+ seconds  Tandem right foot back:  30+ seconds  SLS left: 3-14 seconds seconds  SLS right:  14-30 seconds seconds     MCTSIB: (mean)  EO firm:  2 06  EC firm:  5 41  EO foam:  5 0  EC foam:  5 19  Composite:   4 41

## 2021-09-14 NOTE — PROGRESS NOTES
Daily Note     Today's date: 2021  Patient name: Latonia Spencer  : 1960  MRN: [id number]  Referring provider: Devin Smiley  Dx:   Encounter Diagnosis     ICD-10-CM    1  Chronic left-sided low back pain with left-sided sciatica  M54 42     G89 29    2  Imbalance  R26 89    3  Patellofemoral pain syndrome of left knee  M22 2X2                   Subjective: Pt reports being off balance today  Pt notes that she did a lot of work at home today  Objective: See treatment diary below      Assessment: Tolerated treatment well  Patient with decreased balance needing tactile cues to maintain balance  Pt is given close supervision for all balance ex  Pt would benefit from continued therapy for improving balance for functional mobility  Plan: Continue per plan of care        Precautions: Epilepsy, fall risk      Manuals                                                        Neuro Re-Ed             Romberg head turns             Romberg head tilts             FT EC on foam biodex foam :30x3 biodex foam :30x3 biodex foam :30x3 biodex foam :30x3     biodex foam :30x3    EC head turns/tilts biodex foam :30x2 ea biodex foam :30x2 ea biodex foam :30x2 ea biodex foam :30x2 ea biodex foam :30x2 ea    biodex foam :30x2 ea biodex foam :30x2 ea   Tandem stance  1' ea + head turns 1' ea + head turns 1' ea 1' ea        Tandem with saccade horiz, two targets biodex foam :30x2        On beam :30x2    Walking on foams  4 foams+ hurdles forward and lateral 3' ea 4 foams+ hurdles forward and lateral 3' ea nv 4 foams+ hurdles forward and lateral 3' ea        marching             Forward walking on beam 10 laps + lateral 10 laps + lateral + lateral 2' ea + lateral 6 laps ea + lateral 6 laps ea    10 laps 10 laps + lateral   Toe taps On beam 20 ea On beam 20 ea On beam 20 ea On beam 20 ea On beam 20 ea    On beam 20 ea On beam 20 ea   Visual motion sensitivity, green MB horiz/vert/CW/CCW          10 ea on biodex foam   biodex LOS medium 5x, best 62% medium 5x, best 71% medium 5x, best 74% medium 5x, best 58% medium 5x, best 75%    medium 5x, best 69% medium 5x, best 67%   SLS             Saccades on foam             VOR  biodex foam AP/ML :30x2 biodex foam AP/ML :30x2 nv biodex foam AP/ML :30x2     biodex foam AP/ML :30x2 biodex foam AP/ML :30x2   Sidestepping on beam with cone taps             Ther Ex             Walking with head turns/tilts         2 laps ea 48' x2   Walking with MB circles             Tandem walking 50' x2 50' x2 nv 50'x2 50'x2     50'  x2                                                                    Ther Activity             Star balance 5 targets, 10 rounds ea 5 targets, 10 rounds ea standing on green oval        5 targets, 10 rounds ea                                                       Gait Training                                       Modalities

## 2021-09-16 ENCOUNTER — OFFICE VISIT (OUTPATIENT)
Dept: PHYSICAL THERAPY | Facility: CLINIC | Age: 61
End: 2021-09-16
Payer: COMMERCIAL

## 2021-09-16 DIAGNOSIS — R26.89 IMBALANCE: ICD-10-CM

## 2021-09-16 DIAGNOSIS — M54.42 CHRONIC LEFT-SIDED LOW BACK PAIN WITH LEFT-SIDED SCIATICA: Primary | ICD-10-CM

## 2021-09-16 DIAGNOSIS — G89.29 CHRONIC LEFT-SIDED LOW BACK PAIN WITH LEFT-SIDED SCIATICA: Primary | ICD-10-CM

## 2021-09-16 DIAGNOSIS — M22.2X2 PATELLOFEMORAL PAIN SYNDROME OF LEFT KNEE: ICD-10-CM

## 2021-09-16 PROCEDURE — 97530 THERAPEUTIC ACTIVITIES: CPT

## 2021-09-16 PROCEDURE — 97112 NEUROMUSCULAR REEDUCATION: CPT

## 2021-09-16 NOTE — PROGRESS NOTES
Daily Note     Today's date: 2021  Patient name: Curt Shelby  : 1960  MRN: [id number]  Referring provider: Samina Yan  Dx:   Encounter Diagnosis     ICD-10-CM    1  Chronic left-sided low back pain with left-sided sciatica  M54 42     G89 29    2  Imbalance  R26 89    3  Patellofemoral pain syndrome of left knee  M22 2X2                   Subjective: Patient feels she feels more steady today overall compared to lat visit  Objective: See treatment diary below      Assessment: Tolerated treatment well  Patient demonstrated fatigue post treatment, exhibited good technique with therapeutic exercises and would benefit from continued PT  Improved score on biodex LOS  Mild LOB 1x when negotiating hurdles on foam though patient was able to recover independently  Plan: Progress treatment as tolerated         Precautions: Epilepsy, fall risk      Manuals                                                        Neuro Re-Ed             Romberg head turns             Romberg head tilts             FT EC on foam biodex foam :30x3 biodex foam :30x3 biodex foam :30x3 biodex foam :30x3  biodex foam :30x3   biodex foam :30x3    EC head turns/tilts biodex foam :30x2 ea biodex foam :30x2 ea biodex foam :30x2 ea biodex foam :30x2 ea biodex foam :30x2 ea biodex foam :30x2 ea   biodex foam :30x2 ea biodex foam :30x2 ea   Tandem stance  1' ea + head turns 1' ea + head turns 1' ea 1' ea 1' ea + head turns       Tandem with saccade horiz, two targets biodex foam :30x2        On beam :30x2    Walking on foams  4 foams+ hurdles forward and lateral 3' ea 4 foams+ hurdles forward and lateral 3' ea nv 4 foams+ hurdles forward and lateral 3' ea 4 foams+ hurdles forward and lateral 3' ea       marching             Forward walking on beam 10 laps + lateral 10 laps + lateral + lateral 2' ea + lateral 6 laps ea + lateral 6 laps ea + lateral 8 laps ea   10 laps 10 laps + lateral   Toe taps On beam 20 ea On beam 20 ea On beam 20 ea On beam 20 ea On beam 20 ea On beam 20 ea   On beam 20 ea On beam 20 ea   Visual motion sensitivity, green MB horiz/vert/CW/CCW          10 ea on biodex foam   biodex LOS medium 5x, best 62% medium 5x, best 71% medium 5x, best 74% medium 5x, best 58% medium 5x, best 75% medium 5x, best 78%   medium 5x, best 69% medium 5x, best 67%   SLS             Saccades on foam             VOR  biodex foam AP/ML :30x2 biodex foam AP/ML :30x2 nv biodex foam AP/ML :30x2  biodex foam AP/ML :30x2   biodex foam AP/ML :30x2 biodex foam AP/ML :30x2   Sidestepping on beam with cone taps             Ther Ex             Walking with head turns/tilts         2 laps ea 48' x2   Walking with MB circles             Tandem walking 50' x2 50' x2 nv 50'x2 50'x2 200'    50'  x2                                                                    Ther Activity             Star balance 5 targets, 10 rounds ea 5 targets, 10 rounds ea standing on green oval        5 targets, 10 rounds ea                                                       Gait Training                                       Modalities

## 2021-09-21 ENCOUNTER — OFFICE VISIT (OUTPATIENT)
Dept: PHYSICAL THERAPY | Facility: CLINIC | Age: 61
End: 2021-09-21
Payer: COMMERCIAL

## 2021-09-21 DIAGNOSIS — R26.89 IMBALANCE: ICD-10-CM

## 2021-09-21 DIAGNOSIS — M54.42 CHRONIC LEFT-SIDED LOW BACK PAIN WITH LEFT-SIDED SCIATICA: Primary | ICD-10-CM

## 2021-09-21 DIAGNOSIS — M22.2X2 PATELLOFEMORAL PAIN SYNDROME OF LEFT KNEE: ICD-10-CM

## 2021-09-21 DIAGNOSIS — G89.29 CHRONIC LEFT-SIDED LOW BACK PAIN WITH LEFT-SIDED SCIATICA: Primary | ICD-10-CM

## 2021-09-21 PROCEDURE — 97112 NEUROMUSCULAR REEDUCATION: CPT

## 2021-09-21 NOTE — PROGRESS NOTES
Daily Note     Today's date: 2021  Patient name: Blaire Ortega  : 1960  MRN: [id number]  Referring provider: Ronald Rogers  Dx:   Encounter Diagnosis     ICD-10-CM    1  Chronic left-sided low back pain with left-sided sciatica  M54 42     G89 29    2  Imbalance  R26 89    3  Patellofemoral pain syndrome of left knee  M22 2X2                   Subjective: Patient reports she was able to rake leaves yesterday without difficulty or LOB  Objective: See treatment diary below      Assessment: Tolerated treatment well  Patient demonstrated fatigue post treatment and would benefit from continued PT  Challenged by tandem stance on foam initially which improved as duration increased  No overt LOB throughout session  Plan: Progress treatment as tolerated         Precautions: Epilepsy, fall risk      Manuals                                                           Neuro Re-Ed             Romberg head turns             Romberg head tilts             FT EC on foam biodex foam :30x3 biodex foam :30x3 biodex foam :30x3 biodex foam :30x3  biodex foam :30x3 biodex foam :30x3      EC head turns/tilts biodex foam :30x2 ea biodex foam :30x2 ea biodex foam :30x2 ea biodex foam :30x2 ea biodex foam :30x2 ea biodex foam :30x2 ea biodex foam :30x2 ea      Tandem stance  1' ea + head turns 1' ea + head turns 1' ea 1' ea 1' ea + head turns 1' ea on blue and green oval      Tandem with saccade horiz, two targets biodex foam :30x2      Tandem stance + head turns 1' ea      Walking on foams  4 foams+ hurdles forward and lateral 3' ea 4 foams+ hurdles forward and lateral 3' ea nv 4 foams+ hurdles forward and lateral 3' ea 4 foams+ hurdles forward and lateral 3' ea 4 foams+ hurdles forward and lateral 3' ea      marching             Forward walking on beam 10 laps + lateral 10 laps + lateral + lateral 2' ea + lateral 6 laps ea + lateral 6 laps ea + lateral 8 laps ea + lateral 2' ea      Toe taps On beam 20 ea On beam 20 ea On beam 20 ea On beam 20 ea On beam 20 ea On beam 20 ea On beam 20 ea      Visual motion sensitivity, green MB horiz/vert/CW/CCW             biodex LOS medium 5x, best 62% medium 5x, best 71% medium 5x, best 74% medium 5x, best 58% medium 5x, best 75% medium 5x, best 78% medium 5x, best 67%      SLS             Saccades on foam             VOR  biodex foam AP/ML :30x2 biodex foam AP/ML :30x2 nv biodex foam AP/ML :30x2  biodex foam AP/ML :30x2 biodex foam AP/ML :30x2      Sidestepping on beam with cone taps             Ther Ex             Walking with head turns/tilts             Walking with MB circles             Tandem walking 50' x2 50' x2 nv 50'x2 50'x2 200' 200'                                                                       Ther Activity             Star balance 5 targets, 10 rounds ea 5 targets, 10 rounds ea standing on green oval                                                               Gait Training                                       Modalities

## 2021-09-23 ENCOUNTER — OFFICE VISIT (OUTPATIENT)
Dept: PHYSICAL THERAPY | Facility: CLINIC | Age: 61
End: 2021-09-23
Payer: COMMERCIAL

## 2021-09-23 DIAGNOSIS — M22.2X2 PATELLOFEMORAL PAIN SYNDROME OF LEFT KNEE: ICD-10-CM

## 2021-09-23 DIAGNOSIS — M54.42 CHRONIC LEFT-SIDED LOW BACK PAIN WITH LEFT-SIDED SCIATICA: Primary | ICD-10-CM

## 2021-09-23 DIAGNOSIS — R26.89 IMBALANCE: ICD-10-CM

## 2021-09-23 DIAGNOSIS — G89.29 CHRONIC LEFT-SIDED LOW BACK PAIN WITH LEFT-SIDED SCIATICA: Primary | ICD-10-CM

## 2021-09-23 PROCEDURE — 97112 NEUROMUSCULAR REEDUCATION: CPT

## 2021-09-23 NOTE — PROGRESS NOTES
Daily Note     Today's date: 2021  Patient name: Mere Varghese  : 1960  MRN: [id number]  Referring provider: Tam Vivas  Dx:   Encounter Diagnosis     ICD-10-CM    1  Chronic left-sided low back pain with left-sided sciatica  M54 42     G89 29    2  Imbalance  R26 89    3  Patellofemoral pain syndrome of left knee  M22 2X2                   Subjective: Patient reports she is achy today which she attributes to rainy weather  No significant LOB since last visit  Objective: See treatment diary below      Assessment: Tolerated treatment well  Patient demonstrated fatigue post treatment, exhibited good technique with therapeutic exercises and would benefit from continued PT  Continues to be challenged by biodex LOS and tandem stance activities  Patient was able to recover from mild LOB independently  Plan: Progress treatment as tolerated         Precautions: Epilepsy, fall risk      Manuals                                                          Neuro Re-Ed             Romberg head turns             Romberg head tilts             FT EC on foam biodex foam :30x3 biodex foam :30x3 biodex foam :30x3 biodex foam :30x3  biodex foam :30x3 biodex foam :30x3 biodex foam :30x3     EC head turns/tilts biodex foam :30x2 ea biodex foam :30x2 ea biodex foam :30x2 ea biodex foam :30x2 ea biodex foam :30x2 ea biodex foam :30x2 ea biodex foam :30x2 ea biodex foam :30x2 ea     Tandem stance  1' ea + head turns 1' ea + head turns 1' ea 1' ea 1' ea + head turns 1' ea on blue and green oval 1' ea on blue and green oval     Tandem with saccade horiz, two targets biodex foam :30x2      Tandem stance + head turns 1' ea Tandem stance + head turns 1' ea     Walking on foams  4 foams+ hurdles forward and lateral 3' ea 4 foams+ hurdles forward and lateral 3' ea nv 4 foams+ hurdles forward and lateral 3' ea 4 foams+ hurdles forward and lateral 3' ea 4 foams+ hurdles forward and lateral 3' ea      marching             Forward walking on beam 10 laps + lateral 10 laps + lateral + lateral 2' ea + lateral 6 laps ea + lateral 6 laps ea + lateral 8 laps ea + lateral 2' ea + lateral 2' ea     Toe taps On beam 20 ea On beam 20 ea On beam 20 ea On beam 20 ea On beam 20 ea On beam 20 ea On beam 20 ea On beam 20 ea     Visual motion sensitivity, green MB horiz/vert/CW/CCW             biodex LOS medium 5x, best 62% medium 5x, best 71% medium 5x, best 74% medium 5x, best 58% medium 5x, best 75% medium 5x, best 78% medium 5x, best 67% medium 5x 68%     SLS             Saccades on foam             VOR  biodex foam AP/ML :30x2 biodex foam AP/ML :30x2 nv biodex foam AP/ML :30x2  biodex foam AP/ML :30x2 biodex foam AP/ML :30x2 biodex foam AP/ML :30x2     Sidestepping on beam with cone taps             Ther Ex             Walking with head turns/tilts             Walking with MB circles             Tandem walking 50' x2 50' x2 nv 50'x2 50'x2 200' 200' 200'                                                                      Ther Activity             Star balance 5 targets, 10 rounds ea 5 targets, 10 rounds ea standing on green oval                                                               Gait Training                                       Modalities

## 2021-09-28 ENCOUNTER — OFFICE VISIT (OUTPATIENT)
Dept: PHYSICAL THERAPY | Facility: CLINIC | Age: 61
End: 2021-09-28
Payer: COMMERCIAL

## 2021-09-28 DIAGNOSIS — M22.2X2 PATELLOFEMORAL PAIN SYNDROME OF LEFT KNEE: ICD-10-CM

## 2021-09-28 DIAGNOSIS — M54.42 CHRONIC LEFT-SIDED LOW BACK PAIN WITH LEFT-SIDED SCIATICA: Primary | ICD-10-CM

## 2021-09-28 DIAGNOSIS — R26.89 IMBALANCE: ICD-10-CM

## 2021-09-28 DIAGNOSIS — G89.29 CHRONIC LEFT-SIDED LOW BACK PAIN WITH LEFT-SIDED SCIATICA: Primary | ICD-10-CM

## 2021-09-28 PROCEDURE — 97112 NEUROMUSCULAR REEDUCATION: CPT

## 2021-09-28 NOTE — PROGRESS NOTES
Daily Note     Today's date: 2021  Patient name: Violet Weathers  : 1960  MRN: [id number]  Referring provider: Brandt Hector  Dx:   Encounter Diagnosis     ICD-10-CM    1  Chronic left-sided low back pain with left-sided sciatica  M54 42     G89 29    2  Imbalance  R26 89    3  Patellofemoral pain syndrome of left knee  M22 2X2                   Subjective: Patient reports she has to be cautious of her balance when raking leaves and walking without AD but denies falling or stumbling recently  Objective: See treatment diary below      Assessment: Tolerated treatment well  Patient demonstrated fatigue post treatment and would benefit from continued PT  No difficulty with activities on unstable surfaces this session  Patient was able to recover from mild LOB during tandem walking independently with stepping strategy  Improved performance on biodex LOS compared to recent sessions  Plan: Progress treatment as tolerated         Precautions: Epilepsy, fall risk      Manuals                                                         Neuro Re-Ed             Romberg head turns             Romberg head tilts             FT EC on foam biodex foam :30x3 biodex foam :30x3 biodex foam :30x3 biodex foam :30x3  biodex foam :30x3 biodex foam :30x3 biodex foam :30x3 biodex foam :30x3    EC head turns/tilts biodex foam :30x2 ea biodex foam :30x2 ea biodex foam :30x2 ea biodex foam :30x2 ea biodex foam :30x2 ea biodex foam :30x2 ea biodex foam :30x2 ea biodex foam :30x2 ea biodex foam :30x2 ea    Tandem stance  1' ea + head turns 1' ea + head turns 1' ea 1' ea 1' ea + head turns 1' ea on blue and green oval 1' ea on blue and green oval 1' ea on blue and green oval    Tandem with saccade horiz, two targets biodex foam :30x2      Tandem stance + head turns 1' ea Tandem stance + head turns 1' ea     Walking on foams  4 foams+ hurdles forward and lateral 3' ea 4 foams+ hurdles forward and lateral 3' ea nv 4 foams+ hurdles forward and lateral 3' ea 4 foams+ hurdles forward and lateral 3' ea 4 foams+ hurdles forward and lateral 3' ea  6 foams + hurdles forward and lateral 3' ea    marching             Forward walking on beam 10 laps + lateral 10 laps + lateral + lateral 2' ea + lateral 6 laps ea + lateral 6 laps ea + lateral 8 laps ea + lateral 2' ea + lateral 2' ea + lateral 2' ea    Toe taps On beam 20 ea On beam 20 ea On beam 20 ea On beam 20 ea On beam 20 ea On beam 20 ea On beam 20 ea On beam 20 ea On beam 2'    Visual motion sensitivity, green MB horiz/vert/CW/CCW             biodex LOS medium 5x, best 62% medium 5x, best 71% medium 5x, best 74% medium 5x, best 58% medium 5x, best 75% medium 5x, best 78% medium 5x, best 67% medium 5x 68% medium 85%    SLS             Saccades on foam             VOR  biodex foam AP/ML :30x2 biodex foam AP/ML :30x2 nv biodex foam AP/ML :30x2  biodex foam AP/ML :30x2 biodex foam AP/ML :30x2 biodex foam AP/ML :30x2 biodex foam ML :30x2    Rockerboard          AP/ML tapping 1', balance 1'    Ther Ex             Walking with head turns/tilts             Walking with MB circles             Tandem walking 50' x2 50' x2 nv 50'x2 50'x2 200' 200' 200' 200'                                                                     Ther Activity             Star balance 5 targets, 10 rounds ea 5 targets, 10 rounds ea standing on green oval                                                               Gait Training                                       Modalities

## 2021-09-30 ENCOUNTER — OFFICE VISIT (OUTPATIENT)
Dept: PHYSICAL THERAPY | Facility: CLINIC | Age: 61
End: 2021-09-30
Payer: COMMERCIAL

## 2021-09-30 DIAGNOSIS — R26.89 IMBALANCE: ICD-10-CM

## 2021-09-30 DIAGNOSIS — G89.29 CHRONIC LEFT-SIDED LOW BACK PAIN WITH LEFT-SIDED SCIATICA: Primary | ICD-10-CM

## 2021-09-30 DIAGNOSIS — M54.42 CHRONIC LEFT-SIDED LOW BACK PAIN WITH LEFT-SIDED SCIATICA: Primary | ICD-10-CM

## 2021-09-30 DIAGNOSIS — M22.2X2 PATELLOFEMORAL PAIN SYNDROME OF LEFT KNEE: ICD-10-CM

## 2021-09-30 PROCEDURE — 97112 NEUROMUSCULAR REEDUCATION: CPT

## 2021-09-30 NOTE — PROGRESS NOTES
Daily Note     Today's date: 2021  Patient name: Joana Salvador  : 1960  MRN: [id number]  Referring provider: Fabiano Vora  Dx:   Encounter Diagnosis     ICD-10-CM    1  Chronic left-sided low back pain with left-sided sciatica  M54 42     G89 29    2  Imbalance  R26 89    3  Patellofemoral pain syndrome of left knee  M22 2X2                   Subjective: Patient reports her balance is better today  Objective: See treatment diary below      Assessment: Tolerated treatment well  Patient demonstrated fatigue post treatment and would benefit from continued PT  Continues to require intermittent fingertip assist on handrail during SLS  Patient had no difficulty with tandem walking this session which is an improvement  Plan: Progress treatment as tolerated         Precautions: Epilepsy, fall risk      Manuals                                                        Neuro Re-Ed             Romberg head turns             Romberg head tilts             FT EC on foam biodex foam :30x3 biodex foam :30x3 biodex foam :30x3 biodex foam :30x3  biodex foam :30x3 biodex foam :30x3 biodex foam :30x3 biodex foam :30x3 biodex foam :30x3   EC head turns/tilts biodex foam :30x2 ea biodex foam :30x2 ea biodex foam :30x2 ea biodex foam :30x2 ea biodex foam :30x2 ea biodex foam :30x2 ea biodex foam :30x2 ea biodex foam :30x2 ea biodex foam :30x2 ea biodex foam :30x2 ea   Tandem stance  1' ea + head turns 1' ea + head turns 1' ea 1' ea 1' ea + head turns 1' ea on blue and green oval 1' ea on blue and green oval 1' ea on blue and green oval 1' ea on blue and green oval   Tandem with saccade horiz, two targets biodex foam :30x2      Tandem stance + head turns 1' ea Tandem stance + head turns 1' ea     Walking on foams  4 foams+ hurdles forward and lateral 3' ea 4 foams+ hurdles forward and lateral 3' ea nv 4 foams+ hurdles forward and lateral 3' ea 4 foams+ hurdles forward and lateral 3' ea 4 foams+ hurdles forward and lateral 3' ea  6 foams + hurdles forward and lateral 3' ea 6 foams,2 air disks + hurdles forward  3'    marching          on biodex 20   Forward walking on beam 10 laps + lateral 10 laps + lateral + lateral 2' ea + lateral 6 laps ea + lateral 6 laps ea + lateral 8 laps ea + lateral 2' ea + lateral 2' ea + lateral 2' ea + lateral 2' ea   Toe taps On beam 20 ea On beam 20 ea On beam 20 ea On beam 20 ea On beam 20 ea On beam 20 ea On beam 20 ea On beam 20 ea On beam 2' On beam 2'   Visual motion sensitivity, green MB horiz/vert/CW/CCW             biodex LOS medium 5x, best 62% medium 5x, best 71% medium 5x, best 74% medium 5x, best 58% medium 5x, best 75% medium 5x, best 78% medium 5x, best 67% medium 5x 68% medium 85% medium 5x, best 80%    SLS          1' ea   Saccades on foam             VOR  biodex foam AP/ML :30x2 biodex foam AP/ML :30x2 nv biodex foam AP/ML :30x2  biodex foam AP/ML :30x2 biodex foam AP/ML :30x2 biodex foam AP/ML :30x2 biodex foam ML :30x2    Rockerboard          AP/ML tapping 1', balance 1' AP/ML tapping 1', balance 1'   Ther Ex             Walking with head turns/tilts             Walking with MB circles             Tandem walking 50' x2 50' x2 nv 50'x2 50'x2 200' 200' 200' 200' 200'                                                                    Ther Activity             Star balance 5 targets, 10 rounds ea 5 targets, 10 rounds ea standing on green oval                                                               Gait Training                                       Modalities

## 2021-10-05 ENCOUNTER — OFFICE VISIT (OUTPATIENT)
Dept: PHYSICAL THERAPY | Facility: CLINIC | Age: 61
End: 2021-10-05
Payer: COMMERCIAL

## 2021-10-05 DIAGNOSIS — M54.42 CHRONIC LEFT-SIDED LOW BACK PAIN WITH LEFT-SIDED SCIATICA: Primary | ICD-10-CM

## 2021-10-05 DIAGNOSIS — G89.29 CHRONIC LEFT-SIDED LOW BACK PAIN WITH LEFT-SIDED SCIATICA: Primary | ICD-10-CM

## 2021-10-05 DIAGNOSIS — R26.89 IMBALANCE: ICD-10-CM

## 2021-10-05 DIAGNOSIS — M22.2X2 PATELLOFEMORAL PAIN SYNDROME OF LEFT KNEE: ICD-10-CM

## 2021-10-05 PROCEDURE — 97530 THERAPEUTIC ACTIVITIES: CPT

## 2021-10-05 PROCEDURE — 97112 NEUROMUSCULAR REEDUCATION: CPT

## 2021-10-07 ENCOUNTER — OFFICE VISIT (OUTPATIENT)
Dept: PHYSICAL THERAPY | Facility: CLINIC | Age: 61
End: 2021-10-07
Payer: COMMERCIAL

## 2021-10-07 DIAGNOSIS — M22.2X2 PATELLOFEMORAL PAIN SYNDROME OF LEFT KNEE: ICD-10-CM

## 2021-10-07 DIAGNOSIS — M54.42 CHRONIC LEFT-SIDED LOW BACK PAIN WITH LEFT-SIDED SCIATICA: Primary | ICD-10-CM

## 2021-10-07 DIAGNOSIS — G89.29 CHRONIC LEFT-SIDED LOW BACK PAIN WITH LEFT-SIDED SCIATICA: Primary | ICD-10-CM

## 2021-10-07 DIAGNOSIS — R26.89 IMBALANCE: ICD-10-CM

## 2021-10-07 PROCEDURE — 97112 NEUROMUSCULAR REEDUCATION: CPT

## 2021-10-12 ENCOUNTER — EVALUATION (OUTPATIENT)
Dept: PHYSICAL THERAPY | Facility: CLINIC | Age: 61
End: 2021-10-12
Payer: COMMERCIAL

## 2021-10-12 DIAGNOSIS — R26.89 IMBALANCE: ICD-10-CM

## 2021-10-12 DIAGNOSIS — M54.42 CHRONIC LEFT-SIDED LOW BACK PAIN WITH LEFT-SIDED SCIATICA: Primary | ICD-10-CM

## 2021-10-12 DIAGNOSIS — G89.29 CHRONIC LEFT-SIDED LOW BACK PAIN WITH LEFT-SIDED SCIATICA: Primary | ICD-10-CM

## 2021-10-12 DIAGNOSIS — M22.2X2 PATELLOFEMORAL PAIN SYNDROME OF LEFT KNEE: ICD-10-CM

## 2021-10-12 PROCEDURE — 97112 NEUROMUSCULAR REEDUCATION: CPT

## 2021-10-13 ENCOUNTER — OFFICE VISIT (OUTPATIENT)
Dept: NEUROLOGY | Facility: CLINIC | Age: 61
End: 2021-10-13
Payer: COMMERCIAL

## 2021-10-13 VITALS
BODY MASS INDEX: 35.08 KG/M2 | HEART RATE: 77 BPM | HEIGHT: 63 IN | TEMPERATURE: 97.4 F | SYSTOLIC BLOOD PRESSURE: 132 MMHG | DIASTOLIC BLOOD PRESSURE: 70 MMHG | WEIGHT: 198 LBS

## 2021-10-13 DIAGNOSIS — G47.19 EXCESSIVE DAYTIME SLEEPINESS: ICD-10-CM

## 2021-10-13 DIAGNOSIS — R55 VASOVAGAL SYNCOPE: ICD-10-CM

## 2021-10-13 DIAGNOSIS — R06.83 LOUD SNORING: Primary | ICD-10-CM

## 2021-10-13 DIAGNOSIS — R42 VERTIGO: ICD-10-CM

## 2021-10-13 PROCEDURE — 99214 OFFICE O/P EST MOD 30 MIN: CPT | Performed by: PSYCHIATRY & NEUROLOGY

## 2021-10-14 ENCOUNTER — OFFICE VISIT (OUTPATIENT)
Dept: PHYSICAL THERAPY | Facility: CLINIC | Age: 61
End: 2021-10-14
Payer: COMMERCIAL

## 2021-10-14 DIAGNOSIS — M54.42 CHRONIC LEFT-SIDED LOW BACK PAIN WITH LEFT-SIDED SCIATICA: Primary | ICD-10-CM

## 2021-10-14 DIAGNOSIS — R26.89 IMBALANCE: ICD-10-CM

## 2021-10-14 DIAGNOSIS — M22.2X2 PATELLOFEMORAL PAIN SYNDROME OF LEFT KNEE: ICD-10-CM

## 2021-10-14 DIAGNOSIS — G89.29 CHRONIC LEFT-SIDED LOW BACK PAIN WITH LEFT-SIDED SCIATICA: Primary | ICD-10-CM

## 2021-10-14 PROCEDURE — 97112 NEUROMUSCULAR REEDUCATION: CPT

## 2021-10-19 ENCOUNTER — OFFICE VISIT (OUTPATIENT)
Dept: PHYSICAL THERAPY | Facility: CLINIC | Age: 61
End: 2021-10-19
Payer: COMMERCIAL

## 2021-10-19 DIAGNOSIS — G89.29 CHRONIC LEFT-SIDED LOW BACK PAIN WITH LEFT-SIDED SCIATICA: Primary | ICD-10-CM

## 2021-10-19 DIAGNOSIS — R26.89 IMBALANCE: ICD-10-CM

## 2021-10-19 DIAGNOSIS — M22.2X2 PATELLOFEMORAL PAIN SYNDROME OF LEFT KNEE: ICD-10-CM

## 2021-10-19 DIAGNOSIS — M54.42 CHRONIC LEFT-SIDED LOW BACK PAIN WITH LEFT-SIDED SCIATICA: Primary | ICD-10-CM

## 2021-10-19 PROCEDURE — 97112 NEUROMUSCULAR REEDUCATION: CPT

## 2021-10-20 ENCOUNTER — TELEPHONE (OUTPATIENT)
Dept: NEUROLOGY | Facility: CLINIC | Age: 61
End: 2021-10-20

## 2021-10-20 DIAGNOSIS — R42 VERTIGO: Primary | ICD-10-CM

## 2021-10-21 ENCOUNTER — APPOINTMENT (OUTPATIENT)
Dept: PHYSICAL THERAPY | Facility: CLINIC | Age: 61
End: 2021-10-21
Payer: COMMERCIAL

## 2021-10-25 ENCOUNTER — EVALUATION (OUTPATIENT)
Dept: PHYSICAL THERAPY | Facility: CLINIC | Age: 61
End: 2021-10-25
Payer: COMMERCIAL

## 2021-10-25 DIAGNOSIS — R42 VERTIGO: Primary | ICD-10-CM

## 2021-10-25 PROCEDURE — 97162 PT EVAL MOD COMPLEX 30 MIN: CPT | Performed by: PHYSICAL THERAPIST

## 2021-10-26 ENCOUNTER — APPOINTMENT (OUTPATIENT)
Dept: PHYSICAL THERAPY | Facility: CLINIC | Age: 61
End: 2021-10-26
Payer: COMMERCIAL

## 2021-10-28 ENCOUNTER — OFFICE VISIT (OUTPATIENT)
Dept: PHYSICAL THERAPY | Facility: CLINIC | Age: 61
End: 2021-10-28
Payer: COMMERCIAL

## 2021-10-28 ENCOUNTER — APPOINTMENT (OUTPATIENT)
Dept: PHYSICAL THERAPY | Facility: CLINIC | Age: 61
End: 2021-10-28
Payer: COMMERCIAL

## 2021-10-28 DIAGNOSIS — R26.89 IMBALANCE: ICD-10-CM

## 2021-10-28 DIAGNOSIS — M54.42 CHRONIC LEFT-SIDED LOW BACK PAIN WITH LEFT-SIDED SCIATICA: ICD-10-CM

## 2021-10-28 DIAGNOSIS — R42 VERTIGO: Primary | ICD-10-CM

## 2021-10-28 DIAGNOSIS — G89.29 CHRONIC LEFT-SIDED LOW BACK PAIN WITH LEFT-SIDED SCIATICA: ICD-10-CM

## 2021-10-28 DIAGNOSIS — M22.2X2 PATELLOFEMORAL PAIN SYNDROME OF LEFT KNEE: ICD-10-CM

## 2021-10-28 PROCEDURE — 97110 THERAPEUTIC EXERCISES: CPT

## 2021-10-28 PROCEDURE — 97112 NEUROMUSCULAR REEDUCATION: CPT

## 2021-11-01 ENCOUNTER — APPOINTMENT (OUTPATIENT)
Dept: PHYSICAL THERAPY | Facility: CLINIC | Age: 61
End: 2021-11-01
Payer: COMMERCIAL

## 2021-11-02 ENCOUNTER — OFFICE VISIT (OUTPATIENT)
Dept: PHYSICAL THERAPY | Facility: CLINIC | Age: 61
End: 2021-11-02
Payer: COMMERCIAL

## 2021-11-02 DIAGNOSIS — G89.29 CHRONIC LEFT-SIDED LOW BACK PAIN WITH LEFT-SIDED SCIATICA: Primary | ICD-10-CM

## 2021-11-02 DIAGNOSIS — M54.42 CHRONIC LEFT-SIDED LOW BACK PAIN WITH LEFT-SIDED SCIATICA: Primary | ICD-10-CM

## 2021-11-02 DIAGNOSIS — R42 VERTIGO: ICD-10-CM

## 2021-11-02 DIAGNOSIS — M22.2X2 PATELLOFEMORAL PAIN SYNDROME OF LEFT KNEE: ICD-10-CM

## 2021-11-02 DIAGNOSIS — R26.89 IMBALANCE: ICD-10-CM

## 2021-11-02 PROCEDURE — 97110 THERAPEUTIC EXERCISES: CPT

## 2021-11-02 PROCEDURE — 97112 NEUROMUSCULAR REEDUCATION: CPT

## 2021-11-04 ENCOUNTER — OFFICE VISIT (OUTPATIENT)
Dept: PHYSICAL THERAPY | Facility: CLINIC | Age: 61
End: 2021-11-04
Payer: COMMERCIAL

## 2021-11-04 DIAGNOSIS — M22.2X2 PATELLOFEMORAL PAIN SYNDROME OF LEFT KNEE: ICD-10-CM

## 2021-11-04 DIAGNOSIS — R42 VERTIGO: ICD-10-CM

## 2021-11-04 DIAGNOSIS — R26.89 IMBALANCE: ICD-10-CM

## 2021-11-04 DIAGNOSIS — M54.42 CHRONIC LEFT-SIDED LOW BACK PAIN WITH LEFT-SIDED SCIATICA: Primary | ICD-10-CM

## 2021-11-04 DIAGNOSIS — G89.29 CHRONIC LEFT-SIDED LOW BACK PAIN WITH LEFT-SIDED SCIATICA: Primary | ICD-10-CM

## 2021-11-04 PROCEDURE — 97112 NEUROMUSCULAR REEDUCATION: CPT | Performed by: PHYSICAL THERAPIST

## 2021-11-08 ENCOUNTER — OFFICE VISIT (OUTPATIENT)
Dept: PHYSICAL THERAPY | Facility: CLINIC | Age: 61
End: 2021-11-08
Payer: COMMERCIAL

## 2021-11-08 DIAGNOSIS — R26.89 IMBALANCE: ICD-10-CM

## 2021-11-08 DIAGNOSIS — M22.2X2 PATELLOFEMORAL PAIN SYNDROME OF LEFT KNEE: ICD-10-CM

## 2021-11-08 DIAGNOSIS — R42 VERTIGO: ICD-10-CM

## 2021-11-08 DIAGNOSIS — M54.42 CHRONIC LEFT-SIDED LOW BACK PAIN WITH LEFT-SIDED SCIATICA: Primary | ICD-10-CM

## 2021-11-08 DIAGNOSIS — G89.29 CHRONIC LEFT-SIDED LOW BACK PAIN WITH LEFT-SIDED SCIATICA: Primary | ICD-10-CM

## 2021-11-08 PROCEDURE — 97530 THERAPEUTIC ACTIVITIES: CPT

## 2021-11-08 PROCEDURE — 97112 NEUROMUSCULAR REEDUCATION: CPT

## 2021-11-11 ENCOUNTER — OFFICE VISIT (OUTPATIENT)
Dept: PHYSICAL THERAPY | Facility: CLINIC | Age: 61
End: 2021-11-11
Payer: COMMERCIAL

## 2021-11-11 DIAGNOSIS — G89.29 CHRONIC LEFT-SIDED LOW BACK PAIN WITH LEFT-SIDED SCIATICA: ICD-10-CM

## 2021-11-11 DIAGNOSIS — R26.89 IMBALANCE: ICD-10-CM

## 2021-11-11 DIAGNOSIS — M22.2X2 PATELLOFEMORAL PAIN SYNDROME OF LEFT KNEE: ICD-10-CM

## 2021-11-11 DIAGNOSIS — R42 VERTIGO: Primary | ICD-10-CM

## 2021-11-11 DIAGNOSIS — M54.42 CHRONIC LEFT-SIDED LOW BACK PAIN WITH LEFT-SIDED SCIATICA: ICD-10-CM

## 2021-11-11 PROCEDURE — 97530 THERAPEUTIC ACTIVITIES: CPT

## 2021-11-11 PROCEDURE — 97112 NEUROMUSCULAR REEDUCATION: CPT

## 2021-11-15 ENCOUNTER — OFFICE VISIT (OUTPATIENT)
Dept: PHYSICAL THERAPY | Facility: CLINIC | Age: 61
End: 2021-11-15
Payer: COMMERCIAL

## 2021-11-15 DIAGNOSIS — R42 VERTIGO: Primary | ICD-10-CM

## 2021-11-15 DIAGNOSIS — G89.29 CHRONIC LEFT-SIDED LOW BACK PAIN WITH LEFT-SIDED SCIATICA: ICD-10-CM

## 2021-11-15 DIAGNOSIS — M22.2X2 PATELLOFEMORAL PAIN SYNDROME OF LEFT KNEE: ICD-10-CM

## 2021-11-15 DIAGNOSIS — M54.42 CHRONIC LEFT-SIDED LOW BACK PAIN WITH LEFT-SIDED SCIATICA: ICD-10-CM

## 2021-11-15 DIAGNOSIS — R26.89 IMBALANCE: ICD-10-CM

## 2021-11-15 PROCEDURE — 97530 THERAPEUTIC ACTIVITIES: CPT

## 2021-11-15 PROCEDURE — 97112 NEUROMUSCULAR REEDUCATION: CPT

## 2021-11-18 ENCOUNTER — EVALUATION (OUTPATIENT)
Dept: PHYSICAL THERAPY | Facility: CLINIC | Age: 61
End: 2021-11-18
Payer: COMMERCIAL

## 2021-11-18 DIAGNOSIS — M22.2X2 PATELLOFEMORAL PAIN SYNDROME OF LEFT KNEE: ICD-10-CM

## 2021-11-18 DIAGNOSIS — G89.29 CHRONIC LEFT-SIDED LOW BACK PAIN WITH LEFT-SIDED SCIATICA: ICD-10-CM

## 2021-11-18 DIAGNOSIS — R26.89 IMBALANCE: ICD-10-CM

## 2021-11-18 DIAGNOSIS — R42 VERTIGO: Primary | ICD-10-CM

## 2021-11-18 DIAGNOSIS — M54.42 CHRONIC LEFT-SIDED LOW BACK PAIN WITH LEFT-SIDED SCIATICA: ICD-10-CM

## 2021-11-18 PROCEDURE — 97110 THERAPEUTIC EXERCISES: CPT

## 2021-11-18 PROCEDURE — 97530 THERAPEUTIC ACTIVITIES: CPT

## 2021-11-18 PROCEDURE — 97112 NEUROMUSCULAR REEDUCATION: CPT

## 2021-11-23 ENCOUNTER — OFFICE VISIT (OUTPATIENT)
Dept: PHYSICAL THERAPY | Facility: CLINIC | Age: 61
End: 2021-11-23
Payer: COMMERCIAL

## 2021-11-23 DIAGNOSIS — M22.2X2 PATELLOFEMORAL PAIN SYNDROME OF LEFT KNEE: ICD-10-CM

## 2021-11-23 DIAGNOSIS — R42 VERTIGO: Primary | ICD-10-CM

## 2021-11-23 DIAGNOSIS — G89.29 CHRONIC LEFT-SIDED LOW BACK PAIN WITH LEFT-SIDED SCIATICA: ICD-10-CM

## 2021-11-23 DIAGNOSIS — R26.89 IMBALANCE: ICD-10-CM

## 2021-11-23 DIAGNOSIS — M54.42 CHRONIC LEFT-SIDED LOW BACK PAIN WITH LEFT-SIDED SCIATICA: ICD-10-CM

## 2021-11-23 PROCEDURE — 97112 NEUROMUSCULAR REEDUCATION: CPT

## 2021-11-23 PROCEDURE — 97530 THERAPEUTIC ACTIVITIES: CPT

## 2021-11-30 ENCOUNTER — OFFICE VISIT (OUTPATIENT)
Dept: PHYSICAL THERAPY | Facility: CLINIC | Age: 61
End: 2021-11-30
Payer: COMMERCIAL

## 2021-11-30 DIAGNOSIS — G89.29 CHRONIC LEFT-SIDED LOW BACK PAIN WITH LEFT-SIDED SCIATICA: Primary | ICD-10-CM

## 2021-11-30 DIAGNOSIS — M22.2X2 PATELLOFEMORAL PAIN SYNDROME OF LEFT KNEE: ICD-10-CM

## 2021-11-30 DIAGNOSIS — R26.89 IMBALANCE: ICD-10-CM

## 2021-11-30 DIAGNOSIS — M54.42 CHRONIC LEFT-SIDED LOW BACK PAIN WITH LEFT-SIDED SCIATICA: Primary | ICD-10-CM

## 2021-11-30 DIAGNOSIS — R42 VERTIGO: ICD-10-CM

## 2021-11-30 PROCEDURE — 97110 THERAPEUTIC EXERCISES: CPT

## 2021-11-30 PROCEDURE — 97112 NEUROMUSCULAR REEDUCATION: CPT

## 2021-11-30 PROCEDURE — 97530 THERAPEUTIC ACTIVITIES: CPT

## 2021-12-02 ENCOUNTER — OFFICE VISIT (OUTPATIENT)
Dept: PHYSICAL THERAPY | Facility: CLINIC | Age: 61
End: 2021-12-02
Payer: COMMERCIAL

## 2021-12-02 DIAGNOSIS — R42 VERTIGO: ICD-10-CM

## 2021-12-02 DIAGNOSIS — G89.29 CHRONIC LEFT-SIDED LOW BACK PAIN WITH LEFT-SIDED SCIATICA: Primary | ICD-10-CM

## 2021-12-02 DIAGNOSIS — R26.89 IMBALANCE: ICD-10-CM

## 2021-12-02 DIAGNOSIS — M22.2X2 PATELLOFEMORAL PAIN SYNDROME OF LEFT KNEE: ICD-10-CM

## 2021-12-02 DIAGNOSIS — M54.42 CHRONIC LEFT-SIDED LOW BACK PAIN WITH LEFT-SIDED SCIATICA: Primary | ICD-10-CM

## 2021-12-02 PROCEDURE — 97112 NEUROMUSCULAR REEDUCATION: CPT

## 2021-12-02 PROCEDURE — 97110 THERAPEUTIC EXERCISES: CPT

## 2021-12-02 PROCEDURE — 97530 THERAPEUTIC ACTIVITIES: CPT

## 2021-12-07 ENCOUNTER — OFFICE VISIT (OUTPATIENT)
Dept: PHYSICAL THERAPY | Facility: CLINIC | Age: 61
End: 2021-12-07
Payer: COMMERCIAL

## 2021-12-07 DIAGNOSIS — M22.2X2 PATELLOFEMORAL PAIN SYNDROME OF LEFT KNEE: ICD-10-CM

## 2021-12-07 DIAGNOSIS — R26.89 IMBALANCE: ICD-10-CM

## 2021-12-07 DIAGNOSIS — M54.42 CHRONIC LEFT-SIDED LOW BACK PAIN WITH LEFT-SIDED SCIATICA: Primary | ICD-10-CM

## 2021-12-07 DIAGNOSIS — G89.29 CHRONIC LEFT-SIDED LOW BACK PAIN WITH LEFT-SIDED SCIATICA: Primary | ICD-10-CM

## 2021-12-07 DIAGNOSIS — R42 VERTIGO: ICD-10-CM

## 2021-12-07 PROCEDURE — 97110 THERAPEUTIC EXERCISES: CPT

## 2021-12-07 PROCEDURE — 97112 NEUROMUSCULAR REEDUCATION: CPT

## 2021-12-09 ENCOUNTER — OFFICE VISIT (OUTPATIENT)
Dept: PHYSICAL THERAPY | Facility: CLINIC | Age: 61
End: 2021-12-09
Payer: COMMERCIAL

## 2021-12-09 DIAGNOSIS — M54.42 CHRONIC LEFT-SIDED LOW BACK PAIN WITH LEFT-SIDED SCIATICA: Primary | ICD-10-CM

## 2021-12-09 DIAGNOSIS — M22.2X2 PATELLOFEMORAL PAIN SYNDROME OF LEFT KNEE: ICD-10-CM

## 2021-12-09 DIAGNOSIS — R26.89 IMBALANCE: ICD-10-CM

## 2021-12-09 DIAGNOSIS — R42 VERTIGO: ICD-10-CM

## 2021-12-09 DIAGNOSIS — G89.29 CHRONIC LEFT-SIDED LOW BACK PAIN WITH LEFT-SIDED SCIATICA: Primary | ICD-10-CM

## 2021-12-09 PROCEDURE — 97110 THERAPEUTIC EXERCISES: CPT

## 2021-12-09 PROCEDURE — 97530 THERAPEUTIC ACTIVITIES: CPT

## 2021-12-09 PROCEDURE — 97112 NEUROMUSCULAR REEDUCATION: CPT

## 2021-12-14 ENCOUNTER — OFFICE VISIT (OUTPATIENT)
Dept: PHYSICAL THERAPY | Facility: CLINIC | Age: 61
End: 2021-12-14
Payer: COMMERCIAL

## 2021-12-14 DIAGNOSIS — G89.29 CHRONIC LEFT-SIDED LOW BACK PAIN WITH LEFT-SIDED SCIATICA: Primary | ICD-10-CM

## 2021-12-14 DIAGNOSIS — M54.42 CHRONIC LEFT-SIDED LOW BACK PAIN WITH LEFT-SIDED SCIATICA: Primary | ICD-10-CM

## 2021-12-14 DIAGNOSIS — M22.2X2 PATELLOFEMORAL PAIN SYNDROME OF LEFT KNEE: ICD-10-CM

## 2021-12-14 DIAGNOSIS — R26.89 IMBALANCE: ICD-10-CM

## 2021-12-14 DIAGNOSIS — R42 VERTIGO: ICD-10-CM

## 2021-12-14 PROCEDURE — 97530 THERAPEUTIC ACTIVITIES: CPT

## 2021-12-14 PROCEDURE — 97110 THERAPEUTIC EXERCISES: CPT

## 2021-12-14 PROCEDURE — 97112 NEUROMUSCULAR REEDUCATION: CPT

## 2021-12-16 ENCOUNTER — EVALUATION (OUTPATIENT)
Dept: PHYSICAL THERAPY | Facility: CLINIC | Age: 61
End: 2021-12-16
Payer: COMMERCIAL

## 2021-12-16 DIAGNOSIS — R42 VERTIGO: ICD-10-CM

## 2021-12-16 DIAGNOSIS — R26.89 IMBALANCE: ICD-10-CM

## 2021-12-16 DIAGNOSIS — M54.42 CHRONIC LEFT-SIDED LOW BACK PAIN WITH LEFT-SIDED SCIATICA: Primary | ICD-10-CM

## 2021-12-16 DIAGNOSIS — M22.2X2 PATELLOFEMORAL PAIN SYNDROME OF LEFT KNEE: ICD-10-CM

## 2021-12-16 DIAGNOSIS — G89.29 CHRONIC LEFT-SIDED LOW BACK PAIN WITH LEFT-SIDED SCIATICA: Primary | ICD-10-CM

## 2021-12-16 PROCEDURE — 97112 NEUROMUSCULAR REEDUCATION: CPT

## 2021-12-16 PROCEDURE — 97110 THERAPEUTIC EXERCISES: CPT

## 2021-12-21 ENCOUNTER — OFFICE VISIT (OUTPATIENT)
Dept: PHYSICAL THERAPY | Facility: CLINIC | Age: 61
End: 2021-12-21
Payer: COMMERCIAL

## 2021-12-21 DIAGNOSIS — G89.29 CHRONIC LEFT-SIDED LOW BACK PAIN WITH LEFT-SIDED SCIATICA: Primary | ICD-10-CM

## 2021-12-21 DIAGNOSIS — M22.2X2 PATELLOFEMORAL PAIN SYNDROME OF LEFT KNEE: ICD-10-CM

## 2021-12-21 DIAGNOSIS — R26.89 IMBALANCE: ICD-10-CM

## 2021-12-21 DIAGNOSIS — M54.42 CHRONIC LEFT-SIDED LOW BACK PAIN WITH LEFT-SIDED SCIATICA: Primary | ICD-10-CM

## 2021-12-21 DIAGNOSIS — R42 VERTIGO: ICD-10-CM

## 2021-12-21 PROCEDURE — 97112 NEUROMUSCULAR REEDUCATION: CPT

## 2021-12-21 PROCEDURE — 97110 THERAPEUTIC EXERCISES: CPT

## 2021-12-23 ENCOUNTER — OFFICE VISIT (OUTPATIENT)
Dept: PHYSICAL THERAPY | Facility: CLINIC | Age: 61
End: 2021-12-23
Payer: COMMERCIAL

## 2021-12-23 DIAGNOSIS — M54.42 CHRONIC LEFT-SIDED LOW BACK PAIN WITH LEFT-SIDED SCIATICA: Primary | ICD-10-CM

## 2021-12-23 DIAGNOSIS — G89.29 CHRONIC LEFT-SIDED LOW BACK PAIN WITH LEFT-SIDED SCIATICA: Primary | ICD-10-CM

## 2021-12-23 DIAGNOSIS — M22.2X2 PATELLOFEMORAL PAIN SYNDROME OF LEFT KNEE: ICD-10-CM

## 2021-12-23 DIAGNOSIS — R42 VERTIGO: ICD-10-CM

## 2021-12-23 DIAGNOSIS — R26.89 IMBALANCE: ICD-10-CM

## 2021-12-23 PROCEDURE — 97110 THERAPEUTIC EXERCISES: CPT

## 2021-12-23 PROCEDURE — 97112 NEUROMUSCULAR REEDUCATION: CPT

## 2021-12-28 ENCOUNTER — OFFICE VISIT (OUTPATIENT)
Dept: PHYSICAL THERAPY | Facility: CLINIC | Age: 61
End: 2021-12-28
Payer: COMMERCIAL

## 2021-12-28 DIAGNOSIS — G89.29 CHRONIC LEFT-SIDED LOW BACK PAIN WITH LEFT-SIDED SCIATICA: Primary | ICD-10-CM

## 2021-12-28 DIAGNOSIS — R42 VERTIGO: ICD-10-CM

## 2021-12-28 DIAGNOSIS — M54.42 CHRONIC LEFT-SIDED LOW BACK PAIN WITH LEFT-SIDED SCIATICA: Primary | ICD-10-CM

## 2021-12-28 DIAGNOSIS — R26.89 IMBALANCE: ICD-10-CM

## 2021-12-28 DIAGNOSIS — M22.2X2 PATELLOFEMORAL PAIN SYNDROME OF LEFT KNEE: ICD-10-CM

## 2021-12-28 PROCEDURE — 97112 NEUROMUSCULAR REEDUCATION: CPT

## 2021-12-28 PROCEDURE — 97110 THERAPEUTIC EXERCISES: CPT

## 2022-02-01 ENCOUNTER — TELEPHONE (OUTPATIENT)
Dept: SLEEP CENTER | Facility: CLINIC | Age: 62
End: 2022-02-01

## 2022-02-03 ENCOUNTER — OFFICE VISIT (OUTPATIENT)
Dept: SLEEP CENTER | Facility: CLINIC | Age: 62
End: 2022-02-03
Payer: MEDICARE

## 2022-02-03 ENCOUNTER — HOSPITAL ENCOUNTER (OUTPATIENT)
Dept: SLEEP CENTER | Facility: CLINIC | Age: 62
Discharge: HOME | End: 2022-02-03
Payer: MEDICARE

## 2022-02-03 VITALS
BODY MASS INDEX: 35.47 KG/M2 | HEIGHT: 63 IN | WEIGHT: 200.2 LBS | SYSTOLIC BLOOD PRESSURE: 140 MMHG | DIASTOLIC BLOOD PRESSURE: 100 MMHG | HEART RATE: 79 BPM

## 2022-02-03 DIAGNOSIS — R06.83 SNORING: ICD-10-CM

## 2022-02-03 DIAGNOSIS — G47.19 EXCESSIVE DAYTIME SLEEPINESS: ICD-10-CM

## 2022-02-03 PROCEDURE — 95810 POLYSOM 6/> YRS 4/> PARAM: CPT

## 2022-02-03 PROCEDURE — 99203 OFFICE O/P NEW LOW 30 MIN: CPT | Performed by: INTERNAL MEDICINE
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
## 2022-02-04 NOTE — PROGRESS NOTES
Sleep Study Documentation    Pre-Sleep Study       Sleep testing procedure explained to patient:YES    Patient napped prior to study:NO    Caffeine:Dayshift worker after 12PM   Caffeine use:YES- coffee  6 ounces    Alcohol:Dayshift workers after 5PM: Alcohol use:NO    Typical day for patient:YES       Study Documentation    Sleep Study Indications: snoring, excessive daytime sleepiness, nocturnal choking, RLS, hypertension, seizures, migraine, chest pain, vertigo,      Sleep Study: Diagnostic   Snore: Moderate  Supplemental O2: no    O2 flow rate (L/min)   O2 flow rate (L/min)   Minimum SaO2 68%  Baseline SaO2 93%        Mode of Therapy:    EKG abnormalities: no     EEG abnormalities: no    Sleep Study Recorded < 2 hours: N/A    Sleep Study Recorded > 2 hours but incomplete study: N/A    Sleep Study Recorded 6 hours but no sleep obtained: NO    Patient classification: disabled       Post-Sleep Study    Medication used at bedtime or during sleep study:YES other prescription medications    Patient reports time it took to fall asleep:20 to 30 minutes    Patient reports waking up during study:3 or more times  Patient reports returning to sleep in greater than 30 minutes  Patient reports sleeping 4 to 6 hours without dreaming  Patient reports sleep during study:typical    Patient rated sleepiness: Not sleepy or tired    PAP treatment:no

## 2022-02-09 ENCOUNTER — TELEPHONE (OUTPATIENT)
Dept: SLEEP CENTER | Facility: CLINIC | Age: 62
End: 2022-02-09

## 2022-02-09 NOTE — TELEPHONE ENCOUNTER
Sleep study resulted and shows very severe VALERY (AHI 73 6)  CPAP study ordered  CPAP study scheduled 3/30/22 in [place]  Added to wait list     Instructions provided  Verbalized understanding

## 2022-02-11 ENCOUNTER — HOSPITAL ENCOUNTER (OUTPATIENT)
Dept: SLEEP CENTER | Facility: CLINIC | Age: 62
Discharge: HOME | End: 2022-02-11
Payer: MEDICARE

## 2022-02-11 DIAGNOSIS — G47.33 OSA (OBSTRUCTIVE SLEEP APNEA): ICD-10-CM

## 2022-02-11 PROCEDURE — 95811 POLYSOM 6/>YRS CPAP 4/> PARM: CPT

## 2022-02-12 NOTE — PROGRESS NOTES
Sleep Study Documentation    Pre-Sleep Study       Sleep testing procedure explained to patient:YES    Patient napped prior to study:NO    Caffeine:Dayshift worker after 12PM   Caffeine use:YES- soda  12 ounces    Alcohol:Dayshift workers after 5PM: Alcohol use:NO    Typical day for patient:YES       Study Documentation    Sleep Study Indications: VALERY, snoring, hypertension, tension type headache, migraine, kidney disease, chest pain, vertigo,     Sleep Study: Treatment   Optimal PAP pressure: 23/19cm  Leak:Small  Snore:Eliminated  REM Obtained:yes  Supplemental O2: no    Minimum SaO2 71%  Baseline SaO2 97%  PAP mask tried (list all) small resmed airtouch F20  PAP mask choice (final) small resmed airtouch F20  PAP mask type:full face  PAP pressure at which snoring was eliminated 19/15cm  Minimum SaO2 at final PAP pressure 94%  Mode of Therapy:BiPAP  ETCO2:No  CPAP changed to BiPAP:Yes  If yes why due to persistent of obstructive events on supine at 15cm of cpap    Mode of Therapy:BiPAP    EKG abnormalities: no     EEG abnormalities: no    Sleep Study Recorded < 2 hours: N/A    Sleep Study Recorded > 2 hours but incomplete study: N/A    Sleep Study Recorded 6 hours but no sleep obtained: NO    Patient classification: disabled       Post-Sleep Study    Medication used at bedtime or during sleep study:YES other prescription medications    Patient reports time it took to fall asleep:less than 20 minutes    Patient reports waking up during study:1 to 2 times  Patient reports returning to sleep in 10 to 30 minutes  Patient reports sleeping 6 to 8 hours with dreaming  Patient reports sleep during study:better than usual    Patient rated sleepiness: Somewhat sleepy or tired    PAP treatment:yes: Post PAP treatment patient reports feeling better and  would wear PAP mask at home

## 2022-02-17 ENCOUNTER — TELEPHONE (OUTPATIENT)
Dept: SLEEP CENTER | Facility: CLINIC | Age: 62
End: 2022-02-17

## 2022-02-17 NOTE — TELEPHONE ENCOUNTER
Advised patient CPAP study resulted and Dr Chavez Escudero ordered BIPAP     Patient has follow up scheduled for 2/25/2022 and she wants to discuss BIPAP with dr Chavez Escudero before scheduling DME set up

## 2022-03-03 ENCOUNTER — EVALUATION (OUTPATIENT)
Dept: PHYSICAL THERAPY | Facility: CLINIC | Age: 62
End: 2022-03-03
Payer: MEDICARE

## 2022-03-03 DIAGNOSIS — M54.41 CHRONIC BILATERAL LOW BACK PAIN WITH BILATERAL SCIATICA: ICD-10-CM

## 2022-03-03 DIAGNOSIS — G89.29 CHRONIC BILATERAL LOW BACK PAIN WITH BILATERAL SCIATICA: ICD-10-CM

## 2022-03-03 DIAGNOSIS — M25.562 CHRONIC PAIN OF LEFT KNEE: Primary | ICD-10-CM

## 2022-03-03 DIAGNOSIS — G89.29 CHRONIC PAIN OF LEFT KNEE: Primary | ICD-10-CM

## 2022-03-03 DIAGNOSIS — M54.42 CHRONIC BILATERAL LOW BACK PAIN WITH BILATERAL SCIATICA: ICD-10-CM

## 2022-03-03 PROCEDURE — 97161 PT EVAL LOW COMPLEX 20 MIN: CPT

## 2022-03-03 PROCEDURE — 97110 THERAPEUTIC EXERCISES: CPT

## 2022-03-03 NOTE — PROGRESS NOTES
PT Evaluation     Today's date: 3/3/2022  Patient name: Willa Buitrago  : 1960  MRN: [id number]  Referring provider: Michael Richards  Dx:   Encounter Diagnosis     ICD-10-CM    1  Chronic pain of left knee  M25 562     G89 29    2  Chronic bilateral low back pain with bilateral sciatica  M54 42     M54 41     G89 29         Start Time: 1745  Stop Time: 1815  Total time in clinic (min): 30 minutes    Assessment  Assessment details: Willa Buitrago is a 63 y o  female presenting with c/o chronic LBP with radicular sx and chronic L knee pain  Objective examination is consistent with lumbar radiculopathy and generalized L knee pain  Primary impairments include peripheralization of sx with lumbar flexion, hip and quad weakness, and overall reduced activity tolerance  Will benefit from skilled PT interventions for centralization/sx modulation, strengthening, endurance training, ADL training, and overall assistance in return to PLOF  Impairments: abnormal or restricted ROM, abnormal movement, activity intolerance, impaired physical strength, lacks appropriate home exercise program and pain with function    Symptom irritability: moderateUnderstanding of Dx/Px/POC: good   Prognosis: good    Goals    Short Term Goals: In 4 weeks, the patient will:  1  Improve quad strength to 4/5 B    2  Report initiation of a short walking program    3  Supervision with HEP for self care    Long Term Goals: In 8 weeks, the patient will:  1  Report consistency with walking 1/4 mile >2x per week  2  Increase FOTO to greater than predicted value  3  Independent with HEP for selfcare  4  Increase hip strength to 4+/5 globally  Plan  Plan details: Assess vertigo sx if unresolved at next visit     Referral necessary: No  Planned therapy interventions: functional ROM exercises, graded activity, graded exercise, home exercise program, joint mobilization, manual therapy, neuromuscular re-education, patient education, strengthening, stretching, therapeutic activities and therapeutic exercise  Frequency: 2x week  Duration in weeks: 8  Treatment plan discussed with: patient        Subjective  Pt reported that she doesn't feel like her left knee ever really healed from her first fall several years ago  Pt notes disc problems in her lumbar spine that have been causing issues  She has issues performing a variety of functional tasks  She was also recently diagnosed with sleep apnea  She noted she used to walk a mile every day  Pt notes numbness/tingling in both legs that goes all the way down to her feet  Pt also noted she often walks with a cane at home, however did not present to her appt with a cane  Pt also reported vertigo, however also noted a recent ear infection that likely exacerbated sx  Pain Location: Knee: generalized pain, Back: lower lumbar   BIRD: chronic issues   Provoked by: getting out of bed, bending over  Eased Positions: heating pads, propping her L leg up   Constitutional S/S: no red flags noted   Goals: improve ambulation, be able to walk for exercise     Objective       Strength and ROM evaluated B from a regional biomechanical perspective and values relevant to this episode recorded in tables below  ROM:   Joint / Motion  Right  Left    Lumbar Flexion  WNL    Lumbar Extension  WNL      Repeated Movements:    Standing Baseline: no peripheral symptoms       DURING MVMT  RESPONSE AFTER  Standing Flexion p! peripheralization    Standing Extension WNL  No change          LE Myotomes, Dermatomes, and Reflexes all WNL     Additional Assessments:  Pain with palpation noted: L3-L4 and L4-L5, no TTP at L knee   Joint Mobility: WNL    Special Tests:  Lumbar Specific and Neural Tension                                                                            Test / Measure  Right 3/3/2022 Left 3/3/2022   Straight Leg raise + +         OBJECTIVE:      Strength: MMT revealed the following findings    Joint Motion Right: 3/3/2022 Left: 3/3/2022   Hip Flexion 4/5 4/5   Hip Abduction 4/5 4/5   Hip Extension 3+/5 3-/5   Knee Extension 4+/5 4/5   Knee Flexion 4+/5 4+/5       Outcome Measures: FOTO: 16         Precautions: epilepsy, sleep apnea       Manuals 3/3                                                                Neuro Re-Ed                                                                                                        Ther Ex             Mini squats HEP            Bridges  HEP            Long arc quad  Trialed, p!             Quad set  5"x10 ea            Short arc quad              Lateral band walks              [facility]                           Ther Activity             Graded lifting activity                           Gait Training                                       Modalities

## 2022-03-08 ENCOUNTER — OFFICE VISIT (OUTPATIENT)
Dept: PHYSICAL THERAPY | Facility: CLINIC | Age: 62
End: 2022-03-08
Payer: MEDICARE

## 2022-03-08 DIAGNOSIS — M25.562 CHRONIC PAIN OF LEFT KNEE: Primary | ICD-10-CM

## 2022-03-08 DIAGNOSIS — G89.29 CHRONIC BILATERAL LOW BACK PAIN WITH BILATERAL SCIATICA: ICD-10-CM

## 2022-03-08 DIAGNOSIS — M54.42 CHRONIC BILATERAL LOW BACK PAIN WITH BILATERAL SCIATICA: ICD-10-CM

## 2022-03-08 DIAGNOSIS — M54.41 CHRONIC BILATERAL LOW BACK PAIN WITH BILATERAL SCIATICA: ICD-10-CM

## 2022-03-08 DIAGNOSIS — G89.29 CHRONIC PAIN OF LEFT KNEE: Primary | ICD-10-CM

## 2022-03-08 PROCEDURE — 97110 THERAPEUTIC EXERCISES: CPT

## 2022-03-08 PROCEDURE — 97530 THERAPEUTIC ACTIVITIES: CPT

## 2022-03-08 PROCEDURE — 97112 NEUROMUSCULAR REEDUCATION: CPT

## 2022-03-08 NOTE — PROGRESS NOTES
Daily Note     Today's date: 3/8/2022  Patient name: Octaviano Trevino  : 1960  MRN: [id number]  Referring provider: Richard Nelson  Dx:   Encounter Diagnosis     ICD-10-CM    1  Chronic pain of left knee  M25 562     G89 29    2  Chronic bilateral low back pain with bilateral sciatica  M54 42     M54 41     G89 29                   Subjective: Patient reports increased LLE pain following IE  She has been compliant with HEP  Objective: See treatment diary below      Assessment: Tolerated treatment well  Patient demonstrated fatigue post treatment, exhibited good technique with therapeutic exercises and would benefit from continued PT  Initiated flexion biased TE program as patient had positive response to repeated flexion with decreased LLE symptoms throughout treatment  Resumed Núñez taping to L knee due to positive response in the past   No increased L knee or radicular symptoms reported throughout treatment  Plan: Progress treatment as tolerated         Precautions: epilepsy, sleep apnea       Manuals 3/3 3/8           Núñez taping L knee                                                    Neuro Re-Ed             Bike warm up  5'           PPT  10x           Abdominal bracing in neutral  :05x10           Abdominal bracing + march  10 ea                                     Knee extensions  11# 20           Ther Ex             Mini squats HEP            Bridges  HEP 20x           SAQ   15x           Resisted sidestepping  YTB :90           Clamshells                           Ther Activity             SKC  :10x5           pball curls  15                        Seated pball roll outs  15                                     Gait Training                                       Modalities

## 2022-03-10 ENCOUNTER — OFFICE VISIT (OUTPATIENT)
Dept: PHYSICAL THERAPY | Facility: CLINIC | Age: 62
End: 2022-03-10
Payer: MEDICARE

## 2022-03-10 DIAGNOSIS — M54.42 CHRONIC BILATERAL LOW BACK PAIN WITH BILATERAL SCIATICA: ICD-10-CM

## 2022-03-10 DIAGNOSIS — M25.562 CHRONIC PAIN OF LEFT KNEE: Primary | ICD-10-CM

## 2022-03-10 DIAGNOSIS — M54.41 CHRONIC BILATERAL LOW BACK PAIN WITH BILATERAL SCIATICA: ICD-10-CM

## 2022-03-10 DIAGNOSIS — G89.29 CHRONIC BILATERAL LOW BACK PAIN WITH BILATERAL SCIATICA: ICD-10-CM

## 2022-03-10 DIAGNOSIS — G89.29 CHRONIC PAIN OF LEFT KNEE: Primary | ICD-10-CM

## 2022-03-10 PROCEDURE — 97112 NEUROMUSCULAR REEDUCATION: CPT

## 2022-03-10 PROCEDURE — 97110 THERAPEUTIC EXERCISES: CPT

## 2022-03-10 PROCEDURE — 97530 THERAPEUTIC ACTIVITIES: CPT

## 2022-03-10 NOTE — PROGRESS NOTES
Daily Note     Today's date: 3/10/2022  Patient name: Mary Ann Payne  : 1960  MRN: [id number]  Referring provider: Gabriel Chavarria  Dx:   Encounter Diagnosis     ICD-10-CM    1  Chronic pain of left knee  M25 562     G89 29    2  Chronic bilateral low back pain with bilateral sciatica  M54 42     M54 41     G89 29                   Subjective: Patient reports decreased LLE pain after last session and less muscular soreness overall  Objective: See treatment diary below      Assessment: Progressed exercise program as charted and patient tolerated treatment well  Patient demonstrated fatigue post treatment and would benefit from continued PT  Switched to timed exercises due to difficulty keeping track of reps  Initiated clamshells with burning in L buttocks reported after 1 minutes  No increased symptoms reported throughout session  Plan: Progress treatment as tolerated         Precautions: epilepsy, sleep apnea       Manuals 3/3 3/8 3/10          Niya taping L knee  [facility]                                                 Neuro Re-Ed             Bike warm up  5' 5' L3          PPT  10x 2'          Abdominal bracing in neutral  :05x10           Abdominal bracing + march  10 ea 2'                                    Knee extensions  11# 20 22# 20          Ther Ex             Mini squats HEP            Bridges  HEP 20x 2'          SAQ   15x 3# 2'          Resisted sidestepping  YTB :80 YTB 2'          Clamshells    1'                       Ther Activity             SKC  :10x5 :10 hold 2'          pball curls  15 2'                       Seated pball roll outs  15 2'                                    Gait Training                                       Modalities

## 2022-03-15 ENCOUNTER — OFFICE VISIT (OUTPATIENT)
Dept: PHYSICAL THERAPY | Facility: CLINIC | Age: 62
End: 2022-03-15
Payer: MEDICARE

## 2022-03-15 DIAGNOSIS — G89.29 CHRONIC BILATERAL LOW BACK PAIN WITH BILATERAL SCIATICA: ICD-10-CM

## 2022-03-15 DIAGNOSIS — G89.29 CHRONIC PAIN OF LEFT KNEE: Primary | ICD-10-CM

## 2022-03-15 DIAGNOSIS — M25.562 CHRONIC PAIN OF LEFT KNEE: Primary | ICD-10-CM

## 2022-03-15 DIAGNOSIS — M54.41 CHRONIC BILATERAL LOW BACK PAIN WITH BILATERAL SCIATICA: ICD-10-CM

## 2022-03-15 DIAGNOSIS — M54.42 CHRONIC BILATERAL LOW BACK PAIN WITH BILATERAL SCIATICA: ICD-10-CM

## 2022-03-15 PROCEDURE — 97112 NEUROMUSCULAR REEDUCATION: CPT

## 2022-03-15 PROCEDURE — 97110 THERAPEUTIC EXERCISES: CPT

## 2022-03-15 NOTE — PROGRESS NOTES
Daily Note     Today's date: 3/15/2022  Patient name: Chano Spain  : 1960  MRN: [id number]  Referring provider: Nica Hawkins  Dx:   Encounter Diagnosis     ICD-10-CM    1  Chronic pain of left knee  M25 562     G89 29    2  Chronic bilateral low back pain with bilateral sciatica  M54 42     M54 41     G89 29                   Subjective: Patient reports decreased pain overall after last session and minimal soreness  She complains of increased low back and LLE pain today after prolonged standing  Objective: See treatment diary below      Assessment: Continued with exercise progression as charted and patient tolerated treatment well  Patient demonstrated fatigue post treatment and would benefit from continued PT  Patient feels burning in L buttocks with clamshells though was able to complete the exercise  Patient denies LLE pain and notes decreased low back pain overall at conclusion of session  Plan: Progress treatment as tolerated         Precautions: epilepsy, sleep apnea       Manuals 3/3 3/8 3/10 3/15         Niya taping L knee  [facility]                                                Neuro Re-Ed             Bike warm up  5' 5' L3 5' L3         PPT  10x 2' 2'         Abdominal bracing in neutral  :05x10           Abdominal bracing + march  10 ea 2' 2'                                   Knee extensions  11# 20 22# 20 22# 30         Ther Ex             Mini squats HEP            Bridges  HEP 20x 2' 2'         SAQ   15x 3# 2' 5# 2'         Resisted sidestepping  YTB :80 YTB 2' RTB 2'         Clamshells    1' 1'                      Ther Activity             SKC  :10x5 :10 hold 2' :10 hold 2'         pball curls  15 2' 2'         pball bridges             Seated pball roll outs  15 2' 2'                                   Gait Training                                       Modalities

## 2022-03-17 ENCOUNTER — OFFICE VISIT (OUTPATIENT)
Dept: PHYSICAL THERAPY | Facility: CLINIC | Age: 62
End: 2022-03-17
Payer: MEDICARE

## 2022-03-17 DIAGNOSIS — M25.562 CHRONIC PAIN OF LEFT KNEE: Primary | ICD-10-CM

## 2022-03-17 DIAGNOSIS — M54.41 CHRONIC BILATERAL LOW BACK PAIN WITH BILATERAL SCIATICA: ICD-10-CM

## 2022-03-17 DIAGNOSIS — G89.29 CHRONIC PAIN OF LEFT KNEE: Primary | ICD-10-CM

## 2022-03-17 DIAGNOSIS — M54.42 CHRONIC BILATERAL LOW BACK PAIN WITH BILATERAL SCIATICA: ICD-10-CM

## 2022-03-17 DIAGNOSIS — G89.29 CHRONIC BILATERAL LOW BACK PAIN WITH BILATERAL SCIATICA: ICD-10-CM

## 2022-03-17 PROCEDURE — 97530 THERAPEUTIC ACTIVITIES: CPT

## 2022-03-17 PROCEDURE — 97112 NEUROMUSCULAR REEDUCATION: CPT

## 2022-03-17 PROCEDURE — 97110 THERAPEUTIC EXERCISES: CPT

## 2022-03-17 NOTE — PROGRESS NOTES
Daily Note     Today's date: 3/17/2022  Patient name: Margaret Diaz  : 1960  MRN: [id number]  Referring provider: Giancarlo Kirkland  Dx:   Encounter Diagnosis     ICD-10-CM    1  Chronic pain of left knee  M25 562     G89 29    2  Chronic bilateral low back pain with bilateral sciatica  M54 42     M54 41     G89 29                   Subjective: Patient reports her back was achy all day attributed to weather + cleaning  Objective: See treatment diary below      Assessment: Tolerated treatment well  Patient demonstrated fatigue post treatment and would benefit from continued PT  Patient continues to be challenged appropriately by current exercise program   No L buttocks pain with clamshells which is an improvement from last session  Decreased aching in low back reported at conclusion of session  Plan: Progress treatment as tolerated         Precautions: epilepsy, sleep apnea       Manuals 3/3 3/8 3/10 3/15 3/17        Niya taping L knee  [facility]                                               Neuro Re-Ed             Bike warm up  5' 5' L3 5' L3 5' L3        PPT  10x 2' 2' 2'        Abdominal bracing in neutral  :05x10           Abdominal bracing + march  10 ea 2' 2' 2'                                  Knee extensions  11# 20 22# 20 22# 30 22# 30        Ther Ex             Mini squats HEP            Bridges  HEP 20x 2' 2' 2'        SAQ   15x 3# 2' 5# 2' 5# 2'        Resisted sidestepping  YTB :80 YTB 2' RTB 2' RTB 2'        Clamshells    1' 1' 1'                     Ther Activity             SKC  :10x5 :10 hold 2' :10 hold 2' :10 hold 1' ea        pball curls  15 2' 2' 2'        pball bridges             Seated pball roll outs  15 2' 2' 2'                                  Gait Training                                       Modalities

## 2022-03-22 ENCOUNTER — OFFICE VISIT (OUTPATIENT)
Dept: PHYSICAL THERAPY | Facility: CLINIC | Age: 62
End: 2022-03-22
Payer: MEDICARE

## 2022-03-22 DIAGNOSIS — G89.29 CHRONIC PAIN OF LEFT KNEE: Primary | ICD-10-CM

## 2022-03-22 DIAGNOSIS — M25.562 CHRONIC PAIN OF LEFT KNEE: Primary | ICD-10-CM

## 2022-03-22 DIAGNOSIS — G89.29 CHRONIC BILATERAL LOW BACK PAIN WITH BILATERAL SCIATICA: ICD-10-CM

## 2022-03-22 DIAGNOSIS — M54.42 CHRONIC BILATERAL LOW BACK PAIN WITH BILATERAL SCIATICA: ICD-10-CM

## 2022-03-22 DIAGNOSIS — M54.41 CHRONIC BILATERAL LOW BACK PAIN WITH BILATERAL SCIATICA: ICD-10-CM

## 2022-03-22 PROCEDURE — 97110 THERAPEUTIC EXERCISES: CPT

## 2022-03-22 PROCEDURE — 97112 NEUROMUSCULAR REEDUCATION: CPT

## 2022-03-22 PROCEDURE — 97530 THERAPEUTIC ACTIVITIES: CPT

## 2022-03-22 NOTE — PROGRESS NOTES
Daily Note     Today's date: 3/22/2022  Patient name: Dg Saavedra  : 1960  MRN: [id number]  Referring provider: Lewis Silver  Dx:   Encounter Diagnosis     ICD-10-CM    1  Chronic pain of left knee  M25 562     G89 29    2  Chronic bilateral low back pain with bilateral sciatica  M54 42     M54 41     G89 29                   Subjective: Patient reports no soreness after PT sessions and decreased LLE pain overall  Objective: See treatment diary below      Assessment: Continued with exercise progression as charted and patient tolerated treatment well  Patient demonstrated fatigue post treatment, exhibited good technique with therapeutic exercises and would benefit from continued PT  Initiated leg press and patient required verbal cueing  to avoid knee valgus with good over  No increased symptoms reported throughout session  Plan: Progress treatment as tolerated         Precautions: epilepsy, sleep apnea       Manuals 3/3 3/8 3/10 3/15 3/17 3/22       Niya taping L knee  [facility]                                              Neuro Re-Ed             Bike warm up  5' 5' L3 5' L3 5' L3 5' L3       PPT  10x 2' 2' 2' 2'       Abdominal bracing in neutral  :05x10           Abdominal bracing + march  10 ea 2' 2' 2' 2'                                 Knee extensions  11# 20 22# 20 22# 30 22# 30 22# 30       Leg press       50# 20       Ther Ex             Mini squats HEP            Bridges  HEP 20x 2' 2' 2' 2'       SAQ   15x 3# 2' 5# 2' 5# 2' 6# 2'       Resisted sidestepping  YTB :80 YTB 2' RTB 2' RTB 2' RTB 2'       Clamshells    1' 1' 1' :90                    Ther Activity             SKC  :10x5 :10 hold 2' :10 hold 2' :10 hold 1' ea :10 hold 1' ea       pball curls  15 2' 2' 2' 2'       pball bridges             Seated pball roll outs  15 2' 2' 2' 2'                                 Gait Training                                       Modalities

## 2022-03-24 ENCOUNTER — OFFICE VISIT (OUTPATIENT)
Dept: PHYSICAL THERAPY | Facility: CLINIC | Age: 62
End: 2022-03-24
Payer: MEDICARE

## 2022-03-24 DIAGNOSIS — M25.562 CHRONIC PAIN OF LEFT KNEE: Primary | ICD-10-CM

## 2022-03-24 DIAGNOSIS — G89.29 CHRONIC BILATERAL LOW BACK PAIN WITH BILATERAL SCIATICA: ICD-10-CM

## 2022-03-24 DIAGNOSIS — M54.42 CHRONIC BILATERAL LOW BACK PAIN WITH BILATERAL SCIATICA: ICD-10-CM

## 2022-03-24 DIAGNOSIS — M54.41 CHRONIC BILATERAL LOW BACK PAIN WITH BILATERAL SCIATICA: ICD-10-CM

## 2022-03-24 DIAGNOSIS — G89.29 CHRONIC PAIN OF LEFT KNEE: Primary | ICD-10-CM

## 2022-03-24 PROCEDURE — 97530 THERAPEUTIC ACTIVITIES: CPT

## 2022-03-24 PROCEDURE — 97110 THERAPEUTIC EXERCISES: CPT

## 2022-03-24 PROCEDURE — 97112 NEUROMUSCULAR REEDUCATION: CPT

## 2022-03-24 NOTE — PROGRESS NOTES
Daily Note     Today's date: 3/24/2022  Patient name: Davidson Brewer  : 1960  MRN: [id number]  Referring provider: Kylie Patricia  Dx:   Encounter Diagnosis     ICD-10-CM    1  Chronic pain of left knee  M25 562     G89 29    2  Chronic bilateral low back pain with bilateral sciatica  M54 42     M54 41     G89 29                   Subjective: Patient reports decreased low back pain and LLE symptoms since last visit  Objective: See treatment diary below      Assessment: Tolerated treatment well  Patient demonstrated fatigue post treatment, exhibited good technique with therapeutic exercises and would benefit from continued PT  Progressed exercise program as charted with no increase in symptoms throughout treatment  Tactile cueing needed for PPT form with good carry over once aware  Plan: Progress treatment as tolerated         Precautions: epilepsy, sleep apnea       Manuals 3/3 3/8 3/10 3/15 3/17 3/22 3/24      Niya taping L knee  [facility]                                             Neuro Re-Ed             Bike warm up  5' 5' L3 5' L3 5' L3 5' L3 5' L3      PPT  10x 2' 2' 2' 2' 2'      Abdominal bracing in neutral  :05x10           Abdominal bracing + march  10 ea 2' 2' 2' 2' 2'                                Knee extensions  11# 20 22# 20 22# 30 22# 30 22# 30 22# 30      Leg press       50# 20 50# 30      Ther Ex             Mini squats HEP            Bridges  HEP 20x 2' 2' 2' 2' 2'      SAQ   15x 3# 2' 5# 2' 5# 2' 6# 2' 6# 2'      Resisted sidestepping  YTB :80 YTB 2' RTB 2' RTB 2' RTB 2' RTB 2'      Clamshells    1' 1' 1' :90 :90                   Ther Activity             SKC  :10x5 :10 hold 2' :10 hold 2' :10 hold 1' ea :10 hold 1' ea :10 hold 1' ea      pball curls  15 2' 2' 2' 2' 2'      pball bridges             Seated pball roll outs  15 2' 2' 2' 2' 2'                                Gait Training                                       Modalities

## 2022-03-28 ENCOUNTER — OFFICE VISIT (OUTPATIENT)
Dept: PHYSICAL THERAPY | Facility: CLINIC | Age: 62
End: 2022-03-28
Payer: MEDICARE

## 2022-03-28 DIAGNOSIS — G89.29 CHRONIC BILATERAL LOW BACK PAIN WITH BILATERAL SCIATICA: ICD-10-CM

## 2022-03-28 DIAGNOSIS — M54.42 CHRONIC BILATERAL LOW BACK PAIN WITH BILATERAL SCIATICA: ICD-10-CM

## 2022-03-28 DIAGNOSIS — G89.29 CHRONIC PAIN OF LEFT KNEE: Primary | ICD-10-CM

## 2022-03-28 DIAGNOSIS — M54.41 CHRONIC BILATERAL LOW BACK PAIN WITH BILATERAL SCIATICA: ICD-10-CM

## 2022-03-28 DIAGNOSIS — M25.562 CHRONIC PAIN OF LEFT KNEE: Primary | ICD-10-CM

## 2022-03-28 PROCEDURE — 97110 THERAPEUTIC EXERCISES: CPT

## 2022-03-28 PROCEDURE — 97112 NEUROMUSCULAR REEDUCATION: CPT

## 2022-03-28 PROCEDURE — 97530 THERAPEUTIC ACTIVITIES: CPT

## 2022-03-28 NOTE — PROGRESS NOTES
Daily Note     Today's date: 3/28/2022  Patient name: Bertin Christy  : 1960  MRN: [id number]  Referring provider: Eliza Gonzalez  Dx:   Encounter Diagnosis     ICD-10-CM    1  Chronic pain of left knee  M25 562     G89 29    2  Chronic bilateral low back pain with bilateral sciatica  M54 42     M54 41     G89 29                   Subjective: Patient reports continued improvement in LLE and knee pain  Minimal muscular soreness noted after last session  Objective: See treatment diary below      Assessment: Tolerated treatment well  Patient demonstrated fatigue post treatment, exhibited good technique with therapeutic exercises and would benefit from continued PT  Progressed to no UE for sidestepping with good tolerance and burning felt in posterolateral hips  Patient continues to respond well to flexion biased TE program with no increased LLE pain reported throughout treatment  Plan: Progress treatment as tolerated          Precautions: epilepsy, sleep apnea       Manuals 3/3 3/8 3/10 3/15 3/17 3/22 3/24 3/28     Niya taping L knee  [facility]                                            Neuro Re-Ed             Bike warm up  5' 5' L3 5' L3 5' L3 5' L3 5' L3 5' L3     PPT  10x 2' 2' 2' 2' 2' 2'     Abdominal bracing in neutral  :05x10           Abdominal bracing + march  10 ea 2' 2' 2' 2' 2' 2'                               Knee extensions  11# 20 22# 20 22# 30 22# 30 22# 30 22# 30 22# 30     Leg press       50# 20 50# 30 60# 30     Ther Ex             Mini squats HEP            Bridges  HEP 20x 2' 2' 2' 2' 2' 2'     SAQ   15x 3# 2' 5# 2' 5# 2' 6# 2' 6# 2'      Resisted sidestepping  YTB :80 YTB 2' RTB 2' RTB 2' RTB 2' RTB 2' RTB 2' no UE     Clamshells    1' 1' 1' :90 :90 nv                  Ther Activity             SKC  :10x5 :10 hold 2' :10 hold 2' :10 hold 1' ea :10 hold 1' ea :10 hold 1' ea :10 hold 1' ea     pball curls  15 2' 2' 2' 2' 2' 2'     pball bridges Seated pball roll outs  15 2' 2' 2' 2' 2' 2'                               Gait Training                                       Modalities

## 2022-03-31 ENCOUNTER — OFFICE VISIT (OUTPATIENT)
Dept: PHYSICAL THERAPY | Facility: CLINIC | Age: 62
End: 2022-03-31
Payer: MEDICARE

## 2022-03-31 DIAGNOSIS — G89.29 CHRONIC BILATERAL LOW BACK PAIN WITH BILATERAL SCIATICA: Primary | ICD-10-CM

## 2022-03-31 DIAGNOSIS — M54.41 CHRONIC BILATERAL LOW BACK PAIN WITH BILATERAL SCIATICA: Primary | ICD-10-CM

## 2022-03-31 DIAGNOSIS — G89.29 CHRONIC PAIN OF LEFT KNEE: ICD-10-CM

## 2022-03-31 DIAGNOSIS — M54.42 CHRONIC BILATERAL LOW BACK PAIN WITH BILATERAL SCIATICA: Primary | ICD-10-CM

## 2022-03-31 DIAGNOSIS — M25.562 CHRONIC PAIN OF LEFT KNEE: ICD-10-CM

## 2022-03-31 PROCEDURE — 97110 THERAPEUTIC EXERCISES: CPT

## 2022-03-31 PROCEDURE — 97530 THERAPEUTIC ACTIVITIES: CPT

## 2022-03-31 PROCEDURE — 97112 NEUROMUSCULAR REEDUCATION: CPT

## 2022-03-31 NOTE — PROGRESS NOTES
Daily Note     Today's date: 3/31/2022  Patient name: Davidson Brewer  : 1960  MRN: [id number]  Referring provider: Kylie July  Dx:   Encounter Diagnosis     ICD-10-CM    1  Chronic bilateral low back pain with bilateral sciatica  M54 42     M54 41     G89 29    2  Chronic pain of left knee  M25 562     G89 29                   Subjective: Patient reports continued decrease in low back and LLE symptoms following PT sessions  She notes she fell out of bed while reaching for something last night and landed on her L side, denies associated injury though states she may have some soreness with exercises  Objective: See treatment diary below      Assessment: Tolerated treatment well  Patient demonstrated fatigue post treatment, exhibited good technique with therapeutic exercises and would benefit from continued PT  Progressed resistance for sidestepping with burning felt in bilat gluteals  No increased pain or soreness noted throughout treatment  Plan: Progress treatment as tolerated         Precautions: epilepsy, sleep apnea       Manuals 3/3 3/8 3/10 3/15 3/17 3/22 3/24 3/28 3/31    Niya taping L knee  [facility]                                           Neuro Re-Ed             Bike warm up  5' 5' L3 5' L3 5' L3 5' L3 5' L3 5' L3 5' L3    PPT  10x 2' 2' 2' 2' 2' 2' 2'    Abdominal bracing in neutral  :05x10           Abdominal bracing + march  10 ea 2' 2' 2' 2' 2' 2' 2'                              Knee extensions  11# 20 22# 20 22# 30 22# 30 22# 30 22# 30 22# 30 22# 30    Leg press       50# 20 50# 30 60# 30 65# 30    Ther Ex             Mini squats HEP            Bridges  HEP 20x 2' 2' 2' 2' 2' 2' 2'    SAQ   15x 3# 2' 5# 2' 5# 2' 6# 2' 6# 2'  6# 2'    Resisted sidestepping  YTB :80 YTB 2' RTB 2' RTB 2' RTB 2' RTB 2' RTB 2' no UE GTB 2'    Clamshells    1' 1' 1' :90 :90 nv :90                 Ther Activity             SKC  :10x5 :10 hold 2' :10 hold 2' :10 hold 1' ea :10 hold 1' ea :10 hold 1' ea :10 hold 1' ea :10 hold 1' ea    pball curls  15 2' 2' 2' 2' 2' 2' 2'    pball bridges             Seated pball roll outs  15 2' 2' 2' 2' 2' 2' 2'                              Gait Training                                       Modalities

## 2022-04-05 ENCOUNTER — OFFICE VISIT (OUTPATIENT)
Dept: PHYSICAL THERAPY | Facility: CLINIC | Age: 62
End: 2022-04-05
Payer: COMMERCIAL

## 2022-04-05 DIAGNOSIS — M54.41 CHRONIC BILATERAL LOW BACK PAIN WITH BILATERAL SCIATICA: Primary | ICD-10-CM

## 2022-04-05 DIAGNOSIS — M54.42 CHRONIC BILATERAL LOW BACK PAIN WITH BILATERAL SCIATICA: Primary | ICD-10-CM

## 2022-04-05 DIAGNOSIS — G89.29 CHRONIC PAIN OF LEFT KNEE: ICD-10-CM

## 2022-04-05 DIAGNOSIS — M25.562 CHRONIC PAIN OF LEFT KNEE: ICD-10-CM

## 2022-04-05 DIAGNOSIS — G89.29 CHRONIC BILATERAL LOW BACK PAIN WITH BILATERAL SCIATICA: Primary | ICD-10-CM

## 2022-04-05 PROCEDURE — 97110 THERAPEUTIC EXERCISES: CPT

## 2022-04-05 PROCEDURE — 97112 NEUROMUSCULAR REEDUCATION: CPT

## 2022-04-05 NOTE — PROGRESS NOTES
Daily Note     Today's date: 2022  Patient name: Kendell Echeverria  : 1960  MRN: [id number]  Referring provider: Vasquez Mattson  Dx:   Encounter Diagnosis     ICD-10-CM    1  Chronic bilateral low back pain with bilateral sciatica  M54 42     M54 41     G89 29    2  Chronic pain of left knee  M25 562     G89 29                   Subjective: Patient reports decreased symptoms overall though continued low back pain with forward bending  LLE radicular symptoms have been less and L knee pain continues to improve  Objective: See treatment diary below      Assessment: Tolerated treatment well  Patient demonstrated fatigue post treatment and would benefit from continued PT  Progressed resistance for knee extensions with burning noted in bilateral quads though no L knee pain  Initiated pball bridges which were challenging with verbal cueing needed for abdominal bracing and to engage gluts  No increased symptoms reported throughout treatment  Plan: Progress treatment as tolerated          Precautions: epilepsy, sleep apnea       Manuals 3/3 3/8 3/10 3/15 3/17 3/22 3/24 3/28 3/31 4/5   Niya taping L knee  [facility]                                          Neuro Re-Ed             Bike warm up  5' 5' L3 5' L3 5' L3 5' L3 5' L3 5' L3 5' L3 5' L3   PPT  10x 2' 2' 2' 2' 2' 2' 2' 2'   Abdominal bracing in neutral  :05x10           Abdominal bracing + march  10 ea 2' 2' 2' 2' 2' 2' 2' 2'                             Knee extensions  11# 20 22# 20 22# 30 22# 30 22# 30 22# 30 22# 30 22# 30 33# 20   Leg press       50# 20 50# 30 60# 30 65# 30 65# 30   Ther Ex             Mini squats HEP            Bridges  HEP 20x 2' 2' 2' 2' 2' 2' 2'    SAQ   15x 3# 2' 5# 2' 5# 2' 6# 2' 6# 2'  6# 2'    Resisted sidestepping  YTB :80 YTB 2' RTB 2' RTB 2' RTB 2' RTB 2' RTB 2' no UE GTB 2' GTB 2'    Clamshells    1' 1' 1' :90 :90 nv :90 2# 1'                Ther Activity             SKC  :10x5 :10 hold 2' :10 hold 2' :10 hold 1' ea :10 hold 1' ea :10 hold 1' ea :10 hold 1' ea :10 hold 1' ea :10 hold 1' ea   pball curls  15 2' 2' 2' 2' 2' 2' 2' 2'   pball bridges          1'   Seated pball roll outs  15 2' 2' 2' 2' 2' 2' 2' 2'                             Gait Training                                       Modalities

## 2022-04-07 ENCOUNTER — OFFICE VISIT (OUTPATIENT)
Dept: PHYSICAL THERAPY | Facility: CLINIC | Age: 62
End: 2022-04-07
Payer: COMMERCIAL

## 2022-04-07 DIAGNOSIS — G89.29 CHRONIC PAIN OF LEFT KNEE: ICD-10-CM

## 2022-04-07 DIAGNOSIS — M54.41 CHRONIC BILATERAL LOW BACK PAIN WITH BILATERAL SCIATICA: Primary | ICD-10-CM

## 2022-04-07 DIAGNOSIS — G89.29 CHRONIC BILATERAL LOW BACK PAIN WITH BILATERAL SCIATICA: Primary | ICD-10-CM

## 2022-04-07 DIAGNOSIS — M25.562 CHRONIC PAIN OF LEFT KNEE: ICD-10-CM

## 2022-04-07 DIAGNOSIS — M54.42 CHRONIC BILATERAL LOW BACK PAIN WITH BILATERAL SCIATICA: Primary | ICD-10-CM

## 2022-04-07 PROCEDURE — 97112 NEUROMUSCULAR REEDUCATION: CPT

## 2022-04-07 PROCEDURE — 97110 THERAPEUTIC EXERCISES: CPT

## 2022-04-07 NOTE — PROGRESS NOTES
Daily Note     Today's date: 2022  Patient name: Norma Lew  : 1960  MRN: [id number]  Referring provider: Kelle Quezada  Dx:   Encounter Diagnosis     ICD-10-CM    1  Chronic bilateral low back pain with bilateral sciatica  M54 42     M54 41     G89 29    2  Chronic pain of left knee  M25 562     G89 29                   Subjective: Patient reports she is doing well overall and is having less soreness after PT sessions  Objective: See treatment diary below      Assessment: Tolerated treatment well  Patient demonstrated fatigue post treatment, exhibited good technique with therapeutic exercises and would benefit from continued PT  Progressed resistance as charted with good tolerance  Continued tactile cueing needed for PPT form with fair carry over  No pain reported throughout treatment  Plan: Progress treatment as tolerated         Precautions: epilepsy, sleep apnea       Manuals 4/7   3/15 3/17 3/22 3/24 3/28 3/31 4/5   Niya taping L knee [facility]                                          Neuro Re-Ed             Bike warm up 5' L3   5' L3 5' L3 5' L3 5' L3 5' L3 5' L3 5' L3   PPT 2'   2' 2' 2' 2' 2' 2' 2'   Abdominal bracing in neutral             Abdominal bracing + march 2'   2' 2' 2' 2' 2' 2' 2'                             Knee extensions 33# 30   22# 30 22# 30 22# 30 22# 30 22# 30 22# 30 33# 20   Leg press 70# 30      50# 20 50# 30 60# 30 65# 30 65# 30   Ther Ex             Mini squats             Bridges     2' 2' 2' 2' 2' 2'    SAQ     5# 2' 5# 2' 6# 2' 6# 2'  6# 2'    Resisted sidestepping GTB 2'    RTB 2' RTB 2' RTB 2' RTB 2' RTB 2' no UE GTB 2' GTB 2'    Clamshells  2# 2'   1' 1' :90 :90 nv :90 2# 1'                Ther Activity             SKC :10 hold 1' ea   :10 hold 2' :10 hold 1' ea :10 hold 1' ea :10 hold 1' ea :10 hold 1' ea :10 hold 1' ea :10 hold 1' ea   pball curls 2'   2' 2' 2' 2' 2' 2' 2'   pball bridges :90         1'   Seated pball roll outs 2'   2' 2' 2' 2' 2' 2' 2'                             Gait Training                                       Modalities

## 2022-04-12 ENCOUNTER — EVALUATION (OUTPATIENT)
Dept: PHYSICAL THERAPY | Facility: CLINIC | Age: 62
End: 2022-04-12
Payer: COMMERCIAL

## 2022-04-12 ENCOUNTER — HOSPITAL ENCOUNTER (OUTPATIENT)
Dept: RADIOLOGY | Age: 62
Discharge: HOME | End: 2022-04-12
Payer: MEDICARE

## 2022-04-12 VITALS — HEIGHT: 63 IN | WEIGHT: 195 LBS | BODY MASS INDEX: 34.55 KG/M2

## 2022-04-12 DIAGNOSIS — M25.562 CHRONIC PAIN OF LEFT KNEE: ICD-10-CM

## 2022-04-12 DIAGNOSIS — M54.41 CHRONIC BILATERAL LOW BACK PAIN WITH BILATERAL SCIATICA: Primary | ICD-10-CM

## 2022-04-12 DIAGNOSIS — Z12.31 SCREENING MAMMOGRAM FOR HIGH-RISK PATIENT: ICD-10-CM

## 2022-04-12 DIAGNOSIS — G89.29 CHRONIC PAIN OF LEFT KNEE: ICD-10-CM

## 2022-04-12 DIAGNOSIS — M54.42 CHRONIC BILATERAL LOW BACK PAIN WITH BILATERAL SCIATICA: Primary | ICD-10-CM

## 2022-04-12 DIAGNOSIS — G89.29 CHRONIC BILATERAL LOW BACK PAIN WITH BILATERAL SCIATICA: Primary | ICD-10-CM

## 2022-04-12 PROCEDURE — 97112 NEUROMUSCULAR REEDUCATION: CPT

## 2022-04-12 PROCEDURE — 77067 SCR MAMMO BI INCL CAD: CPT

## 2022-04-12 PROCEDURE — 77063 BREAST TOMOSYNTHESIS BI: CPT

## 2022-04-12 PROCEDURE — 97110 THERAPEUTIC EXERCISES: CPT

## 2022-04-12 NOTE — PROGRESS NOTES
Daily Note     Today's date: 2022  Patient name: Patric Knowles  : 1960  MRN: [id number]  Referring provider: Jackelyn Montes  Dx:   Encounter Diagnosis     ICD-10-CM    1  Chronic bilateral low back pain with bilateral sciatica  M54 42     M54 41     G89 29    2  Chronic pain of left knee  M25 562     G89 29                   Subjective: See RE  Objective: See treatment diary below      Assessment: Tolerated treatment well  Patient demonstrated fatigue post treatment and would benefit from continued PT  Refer to RE for additional details  Plan: Progress treatment as tolerated         Precautions: epilepsy, sleep apnea       Manuals 4/7 4/12    3/22 3/24 3/28 3/31 4/5   Niya taping L knee [facility]                                          Neuro Re-Ed             Bike warm up 5' L3 5' L3    5' L3 5' L3 5' L3 5' L3 5' L3   PPT 2' 2'    2' 2' 2' 2' 2'   Abdominal bracing in neutral             Abdominal bracing + march 2' 2'    2' 2' 2' 2' 2'                             Knee extensions 33# 30 33# 30    22# 30 22# 30 22# 30 22# 30 33# 20   Leg press 70# 30 75# 30     50# 20 50# 30 60# 30 65# 30 65# 30   Ther Ex             Mini squats             Bridges       2' 2' 2' 2'    SAQ       6# 2' 6# 2'  6# 2'    Resisted sidestepping GTB 2'  GTB 2'     RTB 2' RTB 2' RTB 2' no UE GTB 2' GTB 2'    Clamshells  2# 2' 2 5# 2'    :90 :90 nv :90 2# 1'                Ther Activity             SKC :10 hold 1' ea :10 hold 1' ea    :10 hold 1' ea :10 hold 1' ea :10 hold 1' ea :10 hold 1' ea :10 hold 1' ea   pball curls 2' 2'    2' 2' 2' 2' 2'   pball bridges :90 2'        1'   Seated pball roll outs 2' 2'    2' 2' 2' 2' 2'                             Gait Training                                       Modalities

## 2022-04-12 NOTE — PROGRESS NOTES
PT Re-Evaluation      Today's date: 2022  Patient name: Jaja Quinn  : 1960  MRN: [id number]  Referring provider: Chery Hayden  Dx:         Encounter Diagnosis       ICD-10-CM     1  Chronic pain of left knee  M25 562       G89 29     2  Chronic bilateral low back pain with bilateral sciatica  M54 42       M54 41       G89 29           Start Time: 1745  Stop Time: 1815  Total time in clinic (min): 30 minutes     Assessment  Assessment details: Mitas knee pain has improved but it continues to limit weight bearing activities  Her low back also hinders walking endurance and she continues to have radicular symptoms in the left buttock and posterior thigh  She has difficulty with dressing the lower body due to low back and leg pain  Will benefit from skilled PT interventions for centralization/sx modulation, strengthening, endurance training, ADL training, and overall assistance in return to PLOF  Impairments: abnormal or restricted ROM, abnormal movement, activity intolerance, impaired physical strength, lacks appropriate home exercise program and pain with function    Symptom irritability: moderateUnderstanding of Dx/Px/POC: good   Prognosis: good   Goals    Short Term Goals: In 4 weeks, the patient will:  1  Improve quad strength to 4/5 B    2  Report initiation of a short walking program (met)   3  Supervision with HEP for self care (met)    Long Term Goals: In 8 weeks, the patient will:  1  Report consistency with walking 1/4 mile >2x per week  2  Increase FOTO to greater than predicted value  3  Independent with HEP for selfcare  4  Increase hip strength to 4+/5 globally  Plan  Plan details: Assess vertigo sx if unresolved at next visit     Referral necessary: No  Planned therapy interventions: functional ROM exercises, graded activity, graded exercise, home exercise program, joint mobilization, manual therapy, neuromuscular re-education, patient education, strengthening, stretching, therapeutic activities and therapeutic exercise  Frequency: 2x week  Duration in weeks: 8  Treatment plan discussed with: patient         Subjective  Pt reported that she doesn't feel like her left knee ever really healed from her first fall several years ago  Pt notes disc problems in her lumbar spine that have been causing issues  She has issues performing a variety of functional tasks  She was also recently diagnosed with sleep apnea  She noted she used to walk a mile every day  Pt notes numbness/tingling in both legs that goes all the way down to her feet  Pt also noted she often walks with a cane at home, however did not present to her appt with a cane  Pt also reported vertigo, however also noted a recent ear infection that likely exacerbated sx  Update 4/12:22  Patient reports her knee is 'not too bad', better overall  The worst pain she has had in the knee is 6/10 and it continues to hinder weight bearing activities  Her low back pain limits her ability to bend forward, don lower extremity clothes, and return from flexed trunk position  Walking and bending irritate the back and buttock hamstring the most     After a long walk recently her legs felt transient parasthesias throughout the legs  She has to shift and turn when sitting due to discomfort in the low back and along the left side  She can't sleep on the left side due to tenderness at the left hip      Pain Location: Knee: generalized pain, Back: lower lumbar, left buttock and proximal hamstring   BIRD: chronic issues   Provoked by: getting out of bed, bending over  Eased Positions: heating pads, propping her L leg up   Constitutional S/S: no red flags noted   Goals: improve ambulation, be able to walk for exercise      Objective         Strength and ROM evaluated B from a regional biomechanical perspective and values relevant to this episode recorded in tables below      ROM:   Joint / Motion    Lumbar Flexion:  72 Lumbar Extension:  18 with LBP and SIJ   Left lateral flexion 16 degress: pain down left buttock and proximal hamstring    Repeated Movements:     Standing Baseline: no peripheral symptoms                                                   DURING MVMT  RESPONSE AFTER  Standing Flexion P! on return peripheralization    Standing Extension WNL  No change             LE Myotomes, Dermatomes, and Reflexes all WNL      Additional Assessments:  Pain with palpation noted: L3-L4 and L4-L5, no TTP at L knee   Joint Mobility: WNL     Special Tests:  Lumbar Specific and Neural Tension                                                                            Test / Measure  Right 3/3/2022 Left 3/3/2022   Straight Leg raise + +            OBJECTIVE:        Strength: MMT revealed the following findings    Joint Motion Right: 4/13/2022 Left: 4/13/2022   Hip Flexion 4/5 4/5   Hip Extension 3+/5 3-/5   Knee Extension 4+/5 4/5   Knee Flexion 4+/5 4+/5

## 2022-04-14 ENCOUNTER — OFFICE VISIT (OUTPATIENT)
Dept: PHYSICAL THERAPY | Facility: CLINIC | Age: 62
End: 2022-04-14
Payer: COMMERCIAL

## 2022-04-14 DIAGNOSIS — M54.42 CHRONIC BILATERAL LOW BACK PAIN WITH BILATERAL SCIATICA: Primary | ICD-10-CM

## 2022-04-14 DIAGNOSIS — M54.41 CHRONIC BILATERAL LOW BACK PAIN WITH BILATERAL SCIATICA: Primary | ICD-10-CM

## 2022-04-14 DIAGNOSIS — G89.29 CHRONIC BILATERAL LOW BACK PAIN WITH BILATERAL SCIATICA: Primary | ICD-10-CM

## 2022-04-14 DIAGNOSIS — M25.562 CHRONIC PAIN OF LEFT KNEE: ICD-10-CM

## 2022-04-14 DIAGNOSIS — G89.29 CHRONIC PAIN OF LEFT KNEE: ICD-10-CM

## 2022-04-14 PROCEDURE — 97110 THERAPEUTIC EXERCISES: CPT

## 2022-04-14 PROCEDURE — 97112 NEUROMUSCULAR REEDUCATION: CPT

## 2022-04-14 NOTE — PROGRESS NOTES
Daily Note     Today's date: 2022  Patient name: Tera Wood  : 1960  MRN: [id number]  Referring provider: Mee Cuellar  Dx:   Encounter Diagnosis     ICD-10-CM    1  Chronic bilateral low back pain with bilateral sciatica  M54 42     M54 41     G89 29    2  Chronic pain of left knee  M25 562     G89 29                   Subjective: Patient reports she continues to have decreased pain overall  She does note "sciatica" symptoms with prolonged walking and less with forward bending  Objective: See treatment diary below      Assessment: Tolerated treatment well  Patient demonstrated fatigue post treatment and would benefit from continued PT  No increased radicular symptoms or knee pain reported throughout treatment  Improved tolerance to bridges with less difficulty clearing table  Patient will be reducing to 1 visit per week and increasing frequency of HEP due to insurance  Plan: Progress treatment as tolerated         Precautions: epilepsy, sleep apnea       Manuals 4/7 4/12 4/14   3/22 3/24 3/28 3/31 4/5   iNya taping L knee [facility]                                          Neuro Re-Ed             Bike warm up 5' L3 5' L3 5' L3   5' L3 5' L3 5' L3 5' L3 5' L3   PPT 2' 2' 2'   2' 2' 2' 2' 2'   Abdominal bracing in neutral             Abdominal bracing + march 2' 2' 2'   2' 2' 2' 2' 2'                             Knee extensions 33# 30 33# 30 33# 30   22# 30 22# 30 22# 30 22# 30 33# 20   Leg press 70# 30 75# 30 75# 30    50# 20 50# 30 60# 30 65# 30 65# 30   Ther Ex             Mini squats             Bridges       2' 2' 2' 2'    SAQ       6# 2' 6# 2'  6# 2'    Resisted sidestepping GTB 2'  GTB 2'  GTB 2' with abd bracing   RTB 2' RTB 2' RTB 2' no UE GTB 2' GTB 2'    Clamshells  2# 2' 2 5# 2' 3# 2'   :90 :90 nv :90 2# 1'                Ther Activity             SKC :10 hold 1' ea :10 hold 1' ea :10 hold 1' ea   :10 hold 1' ea :10 hold 1' ea :10 hold 1' ea :10 hold 1' ea :10 hold 1' ea   pball curls 2' 2' 2'   2' 2' 2' 2' 2'   pball bridges :80 2' 2'       1'   Seated pball roll outs 2' 2' 2'   2' 2' 2' 2' 2'                             Gait Training                                       Modalities

## 2022-04-19 ENCOUNTER — OFFICE VISIT (OUTPATIENT)
Dept: PHYSICAL THERAPY | Facility: CLINIC | Age: 62
End: 2022-04-19
Payer: COMMERCIAL

## 2022-04-19 ENCOUNTER — APPOINTMENT (OUTPATIENT)
Dept: PHYSICAL THERAPY | Facility: CLINIC | Age: 62
End: 2022-04-19
Payer: COMMERCIAL

## 2022-04-19 DIAGNOSIS — M25.562 CHRONIC PAIN OF LEFT KNEE: ICD-10-CM

## 2022-04-19 DIAGNOSIS — G89.29 CHRONIC BILATERAL LOW BACK PAIN WITH BILATERAL SCIATICA: Primary | ICD-10-CM

## 2022-04-19 DIAGNOSIS — M54.41 CHRONIC BILATERAL LOW BACK PAIN WITH BILATERAL SCIATICA: Primary | ICD-10-CM

## 2022-04-19 DIAGNOSIS — G89.29 CHRONIC PAIN OF LEFT KNEE: ICD-10-CM

## 2022-04-19 DIAGNOSIS — M54.42 CHRONIC BILATERAL LOW BACK PAIN WITH BILATERAL SCIATICA: Primary | ICD-10-CM

## 2022-04-19 PROCEDURE — 97110 THERAPEUTIC EXERCISES: CPT

## 2022-04-19 PROCEDURE — 97112 NEUROMUSCULAR REEDUCATION: CPT

## 2022-04-19 NOTE — PROGRESS NOTES
Daily Note     Today's date: 2022  Patient name: Yudy Jim  : 1960  MRN: [id number]  Referring provider: Chema Gómez  Dx:   Encounter Diagnosis     ICD-10-CM    1  Chronic bilateral low back pain with bilateral sciatica  M54 42     M54 41     G89 29    2  Chronic pain of left knee  M25 562     G89 29                   Subjective: Patient reports that the tape continues to help her L knee with no complaints of knee pain upon presentation  Notes that she is having trouble with her low back still having radiating symptoms down both legs at times everyday but not constant  Objective: See treatment diary below      Assessment: Tolerated treatment well  Patient demonstrated fatigue post treatment and would benefit from continued PT  Pt denied any increased symptoms in her low back and denied radicular symptoms as well  Tape was applied towards the start of session  Cuing was needed to avoid valgus during squats as well as to promote a proper PPT  Plan: Progress treatment as tolerated         Precautions: epilepsy, sleep apnea       Manuals 4/7 4/12 4/14 4/19    3/28 3/31 4/5   Niya taping L knee [facility] MM    [facility]                                          Neuro Re-Ed             Bike warm up 5' L3 5' L3 5' L3 5' L3    5' L3 5' L3 5' L3   PPT 2' 2' 2' 2'    2' 2' 2'   Abdominal bracing in neutral             Abdominal bracing + march 2' 2' 2' 2'    2' 2' 2'                             Knee extensions 33# 30 33# 30 33# 30 33# 30    22# 30 22# 30 33# 20   Leg press 70# 30 75# 30 75# 30 75# 30x    60# 30 65# 30 65# 30   Ther Ex             Mini squats             Bridges         2' 2'    SAQ          6# 2'    Resisted sidestepping GTB 2'  GTB 2'  GTB 2' with abd bracing GTB 2' with abd bracing    RTB 2' no UE GTB 2' GTB 2'    Clamshells  2# 2' 2 5# 2' 3# 2' 3# 2'    nv :90 2# 1'                Ther Activity             SKC :10 hold 1' ea :10 hold 1' ea :10 hold 1' ea :10 hold 1' ea    :10 hold 1' ea :10 hold 1' ea :10 hold 1' ea   pball curls 2' 2' 2' 2'    2' 2' 2'   pball bridges :80 2' 2' 2'      1'   Seated pball roll outs 2' 2' 2' 2'    2' 2' 2'                             Gait Training                                       Modalities

## 2022-04-21 ENCOUNTER — APPOINTMENT (OUTPATIENT)
Dept: PHYSICAL THERAPY | Facility: CLINIC | Age: 62
End: 2022-04-21
Payer: COMMERCIAL

## 2022-04-26 ENCOUNTER — APPOINTMENT (OUTPATIENT)
Dept: PHYSICAL THERAPY | Facility: CLINIC | Age: 62
End: 2022-04-26
Payer: COMMERCIAL

## 2022-04-26 ENCOUNTER — OFFICE VISIT (OUTPATIENT)
Dept: PHYSICAL THERAPY | Facility: CLINIC | Age: 62
End: 2022-04-26
Payer: COMMERCIAL

## 2022-04-26 DIAGNOSIS — G89.29 CHRONIC PAIN OF LEFT KNEE: Primary | ICD-10-CM

## 2022-04-26 DIAGNOSIS — M54.41 CHRONIC BILATERAL LOW BACK PAIN WITH BILATERAL SCIATICA: ICD-10-CM

## 2022-04-26 DIAGNOSIS — G89.29 CHRONIC BILATERAL LOW BACK PAIN WITH BILATERAL SCIATICA: ICD-10-CM

## 2022-04-26 DIAGNOSIS — M54.42 CHRONIC BILATERAL LOW BACK PAIN WITH BILATERAL SCIATICA: ICD-10-CM

## 2022-04-26 DIAGNOSIS — M25.562 CHRONIC PAIN OF LEFT KNEE: Primary | ICD-10-CM

## 2022-04-26 PROCEDURE — 97112 NEUROMUSCULAR REEDUCATION: CPT

## 2022-04-26 PROCEDURE — 97110 THERAPEUTIC EXERCISES: CPT

## 2022-04-26 NOTE — PROGRESS NOTES
Daily Note     Today's date: 2022  Patient name: Baljeet Martinez  : 1960  MRN: [id number]  Referring provider: Maty Moeller  Dx:   Encounter Diagnosis     ICD-10-CM    1  Chronic pain of left knee  M25 562     G89 29    2  Chronic bilateral low back pain with bilateral sciatica  M54 42     M54 41     G89 29                   Subjective: Patient reports increased L sided low back and buttocks pain when getting out of bed this morning which she rates 7/10  Pain reduced on it's own as the morning went on  Objective: See treatment diary below      Assessment: Tolerated treatment well  Patient demonstrated fatigue post treatment and would benefit from continued PT  Verbal cueing needed to avoid LE valgus on leg press with good carry over  No increased symptoms reported throughout treatment  Plan: Progress treatment as tolerated         Precautions: epilepsy, sleep apnea       Manuals 4/7 4/12 4/14 4/19 4/26   3/28 3/31 4/5   Niya taping L knee [facility] MM [facility]                                          Neuro Re-Ed             Bike warm up 5' L3 5' L3 5' L3 5' L3 5' L3   5' L3 5' L3 5' L3   PPT 2' 2' 2' 2' 2'   2' 2' 2'   Abdominal bracing in neutral             Abdominal bracing + march 2' 2' 2' 2' 2'   2' 2' 2'                             Knee extensions 33# 30 33# 30 33# 30 33# 30 33# 30   22# 30 22# 30 33# 20   Leg press 70# 30 75# 30 75# 30 75# 30x 80# 30   60# 30 65# 30 65# 30   Ther Ex             Mini squats             Bridges         2' 2'    SAQ          6# 2'    Resisted sidestepping GTB 2'  GTB 2'  GTB 2' with abd bracing GTB 2' with abd bracing GTB 2'   RTB 2' no UE GTB 2' GTB 2'    Clamshells  2# 2' 2 5# 2' 3# 2' 3# 2' 3# 2'   nv :90 2# 1'                Ther Activity             SKC :10 hold 1' ea :10 hold 1' ea :10 hold 1' ea :10 hold 1' ea :10 hold 1' ea   :10 hold 1' ea :10 hold 1' ea :10 hold 1' ea   pball curls 2' 2' 2' 2' 2'   2' 2' 2'   jackieall bridges :90 2' 2' 2' 2'     1'   Seated pball roll outs 2' 2' 2' 2' 2'   2' 2' 2'                             Gait Training                                       Modalities

## 2022-04-28 ENCOUNTER — APPOINTMENT (OUTPATIENT)
Dept: PHYSICAL THERAPY | Facility: CLINIC | Age: 62
End: 2022-04-28
Payer: COMMERCIAL

## 2022-05-05 ENCOUNTER — APPOINTMENT (OUTPATIENT)
Dept: PHYSICAL THERAPY | Facility: CLINIC | Age: 62
End: 2022-05-05
Payer: COMMERCIAL

## 2022-05-09 ENCOUNTER — APPOINTMENT (OUTPATIENT)
Dept: PHYSICAL THERAPY | Facility: CLINIC | Age: 62
End: 2022-05-09
Payer: COMMERCIAL

## 2022-05-12 ENCOUNTER — OFFICE VISIT (OUTPATIENT)
Dept: PHYSICAL THERAPY | Facility: CLINIC | Age: 62
End: 2022-05-12
Payer: COMMERCIAL

## 2022-05-12 DIAGNOSIS — G89.29 CHRONIC BILATERAL LOW BACK PAIN WITH BILATERAL SCIATICA: ICD-10-CM

## 2022-05-12 DIAGNOSIS — M54.41 CHRONIC BILATERAL LOW BACK PAIN WITH BILATERAL SCIATICA: ICD-10-CM

## 2022-05-12 DIAGNOSIS — M54.42 CHRONIC BILATERAL LOW BACK PAIN WITH BILATERAL SCIATICA: ICD-10-CM

## 2022-05-12 DIAGNOSIS — M25.562 CHRONIC PAIN OF LEFT KNEE: Primary | ICD-10-CM

## 2022-05-12 DIAGNOSIS — G89.29 CHRONIC PAIN OF LEFT KNEE: Primary | ICD-10-CM

## 2022-05-12 PROCEDURE — 97112 NEUROMUSCULAR REEDUCATION: CPT

## 2022-05-12 PROCEDURE — 97110 THERAPEUTIC EXERCISES: CPT

## 2022-05-12 NOTE — PROGRESS NOTES
Daily Note     Today's date: 2022  Patient name: Ying Houston  : 1960  MRN: [id number]  Referring provider: Britt Turcios  Dx:   Encounter Diagnosis     ICD-10-CM    1  Chronic pain of left knee  M25 562     G89 29    2  Chronic bilateral low back pain with bilateral sciatica  M54 42     M54 41     G89 29                   Subjective: Patient reports she is now able to walk her grandson to school with no AD  Objective: See treatment diary below      Assessment: Tolerated treatment well  Patient exhibited good technique with therapeutic exercises and would benefit from continued PT  Plan: Progress treatment as tolerated         Precautions: epilepsy, sleep apnea       Manuals        Niya taping L knee [facility] MM [facility]                                              Neuro Re-Ed             Bike warm up 5' L3 5' L3 5' L3 5' L3 5' L3 5' L3       PPT 2' 2' 2' 2' 2' 2'       Abdominal bracing in neutral             Abdominal bracing + march 2' 2' 2' 2' 2' 2'                                 Knee extensions 33# 30 33# 30 33# 30 33# 30 33# 30 33# 30       Leg press 70# 30 75# 30 75# 30 75# 30x 80# 30 80# 30       Ther Ex             Mini squats             Bridges              SAQ              Resisted sidestepping GTB 2'  GTB 2'  GTB 2' with abd bracing GTB 2' with abd bracing GTB 2' GTB 2'       Clamshells  2# 2' 2 5# 2' 3# 2' 3# 2' 3# 2' 3# 2'                    Ther Activity             SKC :10 hold 1' ea :10 hold 1' ea :10 hold 1' ea :10 hold 1' ea :10 hold 1' ea :10 hold 1' ea       pball curls 2' 2' 2' 2' 2' 2'       pball bridges :80 2' 2' 2' 2' 2'       Seated pball roll outs 2' 2' 2' 2' 2' 2'                                 Gait Training                                       Modalities

## 2022-05-19 ENCOUNTER — OFFICE VISIT (OUTPATIENT)
Dept: PHYSICAL THERAPY | Facility: CLINIC | Age: 62
End: 2022-05-19
Payer: COMMERCIAL

## 2022-05-19 DIAGNOSIS — M54.41 CHRONIC BILATERAL LOW BACK PAIN WITH BILATERAL SCIATICA: ICD-10-CM

## 2022-05-19 DIAGNOSIS — G89.29 CHRONIC PAIN OF LEFT KNEE: Primary | ICD-10-CM

## 2022-05-19 DIAGNOSIS — M54.42 CHRONIC BILATERAL LOW BACK PAIN WITH BILATERAL SCIATICA: ICD-10-CM

## 2022-05-19 DIAGNOSIS — M25.562 CHRONIC PAIN OF LEFT KNEE: Primary | ICD-10-CM

## 2022-05-19 DIAGNOSIS — G89.29 CHRONIC BILATERAL LOW BACK PAIN WITH BILATERAL SCIATICA: ICD-10-CM

## 2022-05-19 PROCEDURE — 97110 THERAPEUTIC EXERCISES: CPT

## 2022-05-19 PROCEDURE — 97112 NEUROMUSCULAR REEDUCATION: CPT

## 2022-05-19 NOTE — PROGRESS NOTES
Daily Note     Today's date: 2022  Patient name: Cassandra Coello  : 1960  MRN: [id number]  Referring provider: Terrence Homans  Dx:   Encounter Diagnosis     ICD-10-CM    1  Chronic pain of left knee  M25 562     G89 29    2  Chronic bilateral low back pain with bilateral sciatica  M54 42     M54 41     G89 29                   Subjective: Patient reports cramping just below L knee earlier today but it resolved on its own  She has been actively trying to exercise at home and feels her tolerance to walking has improved  Objective: See treatment diary below      Assessment: Tolerated treatment well  Patient demonstrated fatigue post treatment and would benefit from continued PT  Progressed resistance on leg press with good tolerance  Tactile cueing needed for proper PPT form with good carry over  No increased symptoms reported throughout treatment  Plan: Progress treatment as tolerated         Precautions: epilepsy, sleep apnea       Manuals       Niya taping L knee [facility] MM [facility]                                             Neuro Re-Ed             Bike warm up 5' L3 5' L3 5' L3 5' L3 5' L3 5' L3 5' L3      PPT 2' 2' 2' 2' 2' 2' 2'      Abdominal bracing in neutral             Abdominal bracing + march 2' 2' 2' 2' 2' 2' 2'                                Knee extensions 33# 30 33# 30 33# 30 33# 30 33# 30 33# 30 33# 30      Leg press 70# 30 75# 30 75# 30 75# 30x 80# 30 80# 30 85# 30      Ther Ex             Mini squats             Bridges              SAQ              Resisted sidestepping GTB 2'  GTB 2'  GTB 2' with abd bracing GTB 2' with abd bracing GTB 2' GTB 2' GTB 2'      Clamshells  2# 2' 2 5# 2' 3# 2' 3# 2' 3# 2' 3# 2' 3# 2'                   Ther Activity             SKC :10 hold 1' ea :10 hold 1' ea :10 hold 1' ea :10 hold ' ea :10 hold ' ea :10 hold 1' ea :10 hold 1' ea      pball curls 2' 2' 2' 2' 2' 2' 2'      pball bridges :90 2' 2' 2' 2' 2' 2'      Seated pball roll outs 2' 2' 2' 2' 2' 2' 2'                                Gait Training                                       Modalities

## 2022-06-15 ENCOUNTER — HOSPITAL ENCOUNTER (EMERGENCY)
Facility: HOSPITAL | Age: 62
Discharge: HOME | End: 2022-06-15
Attending: EMERGENCY MEDICINE | Admitting: EMERGENCY MEDICINE
Payer: MEDICARE

## 2022-06-15 ENCOUNTER — OFFICE VISIT (OUTPATIENT)
Dept: NEUROLOGY | Facility: CLINIC | Age: 62
End: 2022-06-15
Payer: MEDICARE

## 2022-06-15 ENCOUNTER — TELEPHONE (OUTPATIENT)
Dept: NEUROLOGY | Facility: CLINIC | Age: 62
End: 2022-06-15

## 2022-06-15 VITALS
WEIGHT: 195 LBS | HEIGHT: 63 IN | DIASTOLIC BLOOD PRESSURE: 62 MMHG | HEART RATE: 72 BPM | SYSTOLIC BLOOD PRESSURE: 123 MMHG | BODY MASS INDEX: 34.55 KG/M2

## 2022-06-15 VITALS
HEART RATE: 74 BPM | OXYGEN SATURATION: 96 % | RESPIRATION RATE: 18 BRPM | DIASTOLIC BLOOD PRESSURE: 69 MMHG | TEMPERATURE: 98.7 F | SYSTOLIC BLOOD PRESSURE: 147 MMHG

## 2022-06-15 DIAGNOSIS — G47.33 OSA (OBSTRUCTIVE SLEEP APNEA): ICD-10-CM

## 2022-06-15 DIAGNOSIS — R56.9 SEIZURES (HCC): Primary | ICD-10-CM

## 2022-06-15 DIAGNOSIS — R55 VASOVAGAL SYNCOPE: ICD-10-CM

## 2022-06-15 DIAGNOSIS — G43.009 MIGRAINE WITHOUT AURA AND WITHOUT STATUS MIGRAINOSUS, NOT INTRACTABLE: ICD-10-CM

## 2022-06-15 DIAGNOSIS — E34.8 PINEAL GLAND CYST: ICD-10-CM

## 2022-06-15 DIAGNOSIS — R04.0 EPISTAXIS: Primary | ICD-10-CM

## 2022-06-15 DIAGNOSIS — G43.109 MIGRAINE WITH AURA AND WITHOUT STATUS MIGRAINOSUS, NOT INTRACTABLE: ICD-10-CM

## 2022-06-15 DIAGNOSIS — R42 VERTIGO: ICD-10-CM

## 2022-06-15 LAB
ALBUMIN SERPL BCP-MCNC: 4.5 G/DL (ref 3.5–5)
ALP SERPL-CCNC: 79 U/L (ref 34–104)
ALT SERPL W P-5'-P-CCNC: 56 U/L (ref 7–52)
ANION GAP SERPL CALCULATED.3IONS-SCNC: 9 MMOL/L (ref 4–13)
AST SERPL W P-5'-P-CCNC: 46 U/L (ref 13–39)
ATRIAL RATE: 47 BPM
BASOPHILS # BLD AUTO: 0.05 THOUSANDS/ΜL (ref 0–0.1)
BASOPHILS NFR BLD AUTO: 1 % (ref 0–1)
BILIRUB SERPL-MCNC: 0.61 MG/DL (ref 0.2–1)
BUN SERPL-MCNC: 12 MG/DL (ref 5–25)
CALCIUM SERPL-MCNC: 10.2 MG/DL (ref 8.4–10.2)
CHLORIDE SERPL-SCNC: 102 MMOL/L (ref 96–108)
CO2 SERPL-SCNC: 27 MMOL/L (ref 21–32)
CREAT SERPL-MCNC: 0.7 MG/DL (ref 0.6–1.3)
EOSINOPHIL # BLD AUTO: 0.26 THOUSAND/ΜL (ref 0–0.61)
EOSINOPHIL NFR BLD AUTO: 4 % (ref 0–6)
ERYTHROCYTE [DISTWIDTH] IN BLOOD BY AUTOMATED COUNT: 13 % (ref 11.6–15.1)
GFR SERPL CREATININE-BSD FRML MDRD: 94 ML/MIN/1.73SQ M
GLUCOSE SERPL-MCNC: 130 MG/DL (ref 65–140)
HCT VFR BLD AUTO: 41.1 % (ref 34.8–46.1)
HGB BLD-MCNC: 14.1 G/DL (ref 11.5–15.4)
IMM GRANULOCYTES # BLD AUTO: 0.03 THOUSAND/UL (ref 0–0.2)
IMM GRANULOCYTES NFR BLD AUTO: 0 % (ref 0–2)
LYMPHOCYTES # BLD AUTO: 2.01 THOUSANDS/ΜL (ref 0.6–4.47)
LYMPHOCYTES NFR BLD AUTO: 30 % (ref 14–44)
MCH RBC QN AUTO: 30.7 PG (ref 26.8–34.3)
MCHC RBC AUTO-ENTMCNC: 34.3 G/DL (ref 31.4–37.4)
MCV RBC AUTO: 89 FL (ref 82–98)
MONOCYTES # BLD AUTO: 0.63 THOUSAND/ΜL (ref 0.17–1.22)
MONOCYTES NFR BLD AUTO: 9 % (ref 4–12)
NEUTROPHILS # BLD AUTO: 3.84 THOUSANDS/ΜL (ref 1.85–7.62)
NEUTS SEG NFR BLD AUTO: 56 % (ref 43–75)
NRBC BLD AUTO-RTO: 0 /100 WBCS
P AXIS: 52 DEGREES
PLATELET # BLD AUTO: 265 THOUSANDS/UL (ref 149–390)
PMV BLD AUTO: 11 FL (ref 8.9–12.7)
POTASSIUM SERPL-SCNC: 3.7 MMOL/L (ref 3.5–5.3)
PR INTERVAL: 164 MS
PROT SERPL-MCNC: 7.1 G/DL (ref 6.4–8.4)
QRS AXIS: 51 DEGREES
QRSD INTERVAL: 78 MS
QT INTERVAL: 476 MS
QTC INTERVAL: 421 MS
RBC # BLD AUTO: 4.6 MILLION/UL (ref 3.81–5.12)
SODIUM SERPL-SCNC: 138 MMOL/L (ref 135–147)
T WAVE AXIS: 45 DEGREES
VENTRICULAR RATE: 47 BPM
WBC # BLD AUTO: 6.82 THOUSAND/UL (ref 4.31–10.16)

## 2022-06-15 PROCEDURE — 99282 EMERGENCY DEPT VISIT SF MDM: CPT | Performed by: EMERGENCY MEDICINE

## 2022-06-15 PROCEDURE — 99215 OFFICE O/P EST HI 40 MIN: CPT | Performed by: NURSE PRACTITIONER

## 2022-06-15 PROCEDURE — 99284 EMERGENCY DEPT VISIT MOD MDM: CPT

## 2022-06-15 PROCEDURE — 93010 ELECTROCARDIOGRAM REPORT: CPT | Performed by: INTERNAL MEDICINE

## 2022-06-15 PROCEDURE — 36415 COLL VENOUS BLD VENIPUNCTURE: CPT | Performed by: EMERGENCY MEDICINE

## 2022-06-15 PROCEDURE — 85025 COMPLETE CBC W/AUTO DIFF WBC: CPT | Performed by: EMERGENCY MEDICINE

## 2022-06-15 PROCEDURE — 96374 THER/PROPH/DIAG INJ IV PUSH: CPT

## 2022-06-15 PROCEDURE — 93005 ELECTROCARDIOGRAM TRACING: CPT

## 2022-06-15 PROCEDURE — 80053 COMPREHEN METABOLIC PANEL: CPT | Performed by: EMERGENCY MEDICINE

## 2022-06-15 RX ORDER — OXYMETAZOLINE HYDROCHLORIDE 0.05 G/100ML
2 SPRAY NASAL ONCE
Status: COMPLETED | OUTPATIENT
Start: 2022-06-15 | End: 2022-06-15

## 2022-06-15 RX ORDER — LORAZEPAM 2 MG/ML
1 INJECTION INTRAMUSCULAR ONCE AS NEEDED
Status: COMPLETED | OUTPATIENT
Start: 2022-06-15 | End: 2022-06-15

## 2022-06-15 RX ADMIN — LORAZEPAM 1 MG: 2 INJECTION INTRAMUSCULAR; INTRAVENOUS at 01:09

## 2022-06-15 RX ADMIN — OXYMETAZOLINE HYDROCHLORIDE 2 SPRAY: 0.05 SPRAY NASAL at 01:06

## 2022-06-15 NOTE — TELEPHONE ENCOUNTER
Pt called to schedule a sooner appointment as she was seen in ED yesterday and they advise to see neurology in 3 days time frame

## 2022-06-15 NOTE — PATIENT INSTRUCTIONS
- No driving for now - I do have to report the seizure to olivier lehman  - Have your MRI brain done  - Call the office with further events concerning for seizures  - Have your EGG  - Follow up in 3-4 months

## 2022-06-15 NOTE — TELEPHONE ENCOUNTER
Any specific reason she is being seen in 8 months rather than one year? I don't see a telephone encounter documented

## 2022-06-15 NOTE — ED NOTES
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

## 2022-06-15 NOTE — PROGRESS NOTES
Review of Systems   Constitutional: Negative  Negative for appetite change and fever  HENT: Negative  Negative for hearing loss, tinnitus, trouble swallowing and voice change  Eyes: Negative  Negative for photophobia and pain  Respiratory: Negative  Negative for shortness of breath  Cardiovascular: Negative  Negative for palpitations  Gastrointestinal: Negative  Negative for nausea and vomiting  Endocrine: Negative  Negative for cold intolerance  Genitourinary: Negative  Negative for dysuria, frequency and urgency  Musculoskeletal: Negative  Negative for myalgias and neck pain  Skin: Negative  Negative for rash  Neurological: Positive for seizures  Negative for dizziness, tremors, syncope, facial asymmetry, speech difficulty, weakness, light-headedness, numbness and headaches  Hematological: Negative  Does not bruise/bleed easily  Psychiatric/Behavioral: Positive for sleep disturbance  Negative for confusion and hallucinations  The patient is nervous/anxious (stressed)  All other systems reviewed and are negative

## 2022-06-15 NOTE — ED NOTES
Pt and daughter-in-law provided verbal understanding of all discharge instructions  Pt assisted to vehicle via wheelchair by RN with negative complications   Pt AOx4, GCS 15      Russ Andrade RN  06/15/22 [id number]

## 2022-06-15 NOTE — PROGRESS NOTES
Patient ID: Odalys Chilel is a 63 y o  female with unclear possible history of seizures, who is returning to Neurology office for follow up of her seizure  Assessment/Plan:    Seizures ([facility] [address] )  Patient with unclear history of seizures vs syncope presents to the office for follow up  She was seen in the ER for a significant nose bleed when she reported feeling like she was going to have a seizure  From patient report it is still unclear if this represented a vasovagal syncope event vs seizure Reports gagging due to nose bleed, feeling woozy and then seeing blue dots and everything went black  Per nursing report her eyes rolled up, slight upper arm/face twitching noted) lasting 4-5 seconds  It is possible that her symptoms were prodromal symptoms to convulsive vasovagal syncope  It was slightly different than her most recent episode concerning for seizure about 5 years ago  Reports "auras: of visual symptoms about once per month  Will check routine EEG and MRI brain given possible new event type  Given timeline of events per patient and documentation, seems to be more consistent with convulsive syncope but warrants further evaluation to determine if medication trial is appropriate  She will call the office with further events concerning for seizure  Follow up in 3 months  Pineal gland cyst  Most recent imaging over 2 years ago  Check MRI brain due to new symptoms in setting of known pineal gland cyst      VALERY (obstructive sleep apnea)  Awaiting new CPAP machine due to recall  Suspect headaches will be much better once VALERY is treated again  Vertigo  Ongoing issue  Check MRI brain  Migraine without aura and without status migrainosus, not intractable  Again, worse with untreated VALERY  Will re-evaluate need for medication once VALERY is treated  Receiving new machine in near future  Avoid topiramate, zonisamide due to history of kidney stones  Already on beta blocked   Could consider nortriptyline though already on zoloft  May be a good candidate for monthly injectable such as emgality or aimovig if agreeable at follow up  She will Return for 3 months with Dr Puneet Burrell  Subjective:  sean Mei is a 63 y o  female with unclear possible history of seizures, who is returning to Neurology office for follow up of her seizures  Seen most recently in the office by Dr Puneet Burrell on 10/13/2021  At that time, noted that there were no seizures recently while off of AED therapy for years  Some features of the semiology (lightheaded, nausea) suggested syncope but seizure remained a possibility  Prior records suggested EEGs were normal  There was a recent event in July 2021 while hospitalized for chest pain that was vasovagal syncope  She noted a general unsteadiness and imbalance present for more than 5 years  PT had been helpful  Dr Medardo Motley had been prescribing sertraline for anxiety as well as headache prophylaxis which was continued by Dr Puneet Burrell  Pineal cyst stable on prior imaging  Due to daytime sleepiness, snoring, witnessed apneas and obesity she was given a referral to sleep medicine  Recommended 1 year follow up       Patient called for an appointment today  She was in the Er yesterday for epistaxis  While provider not is not completed, per RN notes "Pt stated "I feel like i'm going to have a seizure   I have my aura"  Pt then proceeded to have seizure-like activity (eyes rolled, slight upper arm/face twitching noted) that lasted approx 4-5 seconds  +patent airway" Recommended follow up in 3 days with neurology  She reports that she initially went to the hospital because she had a really bad nose bleed  She could not get it to stop at home  She reports that it made her light headed and she had vertigo worse than usual at that time  She was very weak and felt like she was going to pass out  Called 911 and the ambulance came to get her  She was able to walk with assistance to the ambulance  BP was really high  When she got to the hospital she was having a lot of clots  She started to feel very woosy, nauseated/gagging,  and then she saw blue dots and everything went black  She told them she was going to have a seizure  This is typical of what has happened in the past with all of her seizures  Reports that she was coming to and her eyes were rolling  She had blurred vision and still feels that she does  Her daughter in law reports that she was out of it and confused but knew who she was and where she was  The nurses told her that she had a seizure when she got there but said it was a "small one "     She does not believe that this was a syncopal event  She typically has grand mal seizures and this is different from her typical seizures  The last time she had a seizure she did fall and injured herself as well as having a concussion  Sleep has been bad  She has sleep apnea and is getting her machine in July  SHe was told that she has a severe case ( stopped breathing over 100 times during test)  Snores very loud  Monday night she did have a [place] but was very weak  Stress is 10/10 right now  Headaches continue to be an issue  Takes tylenol  Headaches are continuing to be frequent but notes that once her sleep apnea is treated she feels that her headaches may be better  Last seizure was five years ago  Auras occur about once per month  Pain and stress may provoke her auras  Her heart will start pounding and then her blood pressure triggers this  She does currently drive but not recently  Lives with her son  Current seizure medications:  - none  - clonazepam 0 5 mg BID- anxiety  Other medications as per Epic      Current AEDs:  None     Event/Seizure semiology:  Lightheaded, nauseous, ammonia smell, staring, collapse, apparent convulsion       Special Features  Status epilepticus: reports cluster of 3 events many years ago when in the hospital  Self Injury Seizures: possible head injury  Precipitating Factors: unknown     Epilepsy Risk Factors:  Possible head injury  No family history   Normal birth and development       Prior AEDs:  Phenytoin, divalproex, phenobarbital ineffective  Lacosamide caused nausea and vomting     Prior Evaluation:  Brain MRI w/o 1/30/2020: unchanged, stable pineal cyst  All prior EEGs thought to be normal     Background from 6/23/2020 note:   "66 year old female with remote history of seizures for 20 years from [facility] to 2012 that did not respond to Dilantin, Vimpat, Depakote, and phenobarbital  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  Previous routine EEGs were all normal  Dr Kayla Castillo formerly followed the patient and when he first saw her in January 2013, he tried her on the Vimpat but she developed nausea and vomiting, so she stopped taking it and has not been on seizure medication since then       She remained seizure free for 5 years, but on 11/5/17 she tripped and fell over a tree stump and she hit her head on concrete and she does not recall what happened for the next 30 minutes  Patient assumed she had had a seizure because she felt headachy when she woke up, but it is also possible that she developed headache from a concussion  I ordered an EEG when I saw her on 1/18/18 to check for any new abnormalities and if she did I would consider placing her on a seizure medication  Note that she has a history of kidney stones so should not be given Topamax or Zonegran       When I saw the patient on 2/8/18, I reviewed the results of her EEG which had been normal  I thought it best not to start the patient on seizure medication at that time  "  [address] [place] [address] with son  He has ranch with no stairs      Her history was also obtained from her son, who was present at today's visit  I reviewed prior neurology notes, EEG report, MRI brain report, most recent labs, as documented in Epic/Cogbooks, and summarized above  Objective:    Blood pressure 123/62, pulse 72, height 5' 3" (1 6 m), weight 88 5 kg (195 lb)  Physical Exam  No apparent distress  Appears well nourished  Mood appropriate for situation     Neurologic Exam  Mental status- alert and oriented to person, place, and time  Speech appropriate for conversation  Good attention and knowledge  Cranial Nerves- PERRL, VFFTC, EOMS normal, facial sensation and muscles symmetric, hearing intact bilaterally to finger rubs, tongue midline, palate rise symmetrical, shoulder shrug symmetrical     Motor- No pronator drift  Appropriate strength  Moves all extremities freely  No tremor  Sensory-  Intact distally in all extremities to light touch  DTRs- 2+ and symmetric in all extremities    Gait- wide based, steady casual gait  Coordination- FNF intact  ROS:  Review of Systems   Constitutional: Negative  Negative for appetite change and fever  HENT: Negative  Negative for hearing loss, tinnitus, trouble swallowing and voice change  Eyes: Negative  Negative for photophobia and pain  Respiratory: Negative  Negative for shortness of breath  Cardiovascular: Negative  Negative for palpitations  Gastrointestinal: Negative  Negative for nausea and vomiting  Endocrine: Negative  Negative for cold intolerance  Genitourinary: Negative  Negative for dysuria, frequency and urgency  Musculoskeletal: Negative  Negative for myalgias and neck pain  Skin: Negative  Negative for rash  Neurological: Positive for seizures  Negative for dizziness, tremors, syncope, facial asymmetry, speech difficulty, weakness, light-headedness, numbness and headaches  Hematological: Negative  Does not bruise/bleed easily  Psychiatric/Behavioral: Positive for sleep disturbance  Negative for confusion and hallucinations  The patient is nervous/anxious (stressed)  All other systems reviewed and are negative  ROS obtained by MA and reviewed by myself  This note may have been created using voice recognition software  There may be unintentional errors such as grammatical errors, spelling errors, or pronoun errors

## 2022-06-15 NOTE — ED NOTES
Pt stated "I feel like i'm going to have a seizure   I have my aura"  Pt then proceeded to have seizure-like activity (eyes rolled, slight upper arm/face twitching noted) that lasted approx 4-5 seconds   +patent airway     Lacey Cevallos, PHONG  06/15/22 7976

## 2022-06-17 NOTE — ED PROVIDER NOTES
History  Chief Complaint   Patient presents with    Nose Bleed     Pt arrives via EMS with c/o of nose bleed that started within the hour  Denies blood thinners  Denies visual disturbances  Hx high blood pressure     HPI    Patient is a pleasant 63 yof who presents with nosebleed that started an hour ago  No focal neuro symptoms  Bleeding controlled after afrin  No other complaints  Well appearing  No vomiting  No weakness to suggest severe anemia  Of note, patient had an episode of vasovagal symptoms, possible seizure but doubt this given she had no post ictal state and clearly was bradycardic during the event  However, will have patient followup with neuro  MDM nosebleed, improved, will dc  REVIEW OF SYSTEMS  Positive for nosebleed  All other systems reviewed and are negative unless noted in this section or otherwise in the chart  Physical Exam  Vitals and nursing note reviewed  Constitutional:    Appearance:  Patient is well-developed  No diaphoresis  HENT:   Head: Normocephalic and atraumatic  Right Ear: External ear normal    Left Ear: External ear normal    Nose: No congestion  Eyes:   Conjunctiva/sclera: Conjunctivae normal    Right eye: No discharge  Left eye: No discharge  Extraocular Movements: Extraocular movements intact  Neck:   Vascular: No JVD  Trachea: No tracheal deviation  Cardiovascular:   Rate and Rhythm: Normal rate and regular rhythm  Heart sounds: Normal heart sounds  Pulmonary:   Effort: Pulmonary effort is normal  No respiratory distress  Breath sounds: No wheezing or rales  Abdominal:   Palpations: Abdomen is soft  Tenderness: There is no abdominal tenderness  There is no guarding or rebound  Musculoskeletal:      General: No tenderness  Cervical back: Normal range of motion and neck supple  Skin:  General: Skin is warm and dry  Findings: No erythema or rash     Neurological:   General: No focal deficit present  Mental Status: Alert and oriented to person, place, and time  Motor: No weakness  Psychiatric:      Behavior: Behavior normal       Thought Content: Thought content normal                            Prior to Admission Medications   Prescriptions Last Dose Informant Patient Reported? Taking?    Elastic Bandages & Supports (AIRCAST SPORT ANKLE BRACE/LEFT) MISC   No No   Sig: by Does not apply route daily   Patient not taking: No sig reported   Elastic Bandages & Supports (KNEE BRACE/HINGED S/M) MISC   No No   Sig: by Does not apply route daily   Mapap Arthritis Pain 650 MG CR tablet   Yes No   Sig: Take 650 mg by mouth every 8 (eight) hours as needed   amLODIPine (NORVASC) 10 mg tablet  Self Yes No   Sig: Take 1 tablet by mouth daily   atorvastatin (LIPITOR) 40 mg tablet  Self Yes No   Sig: Take 40 mg by mouth daily   celecoxib (CeleBREX) 200 mg capsule   No No   Sig: Take 1 capsule (200 mg total) by mouth daily   Patient not taking: No sig reported   clonazePAM (KlonoPIN) 0 5 mg tablet   Yes No   Sig: Take 1 tablet by mouth 2 (two) times a day   clotrimazole (LOTRIMIN) 1 % cream   Yes No   Sig: APPLY TO AFFECTED AREA TWICE A DAY IN THE MORNING AND IN THE EVENING   Patient not taking: No sig reported   furosemide (LASIX) 40 mg tablet  Self Yes No   Sig: Take 40 mg by mouth daily as needed    hydrALAZINE (APRESOLINE) 25 mg tablet   Yes No   Sig: Take 25 mg by mouth 2 (two) times a day   losartan (COZAAR) 100 MG tablet  Self Yes No   Sig: Take 100 mg by mouth daily   metoprolol tartrate (LOPRESSOR) 25 mg tablet  Self Yes No   Sig: Take 50 mg by mouth 2 (two) times a day    montelukast (SINGULAIR) 10 mg tablet  Self Yes No   Sig: Take 1 tablet by mouth daily   multivitamin (THERAGRAN) TABS   Yes No   Sig: Take 1 tablet by mouth daily   omeprazole (PriLOSEC) 20 mg delayed release capsule   Yes No   Sig: Take 20 mg by mouth daily   sertraline (ZOLOFT) 50 mg tablet   No No   Sig: Take 1 tablet (50 mg total) by mouth every morning      Facility-Administered Medications: None       Past Medical History:   Diagnosis Date    Bladder prolapse, female, acquired     Epilepsy ([facility] [address] )     Hypertension     Kidney stone     Tear of MCL (medial collateral ligament) of knee        Past Surgical History:   Procedure Laterality Date    HYSTERECTOMY      age 45   [place] [de-identified] Bilateral     age 45       Family History   Problem Relation Age of Onset    Diabetes Mother     Hypertension Mother     Thyroid disease Mother     Kidney disease Mother     Diabetes Father     Hypertension Father     No Known Problems Daughter     Heart attack Maternal Grandmother     No Known Problems Maternal Grandfather     Diabetes Paternal Grandmother     Kidney cancer Paternal Grandmother     No Known Problems Paternal Grandfather     No Known Problems Son     Breast cancer Paternal Aunt         bilateral    No Known Problems Paternal Aunt     No Known Problems Paternal Aunt     No Known Problems Paternal Aunt     No Known Problems Paternal Aunt     Kidney cancer Paternal Aunt     Diabetes Paternal Aunt      I have reviewed and agree with the history as documented      E-Cigarette/Vaping    E-Cigarette Use Never User      E-Cigarette/Vaping Substances    Nicotine No     THC No     CBD Yes Cream 3000mg     Social History     Tobacco Use    Smoking status: Never Smoker    Smokeless tobacco: Never Used   Vaping Use    Vaping Use: Never used   Substance Use Topics    Alcohol use: Yes     Comment: rarely    Drug use: No       Review of Systems    Physical Exam  Physical Exam    Vital Signs  ED Triage Vitals   Temperature Pulse Respirations Blood Pressure SpO2   06/15/22 0100 06/15/22 0050 06/15/22 0050 06/15/22 0050 06/15/22 0050   98 7 °F (37 1 °C) 104 18 (!) 181/105 100 %      Temp Source Heart Rate Source Patient Position - Orthostatic VS BP Location FiO2 (%)   06/15/22 0100 06/15/22 0050 06/15/22 0050 06/15/22 0050 --   Oral Monitor Lying Left arm       Pain Score       06/15/22 0050       No Pain           Vitals:    06/15/22 0115 06/15/22 0130 06/15/22 0145 06/15/22 0215   BP: 156/75 149/72 144/68 147/69   Pulse: 82 80 74 74   Patient Position - Orthostatic VS: Lying Lying Lying Lying         Visual Acuity  Visual Acuity    Flowsheet Row Most Recent Value   L Pupil Size (mm) 3   R Pupil Size (mm) 3          ED Medications  Medications   oxymetazoline (AFRIN) 0 05 % nasal spray 2 spray (2 sprays Each Nare Given by Other 6/15/22 0106)   LORazepam (ATIVAN) injection 1 mg (1 mg Intravenous Given 6/15/22 0109)       Diagnostic Studies  Results Reviewed     Procedure Component Value Units Date/Time    Comprehensive metabolic panel [[id number]]  (Abnormal) Collected: 06/15/22 0120    Lab Status: Final result Specimen: Blood from Arm, Right Updated: 06/15/22 0156     Sodium 138 mmol/L      Potassium 3 7 mmol/L      Chloride 102 mmol/L      CO2 27 mmol/L      ANION GAP 9 mmol/L      BUN 12 mg/dL      Creatinine 0 70 mg/dL      Glucose 130 mg/dL      Calcium 10 2 mg/dL      AST 46 U/L      ALT 56 U/L      Alkaline Phosphatase 79 U/L      Total Protein 7 1 g/dL      Albumin 4 5 g/dL      Total Bilirubin 0 61 mg/dL      eGFR 94 ml/min/1 73sq m     Narrative:      Matilde guidelines for Chronic Kidney Disease (CKD):     Stage 1 with normal or high GFR (GFR > 90 mL/min/1 73 square meters)    Stage 2 Mild CKD (GFR = 60-89 mL/min/1 73 square meters)    Stage 3A Moderate CKD (GFR = 45-59 mL/min/1 73 square meters)    Stage 3B Moderate CKD (GFR = 30-44 mL/min/1 73 square meters)    Stage 4 Severe CKD (GFR = 15-29 mL/min/1 73 square meters)    Stage 5 End Stage CKD (GFR <15 mL/min/1 73 square meters)  Note: GFR calculation is accurate only with a steady state creatinine    CBC and differential [[id number]] Collected: 06/15/22 0120    Lab Status: Final result Specimen: Blood from Arm, Right Updated: 06/15/22 0140 WBC 6 82 Thousand/uL      RBC 4 60 Million/uL      Hemoglobin 14 1 g/dL      Hematocrit 41 1 %      MCV 89 fL      MCH 30 7 pg      MCHC 34 3 g/dL      RDW 13 0 %      MPV 11 0 fL      Platelets 096 Thousands/uL      nRBC 0 /100 WBCs      Neutrophils Relative 56 %      Immat GRANS % 0 %      Lymphocytes Relative 30 %      Monocytes Relative 9 %      Eosinophils Relative 4 %      Basophils Relative 1 %      Neutrophils Absolute 3 84 Thousands/µL      Immature Grans Absolute 0 03 Thousand/uL      Lymphocytes Absolute 2 01 Thousands/µL      Monocytes Absolute 0 63 Thousand/µL      Eosinophils Absolute 0 26 Thousand/µL      Basophils Absolute 0 05 Thousands/µL                  No orders to display              Procedures  Procedures         ED Course                                             MDM    Disposition  Final diagnoses:   Epistaxis     Time reflects when diagnosis was documented in both MDM as applicable and the Disposition within this note     Time User Action Codes Description Comment    6/15/2022  2:13 AM Abdelrahman ANDINO Add [R04 0] Epistaxis       ED Disposition     ED Disposition   Discharge    Condition   Stable    Date/Time   Wed Isaak 15, 2022  2:13 AM    Comment   Rashmi Sultana discharge to home/self care                 Follow-up Information     Follow up With Specialties Details Why Contact Info    Mattie Fulton MD Family Medicine In 1 day  [address]  [address] [facility] Neurology  In 3 days  [address] Neurology [address], [address]  Phone: [phone]          Discharge Medication List as of 6/15/2022  2:13 AM      CONTINUE these medications which have NOT CHANGED    Details   amLODIPine (NORVASC) 10 mg tablet Take 1 tablet by mouth daily, Historical Med      atorvastatin (LIPITOR) 40 mg tablet Take 40 mg by mouth daily, Historical Med      clonazePAM (KlonoPIN) 0 5 mg tablet Take 1 tablet by mouth 2 (two) times a day, Starting Fri 6/19/2020, Historical Med      !! Elastic Bandages & Supports (KNEE BRACE/HINGED S/M) MISC by Does not apply route daily, Starting Fri 5/15/2020, Normal      furosemide (LASIX) 40 mg tablet Take 40 mg by mouth daily as needed , Historical Med      hydrALAZINE (APRESOLINE) 25 mg tablet Take 25 mg by mouth 2 (two) times a day, Starting Sat 5/2/2020, Historical Med      losartan (COZAAR) 100 MG tablet Take 100 mg by mouth daily, Historical Med      Mapap Arthritis Pain 650 MG CR tablet Take 650 mg by mouth every 8 (eight) hours as needed, Starting Mon 7/27/2020, Historical Med      metoprolol tartrate (LOPRESSOR) 25 mg tablet Take 50 mg by mouth 2 (two) times a day , Historical Med      montelukast (SINGULAIR) 10 mg tablet Take 1 tablet by mouth daily, Historical Med      multivitamin (THERAGRAN) TABS Take 1 tablet by mouth daily, Historical Med      omeprazole (PriLOSEC) 20 mg delayed release capsule Take 20 mg by mouth daily, Starting Tue 8/25/2020, Historical Med      sertraline (ZOLOFT) 50 mg tablet Take 1 tablet (50 mg total) by mouth every morning, Starting Wed 5/26/2021, Normal      celecoxib (CeleBREX) 200 mg capsule Take 1 capsule (200 mg total) by mouth daily, Starting Fri 12/4/2020, Normal      clotrimazole (LOTRIMIN) 1 % cream APPLY TO AFFECTED AREA TWICE A DAY IN THE MORNING AND IN THE EVENING, Historical Med      !! Elastic Bandages & Supports (AIRCAST SPORT ANKLE BRACE/LEFT) MISC by Does not apply route daily, Starting Fri 7/17/2020, Normal       !! - Potential duplicate medications found  Please discuss with provider  No discharge procedures on file      PDMP Review       Value Time User    PDMP Reviewed  Yes 7/15/2021  3:20 Sharon Henry MD          ED Provider  Electronically Signed by           Cassius Carrel, MD  06/16/22 9550

## 2022-07-04 PROBLEM — G43.109 MIGRAINE WITH AURA AND WITHOUT STATUS MIGRAINOSUS, NOT INTRACTABLE: Status: ACTIVE | Noted: 2022-07-04

## 2022-07-04 PROBLEM — G43.009 MIGRAINE WITHOUT AURA AND WITHOUT STATUS MIGRAINOSUS, NOT INTRACTABLE: Status: ACTIVE | Noted: 2022-07-04

## 2022-07-04 NOTE — ASSESSMENT & PLAN NOTE
Patient with unclear history of seizures vs syncope presents to the office for follow up  She was seen in the ER for a significant nose bleed when she reported feeling like she was going to have a seizure  From patient report it is still unclear if this represented a vasovagal syncope event vs seizure Reports gagging due to nose bleed, feeling woozy and then seeing blue dots and everything went black  Per nursing report her eyes rolled up, slight upper arm/face twitching noted) lasting 4-5 seconds  It is possible that her symptoms were prodromal symptoms to convulsive vasovagal syncope  It was slightly different than her most recent episode concerning for seizure about 5 years ago  Reports "auras: of visual symptoms about once per month  Will check routine EEG and MRI brain given possible new event type  Given timeline of events per patient and documentation, seems to be more consistent with convulsive syncope but warrants further evaluation to determine if medication trial is appropriate  She will call the office with further events concerning for seizure  Follow up in 3 months
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
## 2022-07-04 NOTE — ASSESSMENT & PLAN NOTE
Awaiting new CPAP machine due to recall  Suspect headaches will be much better once VALERY is treated again

## 2022-07-04 NOTE — ASSESSMENT & PLAN NOTE
Most recent imaging over 2 years ago   Check MRI brain due to new symptoms in setting of known pineal gland cyst

## 2022-07-08 ENCOUNTER — HOSPITAL ENCOUNTER (OUTPATIENT)
Dept: NEUROLOGY | Facility: CLINIC | Age: 62
Discharge: HOME | End: 2022-07-08
Payer: COMMERCIAL

## 2022-07-08 DIAGNOSIS — R56.9 SEIZURES (HCC): ICD-10-CM

## 2022-07-08 PROCEDURE — 95819 EEG AWAKE AND ASLEEP: CPT | Performed by: STUDENT IN AN ORGANIZED HEALTH CARE EDUCATION/TRAINING PROGRAM

## 2022-07-08 PROCEDURE — 95816 EEG AWAKE AND DROWSY: CPT

## 2022-07-12 ENCOUNTER — TELEPHONE (OUTPATIENT)
Dept: NEUROLOGY | Facility: CLINIC | Age: 62
End: 2022-07-12

## 2022-07-12 DIAGNOSIS — R04.0 EPISTAXIS, RECURRENT: ICD-10-CM

## 2022-07-12 DIAGNOSIS — R56.9 CONVULSIONS, UNSPECIFIED CONVULSION TYPE (HCC): Primary | ICD-10-CM

## 2022-07-12 NOTE — TELEPHONE ENCOUNTER
Spoke with patient, she is agreeable to longer 24 hour EEG  Patient stated she has a sleep study scheduled on 7/17/22  Can she do it after the 17th?     Please advise

## 2022-07-12 NOTE — TELEPHONE ENCOUNTER
It was normal  If she is agreeable I would like her to have a longer EEG that he wears home for 24 hours so we can get more information and see her EEG during sleep  If she is agreeable I will place order

## 2022-07-12 NOTE — TELEPHONE ENCOUNTER
Patient calling in results of EEG  Results available in My Chart  Please review and advise      CB# [phone]

## 2022-07-14 NOTE — TELEPHONE ENCOUNTER
Order placed for ambulatory EEG  She should see ENT for the recurrent nosebleeds  Her BP of 181/105 likely contributed

## 2022-07-14 NOTE — TELEPHONE ENCOUNTER
Called patient and made her aware of recommendations   Patient verbalized understanding and denies further questions

## 2022-07-14 NOTE — TELEPHONE ENCOUNTER
Called patient and made her aware she can schedule 24 hour EEG after sleep study is completed     Patient is still concerned about the nose bleed she had with her previous seizure  She states it was a lot of uncontrollable bleeding and wants to know what this is happening   Has happened in the past but does not know why     Please advise

## 2022-07-21 ENCOUNTER — OFFICE VISIT (OUTPATIENT)
Dept: SLEEP CENTER | Facility: CLINIC | Age: 62
End: 2022-07-21
Payer: COMMERCIAL

## 2022-07-21 VITALS
HEIGHT: 63 IN | SYSTOLIC BLOOD PRESSURE: 126 MMHG | HEART RATE: 95 BPM | DIASTOLIC BLOOD PRESSURE: 80 MMHG | BODY MASS INDEX: 34.59 KG/M2 | OXYGEN SATURATION: 99 % | WEIGHT: 195.2 LBS

## 2022-07-21 DIAGNOSIS — G47.33 OSA (OBSTRUCTIVE SLEEP APNEA): Primary | ICD-10-CM

## 2022-07-21 DIAGNOSIS — F41.9 ANXIETY: ICD-10-CM

## 2022-07-21 PROCEDURE — 99213 OFFICE O/P EST LOW 20 MIN: CPT | Performed by: INTERNAL MEDICINE

## 2022-07-21 NOTE — PROGRESS NOTES
Progress Note - [address] POT:[birth year] MRN: [id number]      Reason for Visit:    63 y  o female with very severe VALERY, who requires treatment    Assessment:  BiPAP required for very severe VALERY  Plan:  Start BiPAP 23/19 cm    Follow up:  Compliance check    History of Present Illness:  Diagnostic polysomnography demonstrated AHI = 73 6    The patient was unable to tolerate CPAP at pressure sufficient to eliminate respiratory events and on his titration study was found to have a best setting of 23/19 cm    Review of Systems      Genitourinary need to urinate more than twice a night   Cardiology palpitations/fluttering feeling in the chest and ankle/leg swelling   Gastrointestinal abdominal pain or cramping that disturb sleep    Neurology balance problems   Constitutional claustrophobia   Integumentary none   Psychiatry anxiety   Musculoskeletal muscle aches, back pain and sciatica   Pulmonary snoring   ENT none   Endocrine none   Hematological abnormal blood loss           I have reviewed and updated the review of systems as necessary     Historical Information    Past Medical History:   Diagnosis Date    Bladder prolapse, female, acquired     Epilepsy ([address] )     Hypertension     Kidney stone     Tear of MCL (medial collateral ligament) of knee          Past Surgical History:   Procedure Laterality Date    HYSTERECTOMY      age 45   Jose Francisco Abt OOPHORECTOMY Bilateral     age 45         Social History     Socioeconomic History    Marital status: Single     Spouse name: None    Number of children: None    Years of education: None    Highest education level: None   Occupational History    None   Tobacco Use    Smoking status: Never Smoker    Smokeless tobacco: Never Used   Vaping Use    Vaping Use: Never used   Substance and Sexual Activity    Alcohol use: Yes     Comment: rarely    Drug use: No    Sexual activity: None   Other Topics Concern    None   Social History Narrative    None Social Determinants of Health     Financial Resource Strain: Not on file   Food Insecurity: Not on file   Transportation Needs: Not on file   Physical Activity: Not on file   Stress: Not on file   Social Connections: Not on file   Intimate Partner Violence: Not on file   Housing Stability: Not on file           History   Alcohol use: Not on file       History   Smoking Status    Not on file   Smokeless Tobacco    Not on file       Family History:   Family History   Problem Relation Age of Onset    Diabetes Mother     Hypertension Mother     Thyroid disease Mother     Kidney disease Mother     Diabetes Father     Hypertension Father     No Known Problems Daughter     Heart attack Maternal Grandmother     No Known Problems Maternal Grandfather     Diabetes Paternal Grandmother     Kidney cancer Paternal Grandmother     No Known Problems Paternal Grandfather     No Known Problems Son     Breast cancer Paternal Aunt         bilateral    No Known Problems Paternal Aunt     No Known Problems Paternal Aunt     No Known Problems Paternal Aunt     No Known Problems Paternal Aunt     Kidney cancer Paternal Aunt     Diabetes Paternal Aunt        Medications/Allergies:      Current Outpatient Medications:     amLODIPine (NORVASC) 10 mg tablet, Take 1 tablet by mouth daily, Disp: , Rfl:     atorvastatin (LIPITOR) 40 mg tablet, Take 40 mg by mouth daily, Disp: , Rfl:     celecoxib (CeleBREX) 200 mg capsule, Take 1 capsule (200 mg total) by mouth daily, Disp: 30 capsule, Rfl: 1    clonazePAM (KlonoPIN) 0 5 mg tablet, Take 1 tablet by mouth 2 (two) times a day, Disp: , Rfl:     clotrimazole (LOTRIMIN) 1 % cream, APPLY TO AFFECTED AREA TWICE A DAY IN THE MORNING AND IN THE EVENING, Disp: , Rfl:     Elastic Bandages & Supports (AIRCAST SPORT ANKLE BRACE/LEFT) MISC, by Does not apply route daily, Disp: 1 each, Rfl: 0    Elastic Bandages & Supports (KNEE BRACE/HINGED S/M) MISC, by Does not apply route daily, Disp: 1 each, Rfl: 0    furosemide (LASIX) 40 mg tablet, Take 40 mg by mouth daily as needed , Disp: , Rfl:     hydrALAZINE (APRESOLINE) 25 mg tablet, Take 25 mg by mouth 2 (two) times a day, Disp: , Rfl:     losartan (COZAAR) 100 MG tablet, Take 100 mg by mouth daily, Disp: , Rfl:     Mapap Arthritis Pain 650 MG CR tablet, Take 650 mg by mouth every 8 (eight) hours as needed, Disp: , Rfl:     metoprolol tartrate (LOPRESSOR) 25 mg tablet, Take 50 mg by mouth 2 (two) times a day , Disp: , Rfl:     montelukast (SINGULAIR) 10 mg tablet, Take 1 tablet by mouth daily, Disp: , Rfl:     multivitamin (THERAGRAN) TABS, Take 1 tablet by mouth daily, Disp: , Rfl:     omeprazole (PriLOSEC) 20 mg delayed release capsule, Take 20 mg by mouth daily, Disp: , Rfl:     sertraline (ZOLOFT) 50 mg tablet, TAKE 1 TABLET BY MOUTH EVERY MORNING, Disp: 90 tablet, Rfl: 3      Objective    Vital Signs:   Vitals:    07/21/22 1509   BP: 126/80   Pulse: 95   SpO2: 99%   Weight: 88 5 kg (195 lb 3 2 oz)   Height: 5' 3" (1 6 m)     [place] Sleepiness Scale: Total score: 9    Physical Exam:    General: Alert, appropriate, cooperative, overweight    Head: NC/AT    Skin: Warm, dry    Neuro: No motor abnormalities, cranial nerves appear intact    Psych: Normal affect            JESSICA Beverly    Board Certified Sleep Specialist

## 2022-07-22 ENCOUNTER — TELEPHONE (OUTPATIENT)
Dept: SLEEP CENTER | Facility: CLINIC | Age: 62
End: 2022-07-22

## 2022-08-05 ENCOUNTER — TELEPHONE (OUTPATIENT)
Dept: SLEEP CENTER | Facility: CLINIC | Age: 62
End: 2022-08-05

## 2022-08-05 NOTE — TELEPHONE ENCOUNTER
Prema G3 bipap, set @ IPAP-23cm, EPAP-19cm, heated humidifier, heated tubing,[facility]   Set up @ [facility], per script Dr Mike Marino

## 2022-08-09 ENCOUNTER — TELEPHONE (OUTPATIENT)
Dept: SLEEP CENTER | Facility: CLINIC | Age: 62
End: 2022-08-09

## 2022-08-19 ENCOUNTER — TELEPHONE (OUTPATIENT)
Dept: SLEEP CENTER | Facility: CLINIC | Age: 62
End: 2022-08-19

## 2022-08-19 NOTE — TELEPHONE ENCOUNTER
Lyubov Medina will be in to [facility] 8/24 @ 12:30 for mask fit (kelly says 12:00)

## 2022-08-24 ENCOUNTER — TELEPHONE (OUTPATIENT)
Dept: SLEEP CENTER | Facility: CLINIC | Age: 62
End: 2022-08-24

## 2022-09-22 ENCOUNTER — HOSPITAL ENCOUNTER (EMERGENCY)
Facility: HOSPITAL | Age: 62
Discharge: HOME | End: 2022-09-22
Attending: EMERGENCY MEDICINE
Payer: MEDICARE

## 2022-09-22 ENCOUNTER — APPOINTMENT (EMERGENCY)
Dept: RADIOLOGY | Facility: HOSPITAL | Age: 62
End: 2022-09-22
Payer: MEDICARE

## 2022-09-22 VITALS
TEMPERATURE: 98.3 F | HEART RATE: 58 BPM | RESPIRATION RATE: 18 BRPM | SYSTOLIC BLOOD PRESSURE: 114 MMHG | DIASTOLIC BLOOD PRESSURE: 58 MMHG | OXYGEN SATURATION: 96 %

## 2022-09-22 DIAGNOSIS — R07.9 CHEST PAIN, UNSPECIFIED TYPE: Primary | ICD-10-CM

## 2022-09-22 DIAGNOSIS — I10 HYPERTENSION: ICD-10-CM

## 2022-09-22 LAB
2HR DELTA HS TROPONIN: 0 NG/L
ALBUMIN SERPL BCP-MCNC: 3.9 G/DL (ref 3.5–5)
ALP SERPL-CCNC: 95 U/L (ref 46–116)
ALT SERPL W P-5'-P-CCNC: 87 U/L (ref 12–78)
ANION GAP SERPL CALCULATED.3IONS-SCNC: 9 MMOL/L (ref 4–13)
AST SERPL W P-5'-P-CCNC: 58 U/L (ref 5–45)
ATRIAL RATE: 91 BPM
BASOPHILS # BLD AUTO: 0.05 THOUSANDS/ΜL (ref 0–0.1)
BASOPHILS NFR BLD AUTO: 1 % (ref 0–1)
BILIRUB SERPL-MCNC: 0.51 MG/DL (ref 0.2–1)
BUN SERPL-MCNC: 19 MG/DL (ref 5–25)
CALCIUM SERPL-MCNC: 10.3 MG/DL (ref 8.3–10.1)
CARDIAC TROPONIN I PNL SERPL HS: 4 NG/L
CARDIAC TROPONIN I PNL SERPL HS: 4 NG/L
CHLORIDE SERPL-SCNC: 106 MMOL/L (ref 96–108)
CO2 SERPL-SCNC: 26 MMOL/L (ref 21–32)
CREAT SERPL-MCNC: 0.82 MG/DL (ref 0.6–1.3)
EOSINOPHIL # BLD AUTO: 0.17 THOUSAND/ΜL (ref 0–0.61)
EOSINOPHIL NFR BLD AUTO: 3 % (ref 0–6)
ERYTHROCYTE [DISTWIDTH] IN BLOOD BY AUTOMATED COUNT: 13.2 % (ref 11.6–15.1)
GFR SERPL CREATININE-BSD FRML MDRD: 78 ML/MIN/1.73SQ M
GLUCOSE SERPL-MCNC: 150 MG/DL (ref 65–140)
HCT VFR BLD AUTO: 40.6 % (ref 34.8–46.1)
HGB BLD-MCNC: 13.6 G/DL (ref 11.5–15.4)
IMM GRANULOCYTES # BLD AUTO: 0.03 THOUSAND/UL (ref 0–0.2)
IMM GRANULOCYTES NFR BLD AUTO: 0 % (ref 0–2)
LYMPHOCYTES # BLD AUTO: 1.43 THOUSANDS/ΜL (ref 0.6–4.47)
LYMPHOCYTES NFR BLD AUTO: 21 % (ref 14–44)
MCH RBC QN AUTO: 30.8 PG (ref 26.8–34.3)
MCHC RBC AUTO-ENTMCNC: 33.5 G/DL (ref 31.4–37.4)
MCV RBC AUTO: 92 FL (ref 82–98)
MONOCYTES # BLD AUTO: 0.6 THOUSAND/ΜL (ref 0.17–1.22)
MONOCYTES NFR BLD AUTO: 9 % (ref 4–12)
NEUTROPHILS # BLD AUTO: 4.56 THOUSANDS/ΜL (ref 1.85–7.62)
NEUTS SEG NFR BLD AUTO: 66 % (ref 43–75)
NRBC BLD AUTO-RTO: 0 /100 WBCS
P AXIS: 56 DEGREES
PLATELET # BLD AUTO: 232 THOUSANDS/UL (ref 149–390)
PMV BLD AUTO: 10.9 FL (ref 8.9–12.7)
POTASSIUM SERPL-SCNC: 3.5 MMOL/L (ref 3.5–5.3)
PR INTERVAL: 152 MS
PROT SERPL-MCNC: 7.8 G/DL (ref 6.4–8.4)
QRS AXIS: 51 DEGREES
QRSD INTERVAL: 74 MS
QT INTERVAL: 340 MS
QTC INTERVAL: 418 MS
RBC # BLD AUTO: 4.41 MILLION/UL (ref 3.81–5.12)
SODIUM SERPL-SCNC: 141 MMOL/L (ref 135–147)
T WAVE AXIS: 36 DEGREES
VENTRICULAR RATE: 91 BPM
WBC # BLD AUTO: 6.84 THOUSAND/UL (ref 4.31–10.16)

## 2022-09-22 PROCEDURE — 99285 EMERGENCY DEPT VISIT HI MDM: CPT

## 2022-09-22 PROCEDURE — G1004 CDSM NDSC: HCPCS

## 2022-09-22 PROCEDURE — 70450 CT HEAD/BRAIN W/O DYE: CPT

## 2022-09-22 PROCEDURE — 93005 ELECTROCARDIOGRAM TRACING: CPT

## 2022-09-22 PROCEDURE — 99284 EMERGENCY DEPT VISIT MOD MDM: CPT | Performed by: EMERGENCY MEDICINE

## 2022-09-22 PROCEDURE — 96365 THER/PROPH/DIAG IV INF INIT: CPT

## 2022-09-22 PROCEDURE — 84484 ASSAY OF TROPONIN QUANT: CPT | Performed by: EMERGENCY MEDICINE

## 2022-09-22 PROCEDURE — 93010 ELECTROCARDIOGRAM REPORT: CPT | Performed by: INTERNAL MEDICINE

## 2022-09-22 PROCEDURE — 96375 TX/PRO/DX INJ NEW DRUG ADDON: CPT

## 2022-09-22 PROCEDURE — 80053 COMPREHEN METABOLIC PANEL: CPT | Performed by: EMERGENCY MEDICINE

## 2022-09-22 PROCEDURE — 36415 COLL VENOUS BLD VENIPUNCTURE: CPT

## 2022-09-22 PROCEDURE — 85025 COMPLETE CBC W/AUTO DIFF WBC: CPT | Performed by: EMERGENCY MEDICINE

## 2022-09-22 RX ORDER — KETOROLAC TROMETHAMINE 30 MG/ML
15 INJECTION, SOLUTION INTRAMUSCULAR; INTRAVENOUS ONCE
Status: COMPLETED | OUTPATIENT
Start: 2022-09-22 | End: 2022-09-22

## 2022-09-22 RX ORDER — MAGNESIUM SULFATE HEPTAHYDRATE 40 MG/ML
2 INJECTION, SOLUTION INTRAVENOUS ONCE
Status: COMPLETED | OUTPATIENT
Start: 2022-09-22 | End: 2022-09-22

## 2022-09-22 RX ORDER — METOCLOPRAMIDE HYDROCHLORIDE 5 MG/ML
10 INJECTION INTRAMUSCULAR; INTRAVENOUS ONCE
Status: COMPLETED | OUTPATIENT
Start: 2022-09-22 | End: 2022-09-22

## 2022-09-22 RX ORDER — LABETALOL HYDROCHLORIDE 5 MG/ML
10 INJECTION, SOLUTION INTRAVENOUS ONCE
Status: COMPLETED | OUTPATIENT
Start: 2022-09-22 | End: 2022-09-22

## 2022-09-22 RX ADMIN — MAGNESIUM SULFATE HEPTAHYDRATE 2 G: 40 INJECTION, SOLUTION INTRAVENOUS at 04:29

## 2022-09-22 RX ADMIN — KETOROLAC TROMETHAMINE 15 MG: 30 INJECTION, SOLUTION INTRAMUSCULAR at 04:29

## 2022-09-22 RX ADMIN — METOCLOPRAMIDE HYDROCHLORIDE 10 MG: 5 INJECTION INTRAMUSCULAR; INTRAVENOUS at 04:29

## 2022-09-22 RX ADMIN — LABETALOL HYDROCHLORIDE 10 MG: 5 INJECTION, SOLUTION INTRAVENOUS at 05:17

## 2022-09-22 NOTE — ED PROVIDER NOTES
History  Chief Complaint   Patient presents with    Chest Pain     Pt started with chest pain around 12am today, c/o of headache and chest pressure, has hx of hypertension     HPI  Bari Morris is a 63 y o  female with PMH significant for HTN coming in today with complaint of chest pain  She reports she has been having significant chest pain that has been occurring since around midnight tonight  Describes as a heaviness, nonradiating  She also feels lightheaded as well some mild shortness of breath  She reports she vomited 1 time  She otherwise denies any fevers, chills, episodes of syncope, unilateral weakness, vision changes, seizure-like activity  She has a history of severe hypertension has been admitted previously for this  She has been compliant with her blood pressure medication  Denies any prior surgeries or recent travel  No other complaints at this time       - No language barrier  -PFH/PSH reviewed  - History obtained from patient and chart    Prior to Admission Medications   Prescriptions Last Dose Informant Patient Reported? Taking?    Elastic Bandages & Supports (AIRCAST SPORT ANKLE BRACE/LEFT) MISC   No No   Sig: by Does not apply route daily   Elastic Bandages & Supports (KNEE BRACE/HINGED S/M) MISC   No No   Sig: by Does not apply route daily   Mapap Arthritis Pain 650 MG CR tablet   Yes No   Sig: Take 650 mg by mouth every 8 (eight) hours as needed   amLODIPine (NORVASC) 10 mg tablet  Self Yes No   Sig: Take 1 tablet by mouth daily   atorvastatin (LIPITOR) 40 mg tablet  Self Yes No   Sig: Take 40 mg by mouth daily   celecoxib (CeleBREX) 200 mg capsule   No No   Sig: Take 1 capsule (200 mg total) by mouth daily   clonazePAM (KlonoPIN) 0 5 mg tablet   Yes No   Sig: Take 1 tablet by mouth 2 (two) times a day   clotrimazole (LOTRIMIN) 1 % cream   Yes No   Sig: APPLY TO AFFECTED AREA TWICE A DAY IN THE MORNING AND IN THE EVENING   furosemide (LASIX) 40 mg tablet  Self Yes No   Sig: Take 40 mg by mouth daily as needed    hydrALAZINE (APRESOLINE) 25 mg tablet   Yes No   Sig: Take 25 mg by mouth 2 (two) times a day   losartan (COZAAR) 100 MG tablet  Self Yes No   Sig: Take 100 mg by mouth daily   metoprolol tartrate (LOPRESSOR) 25 mg tablet  Self Yes No   Sig: Take 50 mg by mouth 2 (two) times a day    montelukast (SINGULAIR) 10 mg tablet  Self Yes No   Sig: Take 1 tablet by mouth daily   multivitamin (THERAGRAN) TABS   Yes No   Sig: Take 1 tablet by mouth daily   omeprazole (PriLOSEC) 20 mg delayed release capsule   Yes No   Sig: Take 20 mg by mouth daily   sertraline (ZOLOFT) 50 mg tablet   No No   Sig: TAKE 1 TABLET BY MOUTH EVERY MORNING      Facility-Administered Medications: None       Past Medical History:   Diagnosis Date    Bladder prolapse, female, acquired     Epilepsy ([facility] [address] )     Hypertension     Kidney stone     Tear of MCL (medial collateral ligament) of knee        Past Surgical History:   Procedure Laterality Date    HYSTERECTOMY      age 45   Fatemeh Mare OOPHORECTOMY Bilateral     age 45       Family History   Problem Relation Age of Onset    Diabetes Mother     Hypertension Mother     Thyroid disease Mother     Kidney disease Mother     Diabetes Father     Hypertension Father     No Known Problems Daughter     Heart attack Maternal Grandmother     No Known Problems Maternal Grandfather     Diabetes Paternal Grandmother     Kidney cancer Paternal Grandmother     No Known Problems Paternal Grandfather     No Known Problems Son     Breast cancer Paternal Aunt         bilateral    No Known Problems Paternal Aunt     No Known Problems Paternal Aunt     No Known Problems Paternal Aunt     No Known Problems Paternal Aunt     Kidney cancer Paternal Aunt     Diabetes Paternal Aunt      I have reviewed and agree with the history as documented      E-Cigarette/Vaping    E-Cigarette Use Never User      E-Cigarette/Vaping Substances    Nicotine No     THC No     CBD Yes Cream 3000mg     Social History     Tobacco Use    Smoking status: Never Smoker    Smokeless tobacco: Never Used   Vaping Use    Vaping Use: Never used   Substance Use Topics    Alcohol use: Yes     Comment: rarely    Drug use: No        Review of Systems   Constitutional: Negative for chills and fever  HENT: Negative for sore throat  Eyes: Negative for pain and visual disturbance  Respiratory: Negative for shortness of breath  Cardiovascular: Positive for chest pain  Negative for palpitations  Gastrointestinal: Negative for abdominal pain and vomiting  Genitourinary: Negative for dysuria and hematuria  Musculoskeletal: Negative for back pain  Skin: Negative for color change and rash  Neurological: Positive for light-headedness and headaches  Negative for syncope  All other systems reviewed and are negative  Physical Exam  ED Triage Vitals [09/22/22 0218]   Temperature Pulse Respirations Blood Pressure SpO2   98 3 °F (36 8 °C) 88 18 (!) 206/104 97 %      Temp Source Heart Rate Source Patient Position - Orthostatic VS BP Location FiO2 (%)   Oral Monitor Sitting Right arm --      Pain Score       8             Orthostatic Vital Signs  Vitals:    09/22/22 0218 09/22/22 0415 09/22/22 0630   BP: (!) 206/104 (!) 239/107 114/58   Pulse: 88 74 58   Patient Position - Orthostatic VS: Sitting         Physical Exam  Vitals and nursing note reviewed  Constitutional:       General: She is not in acute distress  Appearance: She is well-developed  HENT:      Head: Normocephalic and atraumatic  Eyes:      Conjunctiva/sclera: Conjunctivae normal    Cardiovascular:      Rate and Rhythm: Normal rate and regular rhythm  Heart sounds: No murmur heard  Pulmonary:      Effort: Pulmonary effort is normal  No respiratory distress  Breath sounds: Normal breath sounds  No decreased breath sounds or wheezing  Abdominal:      Palpations: Abdomen is soft  Tenderness:  There is no abdominal tenderness  Musculoskeletal:      Cervical back: Neck supple  Right lower leg: No edema  Left lower leg: No edema  Skin:     General: Skin is warm and dry  Neurological:      General: No focal deficit present  Mental Status: She is alert     Psychiatric:         Mood and Affect: Mood normal          ED Medications  Medications   ketorolac (TORADOL) injection 15 mg (15 mg Intravenous Given 9/22/22 0429)   metoclopramide (REGLAN) injection 10 mg (10 mg Intravenous Given 9/22/22 0429)   magnesium sulfate 2 g/50 mL IVPB (premix) 2 g (0 g Intravenous Stopped 9/22/22 0450)   labetalol (NORMODYNE) injection 10 mg (10 mg Intravenous Given 9/22/22 0517)       Diagnostic Studies  Results Reviewed     Procedure Component Value Units Date/Time    HS Troponin I 2hr [[id number]]  (Normal) Collected: 09/22/22 0453    Lab Status: Final result Specimen: Blood from Arm, Right Updated: 09/22/22 0536     hs TnI 2hr 4 ng/L      Delta 2hr hsTnI 0 ng/L     HS Troponin 0hr (reflex protocol) [[id number]]  (Normal) Collected: 09/22/22 0253    Lab Status: Final result Specimen: Blood from Arm, Right Updated: 09/22/22 0344     hs TnI 0hr 4 ng/L     Comprehensive metabolic panel [[id number]]  (Abnormal) Collected: 09/22/22 0253    Lab Status: Final result Specimen: Blood from Arm, Right Updated: 09/22/22 0322     Sodium 141 mmol/L      Potassium 3 5 mmol/L      Chloride 106 mmol/L      CO2 26 mmol/L      ANION GAP 9 mmol/L      BUN 19 mg/dL      Creatinine 0 82 mg/dL      Glucose 150 mg/dL      Calcium 10 3 mg/dL      AST 58 U/L      ALT 87 U/L      Alkaline Phosphatase 95 U/L      Total Protein 7 8 g/dL      Albumin 3 9 g/dL      Total Bilirubin 0 51 mg/dL      eGFR 78 ml/min/1 73sq m     Narrative:      Matilde guidelines for Chronic Kidney Disease (CKD):     Stage 1 with normal or high GFR (GFR > 90 mL/min/1 73 square meters)    Stage 2 Mild CKD (GFR = 60-89 mL/min/1 73 square meters)    Stage 3A Moderate CKD (GFR = 45-59 mL/min/1 73 square meters)    Stage 3B Moderate CKD (GFR = 30-44 mL/min/1 73 square meters)    Stage 4 Severe CKD (GFR = 15-29 mL/min/1 73 square meters)    Stage 5 End Stage CKD (GFR <15 mL/min/1 73 square meters)  Note: GFR calculation is accurate only with a steady state creatinine    CBC and differential [[id number]] Collected: 09/22/22 0253    Lab Status: Final result Specimen: Blood from Arm, Right Updated: 09/22/22 0321     WBC 6 84 Thousand/uL      RBC 4 41 Million/uL      Hemoglobin 13 6 g/dL      Hematocrit 40 6 %      MCV 92 fL      MCH 30 8 pg      MCHC 33 5 g/dL      RDW 13 2 %      MPV 10 9 fL      Platelets 018 Thousands/uL      nRBC 0 /100 WBCs      Neutrophils Relative 66 %      Immat GRANS % 0 %      Lymphocytes Relative 21 %      Monocytes Relative 9 %      Eosinophils Relative 3 %      Basophils Relative 1 %      Neutrophils Absolute 4 56 Thousands/µL      Immature Grans Absolute 0 03 Thousand/uL      Lymphocytes Absolute 1 43 Thousands/µL      Monocytes Absolute 0 60 Thousand/µL      Eosinophils Absolute 0 17 Thousand/µL      Basophils Absolute 0 05 Thousands/µL                  CT head without contrast   Final Result by Demian Salcedo MD (09/22 7368)      No acute intracranial abnormality  Chronic microangiopathic changes  Workstation performed: [id number]               Procedures  Procedures      ED Course  ED Course as of 09/22/22 1729   Thu Sep 22, 2022   0351 hs TnI 0hr: 4   0525 CT head without contrast   0546 Delta 2hr hsTnI: 0   0610 Pt feeling well, mentating appropriately, will send home with close follow up instructions                             SBIRT 22yo+    Flowsheet Row Most Recent Value   SBIRT (25 yo +)    In order to provide better care to our patients, we are screening all of our patients for alcohol and drug use  Would it be okay to ask you these screening questions?  Unable to answer at this time Filed at: 09/22/2022 1077 MDM  Number of Diagnoses or Management Options  Chest pain, unspecified type  Hypertension  Diagnosis management comments: Estela Mae is a 63 y o  who presents with complaints of chest pain    Vital signs are remarkable for hypertension, physical exam shows no acute deficits, benign cardio respiratory exam, no peripheral edema    Ddx:  Hypertensive emergency versus ICH versus ACS versus other    Plan:  CBC, BMP, EKG, troponin, CT head    Summary: Ultimately her workup showed no end organ damage  She had notable improvement of her symptoms as well  Her sBP was elevated to 230 during her stay so opted to give Labatelol x1  She tolerated this well and eventually became asymptomatic  Delta trop negative, EKG showed no acute changes  Felt safe to send home  Advised on return precautions  Amount and/or Complexity of Data Reviewed  Clinical lab tests: ordered and reviewed  Tests in the radiology section of CPT®: ordered and reviewed    Risk of Complications, Morbidity, and/or Mortality  Presenting problems: moderate  Diagnostic procedures: low  Management options: low    Patient Progress  Patient progress: stable  I discussed the above care and treatment plan with the pt and answered all of their relevant questions  Reviewed test results and imaging findings, as well as pertinent details of the treatment plan as described above  She expressed understanding, was agreeable to the plan of care, and had no further questions or concerns  Portions of the record may have been created with voice recognition software  Occasional wrong word or "sound a like" substitutions may have occurred due to the inherent limitations of voice recognition software    Read the chart carefully and recognize, using context, where substitutions have occurred      Disposition  Final diagnoses:   Chest pain, unspecified type   Hypertension     Time reflects when diagnosis was documented in both MDM as applicable and the Disposition within this note     Time User Action Codes Description Comment    9/22/2022  6:11 AM Echo Mt Add [R07 9] Chest pain, unspecified type     9/22/2022  6:11 AM Echo Mt Add [I10] Hypertension       ED Disposition     ED Disposition   Discharge    Condition   Stable    Date/Time   Thu Sep 22, 2022  6:11 AM    Comment   Nisha Sultana discharge to home/self care  Follow-up Information     Follow up With Specialties Details Why Contact Info Additional Information    Gorge Chong MD Family Medicine Call   [address]  [address]  [address] [facility] [phone]       [address] [place] [address] [address] [facility] Emergency Department Emergency Medicine  If symptoms worsen [address] [id number]  [address] Emergency Department, [address] [place], [place], [address]          Discharge Medication List as of 9/22/2022  6:12 AM      CONTINUE these medications which have NOT CHANGED    Details   amLODIPine (NORVASC) 10 mg tablet Take 1 tablet by mouth daily, Historical Med      atorvastatin (LIPITOR) 40 mg tablet Take 40 mg by mouth daily, Historical Med      celecoxib (CeleBREX) 200 mg capsule Take 1 capsule (200 mg total) by mouth daily, Starting Fri 12/4/2020, Normal      clonazePAM (KlonoPIN) 0 5 mg tablet Take 1 tablet by mouth 2 (two) times a day, Starting Fri 6/19/2020, Historical Med      clotrimazole (LOTRIMIN) 1 % cream APPLY TO AFFECTED AREA TWICE A DAY IN THE MORNING AND IN THE EVENING, Historical Med      !! Elastic Bandages & Supports (AIRCAST SPORT ANKLE BRACE/LEFT) MISC by Does not apply route daily, Starting Fri 7/17/2020, Normal      !!  Elastic Bandages & Supports (KNEE BRACE/HINGED S/M) MISC by Does not apply route daily, Starting Fri 5/15/2020, Normal      furosemide (LASIX) 40 mg tablet Take 40 mg by mouth daily as needed , Historical Med      hydrALAZINE (APRESOLINE) 25 mg tablet Take 25 mg by mouth 2 (two) times a day, Starting Sat 5/2/2020, Historical Med      losartan (COZAAR) 100 MG tablet Take 100 mg by mouth daily, Historical Med      Mapap Arthritis Pain 650 MG CR tablet Take 650 mg by mouth every 8 (eight) hours as needed, Starting Mon 7/27/2020, Historical Med      metoprolol tartrate (LOPRESSOR) 25 mg tablet Take 50 mg by mouth 2 (two) times a day , Historical Med      montelukast (SINGULAIR) 10 mg tablet Take 1 tablet by mouth daily, Historical Med      multivitamin (THERAGRAN) TABS Take 1 tablet by mouth daily, Historical Med      omeprazole (PriLOSEC) 20 mg delayed release capsule Take 20 mg by mouth daily, Starting Tue 8/25/2020, Historical Med      sertraline (ZOLOFT) 50 mg tablet TAKE 1 TABLET BY MOUTH EVERY MORNING, Normal       !! - Potential duplicate medications found  Please discuss with provider  No discharge procedures on file  PDMP Review       Value Time User    PDMP Reviewed  Yes 7/15/2021  3:20 Yoly Spring MD           ED Provider  Attending physically available and evaluated Elina Bradley  COLBY managed the patient along with the ED Attending      Electronically Signed by         Areli Marie MD  09/22/22 3057

## 2022-09-22 NOTE — ED ATTENDING ATTESTATION
9/22/2022  ISonia MD, saw and evaluated the patient  I have discussed the patient with the resident/non-physician practitioner and agree with the resident's/non-physician practitioner's findings, Plan of Care, and MDM as documented in the resident's/non-physician practitioner's note, except where noted  All available labs and Radiology studies were reviewed  I was present for key portions of any procedure(s) performed by the resident/non-physician practitioner and I was immediately available to provide assistance  At this point I agree with the current assessment done in the Emergency Department  I have conducted an independent evaluation of this patient a history and physical is as follows:    ED Course         Critical Care Time  Procedures    62 yo female with hx of htn, seizures, had sudden onset cp and headache this evening  Pt bp was elevated in 200  Pt with nausea, one episode of vomiting  No weakness, numbness  Vss, afebrile, lungs cta, rrr, hypertensive, abdomen soft nontender, no neuro deficits  Hypertensive emergency    Ct head, cardiac workup, pain meds

## 2022-09-22 NOTE — DISCHARGE INSTRUCTIONS
Your workup here was not concerning for anything dangerous  Therefore there is no need for you to stay at the hospital for further testing  We feel safe to send you home  You can use your home BP meds for management of your symptoms  You should follow up with your primary care physician to assess for resolution of your symptoms and to determine if there is further evaluation that needs to be performed      Return to the emergency department if you have any symptoms of worsening headache or chest pain

## 2022-09-23 ENCOUNTER — HOSPITAL ENCOUNTER (EMERGENCY)
Facility: HOSPITAL | Age: 62
Discharge: HOME | End: 2022-09-23
Attending: EMERGENCY MEDICINE
Payer: MEDICARE

## 2022-09-23 VITALS
BODY MASS INDEX: 34.91 KG/M2 | RESPIRATION RATE: 16 BRPM | OXYGEN SATURATION: 100 % | WEIGHT: 197 LBS | SYSTOLIC BLOOD PRESSURE: 138 MMHG | HEART RATE: 67 BPM | TEMPERATURE: 97.9 F | HEIGHT: 63 IN | DIASTOLIC BLOOD PRESSURE: 70 MMHG

## 2022-09-23 DIAGNOSIS — R03.0 ELEVATED BLOOD PRESSURE READING: Primary | ICD-10-CM

## 2022-09-23 DIAGNOSIS — F41.9 ANXIETY: ICD-10-CM

## 2022-09-23 LAB
ANION GAP SERPL CALCULATED.3IONS-SCNC: 8 MMOL/L (ref 4–13)
ATRIAL RATE: 67 BPM
BASOPHILS # BLD AUTO: 0.04 THOUSANDS/ΜL (ref 0–0.1)
BASOPHILS NFR BLD AUTO: 1 % (ref 0–1)
BUN SERPL-MCNC: 18 MG/DL (ref 5–25)
CALCIUM SERPL-MCNC: 10.3 MG/DL (ref 8.4–10.2)
CHLORIDE SERPL-SCNC: 103 MMOL/L (ref 96–108)
CO2 SERPL-SCNC: 28 MMOL/L (ref 21–32)
CREAT SERPL-MCNC: 0.82 MG/DL (ref 0.6–1.3)
EOSINOPHIL # BLD AUTO: 0.22 THOUSAND/ΜL (ref 0–0.61)
EOSINOPHIL NFR BLD AUTO: 3 % (ref 0–6)
ERYTHROCYTE [DISTWIDTH] IN BLOOD BY AUTOMATED COUNT: 13.1 % (ref 11.6–15.1)
GFR SERPL CREATININE-BSD FRML MDRD: 78 ML/MIN/1.73SQ M
GLUCOSE SERPL-MCNC: 125 MG/DL (ref 65–140)
HCT VFR BLD AUTO: 40.3 % (ref 34.8–46.1)
HGB BLD-MCNC: 13.9 G/DL (ref 11.5–15.4)
IMM GRANULOCYTES # BLD AUTO: 0.02 THOUSAND/UL (ref 0–0.2)
IMM GRANULOCYTES NFR BLD AUTO: 0 % (ref 0–2)
LYMPHOCYTES # BLD AUTO: 1.56 THOUSANDS/ΜL (ref 0.6–4.47)
LYMPHOCYTES NFR BLD AUTO: 21 % (ref 14–44)
MCH RBC QN AUTO: 31 PG (ref 26.8–34.3)
MCHC RBC AUTO-ENTMCNC: 34.5 G/DL (ref 31.4–37.4)
MCV RBC AUTO: 90 FL (ref 82–98)
MONOCYTES # BLD AUTO: 0.53 THOUSAND/ΜL (ref 0.17–1.22)
MONOCYTES NFR BLD AUTO: 7 % (ref 4–12)
NEUTROPHILS # BLD AUTO: 5.01 THOUSANDS/ΜL (ref 1.85–7.62)
NEUTS SEG NFR BLD AUTO: 68 % (ref 43–75)
NRBC BLD AUTO-RTO: 0 /100 WBCS
P AXIS: 44 DEGREES
PLATELET # BLD AUTO: 255 THOUSANDS/UL (ref 149–390)
PMV BLD AUTO: 10.3 FL (ref 8.9–12.7)
POTASSIUM SERPL-SCNC: 3.6 MMOL/L (ref 3.5–5.3)
PR INTERVAL: 165 MS
QRS AXIS: 32 DEGREES
QRSD INTERVAL: 85 MS
QT INTERVAL: 380 MS
QTC INTERVAL: 405 MS
RBC # BLD AUTO: 4.49 MILLION/UL (ref 3.81–5.12)
SODIUM SERPL-SCNC: 139 MMOL/L (ref 135–147)
T WAVE AXIS: 45 DEGREES
VENTRICULAR RATE: 68 BPM
WBC # BLD AUTO: 7.38 THOUSAND/UL (ref 4.31–10.16)

## 2022-09-23 PROCEDURE — 93010 ELECTROCARDIOGRAM REPORT: CPT | Performed by: INTERNAL MEDICINE

## 2022-09-23 PROCEDURE — 99284 EMERGENCY DEPT VISIT MOD MDM: CPT | Performed by: PHYSICIAN ASSISTANT

## 2022-09-23 PROCEDURE — 93005 ELECTROCARDIOGRAM TRACING: CPT

## 2022-09-23 PROCEDURE — 36415 COLL VENOUS BLD VENIPUNCTURE: CPT | Performed by: PHYSICIAN ASSISTANT

## 2022-09-23 PROCEDURE — 80048 BASIC METABOLIC PNL TOTAL CA: CPT | Performed by: PHYSICIAN ASSISTANT

## 2022-09-23 PROCEDURE — 99284 EMERGENCY DEPT VISIT MOD MDM: CPT

## 2022-09-23 PROCEDURE — 85025 COMPLETE CBC W/AUTO DIFF WBC: CPT | Performed by: PHYSICIAN ASSISTANT

## 2022-09-23 RX ORDER — CLONIDINE HYDROCHLORIDE 0.1 MG/1
0.1 TABLET ORAL ONCE
Status: COMPLETED | OUTPATIENT
Start: 2022-09-23 | End: 2022-09-23

## 2022-09-23 RX ORDER — DIAZEPAM 2 MG/1
2 TABLET ORAL ONCE
Status: COMPLETED | OUTPATIENT
Start: 2022-09-23 | End: 2022-09-23

## 2022-09-23 RX ADMIN — DIAZEPAM 2 MG: 2 TABLET ORAL at 16:51

## 2022-09-23 RX ADMIN — CLONIDINE HYDROCHLORIDE 0.1 MG: 0.1 TABLET ORAL at 16:52

## 2022-09-23 NOTE — ED PROVIDER NOTES
History  Chief Complaint   Patient presents with    Hypertension     Pt states she has been having high blood pressure readings at home and was at [facility] earlier this week  Pt states she believes she is getting an aura and thinks she may have a seizure  Past Medical History: Bladder prolapse, female, acquired, Epilepsy, HTN, Kidney stone, Tear of MCL knee   Past Surgical History: HYSTERECTOMY, B/L OOPHORECTOMY    Pt presents to ED concerned about several, intermittent high blood pressure readings at home, with home monitor, without cp, sob, abd pain, no Nausea/diarrhea, but states 1 episode of vomiting, however has tolerated orals since, + HA, NO diaphoresis, no LE edema, and she had a seizure few months ago and feels like she "might be getting another one", states she feels "twitching in her eye"  Pt seen at another ED, [facility], yesterday  For cp, elevated BP, had labs, ekg, cxr, medication, dc home  Pt is taking her medications as directed  Prior to Admission Medications   Prescriptions Last Dose Informant Patient Reported? Taking?    Elastic Bandages & Supports (AIRCAST SPORT ANKLE BRACE/LEFT) MISC   No No   Sig: by Does not apply route daily   Elastic Bandages & Supports (KNEE BRACE/HINGED S/M) MISC   No No   Sig: by Does not apply route daily   Mapap Arthritis Pain 650 MG CR tablet   Yes No   Sig: Take 650 mg by mouth every 8 (eight) hours as needed   amLODIPine (NORVASC) 10 mg tablet  Self Yes No   Sig: Take 1 tablet by mouth daily   atorvastatin (LIPITOR) 40 mg tablet  Self Yes No   Sig: Take 40 mg by mouth daily   celecoxib (CeleBREX) 200 mg capsule   No No   Sig: Take 1 capsule (200 mg total) by mouth daily   clonazePAM (KlonoPIN) 0 5 mg tablet   Yes No   Sig: Take 1 tablet by mouth 2 (two) times a day   clotrimazole (LOTRIMIN) 1 % cream   Yes No   Sig: APPLY TO AFFECTED AREA TWICE A DAY IN THE MORNING AND IN THE EVENING   furosemide (LASIX) 40 mg tablet  Self Yes No   Sig: Take 40 mg by mouth daily as needed    hydrALAZINE (APRESOLINE) 25 mg tablet   Yes No   Sig: Take 25 mg by mouth 2 (two) times a day   losartan (COZAAR) 100 MG tablet  Self Yes No   Sig: Take 100 mg by mouth daily   metoprolol tartrate (LOPRESSOR) 25 mg tablet  Self Yes No   Sig: Take 50 mg by mouth 2 (two) times a day    montelukast (SINGULAIR) 10 mg tablet  Self Yes No   Sig: Take 1 tablet by mouth daily   multivitamin (THERAGRAN) TABS   Yes No   Sig: Take 1 tablet by mouth daily   omeprazole (PriLOSEC) 20 mg delayed release capsule   Yes No   Sig: Take 20 mg by mouth daily   sertraline (ZOLOFT) 50 mg tablet   No No   Sig: TAKE 1 TABLET BY MOUTH EVERY MORNING      Facility-Administered Medications: None       Past Medical History:   Diagnosis Date    Bladder prolapse, female, acquired     Epilepsy ([facility] [address] )     Hypertension     Kidney stone     Tear of MCL (medial collateral ligament) of knee        Past Surgical History:   Procedure Laterality Date    HYSTERECTOMY      age 45   Meredith Belch OOPHORECTOMY Bilateral     age 45       Family History   Problem Relation Age of Onset    Diabetes Mother     Hypertension Mother     Thyroid disease Mother     Kidney disease Mother     Diabetes Father     Hypertension Father     No Known Problems Daughter     Heart attack Maternal Grandmother     No Known Problems Maternal Grandfather     Diabetes Paternal Grandmother     Kidney cancer Paternal Grandmother     No Known Problems Paternal Grandfather     No Known Problems Son     Breast cancer Paternal Aunt         bilateral    No Known Problems Paternal Aunt     No Known Problems Paternal Aunt     No Known Problems Paternal Aunt     No Known Problems Paternal Aunt     Kidney cancer Paternal Aunt     Diabetes Paternal Aunt      I have reviewed and agree with the history as documented      E-Cigarette/Vaping    E-Cigarette Use Never User      E-Cigarette/Vaping Substances    Nicotine No     THC No     CBD Yes Cream 3000mg Social History     Tobacco Use    Smoking status: Never Smoker    Smokeless tobacco: Never Used   Vaping Use    Vaping Use: Never used   Substance Use Topics    Alcohol use: Yes     Comment: rarely    Drug use: No       Review of Systems   Constitutional: Negative for chills and fever  HENT: Negative for hearing loss, sore throat and trouble swallowing  Eyes: Negative for visual disturbance  Respiratory: Negative for cough and shortness of breath  Cardiovascular: Positive for chest pain  Negative for leg swelling  Gastrointestinal: Positive for nausea and vomiting  Negative for abdominal pain and diarrhea  Genitourinary: Negative for dysuria and frequency  Musculoskeletal: Negative for arthralgias and myalgias  Skin: Negative for pallor and rash  Neurological: Positive for dizziness and headaches  Negative for weakness  Psychiatric/Behavioral: Negative for behavioral problems  The patient is nervous/anxious  All other systems reviewed and are negative  Physical Exam  Physical Exam  Vitals and nursing note reviewed  Constitutional:       General: She is not in acute distress  Appearance: She is well-developed  She is obese  She is not ill-appearing  HENT:      Head: Normocephalic and atraumatic  Right Ear: External ear normal       Left Ear: External ear normal       Nose: Nose normal       Mouth/Throat:      Mouth: Mucous membranes are moist       Pharynx: Oropharynx is clear  Eyes:      Conjunctiva/sclera: Conjunctivae normal    Cardiovascular:      Rate and Rhythm: Normal rate and regular rhythm  Pulmonary:      Effort: Pulmonary effort is normal  No respiratory distress  Breath sounds: Normal breath sounds  Abdominal:      General: Bowel sounds are normal       Palpations: Abdomen is soft  Musculoskeletal:         General: Normal range of motion  Cervical back: Normal range of motion and neck supple     Lymphadenopathy:      Cervical: No cervical adenopathy  Skin:     General: Skin is warm and dry  Capillary Refill: Capillary refill takes less than 2 seconds  Findings: No lesion or rash  Neurological:      General: No focal deficit present  Mental Status: She is alert  Mental status is at baseline  Cranial Nerves: No cranial nerve deficit  Motor: No weakness     Psychiatric:         Behavior: Behavior normal       Comments: Slightly anxious, upset about recent ED visit         Vital Signs  ED Triage Vitals   Temperature Pulse Respirations Blood Pressure SpO2   09/23/22 1632 09/23/22 1632 09/23/22 1632 09/23/22 1632 09/23/22 1632   97 9 °F (36 6 °C) 80 16 (!) 179/88 100 %      Temp Source Heart Rate Source Patient Position - Orthostatic VS BP Location FiO2 (%)   09/23/22 1632 09/23/22 1750 09/23/22 1632 09/23/22 1632 --   Oral Monitor Lying Right arm       Pain Score       09/23/22 1632       No Pain           Vitals:    09/23/22 1632 09/23/22 1712 09/23/22 1750   BP: (!) 179/88 (!) 146/45 138/70   Pulse: 80  67   Patient Position - Orthostatic VS: Lying Lying Lying         Visual Acuity      ED Medications  Medications   diazepam (VALIUM) tablet 2 mg (2 mg Oral Given 9/23/22 1651)   cloNIDine (CATAPRES) tablet 0 1 mg (0 1 mg Oral Given 9/23/22 1652)       Diagnostic Studies  Results Reviewed     Procedure Component Value Units Date/Time    Basic metabolic panel [[id number]]  (Abnormal) Collected: 09/23/22 1649    Lab Status: Final result Specimen: Blood from Arm, Right Updated: 09/23/22 1708     Sodium 139 mmol/L      Potassium 3 6 mmol/L      Chloride 103 mmol/L      CO2 28 mmol/L      ANION GAP 8 mmol/L      BUN 18 mg/dL      Creatinine 0 82 mg/dL      Glucose 125 mg/dL      Calcium 10 3 mg/dL      eGFR 78 ml/min/1 73sq m     Narrative:      [place] guidelines for Chronic Kidney Disease (CKD):     Stage 1 with normal or high GFR (GFR > 90 mL/min/1 73 square meters)    Stage 2 Mild CKD (GFR = 60-89 mL/min/1 73 square meters)    Stage 3A Moderate CKD (GFR = 45-59 mL/min/1 73 square meters)    Stage 3B Moderate CKD (GFR = 30-44 mL/min/1 73 square meters)    Stage 4 Severe CKD (GFR = 15-29 mL/min/1 73 square meters)    Stage 5 End Stage CKD (GFR <15 mL/min/1 73 square meters)  Note: GFR calculation is accurate only with a steady state creatinine    CBC and differential [[id number]] Collected: 09/23/22 1649    Lab Status: Final result Specimen: Blood from Arm, Right Updated: 09/23/22 1653     WBC 7 38 Thousand/uL      RBC 4 49 Million/uL      Hemoglobin 13 9 g/dL      Hematocrit 40 3 %      MCV 90 fL      MCH 31 0 pg      MCHC 34 5 g/dL      RDW 13 1 %      MPV 10 3 fL      Platelets 611 Thousands/uL      nRBC 0 /100 WBCs      Neutrophils Relative 68 %      Immat GRANS % 0 %      Lymphocytes Relative 21 %      Monocytes Relative 7 %      Eosinophils Relative 3 %      Basophils Relative 1 %      Neutrophils Absolute 5 01 Thousands/µL      Immature Grans Absolute 0 02 Thousand/uL      Lymphocytes Absolute 1 56 Thousands/µL      Monocytes Absolute 0 53 Thousand/µL      Eosinophils Absolute 0 22 Thousand/µL      Basophils Absolute 0 04 Thousands/µL                  No orders to display              Procedures  Procedures         ED Course                               SBIRT 22yo+    Flowsheet Row Most Recent Value   SBIRT (23 yo +)    In order to provide better care to our patients, we are screening all of our patients for alcohol and drug use  Would it be okay to ask you these screening questions? No Filed at: 09/23/2022 1633                    MDM  Number of Diagnoses or Management Options  Diagnosis management comments: ED note from yesterday states: Ultimately her workup showed no end organ damage  She had notable improvement of her symptoms as well  Her sBP was elevated to 230 during her stay so opted to give Labatelol x1  She tolerated this well and eventually became asymptomatic   Delta trop negative, EKG showed no acute changes  Felt safe to send home  Advised on return precautions  Labs wnl, BP responded well to medication, repeat /56, pt without sx at present  Pt states has been having increased family stress and thinks that's what is contributing to her recent CP and elevated BP; has good FU with PCP and has cardiologist       Amount and/or Complexity of Data Reviewed  Clinical lab tests: ordered and reviewed  Review and summarize past medical records: yes  Discuss the patient with other providers: yes        Disposition  Final diagnoses:   Elevated blood pressure reading   Anxiety     Time reflects when diagnosis was documented in both MDM as applicable and the Disposition within this note     Time User Action Codes Description Comment    9/23/2022  5:55 PM Juju Watkins Add [R03 0] Elevated blood pressure reading     9/23/2022  5:55 PM Juju Watkins Add [F41 9] Anxiety       ED Disposition     ED Disposition   Discharge    Condition   Stable    Date/Time   Fri Sep 23, 2022  5:54 PM    Comment   Kike Sultana discharge to home/self care  Follow-up Information     Follow up With Specialties Details Why Contact Bassem Enciso, [address] [address]  [address] [place] [address]  [phone]            Patient's Medications   Discharge Prescriptions    No medications on file       No discharge procedures on file      PDMP Review       Value Time User    PDMP Reviewed  Yes 7/15/2021  3:20 AM Beau Blackwell MD          ED Provider  Electronically Signed by           Kayla Valentine PA-C  09/23/22 0073

## 2022-09-23 NOTE — DISCHARGE INSTRUCTIONS
Continue to take your medication as directed  Keep track of your BP readings, mid morning and mid afternoon  Follow-up with PCP

## 2022-10-13 ENCOUNTER — OFFICE VISIT (OUTPATIENT)
Dept: NEUROLOGY | Facility: CLINIC | Age: 62
End: 2022-10-13

## 2022-10-13 VITALS
WEIGHT: 200.5 LBS | DIASTOLIC BLOOD PRESSURE: 61 MMHG | SYSTOLIC BLOOD PRESSURE: 123 MMHG | BODY MASS INDEX: 35.52 KG/M2 | HEART RATE: 70 BPM

## 2022-10-13 DIAGNOSIS — R56.9 SEIZURE-LIKE ACTIVITY (HCC): Primary | ICD-10-CM

## 2022-10-13 RX ORDER — AZITHROMYCIN 250 MG/1
TABLET, FILM COATED ORAL
COMMUNITY
Start: 2022-09-20

## 2022-10-13 RX ORDER — ATORVASTATIN CALCIUM 80 MG/1
TABLET, FILM COATED ORAL
COMMUNITY
Start: 2022-10-05

## 2022-10-13 RX ORDER — METOPROLOL TARTRATE 100 MG/1
TABLET ORAL
COMMUNITY
Start: 2022-09-27

## 2022-10-13 RX ORDER — HYDRALAZINE HYDROCHLORIDE 50 MG/1
TABLET, FILM COATED ORAL
COMMUNITY
Start: 2022-09-27

## 2022-10-13 NOTE — PATIENT INSTRUCTIONS
- Call the office with further episodes concerning for seizures  - Call the office with you are 6 months free of events  - Follow up in 3 months with Dr Lavern Nixon  - Have your EEG done

## 2022-10-13 NOTE — PROGRESS NOTES
Patient ID: Octaviano Trevino is a 63 y o  female with unclear possible history of seizures vs syncope, who is returning to Neurology office for follow up of her seizure  Assessment/Plan:    Seizure-like activity [facility])  Patient with unclear history of seizures vs syncope presents to the office for follow up  Most recent event occurred when she was seen in the ER for a significant nose bleed when she reported feeling like she was going to have a seizure  There continues to be concern prodrome that this is syncope rather than seizure  No further episodes since last visit  Recent routine EEG was normal, ambulatory EEG ordered  Continue off of medications for now  If there are further events she will call the office  Follow up in 3 months with Dr Yung Sanchez  May return to driving once she is event free for 6 months, paperwork can be submitted to [facility] at that time  She will Return for follow up with Dr Yung Sanchez in 3-4 months (cancel next week appointment)  Subjective:  Octaviano Trevino is a 63 y o  female with unclear possible history of seizures vs syncope, who is returning to Neurology office for follow up of her seizure  She was last seen int he office on 6/15/22  At that time, She was recently int he ER for a significant nose bleed when she reported feeling like having a seizure  It was unclear if this represents a vasovagal syncope episode rather than seizure due to patient reporting gagging due to the nose bleed, feeling woozy, and then seeing blue dots and everything went black  Per nursing report her eyes rolled up, slight upper arm/face twitching noted) lasting 4-5 seconds  It is possible that her symptoms were prodromal symptoms to convulsive vasovagal syncope  It was slightly different than her most recent episode concerning for seizure about 5 years ago  Reported "auras" of visual symptoms about once per month  Recommended EEG and MRI brain given possible new event type   Noted prior imaging with pineal gland cyst  She was awaiting new CPAP machine due to recall  Vertigo was an ongoing issue as well prompting MRI brain  Recommended 3 month follow up with Dr Trung Troncoso  Since their last visit, she has been in and out of the hospital due to elevated blood pressure and stress  She sees her PCP once per week for her blood pressure  SHe is on a higher dose of metoprolol 100 mg BID  No episodes concerning for seizures since her last visit  Current AEDs:  None     Event/Seizure semiology:  Lightheaded, nauseous, ammonia smell, staring, collapse, apparent convulsion       Special Features  Status epilepticus: reports cluster of 3 events many years ago when in the hospital  Self Injury Seizures: possible head injury  Precipitating Factors: unknown     Epilepsy Risk Factors:  Possible head injury  No family history   Normal birth and development       Prior AEDs:  Phenytoin, divalproex, phenobarbital ineffective  Lacosamide caused nausea and vomting     Prior Evaluation:  Brain MRI w/o 1/30/2020: unchanged, stable pineal cyst  Brain MRI ordered at last visit, not completed  CT head wo contrast 9/22/22: IMPRESSION:  No acute intracranial abnormality  Chronic microangiopathic changes      All prior EEGs thought to be normal  7/8/22 Routine EEG : Normal     Background from 6/23/2020 note:   "66 year old female with remote history of seizures for 20 years from [facility] to 2012 that did not respond to Dilantin, Vimpat, Depakote, and phenobarbital  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  Previous routine EEGs were all normal  Dr Arjun Rogers formerly followed the patient and when he first saw her in January 2013, he tried her on the Vimpat but she developed nausea and vomiting, so she stopped taking it and has not been on seizure medication since then       She remained seizure free for 5 years, but on 11/5/17 she tripped and fell over a tree stump and she hit her head on concrete and she does not recall what happened for the next 30 minutes  Patient assumed she had had a seizure because she felt “headachy” when she woke up, but it is also possible that she developed headache from a concussion  I ordered an EEG when I saw her on 1/18/18 to check for any new abnormalities and if she did I would consider placing her on a seizure medication  Note that she has a history of kidney stones so should not be given Topamax or Zonegran       When I saw the patient on 2/8/18, I reviewed the results of her EEG which had been normal  I thought it best not to start the patient on seizure medication at that time  "  [id number] [facility] with son  He has ranch with no stairs       I reviewed prior neurology notes, most recent labs, EEG report, as documented in Epic/VTEX, and summarized above  Objective:    Blood pressure 123/61, pulse 70, weight 90 9 kg (200 lb 8 oz)     Physical Exam  No apparent distress  Appears well nourished    Mood appropriate for situation     Neurologic Exam  Mental status- alert and oriented to person, place, and time  Speech appropriate for conversation  Good attention and knowledge       Cranial Nerves- PERRL, VFFTC, EOMS normal, facial sensation and muscles symmetric, hearing intact bilaterally to finger rubs, tongue midline, palate rise symmetrical, shoulder shrug symmetrical      Motor- No pronator drift  Appropriate strength  Moves all extremities freely  No tremor      Sensory-  Intact distally in all extremities to light touch       DTRs- 2+ and symmetric in all extremities     Gait- wide based, steady casual gait       Coordination- FNF intact         ROS:  Review of Systems   Constitutional: Negative  Negative for appetite change and fever  HENT: Negative  Negative for hearing loss, tinnitus, trouble swallowing and voice change  Eyes: Negative  Negative for photophobia, pain and visual disturbance  Respiratory: Negative    Negative for shortness of breath  Cardiovascular: Negative  Negative for palpitations  Gastrointestinal: Negative  Negative for nausea and vomiting  Endocrine: Negative  Negative for cold intolerance  Genitourinary: Negative  Negative for dysuria, frequency and urgency  Musculoskeletal: Negative  Negative for gait problem, myalgias and neck pain  Skin: Negative  Negative for rash  Allergic/Immunologic: Negative  Neurological: Negative  Negative for dizziness, tremors, seizures, syncope, facial asymmetry, speech difficulty, weakness, light-headedness, numbness and headaches  Hematological: Negative  Does not bruise/bleed easily  Psychiatric/Behavioral: Negative  Negative for confusion, hallucinations and sleep disturbance  All other systems reviewed and are negative  ROS obtained by MA and reviewed by myself  This note may have been created using voice recognition software  There may be unintentional errors such as grammatical errors, spelling errors, or pronoun errors

## 2022-10-14 ENCOUNTER — TELEPHONE (OUTPATIENT)
Dept: NEUROLOGY | Facility: CLINIC | Age: 62
End: 2022-10-14

## 2022-10-14 NOTE — TELEPHONE ENCOUNTER
----- Message from Nehemias Goyal sent at 10/13/2022  4:33 PM EDT -----  Regarding: rides to appointments  Patient is unable to drive due to seizures  She is requesting assistance with rides to appointments   Please assist

## 2022-10-14 NOTE — TELEPHONE ENCOUNTER
MSW phoned patient this date at [phone]  Patient stated that she has been in contact with someone at her insurance company through a program called [employer] and that person provided her with an application for [employer]  Patient stated that this person is also helping her get in home nursing  Patient stated that she has the [employer] application complete, but has not mailed same in yet  MSW advised patient to return ASAP and that it is likely that she will receive a 6 month temporary pass to use the [employer] door to door service, as they are currently holding off on in-person assessments  MSW advised that patient will likely need to have an in-person assessment once the 6 months is up to see if she qualifies for ongoing transportation  Patient stated that her son will transport her to her upcoming sleep medicine appt and that her daughter-in-law will transport her to/from her upcoming EEG appts  Patient requested information on home delivery of  Groceries  MSW did stated that [employer] would be an option or [address] on [address] [facility], [address] has a [employer]  MSW will email patient information on the [facility]'s grocery shopping service to [email or web address]

## 2022-10-17 NOTE — TELEPHONE ENCOUNTER
THIAGO sent the following email to patient at [place]  [id number]  com:    "Eh [place],     1) Here is information on the grocery shopping service offered by [employer] the [address] [employer]: [email or web address] org/services/#grocery-shopping     We are happy to offer grocery shopping to our clients and those around the [facility] who have difficulty getting to the grocery store  Our grocery shopping program is convenient and easy to use, and our volunteers complete more than 1,700 shopping trips per year! Our grocery shopping program offers flexibility (volunteers shop at your convenience), security (all volunteers are background-checked), and affordability ($5 00 per trip for Home Delivered Meal clients, $10 everyone else)  You’ll have the same volunteer each visit, and you pick the grocery store ([employer], [employer], or [employer])  If our grocery shopping program sounds like it’s for you, check out our FAQs below or contact Kaur at [phone] for more information about how you can get your groceries from [employer] of the [address] [facility] [employer]  [facility] of the [address] [facility]’  [address]  How much does the grocery shopping service cost?  Standard delivery fee is $10 per shopping trip and $5 if you are receiving our daily meal services  Who can get grocery shopping services? Grocery shopping is available to people who have difficulty getting out to the store to shop for themselves due to health, transportation, or other issues  What grocery stores can I use? Our shoppers can shop at [employer], [employer], and [employer]  When will my groceries be delivered? A volunteer will be scheduled to fit into your schedule  Most shopping trips happen between Tuesday afternoon and Friday afternoon  You will be given a 2-hour window for delivery once your shopping volunteer is scheduled  How do I pay for my groceries?   All groceries are billed through [facility] [facility] of the [facility] [employer]  You can pay with SNAP benefits, credit card, or check  We keep your information on file  We charge your credit card or SNAP account when shopping is completed  Check payments are due at the time of delivery  How do I order groceries? Grocery orders are taken on Mondays  Clients can call our Market on [facility] at [phone] anytime between [address] Friday and 8 am Monday and leave their name and phone number for service  Market on [place] or volunteers call clients between 8:30am and 1pm on Mondays to take their grocery orders  What can I order? Our shoppers will shop for anything in the store with the exception of alcohol, tobacco products, lottery tickets and prescription medication  We limit orders to 25 items  See our [address] for more information  Who will deliver my groceries? An authorized [facility] of the [facility] [employer] volunteer will shop for and deliver your groceries  All volunteers have had criminal background checks  You will get the volunteer’s name before he or she goes shopping  Can I get sale prices with a bonus/’s card? [facility] of the [facility] [employer] has ’s/bonus cards set up with each store, so that you will get the sale price  Can I use coupons for my shopping? We cannot use  coupons at this time  What if my  can’t find what I want? Your  will be given your phone number with your order  If he/she has questions or if something is not available, your  will call you  What if they buy the wrong item? We do our best to get exactly what you want  Unfortunately, mistakes happen  We will work with you to fix the problem if necessary  2) You can always consider other options for grocery delivery via [address] [place] [address] Road which is an adilson that you can download to your start phone/tablet  You can order online/phone to have groceries delivered  Fees will apply   For general information on [employer], use this link: [employer]     Best Regards,    Chito Hagen, MSW   [facility]  [facility] [address], [address] [facility]  [phone] - phone  [phone] - fax   Earl YASSSU  [facility]"    MSW will follow-up with patient in a few days to follow-up regarding her Tammy Zelaya application and the grocery shopping services

## 2022-10-20 NOTE — TELEPHONE ENCOUNTER
MSW attempted to reach patient at [phone] to follow-up regarding Debbe Hertz application and grocery shopping delivery service  No answer  MSW left a message requesting callback  Awaiting same

## 2022-10-21 NOTE — TELEPHONE ENCOUNTER
MSW attempted to reach patient at [phone]  No answer  MSW left a message requesting callback with this writer's direct callback number  Awaiting same

## 2022-10-21 NOTE — TELEPHONE ENCOUNTER
MSW received callback from patient ([phone])  Patient stated that she did submit the Saad Alan application on 76/84/91 via mail  MSW will follow-up with Saad Alan next week to ensure that they received same  MSW did advise that if the application is complete, that Saad Alan will likely be able to process her application in 3 weeks, but if any information is missing from the application that Saad Alan will send it back to her to complete  MSW confirmed that patient received the [facility] of the [facility] [employer]  Patient stated that she is interested and that she will call them to get registered/begin to use the service

## 2022-10-21 NOTE — TELEPHONE ENCOUNTER
Hi, this is Elane Mins    You called yesterday to ask me some questions  If you can give me a call back  The numbers [phone]  Thank you   Bye

## 2022-10-25 NOTE — TELEPHONE ENCOUNTER
MSW spoke with Mehran Ortega at [facility] this date  She stated that they did receive patient's application but there was missing information in section 3A and that patient did not provide a proof of age document  Mehran Ortega stated that application was mailed back to patient on 10/24 and missing information was highlighted  MSW attempted to reach at [phone] to alert her to above  No answer  MSW left a detailed message making her aware that the application for [facility] was sent back to her and will need to be completed/returned to [facility] for processing  MSW will follow-up with patient next week to ensure that she received the application back and if she was able to resubmit it yet

## 2022-10-26 NOTE — TELEPHONE ENCOUNTER
LATE ENTRY FROM 10/25/22:    MSW received a call from patient ([phone])  She stated that she will watch for the [employer] application to come in the mail and will address/return as soon as possible  If patient has any questions on how to complete the questions, she will reach out to this writer  Patient was made aware that she will need to provide a proof of age document to accompany the application  Patient went on to state that she has been living with her son and daughter-in-law, but they are moving to [place] so she will need to find a new apartment with preferably no stairs as she has leg/back problems  Patient stated that she did reach out to the housing authority  Patient stated that she thought she was on their waiting list, but that she must have been removed from same when she gave up an apartment when she turned down an apartment in [facility] in the past  Patient stated that the [employer] will be sending her an application, but that there is at a minimum a year waiting list  MSW also advised that the [place] [address] does also offer housing services  MSW provided the number for this agency as [phone], ext [phone]  Patient stated that she will contact this agency to see if they can offer housing assistance as well  Patient did state that she has  through her insurance (program called [employer]) that is trying to connect her to community resources as well  MSW will follow-up with patient next week to see if she received/completed/returned the [employer] application for processing

## 2022-11-02 ENCOUNTER — OFFICE VISIT (OUTPATIENT)
Dept: SLEEP CENTER | Facility: CLINIC | Age: 62
End: 2022-11-02

## 2022-11-02 VITALS
SYSTOLIC BLOOD PRESSURE: 118 MMHG | BODY MASS INDEX: 35.3 KG/M2 | HEIGHT: 63 IN | DIASTOLIC BLOOD PRESSURE: 60 MMHG | WEIGHT: 199.2 LBS

## 2022-11-02 DIAGNOSIS — G47.33 OSA (OBSTRUCTIVE SLEEP APNEA): Primary | ICD-10-CM

## 2022-11-02 DIAGNOSIS — G47.33 OBSTRUCTIVE SLEEP APNEA (ADULT) (PEDIATRIC): ICD-10-CM

## 2022-11-02 NOTE — PROGRESS NOTES
Progress Note - [address] CZS:[birth year] MRN: [id number]      Reason for Visit:  63 y  o female here for PAP compliance check    Assessment:  Doing well with new PAP device  Sleep quality is improved and the patient feels less drowsy  Compliance data show utilization for greater than or equal to 70% of nights, for greater than or equal to 4 hours per night  Plan:  Adequate compliance and successful treatment    Follow up: One year    History of Present Illness:  History of VALERY on PAP therapy  Meets adequate compliance          Review of Systems      Genitourinary need to urinate more than twice a night  Cardiology ankle/leg swelling  Gastrointestinal none  Neurology frequent headaches and muscle weakness  Constitutional fatigue  Integumentary none  Psychiatry anxiety  Musculoskeletal joint pain, muscle aches, back pain, sciatica and leg cramps  Pulmonary snoring  ENT none  Endocrine none  Hematological none      I have reviewed and updated the review of systems as necessary    Historical Information    Past Medical History:   Diagnosis Date   • Bladder prolapse, female, acquired    • Epilepsy ([address] )    • Hypertension    • Kidney stone    • Tear of MCL (medial collateral ligament) of knee          Past Surgical History:   Procedure Laterality Date   • HYSTERECTOMY      age 45   • OOPHORECTOMY Bilateral     age 45         Social History     Socioeconomic History   • Marital status: Single     Spouse name: None   • Number of children: None   • Years of education: None   • Highest education level: None   Occupational History   • None   Tobacco Use   • Smoking status: Never Smoker   • Smokeless tobacco: Never Used   Vaping Use   • Vaping Use: Never used   Substance and Sexual Activity   • Alcohol use: Yes     Comment: rarely   • Drug use: No   • Sexual activity: None   Other Topics Concern   • None   Social History Narrative   • None     Social Determinants of Health     Financial Resource Strain: Not on file   Food Insecurity: Not on file   Transportation Needs: Not on file   Physical Activity: Not on file   Stress: Not on file   Social Connections: Not on file   Intimate Partner Violence: Not on file   Housing Stability: Not on file         Family History   Problem Relation Age of Onset   • Diabetes Mother    • Hypertension Mother    • Thyroid disease Mother    • Kidney disease Mother    • Diabetes Father    • Hypertension Father    • No Known Problems Daughter    • Heart attack Maternal Grandmother    • No Known Problems Maternal Grandfather    • Diabetes Paternal Grandmother    • Kidney cancer Paternal Grandmother    • No Known Problems Paternal Grandfather    • No Known Problems Son    • Breast cancer Paternal Aunt         bilateral   • No Known Problems Paternal Aunt    • No Known Problems Paternal Aunt    • No Known Problems Paternal Aunt    • No Known Problems Paternal Aunt    • Kidney cancer Paternal Aunt    • Diabetes Paternal Aunt        Medications/Allergies:      Current Outpatient Medications:   •  amLODIPine (NORVASC) 10 mg tablet, Take 1 tablet by mouth daily PT states taking 40 mg, Disp: , Rfl:   •  atorvastatin (LIPITOR) 80 mg tablet, , Disp: , Rfl:   •  azithromycin (ZITHROMAX) 250 mg tablet, TAKE 2 TABLETS BY MOUTH TODAY, THEN TAKE 1 TABLET DAILY FOR 4 DAYS, Disp: , Rfl:   •  celecoxib (CeleBREX) 200 mg capsule, Take 1 capsule (200 mg total) by mouth daily, Disp: 30 capsule, Rfl: 1  •  clonazePAM (KlonoPIN) 0 5 mg tablet, Take 1 tablet by mouth 2 (two) times a day, Disp: , Rfl:   •  clotrimazole (LOTRIMIN) 1 % cream, APPLY TO AFFECTED AREA TWICE A DAY IN THE MORNING AND IN THE EVENING, Disp: , Rfl:   •  Elastic Bandages & Supports (AIRCAST SPORT ANKLE BRACE/LEFT) MISC, by Does not apply route daily, Disp: 1 each, Rfl: 0  •  Elastic Bandages & Supports (KNEE BRACE/HINGED S/M) MISC, by Does not apply route daily, Disp: 1 each, Rfl: 0  •  furosemide (LASIX) 40 mg tablet, Take 40 mg by mouth daily as needed , Disp: , Rfl:   •  hydrALAZINE (APRESOLINE) 25 mg tablet, Take 25 mg by mouth 2 (two) times a day, Disp: , Rfl:   •  hydrALAZINE (APRESOLINE) 50 mg tablet, , Disp: , Rfl:   •  losartan (COZAAR) 100 MG tablet, Take 100 mg by mouth daily, Disp: , Rfl:   •  Mapap Arthritis Pain 650 MG CR tablet, Take 650 mg by mouth every 8 (eight) hours as needed, Disp: , Rfl:   •  metoprolol tartrate (LOPRESSOR) 100 mg tablet, , Disp: , Rfl:   •  montelukast (SINGULAIR) 10 mg tablet, Take 1 tablet by mouth daily, Disp: , Rfl:   •  multivitamin (THERAGRAN) TABS, Take 1 tablet by mouth daily, Disp: , Rfl:   •  omeprazole (PriLOSEC) 20 mg delayed release capsule, Take 20 mg by mouth daily, Disp: , Rfl:   •  sertraline (ZOLOFT) 50 mg tablet, TAKE 1 TABLET BY MOUTH EVERY MORNING, Disp: 90 tablet, Rfl: 3  •  atorvastatin (LIPITOR) 40 mg tablet, Take 40 mg by mouth daily (Patient not taking: Reported on 11/2/2022), Disp: , Rfl:   •  metoprolol tartrate (LOPRESSOR) 25 mg tablet, Take 50 mg by mouth 2 (two) times a day  (Patient not taking: Reported on 11/2/2022), Disp: , Rfl:       Objective    Vital Signs:   Vitals:    11/02/22 1252   BP: 118/60     [place] Sleepiness Scale: Total score: 5        Physical Exam:    General: Alert, appropriate, cooperative, overweight    Head: NC/AT    Skin: Warm, dry    Neuro: No motor abnormalities, cranial nerves appear intact    Extremity: No clubbing, cyanosis    PAP setting:   BiPAP 23/19 cm  DME Provider: [employer]  Test results:  AHI = 73 6    Counseling / Coordination of Care  Total clinic time spent today 15 minutes  A description of the counseling / coordination of care: Maintain compliance  Discussed equipment  JESSICA Washington    Board Certified Sleep Specialist

## 2022-11-03 ENCOUNTER — TELEPHONE (OUTPATIENT)
Dept: SLEEP CENTER | Facility: CLINIC | Age: 62
End: 2022-11-03

## 2022-11-04 NOTE — TELEPHONE ENCOUNTER
LATE ENTRY FROM 11/3/22:    Patient called right back  Patient confirmed that she received the Fran Lemus application that was mailed back to her  Patient stated that she completed the missing items on the application, but still needs to get a copy of her proof of age document  Patient stated that she hopes to get a copy over the weekend and mail it in by Monday  MSW will follow-up mid next week to ensure that she has submitted the application back to [facility]  Patient provided update on housing - stated that she has been in contact with the [facility] as well and that there are housing wait lists "everywhere"   Patient stated that she did submit her application back to the Housing Authority to be put on their waitlist

## 2022-11-09 NOTE — TELEPHONE ENCOUNTER
MSW attempted to reach patient at [phone] to follow-up about Jacquelin Jose application  No answer  MSW left a message requesting callback with this writer's direct callback number  Awaiting same

## 2022-11-10 NOTE — TELEPHONE ENCOUNTER
MSW phoned patient at [phone]  She stated that she has not yet been able to get a copy of her proof of age document to send with Mera Mast application  Patient stated that she will try to go this weekend  MSW will follow-up with patient next week

## 2022-11-17 NOTE — TELEPHONE ENCOUNTER
MSW phoned patient at [phone]  She just obtained photo ID this date  Patient stated that she will need to copy same and will get the Enrike Fisher application mailed out this weekend  MSW will follow-up with Enrike Fisher around 11/28 to see if they received the application back

## 2022-11-29 NOTE — TELEPHONE ENCOUNTER
MSW phoned Fran Lemus this date at [phone] and spoke with Padma Ford - he stated that they have not yet received the application back yet but it could still be in transit  MSW attempted to reach patient to confirm that she mailed back the Fran Lemus application and proof of age document  No answer at [phone]  MSW left a message requesting callback  Awaiting same

## 2022-12-02 NOTE — TELEPHONE ENCOUNTER
MSW attempted to reach patient to confirm that she mailed back the [employer] application and proof of age document  No answer at [phone]  MSW left a message requesting callback  Awaiting same

## 2022-12-05 ENCOUNTER — HOSPITAL ENCOUNTER (OUTPATIENT)
Dept: NEUROLOGY | Facility: CLINIC | Age: 62
Discharge: HOME | End: 2022-12-05

## 2022-12-05 DIAGNOSIS — R56.9 CONVULSIONS, UNSPECIFIED CONVULSION TYPE (HCC): ICD-10-CM

## 2022-12-06 ENCOUNTER — HOSPITAL ENCOUNTER (OUTPATIENT)
Dept: NEUROLOGY | Facility: CLINIC | Age: 62
Discharge: HOME | End: 2022-12-06

## 2022-12-06 DIAGNOSIS — R56.9 CONVULSIONS, UNSPECIFIED CONVULSION TYPE (HCC): ICD-10-CM

## 2022-12-08 NOTE — TELEPHONE ENCOUNTER
MSW phoned patient this date at [phone] to follow-up regarding Gleda Mercury application  Patient stated that she was not able to mail same as she misplaced the application  Patient requested to  another application at the [address] [place] [address] Neurology office, as she will be coming in on 12/9 or 12/12 to drop off [employer] forms  MSW left the application/copy of proof of age document in an envelope at the  for patient

## 2022-12-13 ENCOUNTER — TELEPHONE (OUTPATIENT)
Dept: NEUROLOGY | Facility: CLINIC | Age: 62
End: 2022-12-13

## 2022-12-13 NOTE — TELEPHONE ENCOUNTER
----- Message from Nehemias Goyal sent at 12/13/2022  8:35 AM EST -----  EEG normal  Follow up with Dr Glenna Castillo as scheduled     ----- Message -----  From: Miguel Garcia MD  Sent: 12/11/2022  10:24 PM EST  To: JUSTO Ozuna

## 2022-12-15 ENCOUNTER — TELEPHONE (OUTPATIENT)
Dept: NEUROLOGY | Facility: CLINIC | Age: 62
End: 2022-12-15

## 2022-12-15 NOTE — TELEPHONE ENCOUNTER
Seizure reporting form received by clinical team from   Antonina - agreeable to form completion?     Juan - please assist

## 2022-12-22 NOTE — TELEPHONE ENCOUNTER
MSW phoned patient this date at [phone]  Patient stated that she no longer wants to proceed with applying for Aimee Marcelo  Patient stated that she was seen in the office, and the provider will be filling out the [employer] forms to get her license back  Patient stated that, in the meantime, her family will provide transportation for her  MSW will be available to patient as needed

## 2023-01-13 NOTE — ASSESSMENT & PLAN NOTE
Patient with unclear history of seizures vs syncope presents to the office for follow up  Most recent event occurred when she was seen in the ER for a significant nose bleed when she reported feeling like she was going to have a seizure  There continues to be concern prodrome that this is syncope rather than seizure  No further episodes since last visit  Recent routine EEG was normal, ambulatory EEG ordered  Continue off of medications for now  If there are further events she will call the office  Follow up in 3 months with Dr Nely Rodriguez  May return to driving once she is event free for 6 months, paperwork can be submitted to [facility] at that time

## 2023-01-17 ENCOUNTER — OFFICE VISIT (OUTPATIENT)
Dept: PHYSICAL THERAPY | Facility: CLINIC | Age: 63
End: 2023-01-17

## 2023-01-17 DIAGNOSIS — M54.32 SCIATICA, LEFT SIDE: Primary | ICD-10-CM

## 2023-01-17 DIAGNOSIS — M25.552 LEFT HIP PAIN: ICD-10-CM

## 2023-01-17 NOTE — PROGRESS NOTES
PT EVALUATION    Today's date: 23  Patient name: Jia Mcdonnell  : 1960  MRN: [id number]  Referring provider: Kerwin Faulkner PA-C  Dx: No diagnosis found  Jia Mcdonnell is a 63 y o  female who presents with acute on chronic low back with recent falls  She has a history of vertigo but no symptoms with table tranfers or movements today  This patient would benefit from skilled physical therapy to address their listed impairments and functional limitations to maximize functional outcome  Impairments:    restricted ROM    decreased strength   pain with function   activity intolerance   weight bearing intolerance   abnormal gait     Prognosis:  Good  Positive and negative prognostic indicator(s):  none    Goals:    STG Patient is independent with HEP   STG Timed up and go improved to <20 seconds in 2 weeks  LTG Range of motion is improved by 50% in 4 weeks  LTG ADL performance improved to prior level of function in 6 weeks    Planned interventions:  home exercise program, patient education, manual therapy, massage, graded activity, flexibility, functional range of motion exercises, strengthening and abdominal trunk stabilization    Duration in visits:  8  Frequency: 2 visits per week  Duration in weeks:  4    History of Current Injury: Reports having a seizure in  and was in the hospital for 24 hours  She was later helping at her mother's house and fell down the stairs twice due to fatigue and vertigo  She went to the hospital after one of the falls and was released  Recently she was bending forward and was unable to stand without assistance due to severe low back pain  She was light headed yesterday when bending forward with a spinning sensation  Spinning can occur when walking and better when sitting and lying down      Pain Location: low back left posterior hip, posterior thigh to the knee   Provoked by: getting out of bed, bending over  Eased Positions: heating pads, propping her L leg up   Constitutional S/S: no red flags noted   Goals: improve ambulation, be able to walk for exercise       Aggravating factors: weight bearing activities, bending  Easing factors: lying down    Patient goals:  decreased pain, independence with ADLs, increased mobility and increased strength    Objective     Active Range of Motion     Lumbar   Extension: 10 degrees  with pain  Left lateral flexion: 11 degrees    with pain  Right lateral flexion: 10 degrees  with pain    Additional Active Range of Motion Details  Flexion not tested due to recent episodes of vertigo when bending forward  Pain felt with all trunk movements  Notes her left leg tends to be weaker than right  Shoulder bridge painful across the back  SLR: pain in the back bilaterally at 40 degrees    Strength/Myotome Testing     Left Hip   Planes of Motion   Flexion: 4  Extension: 4  Abduction: 3-  External rotation: 5  Internal rotation: 5    Right Hip   Planes of Motion   Flexion: 4  Extension: 4  Abduction: 3  External rotation: 5  Internal rotation: 5    Left Knee   Flexion: 3+  Extension: 5    Right Knee   Flexion: 5  Extension: 5    Left Ankle/Foot   Dorsiflexion: 3+  Plantar flexion: 5  Great toe extension: 5    Right Ankle/Foot   Dorsiflexion: 5  Plantar flexion: 5  Great toe extension: 5    Tests     Lumbar     Left   Positive passive SLR and slump test      Right   Positive crossed SLR     Negative passive SLR and slump test      General Comments:      Lumbar Comments  +TTP:  Right superior gluteals/piriformis, lumbar paraspinals L4-5          Precautions: hx seizures, HTN    Manuals 1/17            Left glut/piriformis STm SY            Left gapping prn                          Nu step/bike             Neuro Re-Ed                                                                                                        Ther Ex             LTR 20            Glut stretch :10x5            SKC :10x5            pball flexion roll outs head up             Other trunk ROM             clamshells             Hamstring tensioners supine 15                         Ther Activity                                       Gait Training                                       Modalities                                             OBJECTIVE:        Strength: MMT revealed the following findings    Joint Motion Right: 3/3/2022 Left: 3/3/2022   Hip Flexion 4/5 4/5   Hip Abduction 4/5 4/5   Hip Extension 3+/5 3-/5   Knee Extension 4+/5 4/5   Knee Flexion 4+/5 4+/5

## 2023-01-17 NOTE — LETTER
2023    Zackary Dunlap MD  [address]  [address] [facility]     Patient: Doris Loyd   YOB: 1960   Date of Visit: 2023     Encounter Diagnosis     ICD-10-CM    1  Sciatica, left side  M54 32       2  Left hip pain  M25 552           Dear Dr Claudeen Llanos:    Thank you for your recent referral of Doris Loyd  Please review the attached evaluation summary from [facility] recent visit  Please verify that you agree with the plan of care by signing the attached order  If you have any questions or concerns, please do not hesitate to call  I sincerely appreciate the opportunity to share in the care of one of your patients and hope to have another opportunity to work with you in the near future  Sincerely,    Kassie Salvador, PT      Referring Provider:      I certify that I have read the below Plan of Care and certify the need for these services furnished under this plan of treatment while under my care  Zackary Dunlap MD  [address]  [address] [address]  [address] [place] [address]  Via Fax: [phone]             PT EVALUATION    Today's date: 23  Patient name: Doris Loyd  : 1960  MRN: [id number]  Referring provider: Sachin Peralta PA-C  Dx: No diagnosis found  Doris Loyd is a 63 y o  female who presents with acute on chronic low back with recent falls  She has a history of vertigo but no symptoms with table tranfers or movements today  This patient would benefit from skilled physical therapy to address their listed impairments and functional limitations to maximize functional outcome      Impairments:    restricted ROM    decreased strength   pain with function   activity intolerance   weight bearing intolerance   abnormal gait     Prognosis:  Good  Positive and negative prognostic indicator(s):  none    Goals:    STG Patient is independent with HEP   STG Timed up and go improved to <20 seconds in 2 weeks  LTG Range of motion is improved by 50% in 4 weeks  LTG ADL performance improved to prior level of function in 6 weeks    Planned interventions:  home exercise program, patient education, manual therapy, massage, graded activity, flexibility, functional range of motion exercises, strengthening and abdominal trunk stabilization    Duration in visits:  8  Frequency: 2 visits per week  Duration in weeks:  4    History of Current Injury: Reports having a seizure in June and was in the hospital for 24 hours  She was later helping at her mother's house and fell down the stairs twice due to fatigue and vertigo  She went to the hospital after one of the falls and was released  Recently she was bending forward and was unable to stand without assistance due to severe low back pain  She was light headed yesterday when bending forward with a spinning sensation  Spinning can occur when walking and better when sitting and lying down      Pain Location: low back left posterior hip, posterior thigh to the knee   Provoked by: getting out of bed, bending over  Eased Positions: heating pads, propping her L leg up   Constitutional S/S: no red flags noted   Goals: improve ambulation, be able to walk for exercise       Aggravating factors: weight bearing activities, bending  Easing factors: lying down    Patient goals:  decreased pain, independence with ADLs, increased mobility and increased strength    Objective     Active Range of Motion     Lumbar   Extension: 10 degrees  with pain  Left lateral flexion: 11 degrees    with pain  Right lateral flexion: 10 degrees  with pain    Additional Active Range of Motion Details  Flexion not tested due to recent episodes of vertigo when bending forward  Pain felt with all trunk movements  Notes her left leg tends to be weaker than right  Shoulder bridge painful across the back  SLR: pain in the back bilaterally at 40 degrees    Strength/Myotome Testing     Left Hip   Planes of Motion Flexion: 4  Extension: 4  Abduction: 3-  External rotation: 5  Internal rotation: 5    Right Hip   Planes of Motion   Flexion: 4  Extension: 4  Abduction: 3  External rotation: 5  Internal rotation: 5    Left Knee   Flexion: 3+  Extension: 5    Right Knee   Flexion: 5  Extension: 5    Left Ankle/Foot   Dorsiflexion: 3+  Plantar flexion: 5  Great toe extension: 5    Right Ankle/Foot   Dorsiflexion: 5  Plantar flexion: 5  Great toe extension: 5    Tests     Lumbar     Left   Positive passive SLR and slump test      Right   Positive crossed SLR  Negative passive SLR and slump test      General Comments:      Lumbar Comments  +TTP:  Right superior gluteals/piriformis, lumbar paraspinals L4-5         Precautions: hx seizures, HTN    Manuals 1/17            Left glut/piriformis STm SY            Left gapping prn                          Nu step/bike             Neuro Re-Ed                                                                                                        Ther Ex             LTR 20            Glut stretch :10x5            SKC :10x5            pball flexion roll outs head up             Other trunk ROM             clamshells             Hamstring tensioners supine 15                         Ther Activity                                       Gait Training                                       Modalities                                            OBJECTIVE:        Strength: MMT revealed the following findings    Joint Motion Right: 3/3/2022 Left: 3/3/2022   Hip Flexion 4/5 4/5   Hip Abduction 4/5 4/5   Hip Extension 3+/5 3-/5   Knee Extension 4+/5 4/5   Knee Flexion 4+/5 4+/5

## 2023-01-19 ENCOUNTER — OFFICE VISIT (OUTPATIENT)
Dept: PHYSICAL THERAPY | Facility: CLINIC | Age: 63
End: 2023-01-19

## 2023-01-19 DIAGNOSIS — M25.552 LEFT HIP PAIN: Primary | ICD-10-CM

## 2023-01-19 DIAGNOSIS — M54.32 SCIATICA, LEFT SIDE: ICD-10-CM

## 2023-01-19 NOTE — PROGRESS NOTES
Daily Note     Today's date: 2023  Patient name: Kelechi Pruitt  : 1960  MRN: [id number]  Referring provider: Clarance Apgar  Dx:   Encounter Diagnosis     ICD-10-CM    1  Left hip pain  M25 552       2  Sciatica, left side  M54 32                      Subjective: Patient reports compliance with HEP; she notes general muscular soreness following IE  She complains of low back pain and LLE pain extending to knee at start of session  Objective: See treatment diary below      Assessment: Initiated PT POC as indicated and patient tolerated treatment well  Patient demonstrated fatigue post treatment and would benefit from continued PT  Progressed mobility exercises as charted and added core stabilization and light gluteal strengthening  Tender to area of L SIJ and piriformis with decreased pain post manual treatment  Monitor response to session and progress as appropriate  Plan: Progress treatment as tolerated         Precautions: hx seizures, HTN    Manuals            Left glut/piriformis STm SY MC           Left gapping prn  MC                        Nu step/bike  Bike 5' L3             Neuro Re-Ed                                                    PPT  1' :05 hold           PPT + march             Hip IR/ER isometrics  :05x2' ea                        Ther Ex             LTR 20 2'           Glut stretch :10x5            SKC :10x5 :10x5           pball flexion roll outs head up  2'            Pball curls  :05x2'           pball bridges  1'           clamshells  Uni fall outs in supine RTB 2' ea           Hamstring tensioners supine 15                         Ther Activity             Side stepping with resistance                          Gait Training                                       Modalities

## 2023-01-20 ENCOUNTER — APPOINTMENT (OUTPATIENT)
Dept: PHYSICAL THERAPY | Facility: CLINIC | Age: 63
End: 2023-01-20

## 2023-01-24 ENCOUNTER — APPOINTMENT (OUTPATIENT)
Dept: PHYSICAL THERAPY | Facility: CLINIC | Age: 63
End: 2023-01-24

## 2023-01-27 ENCOUNTER — APPOINTMENT (OUTPATIENT)
Dept: PHYSICAL THERAPY | Facility: CLINIC | Age: 63
End: 2023-01-27

## 2023-02-20 ENCOUNTER — OFFICE VISIT (OUTPATIENT)
Dept: NEUROLOGY | Facility: CLINIC | Age: 63
End: 2023-02-20

## 2023-02-20 VITALS
DIASTOLIC BLOOD PRESSURE: 80 MMHG | HEIGHT: 63 IN | OXYGEN SATURATION: 97 % | BODY MASS INDEX: 36.14 KG/M2 | HEART RATE: 76 BPM | TEMPERATURE: 97.9 F | WEIGHT: 204 LBS | SYSTOLIC BLOOD PRESSURE: 120 MMHG

## 2023-02-20 DIAGNOSIS — R55 VASOVAGAL SYNCOPE: Primary | ICD-10-CM

## 2023-02-20 DIAGNOSIS — E34.8 PINEAL GLAND CYST: ICD-10-CM

## 2023-02-20 DIAGNOSIS — F41.9 ANXIETY: ICD-10-CM

## 2023-02-20 NOTE — PROGRESS NOTES
[facility] [address] [facility]  Follow Up Visit    Impression/Plan    Ms Cldye Toledo is a 63 y o  female  with unclear possible history of seizures, but none recently while off all seizure medications for years  Some features of the semiology (lightheaded, nauseous prior) suggest syncope, but seizures remain possible  Records suggest that prior EEGs have been normal, but I only have access to a 2018 REEG  Diagnosis is uncertain, but events are controlled  The recent events were vasovagal syncope       There is chronic multifactorial gait instability and previously documented vertigo  She notes a general unsteadiness and imbalance present for more than 5 years  PT has been helpful in the past       Dr Damon Wilson had been prescribing sertraline for anxiety as well as headache prophylaxis  I prescribed this at her last visit  Her primary care provider has prescribed prn clonazepam in the past       Pineal cyst was stable on the last brain MRI completed in 1/2020  Patient Instructions   1  Let us know if there are seizures  2  Return in one year to see [facility], [address] [place] [address]  Diagnoses and all orders for this visit:    Vasovagal syncope    Anxiety  -     sertraline (ZOLOFT) 50 mg tablet; Take 1 tablet (50 mg total) by mouth every morning    Pineal gland cyst        Subjective    Epimenio Rayo is returning to the [facility] for follow up  Interval Events:   None  Vasovagal syncope was caused by epistaxis prior to last visit  MRI brain ordered last year has not been completed   She asks about status of pineal cyst      Event/Seizure semiology:  Lightheaded, nauseous, ammonia smell, staring, collapse, apparent convulsion       Special Features  Status epilepticus: reports cluster of 3 events many years ago when in the hospital  Self Injury Seizures: possible head injury  Precipitating Factors: unknown     Epilepsy Risk Factors:  Possible head injury  No family history   Normal birth and development       Prior AEDs:  Phenytoin, divalproex, phenobarbital ineffective  Lacosamide caused nausea and vomting     Prior Evaluation:  Brain MRI w/o 1/30/2020: unchanged, stable pineal cyst  Brain MRI ordered at last visit, not completed  CT head wo contrast 9/22/22: IMPRESSION:  No acute intracranial abnormality     Chronic microangiopathic changes      All prior EEGs thought to be normal  7/8/22 Routine EEG : Normal     Background from 6/23/2020 note:   "66 year old female with remote history of seizures for 20 years from [facility] to 2012 that did not respond to Dilantin, Vimpat, Depakote, and phenobarbital  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  Previous routine EEGs were all normal  Dr Rosaura Caballero formerly followed the patient and when he first saw her in January 2013, he tried her on the Vimpat but she developed nausea and vomiting, so she stopped taking it and has not been on seizure medication since then       She remained seizure free for 5 years, but on 11/5/17 she tripped and fell over a tree stump and she hit her head on concrete and she does not recall what happened for the next 30 minutes  Patient assumed she had had a seizure because she felt “headachy” when she woke up, but it is also possible that she developed headache from a concussion  I ordered an EEG when I saw her on 1/18/18 to check for any new abnormalities and if she did I would consider placing her on a seizure medication  Note that she has a history of kidney stones so should not be given Topamax or Zonegran       When I saw the patient on 2/8/18, I reviewed the results of her EEG which had been normal  I thought it best not to start the patient on seizure medication at that time  "  [id number] [place] [address] with son   He has ranch with no stairs        Objective    /80 (BP Location: Left arm, Patient Position: Sitting, Cuff Size: Standard)   Pulse 76   Temp 97 9 °F (36 6 °C) (Temporal)   Ht 5' 3" (1 6 m)   Wt 92 5 kg (204 lb)   SpO2 97%   BMI 36 14 kg/m²      General Exam  No acute distress  Neurologic Exam  Mental Status:  Alert and oriented  Language: normal fluency and comprehension  Cranial Nerves:  VFFTC  EOMI, no nystagums  Face symmetric  No dysarthria  Motor:  No drift  Variable effort with dorsiflexion, at least 4/5 bilaterally  Coordination: Finger to nose intact    Gait: uses cane, steady

## 2023-03-14 ENCOUNTER — TELEPHONE (OUTPATIENT)
Dept: NEUROLOGY | Facility: CLINIC | Age: 63
End: 2023-03-14

## 2023-03-14 NOTE — TELEPHONE ENCOUNTER
Recd vm:    Eh damon, this is Janki  You called me last night and said you were going to call me [phone] this morning to tell me what was going on  I'm supposed to go to get an Khari Rivas I today at [facility], please give me a call back so I know what's going on    [phone]    Patient asking if MRI was authorized as appointment is today  I let patient know prior authorization department will be reaching out to her shortly

## 2023-04-06 ENCOUNTER — HOSPITAL ENCOUNTER (OUTPATIENT)
Dept: MRI IMAGING | Facility: HOSPITAL | Age: 63
Discharge: HOME | End: 2023-04-06

## 2023-04-06 DIAGNOSIS — E34.8 PINEAL GLAND CYST: ICD-10-CM

## 2023-04-06 DIAGNOSIS — R56.9 SEIZURES (HCC): ICD-10-CM

## 2023-04-24 ENCOUNTER — TELEPHONE (OUTPATIENT)
Dept: NEUROLOGY | Facility: CLINIC | Age: 63
End: 2023-04-24

## 2023-04-24 NOTE — TELEPHONE ENCOUNTER
----- Message from Rosalind Day, [address] [place] [address] sent at 4/21/2023  4:50 PM EDT -----  No acute findings  ----- Message -----  From: Janak, Radiology Results In  Sent: 4/10/2023   3:46 PM EDT  To: Chivo Lenz

## 2023-05-22 ENCOUNTER — HOSPITAL ENCOUNTER (OUTPATIENT)
Dept: RADIOLOGY | Age: 63
Discharge: HOME | End: 2023-05-22

## 2023-05-22 DIAGNOSIS — Z12.31 ENCOUNTER FOR SCREENING MAMMOGRAM FOR MALIGNANT NEOPLASM OF BREAST: ICD-10-CM

## 2023-07-20 ENCOUNTER — TELEPHONE (OUTPATIENT)
Dept: SLEEP CENTER | Facility: CLINIC | Age: 63
End: 2023-07-20

## 2023-07-20 NOTE — TELEPHONE ENCOUNTER
Patient declined to schedule follow up appointment at this time -Patient stated she will call office back

## 2024-01-10 ENCOUNTER — TELEPHONE (OUTPATIENT)
Dept: NEUROLOGY | Facility: CLINIC | Age: 64
End: 2024-01-10

## 2024-01-10 NOTE — TELEPHONE ENCOUNTER
Spoke to patient and offered appointment with Antonina tomorrow in [place]. Patient refused. Patient said she was busy and is going to call back later today to schedule appointment.

## 2024-01-12 ENCOUNTER — OFFICE VISIT (OUTPATIENT)
Dept: SLEEP CENTER | Facility: CLINIC | Age: 64
End: 2024-01-12
Payer: MEDICARE

## 2024-01-12 VITALS
BODY MASS INDEX: 34.73 KG/M2 | WEIGHT: 196 LBS | DIASTOLIC BLOOD PRESSURE: 74 MMHG | SYSTOLIC BLOOD PRESSURE: 110 MMHG | HEIGHT: 63 IN

## 2024-01-12 DIAGNOSIS — I10 HYPERTENSION, UNSPECIFIED TYPE: ICD-10-CM

## 2024-01-12 DIAGNOSIS — G43.709 CHRONIC MIGRAINE WITHOUT AURA WITHOUT STATUS MIGRAINOSUS, NOT INTRACTABLE: ICD-10-CM

## 2024-01-12 DIAGNOSIS — G47.33 OBSTRUCTIVE SLEEP APNEA (ADULT) (PEDIATRIC): Primary | ICD-10-CM

## 2024-01-12 DIAGNOSIS — R56.9 SEIZURE-LIKE ACTIVITY (HCC): ICD-10-CM

## 2024-01-12 PROCEDURE — 99214 OFFICE O/P EST MOD 30 MIN: CPT | Performed by: STUDENT IN AN ORGANIZED HEALTH CARE EDUCATION/TRAINING PROGRAM

## 2024-01-12 NOTE — PROGRESS NOTES
"[facility]  Sleep Medicine Follow up/ Established Patient Visit      Assessment/Plan:  Melvi was seen today for follow-up.    Diagnoses and all orders for this visit:    Obstructive sleep apnea (adult) (pediatric)  -     Home Study; Future    Chronic migraine without aura without status migrainosus, not intractable  -     Home Study; Future    Seizure-like activity (HCC)  -     Home Study; Future    Hypertension, unspecified type  -     Home Study; Future      Janki is a 63-year-old woman with a PMHx of HTN, chronic migraine, concussion, seizure-like activity (following with neurology), who presents in follow up for obstructive sleep apnea (AHI 73.6, O2 keron 68% 2/2022).  She is not currently utilizing her BiPAP device; curiously, she states that she was informed by the DME that it was defective and was not sending the correct information, and as such needed to be collected for repairs; somebody from the DME then supposedly picked it up for that purpose in October 2023, and she \"never heard back from them about it.\"  Following my discussion with the DME representative here in the office, they were able to find that what was documented was that the patient signed off on returning the device due to inadequate compliance.  The patient is quite adamant for me today when asked about this that she was using it nightly and was deriving significant benefit, however it was not relaying the information correctly.  Regardless, she does express a desire to resume PAP therapy as soon as possible.    -- Discussed with patient the pathophysiology of OSAS and medical conditions associated with OSAS such as DM, HTN, CAD, Depression, Stroke, Headache.  -- I have ordered a home sleep apnea test to confirm her diagnosis of obstructive sleep apnea and thus requalify her for PAP therapy.  -- Advised patient to avoid activities that could harm self or others when tired/sleepy, including driving.  -- " "Encouraged maintaining normal weight  -- Discussed importance of good sleep hygiene.  -- Pending the results of the sleep study, we will plan to order bilevel PAP at her previous settings (23/19 cm H2O) with a subsequent compliance follow-up.          ________________________________________________________________________________________________    Per Last Visit Note (Date: 11/2/2022):  Reason for Visit:  62 y.o.female here for PAP compliance check     Assessment:  Doing well with new PAP device.   Sleep quality is improved and the patient feels less drowsy  Compliance data show utilization for greater than or equal to 70% of nights, for greater than or equal to 4 hours per night.     Plan:  Adequate compliance and successful treatment     Follow up:  One year      Sleep Studies:  -Extended diagnostic sleep study/double study 2/3/2022:  minutes, sleep efficiency 71.8%.  No stage N3, reduced stage REM noted.  Sleep onset latency 48.2 minutes, REM onset latency 88 minutes.  AHI 73.6, supine AHI 80.4, REM AHI 67.3.  O2 ekron 68%.  PLM index 0.  No seizure activity noted on EEG.    -PAP titration study 2/12/2022: Bilevel PAP at a PAP pressure of 23/19 a limited respiratory events the most effectively.    ________________________________________________________________________________________________      Interval History: Melvi Sultana is a 63 y.o. female with a PMHx of HTN, chronic migraine, concussion, \"seizure-like activity,\" who presents in follow up for obstructive sleep apnea (AHI 73.6, O2 keron 68% 2/2022).    SDB:  -Current experience with PAP Therapy: Hasn't had machine since October 2023; was told that it \"wasn't working at all,\" it was picked up to be fixed, has not gotten a replacement yet. Now feeling unrefreshed/sleepy with no energy, getting nosebleeds without device.  -Mask type: FFM  -Difficulties with mask: By the end, was \"moving around, with leaks.\"  -Device: Prema; received ~1 year ago. " "23/19 cmH2O.  -Difficulties with device: See above  -Compliance:  N/A.    Of note, claims that she was using it daily but the device was not registering it. She wants to use it.         Changes to PMH, PSH, SH: Denies     SLEEP RELATED ROS    Allergies   Allergen Reactions    Iodinated Contrast Media Anaphylaxis    Cortisone     Iodine - Food Allergy     Lidocaine     Novocain [Procaine]     Other      \"GENERAL ANESTHESIA\"    Phenytoin        CURRENT MEDICATIONS:  Current Outpatient Medications   Medication Instructions    amLODIPine (NORVASC) 10 mg tablet 1 tablet, Oral, Daily, PT states taking 40 mg    atorvastatin (LIPITOR) 80 mg tablet No dose, route, or frequency recorded.    atorvastatin (LIPITOR) 40 mg, Oral, Daily    azithromycin (ZITHROMAX) 250 mg tablet TAKE 2 TABLETS BY MOUTH TODAY, THEN TAKE 1 TABLET DAILY FOR 4 DAYS    celecoxib (CELEBREX) 200 mg, Oral, Daily    clonazePAM (KlonoPIN) 0.5 mg tablet 1 tablet, Oral, 2 times daily    clotrimazole (LOTRIMIN) 1 % cream APPLY TO AFFECTED AREA TWICE A DAY IN THE MORNING AND IN THE EVENING    Elastic Bandages & Supports (AIRCAST SPORT ANKLE BRACE/LEFT) MISC Does not apply, Daily    Elastic Bandages & Supports (KNEE BRACE/HINGED S/M) MISC Does not apply, Daily    furosemide (LASIX) 40 mg, Oral, Daily PRN    hydrALAZINE (APRESOLINE) 50 mg tablet No dose, route, or frequency recorded.    hydrALAZINE (APRESOLINE) 25 mg, Oral, 2 times daily    losartan (COZAAR) 100 mg, Oral, Daily    Mapap Arthritis Pain 650 mg, Oral, Every 8 hours PRN    metoprolol tartrate (LOPRESSOR) 50 mg, 2 times daily    metoprolol tartrate (LOPRESSOR) 100 mg, Oral, Daily    montelukast (SINGULAIR) 10 mg tablet 1 tablet, Oral, Daily    multivitamin (THERAGRAN) TABS 1 tablet, Oral, Daily    omeprazole (PRILOSEC) 20 mg, Oral, Daily    sertraline (ZOLOFT) 50 mg, Oral, Every morning           PHYSICAL EXAMINATION:  Vital Signs: /74   Ht 5' 3\" (1.6 m)   Wt 88.9 kg (196 lb)   BMI 34.72 kg/m² "     Constitutional: NAD, well appearing   Mental Status: AAOx3  Skin: Warm, dry, no rashes noted   Eyes: PERRL, normal conjunctiva  ENT: Nasal congestion absent, nasal valve incompetence absent.  Posterior Airspace:   Jain Tongue Position: 4  Retrognathia: absent  Overbite: absent  High Arched Palate: absent  Tongue Scalloping/Ridging: present  Uvula: normal  Chest: No evidence of respiratory distress, no accessory muscle use; no evidence of peripheral cyanosis  Abdomen: Soft, NT/ND  Extremities: No digital clubbing or pedal edema  Neuro: Strength 5/5 throughout, sensation grossly intact      I have spent a total time of 30-40 minutes on 01/12/24 in caring for this patient including Prognosis, Risks and benefits of tx options, Instructions for management, Patient and family education, Importance of tx compliance, Risk factor reductions, Counseling / Coordination of care, Documenting in the medical record, Reviewing / ordering tests, medicine, procedures  , Obtaining or reviewing history  , and Communicating with other healthcare professionals .        Electronically signed by:    Bobby South DO  Board-Certified Neurology and Sleep Medicine  [facility]  01/12/24

## 2024-01-12 NOTE — PATIENT INSTRUCTIONS
-I have ordered a repeat sleep study in order to re-qualify you for CPAP, as we discussed  -I am ordering a Home Sleep Apnea Test; this should be scheduled today  -When this is positive, I will re-order BiPAP  -If I am told that you need a titration study to qualify for BiPAP, we will cross that bridge when/if it comes  -Plan to follow-up after receipt of the PAP device

## 2024-01-18 ENCOUNTER — TELEPHONE (OUTPATIENT)
Dept: NEUROLOGY | Facility: CLINIC | Age: 64
End: 2024-01-18

## 2024-01-24 ENCOUNTER — OFFICE VISIT (OUTPATIENT)
Dept: NEUROLOGY | Facility: CLINIC | Age: 64
End: 2024-01-24
Payer: MEDICARE

## 2024-01-24 VITALS
BODY MASS INDEX: 34.92 KG/M2 | DIASTOLIC BLOOD PRESSURE: 74 MMHG | WEIGHT: 197.1 LBS | HEIGHT: 63 IN | TEMPERATURE: 98.1 F | SYSTOLIC BLOOD PRESSURE: 132 MMHG

## 2024-01-24 DIAGNOSIS — E34.8 PINEAL GLAND CYST: ICD-10-CM

## 2024-01-24 DIAGNOSIS — H92.09 EARACHE: ICD-10-CM

## 2024-01-24 DIAGNOSIS — F41.9 ANXIETY: ICD-10-CM

## 2024-01-24 DIAGNOSIS — R42 VERTIGO: ICD-10-CM

## 2024-01-24 DIAGNOSIS — G47.33 OSA (OBSTRUCTIVE SLEEP APNEA): ICD-10-CM

## 2024-01-24 DIAGNOSIS — R56.9 SEIZURE-LIKE ACTIVITY (HCC): Primary | ICD-10-CM

## 2024-01-24 PROCEDURE — 99215 OFFICE O/P EST HI 40 MIN: CPT | Performed by: NURSE PRACTITIONER

## 2024-01-24 NOTE — PROGRESS NOTES
"Patient ID: Melvi Sultana is a 63 y.o. female with unclear possible history of seizures, but none recently while off all seizure medications for years, who is returning to Neurology office for follow up of her seizures.     Assessment/Plan:    Seizure-like activity (HCC)  Patient with unclear history of seizures vs syncope presents to the office for follow up.  No further episodes since last visit. Prodrome to more recent events (over 1 year ago) more concerning for syncope. She does report a \"near one\" when her blood pressure was reportedly 270/110 but was likely symptoms related to extremely elevated blood pressure.      Most recent routine EEG was normal, ambulatory EEG ordered. Continue off of medications for now. If there are further events she will call the office. Follow up in 3 months with Dr Carrera. May return to driving once she is event free for 6 months, paperwork can be submitted to [facility] at that time.     Vertigo  Ongoing issues with vertigo and earaches. Given referral to ENT.     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     VALERY (obstructive sleep apnea)  Working on getting new CPAP machine. Following with sleep medicine.     Anxiety  Given refill of sertraline which neurology has been prescribing for quite some time. Request PCP takes over this script in the future.         She will Return in about 1 year (around 1/24/2025) for Follow up with Dr Carrera.      Subjective:  Melvi Sultana is a 63 y.o. female with unclear possible history of seizures, but none recently while off all seizure medications for years, who is returning to Neurology office for follow up of her seizures. Last seen by Dr Carrear 2/20/23. At that time, some features of the semiology (lightheaded, nauseous prior) suggest syncope, but seizures remain possible. Records suggest that prior EEGs have been normal,  only have access to a 2018 REEG. Diagnosis is uncertain, but events are controlled. The recent " "events were vasovagal syncope.  Recommended 1 year follow up,    Since their last visit, there have been no further events concerning for seizure. Recent MRI in April 2023 with stable pineal cyst. She does report concern regarding nosebleed prior to syncopal event. There have been no further episodes of epistaxis. She reports that her vision is not as good since that nose bleed. Has astigmatism in bilateral eyes and has cataracts. Can not see at night with driving.     She has not had her CPAP since October 9th. CPAP was not working and believes that the machine was recalled. She reports she was crying in the office. She keeps waking up with nosebleeds. She is very hyperfixated about the CPAP.     She has not had any clear seizures since her last visit. She did have a \"near one\" when she reportedly had a BP of 270/110. Saw her PCP about one month ago and blood pressures have been better. She reports that she has had blood pressures in the past in 300s.     She does not drive.     Current seizure medications:  - none  Other medications as per Epic.    Event/Seizure semiology:  Lightheaded, nauseous, ammonia smell, staring, collapse, apparent convulsion.      Special Features  Status epilepticus: reports cluster of 3 events many years ago when in the hospital  Self Injury Seizures: possible head injury  Precipitating Factors: unknown     Epilepsy Risk Factors:  Possible head injury  No family history   Normal birth and development.      Prior AEDs:  Phenytoin, divalproex, phenobarbital ineffective  Lacosamide caused nausea and vomting     Prior Evaluation:  Brain MRI w/o 1/30/2020: unchanged, stable pineal cyst  Brain MRI w/p 4/6/23: unchanged pineal cyst, white matter changes suggetsive of chronic microangiopathy.   CT head wo contrast 9/22/22:  IMPRESSION:  No acute intracranial abnormality.    Chronic microangiopathic changes.     All prior EEGs thought to be normal  7/8/2022: Routine EEG : Normal  12/6/2022: 24 " "hour ambulatory EEG: Normal     Background from 6/23/2020 note:   \"60 year old female with remote history of seizures for 20 years from 1992 to 2012 that did not respond to Dilantin, Vimpat, Depakote, and phenobarbital. MRI in 2012 showed an 8.6 mm pineal cyst. Previous routine EEGs were all normal. Dr. Aguirre formerly followed the patient and when he first saw her in January 2013, he tried her on the Vimpat but she developed nausea and vomiting, so she stopped taking it and has not been on seizure medication since then.      She remained seizure free for 5 years, but on 11/5/17 she tripped and fell over a tree stump and she hit her head on concrete and she does not recall what happened for the next 30 minutes. Patient assumed she had had a seizure because she felt “headachy” when she woke up, but it is also possible that she developed headache from a concussion. I ordered an EEG when I saw her on 1/18/18 to check for any new abnormalities and if she did I would consider placing her on a seizure medication. Note that she has a history of kidney stones so should not be given Topamax or Zonegran.      When I saw the patient on 2/8/18, I reviewed the results of her EEG which had been normal. I thought it best not to start the patient on seizure medication at that time. \"         Social History:   Lives Alone: No.     I reviewed prior neurology notes, most recent labs, as documented in Epic/AudioCaseFiles, and summarized above.        Objective:    Blood pressure 132/74, temperature 98.1 °F (36.7 °C), temperature source Temporal, height 5' 3\" (1.6 m), weight 89.4 kg (197 lb 1.6 oz).    Physical Exam  No apparent distress.   Appears well nourished.   Mood appropriate for situation     Neurologic Exam  Mental status- alert and oriented to person, place, and time. Speech appropriate for conversation. Good attention and knowledge.     Cranial Nerves- PERRL, EOMS normal, facial sensation and muscles symmetric, hearing intact " bilaterally to finger rubs, tongue midline, palate rise symmetrical, shoulder shrug symmetrical.    Motor- No pronator drift. Appropriate strength. Moves all extremities freely. No tremor.    Sensory-  Intact distally in all extremities to light touch.     DTRs- 2+ and symmetric in all extremities.     Gait- normal casual gait. Normal tandem gait. Negative rhomberg.     Coordination- FNF intact.     ROS:  Review of Systems   Constitutional:  Negative for appetite change, fatigue and fever.   HENT: Negative.  Negative for hearing loss, tinnitus, trouble swallowing and voice change.    Eyes:  Positive for visual disturbance (Blurred vision). Negative for photophobia and pain.   Respiratory: Negative.  Negative for shortness of breath.    Cardiovascular: Negative.  Negative for palpitations.   Gastrointestinal: Negative.  Negative for nausea and vomiting.   Endocrine: Negative.  Negative for cold intolerance.   Genitourinary: Negative.  Negative for dysuria, frequency and urgency.   Musculoskeletal:  Negative for back pain, gait problem, myalgias, neck pain and neck stiffness.   Skin: Negative.  Negative for rash.   Allergic/Immunologic: Negative.    Neurological: Negative.  Negative for dizziness, tremors, seizures, syncope, facial asymmetry, speech difficulty, weakness, light-headedness, numbness and headaches.   Hematological: Negative.  Does not bruise/bleed easily.   Psychiatric/Behavioral: Negative.  Negative for confusion, hallucinations and sleep disturbance.    All other systems reviewed and are negative.     ROS obtained by MA and reviewed by myself.     This note may have been created using voice recognition software. There may be unintentional errors such as grammatical errors, spelling errors, or pronoun errors.

## 2024-01-24 NOTE — PATIENT INSTRUCTIONS
- Work on getting the CPAP - I sent a message to our nurses to see about getting this expedited  - Sertraline was refilled for you  - Keep blood pressure controlled  - Call the office with possible seizures or concerns  - Follow up in 1 year with Dr Carrera

## 2024-01-24 NOTE — PROGRESS NOTES
Review of Systems   Constitutional:  Negative for appetite change, fatigue and fever.   HENT: Negative.  Negative for hearing loss, tinnitus, trouble swallowing and voice change.    Eyes:  Positive for visual disturbance (Blurred vision). Negative for photophobia and pain.   Respiratory: Negative.  Negative for shortness of breath.    Cardiovascular: Negative.  Negative for palpitations.   Gastrointestinal: Negative.  Negative for nausea and vomiting.   Endocrine: Negative.  Negative for cold intolerance.   Genitourinary: Negative.  Negative for dysuria, frequency and urgency.   Musculoskeletal:  Negative for back pain, gait problem, myalgias, neck pain and neck stiffness.   Skin: Negative.  Negative for rash.   Allergic/Immunologic: Negative.    Neurological: Negative.  Negative for dizziness, tremors, seizures, syncope, facial asymmetry, speech difficulty, weakness, light-headedness, numbness and headaches.   Hematological: Negative.  Does not bruise/bleed easily.   Psychiatric/Behavioral: Negative.  Negative for confusion, hallucinations and sleep disturbance.    All other systems reviewed and are negative.

## 2024-01-25 PROBLEM — F41.9 ANXIETY: Status: ACTIVE | Noted: 2024-01-25

## 2024-01-25 PROBLEM — H92.09 EARACHE: Status: ACTIVE | Noted: 2024-01-25

## 2024-01-25 NOTE — ASSESSMENT & PLAN NOTE
"Patient with unclear history of seizures vs syncope presents to the office for follow up.  No further episodes since last visit. Prodrome to more recent events (over 1 year ago) more concerning for syncope. She does report a \"near one\" when her blood pressure was reportedly 270/110 but was likely symptoms related to extremely elevated blood pressure.      Most recent routine EEG was normal, ambulatory EEG ordered. Continue off of medications for now. If there are further events she will call the office. Follow up in 3 months with Dr Carrera. May return to driving once she is event free for 6 months, paperwork can be submitted to [facility] at that time.   "

## 2024-01-25 NOTE — ASSESSMENT & PLAN NOTE
Given refill of sertraline which neurology has been prescribing for quite some time. Request PCP takes over this script in the future.

## 2024-03-17 ENCOUNTER — HOSPITAL ENCOUNTER (OUTPATIENT)
Dept: SLEEP CENTER | Facility: CLINIC | Age: 64
Discharge: HOME | End: 2024-03-17

## 2024-03-17 DIAGNOSIS — G47.33 OBSTRUCTIVE SLEEP APNEA (ADULT) (PEDIATRIC): ICD-10-CM

## 2024-03-17 DIAGNOSIS — G43.709 CHRONIC MIGRAINE WITHOUT AURA WITHOUT STATUS MIGRAINOSUS, NOT INTRACTABLE: ICD-10-CM

## 2024-03-17 DIAGNOSIS — I10 HYPERTENSION, UNSPECIFIED TYPE: ICD-10-CM

## 2024-03-17 DIAGNOSIS — R56.9 SEIZURE-LIKE ACTIVITY (HCC): ICD-10-CM

## 2024-03-17 NOTE — PROGRESS NOTES
Home Sleep Study Documentation    HOME STUDY DEVICE: Noxturnal no                                           Brianna G3 yes      Pre-Sleep Home Study:    Set-up and instructions performed by: KS    Technician performed demonstration for Patient: no    Return demonstration performed by Patient: no    Written instructions provided to Patient: no    Patient signed consent form: no    Patient left without seen. Patient is unable to return unit following morning (3/18/24) because she does not drive. Will reschedule and will need a Friday night appointment to  unit.

## 2024-03-18 ENCOUNTER — TELEPHONE (OUTPATIENT)
Dept: SLEEP CENTER | Facility: CLINIC | Age: 64
End: 2024-03-18

## 2024-03-18 DIAGNOSIS — G43.109 MIGRAINE WITH AURA AND WITHOUT STATUS MIGRAINOSUS, NOT INTRACTABLE: ICD-10-CM

## 2024-03-18 DIAGNOSIS — G47.33 OSA (OBSTRUCTIVE SLEEP APNEA): Primary | ICD-10-CM

## 2024-03-18 DIAGNOSIS — R56.9 SEIZURE-LIKE ACTIVITY (HCC): ICD-10-CM

## 2024-03-18 NOTE — TELEPHONE ENCOUNTER
Patient called and stated that she was schedule for a home sleep study last night in the [facility] office. She said she did not take the home sleep study equipment because she was told she had to bring the machine back by 8am. Patient stated that she does not drive and has to get a ride. Patient would like to have an in lab study instead. I asked patient if she would be able to have a ride home since study will end by 6am she stated that she would just have to wait for her son to pick her up. Patient would like a call back at [phone].

## 2024-03-18 NOTE — TELEPHONE ENCOUNTER
Order placed for in-lab diagnostic study.    Call placed to patient.  Scheduled diagnostic sleep study 6/21/2024 in [place], patient added to wait list for sooner study in [place] or [place], Friday night study only.

## 2024-03-18 NOTE — TELEPHONE ENCOUNTER
Returned call from patient, advised request for in-lab PSG was forwarded to Dr. South.    Patient requesting telephone call to assist with scheduling once order is provided.    Dr. South notified.

## 2024-06-21 ENCOUNTER — CONSULT (OUTPATIENT)
Dept: MULTI SPECIALTY CLINIC | Facility: CLINIC | Age: 64
End: 2024-06-21

## 2024-06-21 VITALS
TEMPERATURE: 98.1 F | HEART RATE: 65 BPM | SYSTOLIC BLOOD PRESSURE: 136 MMHG | WEIGHT: 197 LBS | DIASTOLIC BLOOD PRESSURE: 74 MMHG | BODY MASS INDEX: 34.9 KG/M2

## 2024-06-21 DIAGNOSIS — H92.09 EARACHE: ICD-10-CM

## 2024-06-21 DIAGNOSIS — J30.1 NON-SEASONAL ALLERGIC RHINITIS DUE TO POLLEN: ICD-10-CM

## 2024-06-21 DIAGNOSIS — H10.13 ALLERGIC CONJUNCTIVITIS OF BOTH EYES: Primary | ICD-10-CM

## 2024-06-21 PROCEDURE — 99203 OFFICE O/P NEW LOW 30 MIN: CPT

## 2024-06-21 RX ORDER — OLOPATADINE HYDROCHLORIDE 1 MG/ML
1 SOLUTION/ DROPS OPHTHALMIC 2 TIMES DAILY
Qty: 5 ML | Refills: 3 | Status: SHIPPED | OUTPATIENT
Start: 2024-06-21

## 2024-06-21 RX ORDER — AZELASTINE 1 MG/ML
1 SPRAY, METERED NASAL 2 TIMES DAILY
Qty: 30 ML | Refills: 3 | Status: SHIPPED | OUTPATIENT
Start: 2024-06-21

## 2024-06-21 NOTE — PROGRESS NOTES
Consultation - Otolaryngology - Head and Neck Surgery  Facial Plastic and Reconstructive Surgery  Melvi Sultana 64 y.o. female MRN: [id number]  Encounter: [id number]        Assessment/Plan:  1. Allergic conjunctivitis of both eyes  olopatadine (PATANOL) 0.1 % ophthalmic solution      2. Earache  Ambulatory Referral to Otolaryngology      3. Vertigo  Ambulatory Referral to Otolaryngology      4. Non-seasonal allergic rhinitis due to pollen  azelastine (ASTELIN) 0.1 % nasal spray          Allergic rhinitis: We discussed the nature of allergic rhinitis.  We discussed options for management including avoidance of triggers, use of over-the-counter antihistamines, nasal steroids, allergy testing, and immunotherapy including sublingual and subcutaneous immunotherapy.  We discussed risks, benefits, and alternatives to these.  Allergy testing was offered. She elects for flonase, azelastine and pataday. She will contact office if she wishes to pursue allergy testing.       History of Present Illness   Physician Requesting Consult: Elizabet Diana MD   Reason for Consult / Principal Problem: Ear fullness and congestion   HPI: Melvi Sultana is a 64 y.o. year old female who presents with earaches with sinus infections. Patient reports she gets sinus infections with changes in season and if she opens windows. Last infection 2 weeks ago and went away with z pack. Has a hx of seasonal allergies but has not been allergy testing due to needles/ stress triggering her seizures. Currently takes Singular x 20 years. Has taken zyrtec or allergra as needed but previously had bad uncontrolled HTN. Used flonase prn. Additionally, has running eyes and increase in eye bags. That she is concerned are from her sinuses. No nasal surgeries. No nasal injuries. No imaging of sinuses     Of note, has hx of goiter. PCP ordered thyroid US.       Review of systems:  10 Point ROS was performed and negative except as above or otherwise noted  in the medical record.    Historical Information   Past Medical History:   Diagnosis Date    Bladder prolapse, female, acquired     Epilepsy (HCC)     Hypertension     Kidney stone     Tear of MCL (medial collateral ligament) of knee      Past Surgical History:   Procedure Laterality Date    HYSTERECTOMY      age 44    OOPHORECTOMY Bilateral     age 44     Social History   Social History     Substance and Sexual Activity   Alcohol Use Yes    Comment: rarely     Social History     Substance and Sexual Activity   Drug Use Yes    Types: Marijuana    Comment: Occasionally for stress     Social History     Tobacco Use   Smoking Status Never   Smokeless Tobacco Never     Family History:   Family History   Problem Relation Age of Onset    Diabetes Mother     Hypertension Mother     Thyroid disease Mother     Kidney disease Mother     Diabetes Father     Hypertension Father     No Known Problems Daughter     Heart attack Maternal Grandmother     No Known Problems Maternal Grandfather     Diabetes Paternal Grandmother     Kidney cancer Paternal Grandmother     No Known Problems Paternal Grandfather     No Known Problems Son     Breast cancer Paternal Aunt         bilateral    No Known Problems Paternal Aunt     No Known Problems Paternal Aunt     No Known Problems Paternal Aunt     No Known Problems Paternal Aunt     Kidney cancer Paternal Aunt     Diabetes Paternal Aunt        Current Outpatient Medications on File Prior to Visit   Medication Sig    amLODIPine (NORVASC) 10 mg tablet Take 1 tablet by mouth daily PT states taking 40 mg    atorvastatin (LIPITOR) 40 mg tablet Take 40 mg by mouth daily    atorvastatin (LIPITOR) 80 mg tablet  (Patient not taking: Reported on 2/20/2023)    azithromycin (ZITHROMAX) 250 mg tablet TAKE 2 TABLETS BY MOUTH TODAY, THEN TAKE 1 TABLET DAILY FOR 4 DAYS (Patient not taking: Reported on 2/20/2023)    celecoxib (CeleBREX) 200 mg capsule Take 1 capsule (200 mg total) by mouth daily (Patient  "not taking: Reported on 2/20/2023)    clonazePAM (KlonoPIN) 0.5 mg tablet Take 1 tablet by mouth 2 (two) times a day    clotrimazole (LOTRIMIN) 1 % cream     Elastic Bandages & Supports (AIRCAST SPORT ANKLE BRACE/LEFT) MISC by Does not apply route daily (Patient not taking: Reported on 1/12/2024)    Elastic Bandages & Supports (KNEE BRACE/HINGED S/M) MISC by Does not apply route daily (Patient not taking: Reported on 1/12/2024)    furosemide (LASIX) 40 mg tablet Take 40 mg by mouth daily as needed     hydrALAZINE (APRESOLINE) 25 mg tablet Take 25 mg by mouth 2 (two) times a day    hydrALAZINE (APRESOLINE) 50 mg tablet  (Patient not taking: Reported on 2/20/2023)    losartan (COZAAR) 100 MG tablet Take 100 mg by mouth daily (Patient not taking: Reported on 1/24/2024)    Mapap Arthritis Pain 650 MG CR tablet Take 650 mg by mouth every 8 (eight) hours as needed    metoprolol tartrate (LOPRESSOR) 100 mg tablet Take 100 mg by mouth in the morning    metoprolol tartrate (LOPRESSOR) 25 mg tablet Take 50 mg by mouth 2 (two) times a day  (Patient not taking: Reported on 11/2/2022)    montelukast (SINGULAIR) 10 mg tablet Take 1 tablet by mouth daily    multivitamin (THERAGRAN) TABS Take 1 tablet by mouth daily    omeprazole (PriLOSEC) 20 mg delayed release capsule Take 20 mg by mouth daily    sertraline (ZOLOFT) 50 mg tablet Take 1 tablet (50 mg total) by mouth every morning     No current facility-administered medications on file prior to visit.       Allergies   Allergen Reactions    Iodinated Contrast Media Anaphylaxis    Cortisone     Iodine - Food Allergy     Lidocaine     Novocain [Procaine]     Other      \"GENERAL ANESTHESIA\"    Phenytoin        Vitals:    06/21/24 1316   BP: 136/74   Pulse: 65   Temp: 98.1 °F (36.7 °C)       Physical Exam   Constitutional: Oriented to person, place, and time. Well-developed and well-nourished, no apparent distress, non-toxic appearance. Cooperative, able to hear and answer questions " without difficulty.    Voice: Normal voice quality.  Head: Normocephalic, atraumatic.  No scars, masses or lesions.  Face: Symmetric, no edema, no sinus tenderness.  Eyes: Vision grossly intact, extra-ocular movement intact.  Ears: External ears normal. Tympanic membranes intact with intact normal landmarks.  No post-auricular erythema or tenderness.  Nose: Septum intact, nares clear.  Mucosa moist, turbinates well appearing.  No crusting, polyps or discharge evident.  Oral cavity: Dentition intact.  Mucosa moist, lips without lesions or masses.  Tongue mobile, floor of mouth soft and flat.  Hard palate intact.  No masses or lesions.   Oropharynx: Uvula is midline, soft palate intact without lesion or mass.  Oropharyngeal inlet without obstruction.  Tonsils unremarkable.  Posterior pharyngeal wall clear. No masses or lesions.  Salivary glands:  Parotid glands and submandibular glands symmetric, no enlargement or tenderness.  Neck: Normal laryngeal elevation with swallow.  Trachea midline.  No masses or lesions. No palpable adenopathy.  Thyroid: Without tenderness or palpable nodules. Diffusely enlarged goiter.    Pulmonary/Chest: Normal effort and rate. No respiratory distress. No stertor or stridor  Musculoskeletal: Normal range of motion.   Neurological: Cranial nerves 2-12 intact.  Skin: Skin is warm and dry.   Psychiatric: Normal mood and affect.        Imaging Studies: I have personally reviewed pertinent reports.      Lab Results: I have personally reviewed pertinent lab results.      Records reviewed: None

## 2024-09-14 DIAGNOSIS — J30.1 NON-SEASONAL ALLERGIC RHINITIS DUE TO POLLEN: ICD-10-CM

## 2024-09-16 RX ORDER — AZELASTINE HYDROCHLORIDE 137 UG/1
SPRAY, METERED NASAL
Qty: 30 ML | Refills: 2 | Status: SHIPPED | OUTPATIENT
Start: 2024-09-16

## 2024-10-06 ENCOUNTER — HOSPITAL ENCOUNTER (OUTPATIENT)
Dept: SLEEP CENTER | Facility: CLINIC | Age: 64
Discharge: HOME | End: 2024-10-06
Payer: MEDICARE

## 2024-10-06 DIAGNOSIS — G47.33 OSA (OBSTRUCTIVE SLEEP APNEA): ICD-10-CM

## 2024-10-06 DIAGNOSIS — R56.9 SEIZURE-LIKE ACTIVITY (HCC): ICD-10-CM

## 2024-10-06 DIAGNOSIS — G43.109 MIGRAINE WITH AURA AND WITHOUT STATUS MIGRAINOSUS, NOT INTRACTABLE: ICD-10-CM

## 2024-10-06 PROCEDURE — 95810 POLYSOM 6/> YRS 4/> PARAM: CPT

## 2024-10-07 NOTE — PROGRESS NOTES
Sleep Study Documentation    Pre-Sleep Study       Sleep testing procedure explained to patient:YES    Patient napped prior to study:NO    Caffeine:Dayshift worker after 12PM.  Caffeine use:NO    Alcohol:Dayshift workers after 5PM: Alcohol use:NO    Typical day for patient:YES       Study Documentation    Sleep Study Indications: The patient is here for     Sleep Study: Diagnostic   Snore:Mild  Supplemental O2: no    O2 flow rate (L/min) range N/A  O2 flow rate (L/min) final N/A  Minimum SaO2 81  Baseline SaO2 93    Mode of Therapy:N/A    EKG abnormalities: no     EEG abnormalities: no    Were abnormal behaviors in sleep observed:NO    Is Total Sleep Study Recording Time < 2 hours: N/A    Is Total Sleep Study Recording Time > 2 hours but study is incomplete: N/A    Is Total Sleep Study Recording Time 6 hours or more but sleep was not obtained: NO    Patient classification: retired       Post-Sleep Study    Medication used at bedtime or during sleep study:NO    Patient reports time it took to fall asleep:30 to 60 minutes    Patient reports waking up during study:1 to 2 times.  Patient reports returning to sleep in 10 to 30 minutes.    Patient reports sleeping 6 to 8 hours with dreaming.    Does the Patient feel this is a typical night of sleep:better than usual    Patient rated sleepiness: Somewhat sleepy or tired    PAP treatment:no.

## 2024-10-23 ENCOUNTER — TELEPHONE (OUTPATIENT)
Dept: SLEEP CENTER | Facility: CLINIC | Age: 64
End: 2024-10-23

## 2024-10-23 NOTE — TELEPHONE ENCOUNTER
Please verify with patient: I am okay with this, however she was on a very high, Bilevel PAP pressure in the past, and to qualify for this again a PAP Titration would be needed. Starting AutoPAP has a higher likelihood in her case of being more uncomfortable until we can refine settings. If PAP Titration is ordered. I can request that her be expedited due to the severity of her VALERY.

## 2024-10-23 NOTE — TELEPHONE ENCOUNTER
"Incoming call from patient looking for sleep study results and to get a machine.     Sleep study resulted and shows severe sleep apnea with AHI 53.4. REM AHI 55.1. CPAP Titration recommeded.     Message to patient from Dr. South giving results of sleep study and recommendation to have titration study done, but did offer to start patient on PAP therapy and then get titration study done in the future.     Patient states \" I need this right away I have not had a machine in over a year and I need to get it soon\". Advised patient I will let Dr. South know she would like her machine right away. And have it delivered to her home.  Only needs machine has plenty of other supplies.     Patient chose to use [facility] as DME provider.    Compliance follow up visit scheduled for 3/19/2025 @ 12:30 PM with Dr. South in the [place]    "

## 2024-10-24 NOTE — PROGRESS NOTES
PT Re-Evaluation     Today's date: 2018  Patient name: Stephanie Rm  : 1960  MRN: [id number]  Referring provider: Coretha Snellen, DO  Dx:   Encounter Diagnosis     ICD-10-CM    1  Sprain of medial collateral ligament of left knee, subsequent encounter [id number] [id number] PT plan of care cert/re-cert       Start Time: 1410  Stop Time: 1450  Total time in clinic (min): 40 minutes    Assessment  Impairments: activity intolerance, impaired balance, impaired physical strength, lacks appropriate home exercise program and pain with function    Assessment details: Stephanie Rm is a 56 y o  female who presents with:  Sprain of medial collateral ligament of left knee, subsequent encounter  (primary encounter diagnosis)    Venancio Leventhal presents with the impairments as listed above and would benefit from Physical Therapy to address these impairments in order to return to their prior level of function  Understanding of Dx/Px/POC: good   Prognosis: fair    Goals  Short-Term Goals:   1  Increase L knee strength to 4+/5 4 weeks   2  Increase L hip abd strength to 4+/5 4 weeks    Long-Term Goals:   1  Pt able to ambulate community distances w/o brace 4-6 weeks   2  Pt able to complete ADLS w/o pain 4 weeks   3  Patient independent with HEP at time of discharge     4  Patient able to walk 1 mile on her [place] 6 weeks      Plan  Patient would benefit from: skilled PT  Planned therapy interventions: manual therapy, neuromuscular re-education, strengthening, stretching, graded activity, graded exercise, home exercise program, therapeutic exercise, therapeutic activities and patient education  Frequency: 2x week  Duration in weeks: 6  Treatment plan discussed with: patient  Plan details: Pt will be treated for 6 weeks and transitioned towards independent home exercise program          Subjective Evaluation    History of Present Illness  Mechanism of injury: Pt notes not being able to schedule appointments since march Reason for call:   [x] Refill   [] Prior Auth  [] Other:     Office:   [x] PCP/Provider -   [] Specialty/Provider -     Medication: valACYclovir (VALTREX) 500 mg Take 1 tablet (500 mg total) by mouth 2 (two) times a day       [facility]: [facility]     Does the patient have enough for 3 days?   [] Yes   [x] No - Send as HP to POD    due to being too busy with caring for her mother  Pt reports she was going to go for a walk last week and being unable to due to feeling light headed  Reports wearing a "copper fit" brace when she's more active and then the "doctor's brace" when she's on her feet longer  Reports being limited due to "my knee and leg swelling" "My coordination is bad"  Pt reports difficulty with step negotiation, prolonged ambulation, and balance  Subjective since last re-evaluation:  Since her injury, pt notes 75-80% improvement with remaining 25-20% being due to the " difficulty with carrying anything heavy as well as the feeling of imbalance and incoordination " Continues to report feelings of giving out  Pt reports it's tough getting out of bed in the morning, "once I get moving it feels better "  I still wear the compression brace  Pain  Current pain ratin  At best pain ratin  At worst pain ratin  Location: L knee  Relieving factors: rest  Aggravating factors: standing, walking and stair climbing  Progression: improved    Social Support    Employment status: working (works as a )  Treatments  Previous treatment: physical therapy  Current treatment: physical therapy  Patient Goals  Patient goals for therapy: decreased pain, improved balance, increased motion, increased strength, independence with ADLs/IADLs and return to sport/leisure activities  Patient goal: "get back to my normal mile walk"         Objective     Active Range of Motion   Left Knee   Flexion: 130 degrees WFL and with pain  Extension: 0 degrees     Right Knee   Flexion: 130 degrees   Extension: 0 degrees     Strength/Myotome Testing     Left Hip   Planes of Motion   Flexion: 4  Abduction: 4    Right Hip   Planes of Motion   Flexion: 4  Abduction: 4    Left Knee   Flexion: 4  Extension: 4  Quadriceps contraction: good    Right Knee   Flexion: 4+  Extension: 4+  Quadriceps contraction: good    Tests     Left Hip   Positive Ely's  Right Hip   Positive Ely's  Left Knee   Positive medial Deb  Additional Tests Details  120* knee flexion ely's   130* knee flexion Ely's  Tenderness to palpation MCL, however pt's R knee was also tenderness noted over MCL  Medial joint line tenderness L knee  SL balance bilaterally >45 seconds, hip and ankle strategy noted         Flowsheet Rows      Most Recent Value   PT/OT G-Codes   Current Score  43   Projected Score  53   FOTO information reviewed  Yes   Assessment Type  Evaluation   G code set  Mobility: Walking & Moving Around   Mobility: Walking and Moving Around Current Status ()  CK   Mobility: Walking and Moving Around Goal Status ()  CK        Precautions: Epileptic, HTN, LBP poor tolerance to SL     Daily Treatment Diary      Manual  1/30  2/13  2/15  2/20               Passive flexion nv  8 min  np np                                                                                                                      Exercise Diary  4/26        3/1  3/6  3/8   SLR flx * nv          3x12  1# 2x10   Supine Clams         3x12 GTB  3x12 GTB  20x5" BTB   Bridges* nv        3x12 12#  3x12 12#  3x15 12#   LAQ                TG                STS  nv        3x12 8#  3x12 8#  2x10 10#                                     HS stretch seated* nv        3x30" 3x30"  3x30"   Calf stretch*                Quad stretch 3x30"        3x30" 3x30"                      Step ups fwd nv        np 3x12 6"  8" 2x10   Step ups lat nv        np 3x12 6"  8" 2x10   Lateral step overs nv              lateral walking nv         3 laps PTB  3 laps PTB                     line walking w high stepping over cones           3 laps  3 laps                     Balance                        SL                        Tandem                                                *=on hep     Modalities

## 2024-10-29 DIAGNOSIS — G47.33 OSA (OBSTRUCTIVE SLEEP APNEA): Primary | ICD-10-CM

## 2024-10-29 NOTE — TELEPHONE ENCOUNTER
Call to patient and is agreeable to do PAP titration, states she will call to make arrangements for transportation once scheduled. Informed patient as soon as order is placed we will call back to schedule it.

## 2024-10-30 NOTE — TELEPHONE ENCOUNTER
Call placed to patient, scheduled CPAP titration study 3/27/2025 in [place].  Added to wait list for sooner study date.  Patient only wants [place] and requests 72 hours notice when offering study dates, due to transportation issues.

## 2024-12-04 ENCOUNTER — HOSPITAL ENCOUNTER (OUTPATIENT)
Dept: SLEEP CENTER | Facility: CLINIC | Age: 64
Discharge: HOME | End: 2024-12-04
Payer: MEDICARE

## 2024-12-04 DIAGNOSIS — G47.33 OSA (OBSTRUCTIVE SLEEP APNEA): ICD-10-CM

## 2024-12-04 PROCEDURE — 95811 POLYSOM 6/>YRS CPAP 4/> PARM: CPT | Performed by: STUDENT IN AN ORGANIZED HEALTH CARE EDUCATION/TRAINING PROGRAM

## 2024-12-04 PROCEDURE — 95811 POLYSOM 6/>YRS CPAP 4/> PARM: CPT

## 2024-12-05 NOTE — PROGRESS NOTES
Sleep Study Documentation    Pre-Sleep Study       Sleep testing procedure explained to patient:YES    Patient napped prior to study:NO    Caffeine:Dayshift worker after 12PM.  Caffeine use:YES- tea  6 ounces    Alcohol:Dayshift workers after 5PM: Alcohol use:NO    Typical day for patient:YES       Study Documentation    Sleep Study Indications: Severe obstructive sleep apnea demonstrated on diagnostic study performed 10/6/24.     Sleep Study: Treatment   Optimal PAP pressure: 14cm   Leak:Small  Snore:Eliminated  REM Obtained:yes  Supplemental O2: no    Minimum SaO2 89%  Baseline SaO2 93%  PAP mask tried (list all) ResMed Airtouch F20 (small)   PAP mask choice (final) ResMed AirTouch F20 (small)   PAP mask type:full face  PAP pressure at which snoring was eliminated 14cm   Minimum SaO2 at final PAP pressure 91%  Mode of Therapy:CPAP  ETCO2:No  CPAP changed to BiPAP:No          EKG abnormalities: no     EEG abnormalities: no    Were abnormal behaviors in sleep observed:NO    Is Total Sleep Study Recording Time < 2 hours: N/A    Is Total Sleep Study Recording Time > 2 hours but study is incomplete: N/A    Is Total Sleep Study Recording Time 6 hours or more but sleep was not obtained: NO    Patient classification: disabled       Post-Sleep Study    Medication used at bedtime or during sleep study:YES other prescription medications    Patient reports time it took to fall asleep:greater than 60 minutes    Patient reports waking up during study:Denied    Patient reports sleeping 4 to 6 hours with dreaming.    Does the Patient feel this is a typical night of sleep:typical    Patient rated sleepiness: Somewhat sleepy or tired    PAP treatment:yes: Post PAP treatment patient reports feeling unchanged and would wear PAP mask at home.

## 2024-12-06 ENCOUNTER — RESULTS FOLLOW-UP (OUTPATIENT)
Dept: SLEEP CENTER | Facility: CLINIC | Age: 64
End: 2024-12-06

## 2024-12-06 DIAGNOSIS — G47.33 OSA (OBSTRUCTIVE SLEEP APNEA): Primary | ICD-10-CM

## 2024-12-19 ENCOUNTER — TELEPHONE (OUTPATIENT)
Dept: SLEEP CENTER | Facility: CLINIC | Age: 64
End: 2024-12-19

## 2024-12-19 NOTE — TELEPHONE ENCOUNTER
CPAP titration study resulted, previously confirmed sleep disordered breathing was effectively treated with PAP therapy.    CPAP recommended and prescription provided.    Call placed to patient, left call back message.    Flexion message sent providing results, recommendations and instructions.    Added to Teams DME chat for order processing.    Compliance follow-up with Dr. South 3/19/2024.

## 2024-12-20 ENCOUNTER — TELEPHONE (OUTPATIENT)
Dept: SLEEP CENTER | Facility: CLINIC | Age: 64
End: 2024-12-20

## 2024-12-20 LAB
DME PARACHUTE DELIVERY DATE REQUESTED: NORMAL
DME PARACHUTE ITEM DESCRIPTION: NORMAL
DME PARACHUTE ORDER STATUS: NORMAL
DME PARACHUTE SUPPLIER NAME: NORMAL
DME PARACHUTE SUPPLIER PHONE: NORMAL

## 2024-12-20 NOTE — TELEPHONE ENCOUNTER
Return call from patient.     Patient choosing to stay with [facility] as DME provider. Phone number given    Compliance appointment already scheduled with Dr. South on 3/19/2025 @ 12:30 pm in the [place] office.   .

## 2024-12-26 ENCOUNTER — TELEPHONE (OUTPATIENT)
Dept: SLEEP CENTER | Facility: CLINIC | Age: 64
End: 2024-12-26

## 2024-12-30 LAB

## 2025-01-10 LAB

## 2025-03-19 ENCOUNTER — OFFICE VISIT (OUTPATIENT)
Dept: SLEEP CENTER | Facility: CLINIC | Age: 65
End: 2025-03-19
Payer: MEDICARE

## 2025-03-19 VITALS
DIASTOLIC BLOOD PRESSURE: 70 MMHG | WEIGHT: 192 LBS | BODY MASS INDEX: 34.02 KG/M2 | SYSTOLIC BLOOD PRESSURE: 132 MMHG | HEIGHT: 63 IN

## 2025-03-19 DIAGNOSIS — G47.33 OSA (OBSTRUCTIVE SLEEP APNEA): Primary | ICD-10-CM

## 2025-03-19 DIAGNOSIS — E66.811 CLASS 1 OBESITY WITH SERIOUS COMORBIDITY AND BODY MASS INDEX (BMI) OF 34.0 TO 34.9 IN ADULT, UNSPECIFIED OBESITY TYPE: ICD-10-CM

## 2025-03-19 PROCEDURE — G2211 COMPLEX E/M VISIT ADD ON: HCPCS | Performed by: STUDENT IN AN ORGANIZED HEALTH CARE EDUCATION/TRAINING PROGRAM

## 2025-03-19 PROCEDURE — 99214 OFFICE O/P EST MOD 30 MIN: CPT | Performed by: STUDENT IN AN ORGANIZED HEALTH CARE EDUCATION/TRAINING PROGRAM

## 2025-03-19 NOTE — PATIENT INSTRUCTIONS
Continue PAP Therapy  Continue AutoPAP at settings of 13-20 cmH2O  Remember to clean your mask and equipment regularly, as directed.  I am ordering a formal mask fitting appointment; this is an appointment with the DME to ensure that you have the optimal mask and fit for your face structure    You should be eligible for new supplies approximately every 3-6 months, depending on your insurance coverage. Contact your Durable Medical Equipment (DME) company for new supplies as needed.  Practice good Sleep Hygiene, as outlined below.  Follow up in 6 months.      Care and Maintenance  Headgear should be washed as needed. Daily inspection and weekly washings are recommended. Do not disassemble the straps. Machine wash in warm water, making sure to attach Velcro hooks and tabs before washing. Line dry or machine dry on a low setting.  Masks should be washed every day. Daily inspection is recommended. Leave the mask and tubing attached. Gently wash the mask with a soft cloth using warm water and mild detergent, concentrating on the mask cushion flaps. DO NOT use alcohol or bleach. Rinse thoroughly and air dry.  Tubing and headgear should be washed weekly. Daily inspection is recommended. Wash in warm water and mild detergent and rinse thoroughly. Hook the tubing to the machine and blow until dry.  Humidifier should be washed daily and filled with DISTILLED water before use. Wash with warm water and mild detergent. Rinse thoroughly and air dry.  Disposable filters should be replaced once a month. Wash reusable foam filters with warm water and mild detergent at least once a month. Rinse thoroughly and dry with paper towels.  Avoid  that contain fragrance or conditioners, as these will leave a residue.  NEVER iron any soft goods.      CMS Requirements    Your insurance requires a face-to-face follow up visit within a 31-90 day period after starting CPAP.  Your insurance requires compliance with CPAP, which is at least 4  hours per night for 70% of the time. This must be done over a 30 day period and must occur within the initial 31-90 day period after starting CPAP.  Your insurance also requires at least yearly follow ups to continue to pay for CPAP supplies.       PAP Supply Guidelines    Below are the guidelines for reordering your supplies. You will be responsible for your deductible, co payments, and out of pocket expenses.    Item Frequency   Nasal Mask (no headgear) 1 every 3 months   Nasal Mask Cushion 1 every 2 weeks   Full Face Mask (no headgear) 1 every 3 months   Full Face Mask Cushion 1 every month   Nasal Pillows 1 every 2 weeks   Headgear 1 every 6 months   hin Strap 1 every 6 months   fritz 1 every 3 months   Filters: Reusable 1 every 6 months   Filters: Disposable 1 every 2 weeks   Humidifier Chamber(disposable) 1 every 6 months         Good Sleep Hygiene  Wake up at the same time every day, even on the weekends.  Use your bed for sleep and intimacy only.  If you have been in bed awake for 30 minutes, get up and leave the bedroom. Choose a dull activity not involving a blue screen (TV, computer, handheld devices). Go back to bed when you feel sleepy.  Avoid caffeine, nicotine and alcohol before you go to bed.  Avoid large meals before you go to bed.  Avoid using screens (computers, tablets, smartphones, etc.) for at least 1 hour before bedtime  Exercise regularly, but do not exercise right before you go to bed.  Avoid daytime naps. If you do take a nap, sleep for 20-40 minutes, and not after 2pm.

## 2025-03-19 NOTE — PROGRESS NOTES
Name: Melvi Sultana      : 1960      MRN: [id number]  Encounter Provider: Bobby South DO  Encounter Date: 3/19/2025   Encounter department: [facility] SLEEP MEDICINE [facility]  :  Assessment & Plan  VALERY (obstructive sleep apnea)  Melvi is a pleasant 64-year-old woman with a PMHx of HTN, chronic migraine, concussion, seizure-like activity (follows with neurology), obesity who presents in follow up for VALERY (AHI 53.4, O2 keron 79% 10/2024).  She is doing markedly better at today's appointment than she had been in the past, and indeed, does endorse that she was going through some severely stressful life events at the time of her last appointment.  Regardless, she is doing quite well with her new device, with no major concerns for me today.  However, it is worth noting that there is significant mask leak noted on her compliance report.    Compliance report reviewed and analyzed  Continue AutoPAP at adjusted settings of  13-20 cmH2O  Mask fit ordered today  Prescription for new supplies ordered today  Reviewed CMS/insurance requirements and resupply guidelines  Information provided on the above as well as general maintenance steps  Recommended maintaining good Sleep Hygiene    Orders:    Mask fitting only; Future    PAP DME Pressure Change    PAP DME Resupply/Reorder    Class 1 obesity with serious comorbidity and body mass index (BMI) of 34.0 to 34.9 in adult, unspecified obesity type    Discussed the importance of maintaining a healthy weight on SDB control/management, and vice versa.  Encouraged lifestyle practices to maintain a healthy weight (including diet, exercise).  Reviewed that if patient ever wishes for a formal referral to Weight Management, they can let myself or their PCP know           Follow-up:  She will Return in about 6 months (around 2025).      ________________________________________________________________________________________________    Per Last Visit Note (Date:  "1/12/2024):  Janki is a 63-year-old woman with a PMHx of HTN, chronic migraine, concussion, seizure-like activity (following with neurology), who presents in follow up for obstructive sleep apnea (AHI 73.6, O2 keron 68% 2/2022).  She is not currently utilizing her BiPAP device; curiously, she states that she was informed by the DME that it was defective and was not sending the correct information, and as such needed to be collected for repairs; somebody from the DME then supposedly picked it up for that purpose in October 2023, and she \"never heard back from them about it.\"  Following my discussion with the DME representative here in the office, they were able to find that what was documented was that the patient signed off on returning the device due to inadequate compliance.  The patient is quite adamant for me today when asked about this that she was using it nightly and was deriving significant benefit, however it was not relaying the information correctly.  Regardless, she does express a desire to resume PAP therapy as soon as possible.     -- Discussed with patient the pathophysiology of OSAS and medical conditions associated with OSAS such as DM, HTN, CAD, Depression, Stroke, Headache.  -- I have ordered a home sleep apnea test to confirm her diagnosis of obstructive sleep apnea and thus requalify her for PAP therapy.  -- Advised patient to avoid activities that could harm self or others when tired/sleepy, including driving.  -- Encouraged maintaining normal weight  -- Discussed importance of good sleep hygiene.  -- Pending the results of the sleep study, we will plan to order bilevel PAP at her previous settings (23/19 cm H2O) with a subsequent compliance follow-up.      Sleep Studies:  -Extended diagnostic sleep study/double study 2/3/2022:  minutes, sleep efficiency 71.8%.  No stage N3, reduced stage REM noted.  Sleep onset latency 48.2 minutes, REM onset latency 88 minutes.  AHI 73.6, supine AHI " 80.4, REM AHI 67.3.  O2 keron 68%.  PLM index 0.  No seizure activity noted on EEG.     -PAP titration study 2/12/2022: Bilevel PAP at a PAP pressure of 23/19 a limited respiratory events the most effectively.     -PSG 10/7/2024: BMI: 34.9. TST: 322.5 minutes, Sleep efficiency: 73%. Sleep Onset Latency: 63 minutes, REM Onset Latency: 82 minutes. AHI: 53.4, Supine AHI: --, REM AHI: 55.1. O2 keron: 79%, Time below 90%: 21.8 minutes. PLM Index: 0, PLM Arousal Index: 0.      -PAP Titration Study 12/5/2024: BMI 34.9. Titration started at 4 cmH2O, titrated to 14 cmH2O. Best pressure noted to be 14 cmH2O. of note, the study was somewhat limited due to prolonged sleep onset latency and poor sleep efficiency.    ________________________________________________________________________________________________      Interval History: Melvi Sultana is a 64 y.o. female with a PMHx of HTN, chronic migraine, concussion, seizure-like activity (follows with neurology), obesity who presents in follow up for VALERY (AHI 53.4, O2 keron 79% 10/2024).      SDB:  -Current experience with PAP Therapy: Doing well  -Mask type: Full-face mask  -Difficulties with mask: Some marks from the mask; if not tight, then notes air leak  -Device: ResMed AirSense 11; received 12/2024.  -Difficulties with device: Denies  -DME: [facility]  -Compliance:          SLEEP SCHEDULE:  Bedtime: 2330   Time it takes to fall sleep: 16-30 minutes  Wake up Time: 0730   Number of times patient wakes up per night: 0-1  Reason (s) why patient wakes up during the night: Cat, other environmental, restroom    Estimated total sleep time ( in a 24 hour period of time): ~8 hours       Changes to PMH, PSH, SH: Mother with dementia now in a SNF; at last visit she was dealing with trying to care for her       Sitting and reading: Would never doze  Watching TV: Would never doze  Sitting, inactive in a public place (e.g. a theatre or a meeting): Would never doze  As a passenger  in a car for an hour without a break: Would never doze  Lying down to rest in the afternoon when circumstances permit: Slight chance of dozing  Sitting and talking to someone: Would never doze  Sitting quietly after a lunch without alcohol: Would never doze  In a car, while stopped for a few minutes in traffic: Would never doze  Total score: 1     Review of Systems  Pertinent positives/negatives included in HPI and also as noted:     Current Outpatient Medications on File Prior to Visit   Medication Sig Dispense Refill    amLODIPine (NORVASC) 10 mg tablet Take 1 tablet by mouth daily PT states taking 40 mg      atorvastatin (LIPITOR) 40 mg tablet Take 40 mg by mouth daily      Azelastine HCl 137 MCG/SPRAY SOLN 1 SPRAY INTO EACH NOSTRIL 2 (TWO) TIMES A DAY USE IN EACH NOSTRIL AS DIRECTED 30 mL 2    clotrimazole (LOTRIMIN) 1 % cream       furosemide (LASIX) 40 mg tablet Take 40 mg by mouth daily as needed       hydrALAZINE (APRESOLINE) 25 mg tablet Take 25 mg by mouth 2 (two) times a day      losartan (COZAAR) 100 MG tablet Take 100 mg by mouth daily      Mapap Arthritis Pain 650 MG CR tablet Take 650 mg by mouth every 8 (eight) hours as needed      metoprolol tartrate (LOPRESSOR) 100 mg tablet Take 100 mg by mouth in the morning      montelukast (SINGULAIR) 10 mg tablet Take 1 tablet by mouth daily      multivitamin (THERAGRAN) TABS Take 1 tablet by mouth daily      atorvastatin (LIPITOR) 80 mg tablet  (Patient not taking: Reported on 2/20/2023)      azithromycin (ZITHROMAX) 250 mg tablet TAKE 2 TABLETS BY MOUTH TODAY, THEN TAKE 1 TABLET DAILY FOR 4 DAYS (Patient not taking: Reported on 2/20/2023)      celecoxib (CeleBREX) 200 mg capsule Take 1 capsule (200 mg total) by mouth daily (Patient not taking: Reported on 2/20/2023) 30 capsule 1    clonazePAM (KlonoPIN) 0.5 mg tablet Take 1 tablet by mouth 2 (two) times a day (Patient not taking: Reported on 3/19/2025)      Elastic Bandages & Supports (AIRCAST SPORT ANKLE  "BRACE/LEFT) MISC by Does not apply route daily (Patient not taking: Reported on 1/12/2024) 1 each 0    Elastic Bandages & Supports (KNEE BRACE/HINGED S/M) MISC by Does not apply route daily (Patient not taking: Reported on 1/12/2024) 1 each 0    hydrALAZINE (APRESOLINE) 50 mg tablet  (Patient not taking: Reported on 2/20/2023)      metoprolol tartrate (LOPRESSOR) 25 mg tablet Take 50 mg by mouth 2 (two) times a day  (Patient not taking: Reported on 11/2/2022)      olopatadine (PATANOL) 0.1 % ophthalmic solution Administer 1 drop to both eyes 2 (two) times a day (Patient not taking: Reported on 3/19/2025) 5 mL 3    omeprazole (PriLOSEC) 20 mg delayed release capsule Take 20 mg by mouth daily (Patient not taking: Reported on 3/19/2025)      sertraline (ZOLOFT) 50 mg tablet Take 1 tablet (50 mg total) by mouth every morning (Patient not taking: Reported on 3/19/2025) 90 tablet 3     No current facility-administered medications on file prior to visit.      Objective   /70   Ht 5' 3\" (1.6 m)   Wt 87.1 kg (192 lb)   BMI 34.01 kg/m²        Physical Exam  PHYSICAL EXAMINATION:  Vital Signs: /70   Ht 5' 3\" (1.6 m)   Wt 87.1 kg (192 lb)   BMI 34.01 kg/m²     Constitutional: NAD, well appearing   Mental Status: AAOx3  Skin: Warm, dry, no rashes noted   Eyes: PERRL, normal conjunctiva  Chest: No evidence of respiratory distress, no accessory muscle use; no evidence of peripheral cyanosis  Abdomen: Soft, NT/ND  Extremities: No digital clubbing or pedal edema  Neuro: Strength 5/5 throughout, sensation grossly intact    Data  Lab Results   Component Value Date    HGB 13.9 09/23/2022    HCT 40.3 09/23/2022    MCV 90 09/23/2022      Lab Results   Component Value Date    CALCIUM 9.7 07/12/2024    K 4.1 07/12/2024    CO2 25 07/12/2024     07/12/2024    BUN 12 07/12/2024    CREATININE 0.58 07/12/2024     No results found for: \"IRON\", \"TIBC\", \"FERRITIN\"  Lab Results   Component Value Date    AST 15 07/12/2024    " ALT 20 07/12/2024         Electronically signed by:    Bobby South DO  Board-Certified Neurology and Sleep Medicine  [facility]  03/19/25

## 2025-03-19 NOTE — ASSESSMENT & PLAN NOTE
Melvi is a pleasant 64-year-old woman with a PMHx of HTN, chronic migraine, concussion, seizure-like activity (follows with neurology), obesity who presents in follow up for VALERY (AHI 53.4, O2 keron 79% 10/2024).  She is doing markedly better at today's appointment than she had been in the past, and indeed, does endorse that she was going through some severely stressful life events at the time of her last appointment.  Regardless, she is doing quite well with her new device, with no major concerns for me today.  However, it is worth noting that there is significant mask leak noted on her compliance report.    Compliance report reviewed and analyzed  Continue AutoPAP at adjusted settings of 13-20 cmH2O  Mask fit ordered today  Prescription for new supplies ordered today  Reviewed CMS/insurance requirements and resupply guidelines  Information provided on the above as well as general maintenance steps  Recommended maintaining good Sleep Hygiene    Orders:    Mask fitting only; Future    PAP DME Pressure Change    PAP DME Resupply/Reorder

## 2025-03-20 ENCOUNTER — TELEPHONE (OUTPATIENT)
Dept: SLEEP CENTER | Facility: CLINIC | Age: 65
End: 2025-03-20

## 2025-03-24 LAB
DME PARACHUTE DELIVERY DATE REQUESTED: NORMAL
DME PARACHUTE DELIVERY DATE REQUESTED: NORMAL
DME PARACHUTE ITEM DESCRIPTION: NORMAL
DME PARACHUTE ORDER STATUS: NORMAL
DME PARACHUTE ORDER STATUS: NORMAL
DME PARACHUTE SUPPLIER NAME: NORMAL
DME PARACHUTE SUPPLIER NAME: NORMAL
DME PARACHUTE SUPPLIER PHONE: NORMAL
DME PARACHUTE SUPPLIER PHONE: NORMAL

## 2025-06-27 DIAGNOSIS — J30.1 NON-SEASONAL ALLERGIC RHINITIS DUE TO POLLEN: ICD-10-CM

## 2025-06-30 RX ORDER — AZELASTINE HYDROCHLORIDE 137 UG/1
SPRAY, METERED NASAL
Qty: 30 ML | Refills: 2 | Status: SHIPPED | OUTPATIENT
Start: 2025-06-30